# Patient Record
Sex: FEMALE | Race: WHITE | NOT HISPANIC OR LATINO | Employment: UNEMPLOYED | URBAN - NONMETROPOLITAN AREA
[De-identification: names, ages, dates, MRNs, and addresses within clinical notes are randomized per-mention and may not be internally consistent; named-entity substitution may affect disease eponyms.]

---

## 2017-09-02 ENCOUNTER — HOSPITAL ENCOUNTER (EMERGENCY)
Facility: HOSPITAL | Age: 22
Discharge: HOME | End: 2017-09-02
Attending: EMERGENCY MEDICINE | Admitting: EMERGENCY MEDICINE

## 2017-09-02 VITALS
HEART RATE: 102 BPM | TEMPERATURE: 98.1 F | RESPIRATION RATE: 20 BRPM | WEIGHT: 128 LBS | BODY MASS INDEX: 21.85 KG/M2 | DIASTOLIC BLOOD PRESSURE: 85 MMHG | OXYGEN SATURATION: 100 % | SYSTOLIC BLOOD PRESSURE: 130 MMHG | HEIGHT: 64 IN

## 2017-09-02 DIAGNOSIS — L03.114 CELLULITIS OF LEFT HAND: Primary | ICD-10-CM

## 2017-09-02 DIAGNOSIS — T80.818A INJECTION SITE EXTRAVASATION, INITIAL ENCOUNTER: ICD-10-CM

## 2017-09-02 PROCEDURE — 99284 EMERGENCY DEPT VISIT MOD MDM: CPT

## 2017-09-02 RX ORDER — CLINDAMYCIN HYDROCHLORIDE 150 MG/1
600 CAPSULE ORAL ONCE
Status: COMPLETED | OUTPATIENT
Start: 2017-09-02 | End: 2017-09-02

## 2017-09-02 RX ORDER — ACETAMINOPHEN 325 MG/1
1000 TABLET ORAL ONCE
Status: COMPLETED | OUTPATIENT
Start: 2017-09-02 | End: 2017-09-02

## 2017-09-02 RX ORDER — CLINDAMYCIN HYDROCHLORIDE 300 MG/1
300 CAPSULE ORAL 4 TIMES DAILY
Qty: 14 CAPSULE | Refills: 0 | OUTPATIENT
Start: 2017-09-02 | End: 2019-04-18

## 2017-09-02 RX ADMIN — CLINDAMYCIN HYDROCHLORIDE 600 MG: 150 CAPSULE ORAL at 04:24

## 2017-09-02 RX ADMIN — ACETAMINOPHEN 975 MG: 325 TABLET ORAL at 04:24

## 2017-09-02 NOTE — ED NOTES
Fetal Heart Tones found left suprapubic at 148bpm, provider made aware.      Mary De Dios RN  09/02/17 7408

## 2017-09-02 NOTE — ED PROVIDER NOTES
Subjective   Patient is a 21 y.o. female presenting with upper extremity pain.   History provided by:  Patient   used: No    Upper Extremity Issue   Location:  Hand  Hand location:  L hand  Injury: yes    Time since incident:  12 hours  Mechanism of injury comment:  Injected suboxone into left wrist   Pain details:     Quality:  Aching and throbbing    Radiates to:  L forearm and L arm    Severity:  Moderate    Onset quality:  Gradual    Timing:  Constant    Progression:  Worsening  Handedness:  Right-handed  Dislocation: no    Foreign body present:  No foreign bodies  Tetanus status:  Up to date  Prior injury to area:  No  Relieved by:  Nothing  Worsened by:  Nothing  Ineffective treatments:  None tried  Associated symptoms: numbness and swelling    Associated symptoms: no back pain, no fatigue, no fever, no muscle weakness and no neck pain    Risk factors: no concern for non-accidental trauma, no known bone disorder, no frequent fractures and no recent illness        Review of Systems   Constitutional: Negative.  Negative for fatigue and fever.   HENT: Negative.    Eyes: Negative.    Respiratory: Negative.    Cardiovascular: Negative.    Gastrointestinal: Negative.    Endocrine: Negative.    Genitourinary: Negative.    Musculoskeletal: Negative for back pain and neck pain.   Skin: Positive for color change.   Allergic/Immunologic: Negative.    Hematological: Negative.    Psychiatric/Behavioral: Negative.        History reviewed. No pertinent past medical history.    Allergies   Allergen Reactions   • Amoxicillin    • Penicillins    • Zofran [Ondansetron Hcl]        History reviewed. No pertinent surgical history.    Family History   Problem Relation Age of Onset   • No Known Problems Mother    • No Known Problems Father    • No Known Problems Sister    • No Known Problems Brother    • No Known Problems Son    • No Known Problems Daughter    • No Known Problems Maternal Grandmother    • No Known  Problems Maternal Grandfather    • No Known Problems Paternal Grandmother    • No Known Problems Paternal Grandfather    • No Known Problems Cousin    • Rheum arthritis Neg Hx    • Osteoarthritis Neg Hx    • Asthma Neg Hx    • Diabetes Neg Hx    • Heart failure Neg Hx    • Hyperlipidemia Neg Hx    • Hypertension Neg Hx    • Migraines Neg Hx    • Rashes / Skin problems Neg Hx    • Seizures Neg Hx    • Stroke Neg Hx    • Thyroid disease Neg Hx        Social History     Social History   • Marital status: Single     Spouse name: N/A   • Number of children: N/A   • Years of education: N/A     Social History Main Topics   • Smoking status: Current Every Day Smoker   • Smokeless tobacco: Never Used   • Alcohol use No   • Drug use: 3.00 per week     Special: IV   • Sexual activity: Yes     Other Topics Concern   • None     Social History Narrative   • None           Objective   Physical Exam   Constitutional: She is oriented to person, place, and time. She appears well-developed and well-nourished.   HENT:   Head: Normocephalic and atraumatic.   Nose: Nose normal.   Mouth/Throat: Oropharynx is clear and moist.   Eyes: EOM are normal. Pupils are equal, round, and reactive to light.   Neck: Normal range of motion. Neck supple.   Cardiovascular: Normal rate, regular rhythm, normal heart sounds and intact distal pulses.    Pulmonary/Chest: Effort normal and breath sounds normal.   Abdominal: Soft. Bowel sounds are normal.   Musculoskeletal: She exhibits edema.   Ulnar and radial pulses intact   Neurological: She is alert and oriented to person, place, and time.   Skin: Skin is warm and dry. There is erythema.   Erythema and ecchymosis left palm and left wrist   Psychiatric: She has a normal mood and affect. Her behavior is normal. Judgment and thought content normal.   Nursing note and vitals reviewed.      Procedures         ED Course  ED Course                  MDM  Number of Diagnoses or Management Options  Cellulitis of  left hand: new and does not require workup  Injection site extravasation, initial encounter: new and does not require workup  Risk of Complications, Morbidity, and/or Mortality  Presenting problems: moderate  Diagnostic procedures: moderate  Management options: moderate    Patient Progress  Patient progress: improved      Final diagnoses:   Cellulitis of left hand   Injection site extravasation, initial encounter            Benjamín eNal, APRN  09/02/17 0433

## 2017-12-30 ENCOUNTER — HOSPITAL ENCOUNTER (EMERGENCY)
Facility: HOSPITAL | Age: 22
Discharge: HOME | End: 2017-12-30
Attending: FAMILY MEDICINE | Admitting: FAMILY MEDICINE

## 2017-12-30 VITALS
BODY MASS INDEX: 24.8 KG/M2 | OXYGEN SATURATION: 100 % | HEART RATE: 80 BPM | WEIGHT: 140 LBS | DIASTOLIC BLOOD PRESSURE: 62 MMHG | TEMPERATURE: 98.2 F | SYSTOLIC BLOOD PRESSURE: 108 MMHG | HEIGHT: 63 IN | RESPIRATION RATE: 18 BRPM

## 2017-12-30 DIAGNOSIS — S31.41XA LACERATION OF VAGINA, INITIAL ENCOUNTER: Primary | ICD-10-CM

## 2017-12-30 PROCEDURE — 99283 EMERGENCY DEPT VISIT LOW MDM: CPT

## 2018-06-03 ENCOUNTER — HOSPITAL ENCOUNTER (EMERGENCY)
Facility: HOSPITAL | Age: 23
Discharge: LEFT BEFORE DISCHARGE | End: 2018-06-04

## 2018-06-04 VITALS
WEIGHT: 130 LBS | DIASTOLIC BLOOD PRESSURE: 75 MMHG | HEIGHT: 63 IN | BODY MASS INDEX: 23.04 KG/M2 | HEART RATE: 68 BPM | SYSTOLIC BLOOD PRESSURE: 114 MMHG | OXYGEN SATURATION: 98 % | RESPIRATION RATE: 18 BRPM | TEMPERATURE: 98 F

## 2018-06-04 NOTE — ED NOTES
"Patient reports she needs to leave because her  is ready to be discharged and she will go to [facility], encouraged patient to stay and be seen by a provider, patient states \" I need to go\". Patient signed AMA paper work and left ER.      Coretta Beckwith RN  06/04/18 0049    "

## 2018-06-04 NOTE — ED NOTES
"Patient reports she wants to leave and go to [facility] and be seen, inquired if there were any reasons that patient wanted to leave instead of staying to be seen, patient states \" I have two babies in the car with my mom waiting on me and I need to know how long this is gonna take? My  is on also in the ER he was bit by a spider as well, I need to talk to my  and then ill decided what to do\". Provider aware      Coretta Beckwith RN  06/04/18 0035    "

## 2018-10-29 ENCOUNTER — HOSPITAL ENCOUNTER (EMERGENCY)
Facility: HOSPITAL | Age: 23
Discharge: HOME | End: 2018-10-29
Attending: EMERGENCY MEDICINE | Admitting: EMERGENCY MEDICINE

## 2018-10-29 ENCOUNTER — APPOINTMENT (OUTPATIENT)
Dept: GENERAL RADIOLOGY | Facility: HOSPITAL | Age: 23
End: 2018-10-29

## 2018-10-29 VITALS
DIASTOLIC BLOOD PRESSURE: 60 MMHG | BODY MASS INDEX: 17.07 KG/M2 | RESPIRATION RATE: 28 BRPM | HEART RATE: 99 BPM | TEMPERATURE: 99.9 F | OXYGEN SATURATION: 95 % | HEIGHT: 64 IN | WEIGHT: 100 LBS | SYSTOLIC BLOOD PRESSURE: 111 MMHG

## 2018-10-29 DIAGNOSIS — J10.1 INFLUENZA A: Primary | ICD-10-CM

## 2018-10-29 LAB
ALBUMIN SERPL-MCNC: 4.6 G/DL (ref 3.5–5)
ALBUMIN/GLOB SERPL: 1.4 G/DL (ref 1.5–2.5)
ALP SERPL-CCNC: 71 U/L (ref 35–104)
ALT SERPL W P-5'-P-CCNC: 20 U/L (ref 10–36)
ANION GAP SERPL CALCULATED.3IONS-SCNC: 3.5 MMOL/L (ref 3.6–11.2)
AST SERPL-CCNC: 24 U/L (ref 10–30)
BACTERIA UR QL AUTO: ABNORMAL /HPF
BASOPHILS # BLD AUTO: 0.02 10*3/MM3 (ref 0–0.3)
BASOPHILS NFR BLD AUTO: 0.2 % (ref 0–2)
BILIRUB SERPL-MCNC: 1 MG/DL (ref 0.2–1.8)
BILIRUB UR QL STRIP: ABNORMAL
BUN BLD-MCNC: 8 MG/DL (ref 7–21)
BUN/CREAT SERPL: 10.8 (ref 7–25)
CALCIUM SPEC-SCNC: 9.4 MG/DL (ref 7.7–10)
CHLORIDE SERPL-SCNC: 107 MMOL/L (ref 99–112)
CLARITY UR: ABNORMAL
CO2 SERPL-SCNC: 26.5 MMOL/L (ref 24.3–31.9)
COLOR UR: ABNORMAL
CREAT BLD-MCNC: 0.74 MG/DL (ref 0.43–1.29)
D-LACTATE SERPL-SCNC: 1.2 MMOL/L (ref 0.5–2)
DEPRECATED RDW RBC AUTO: 43.4 FL (ref 37–54)
EOSINOPHIL # BLD AUTO: 0.02 10*3/MM3 (ref 0–0.7)
EOSINOPHIL NFR BLD AUTO: 0.2 % (ref 0–5)
ERYTHROCYTE [DISTWIDTH] IN BLOOD BY AUTOMATED COUNT: 13 % (ref 11.5–14.5)
FLUAV AG NPH QL: POSITIVE
FLUBV AG NPH QL IA: NEGATIVE
GFR SERPL CREATININE-BSD FRML MDRD: 96 ML/MIN/1.73
GLOBULIN UR ELPH-MCNC: 3.4 GM/DL
GLUCOSE BLD-MCNC: 122 MG/DL (ref 70–110)
GLUCOSE UR STRIP-MCNC: NEGATIVE MG/DL
HCG SERPL QL: NEGATIVE
HCT VFR BLD AUTO: 41.4 % (ref 37–47)
HGB BLD-MCNC: 13.8 G/DL (ref 12–16)
HGB UR QL STRIP.AUTO: ABNORMAL
HYALINE CASTS UR QL AUTO: ABNORMAL /LPF
IMM GRANULOCYTES # BLD: 0.01 10*3/MM3 (ref 0–0.03)
IMM GRANULOCYTES NFR BLD: 0.1 % (ref 0–0.5)
KETONES UR QL STRIP: ABNORMAL
LEUKOCYTE ESTERASE UR QL STRIP.AUTO: ABNORMAL
LYMPHOCYTES # BLD AUTO: 0.7 10*3/MM3 (ref 1–3)
LYMPHOCYTES NFR BLD AUTO: 8.1 % (ref 21–51)
MCH RBC QN AUTO: 30.8 PG (ref 27–33)
MCHC RBC AUTO-ENTMCNC: 33.3 G/DL (ref 33–37)
MCV RBC AUTO: 92.4 FL (ref 80–94)
MONOCYTES # BLD AUTO: 0.59 10*3/MM3 (ref 0.1–0.9)
MONOCYTES NFR BLD AUTO: 6.8 % (ref 0–10)
NEUTROPHILS # BLD AUTO: 7.33 10*3/MM3 (ref 1.4–6.5)
NEUTROPHILS NFR BLD AUTO: 84.6 % (ref 30–70)
NITRITE UR QL STRIP: NEGATIVE
OSMOLALITY SERPL CALC.SUM OF ELEC: 273.5 MOSM/KG (ref 273–305)
PH UR STRIP.AUTO: 6 [PH] (ref 5–8)
PLATELET # BLD AUTO: 136 10*3/MM3 (ref 130–400)
PMV BLD AUTO: 11 FL (ref 6–10)
POTASSIUM BLD-SCNC: 4.1 MMOL/L (ref 3.5–5.3)
PROT SERPL-MCNC: 8 G/DL (ref 6–8)
PROT UR QL STRIP: ABNORMAL
RBC # BLD AUTO: 4.48 10*6/MM3 (ref 4.2–5.4)
RBC # UR: ABNORMAL /HPF
REF LAB TEST METHOD: ABNORMAL
S PYO AG THROAT QL: NEGATIVE
SODIUM BLD-SCNC: 137 MMOL/L (ref 135–153)
SP GR UR STRIP: >1.03 (ref 1–1.03)
SQUAMOUS #/AREA URNS HPF: ABNORMAL /HPF
UROBILINOGEN UR QL STRIP: ABNORMAL
WBC NRBC COR # BLD: 8.67 10*3/MM3 (ref 4.5–12.5)
WBC UR QL AUTO: ABNORMAL /HPF

## 2018-10-29 PROCEDURE — 87880 STREP A ASSAY W/OPTIC: CPT | Performed by: PHYSICIAN ASSISTANT

## 2018-10-29 PROCEDURE — 94640 AIRWAY INHALATION TREATMENT: CPT

## 2018-10-29 PROCEDURE — 71045 X-RAY EXAM CHEST 1 VIEW: CPT | Performed by: RADIOLOGY

## 2018-10-29 PROCEDURE — 94799 UNLISTED PULMONARY SVC/PX: CPT

## 2018-10-29 PROCEDURE — 80053 COMPREHEN METABOLIC PANEL: CPT | Performed by: PHYSICIAN ASSISTANT

## 2018-10-29 PROCEDURE — 87804 INFLUENZA ASSAY W/OPTIC: CPT | Performed by: PHYSICIAN ASSISTANT

## 2018-10-29 PROCEDURE — 83605 ASSAY OF LACTIC ACID: CPT | Performed by: PHYSICIAN ASSISTANT

## 2018-10-29 PROCEDURE — 84703 CHORIONIC GONADOTROPIN ASSAY: CPT | Performed by: PHYSICIAN ASSISTANT

## 2018-10-29 PROCEDURE — 25010000002 DEXAMETHASONE PER 1 MG: Performed by: PHYSICIAN ASSISTANT

## 2018-10-29 PROCEDURE — 87081 CULTURE SCREEN ONLY: CPT | Performed by: PHYSICIAN ASSISTANT

## 2018-10-29 PROCEDURE — 96372 THER/PROPH/DIAG INJ SC/IM: CPT

## 2018-10-29 PROCEDURE — 99284 EMERGENCY DEPT VISIT MOD MDM: CPT

## 2018-10-29 PROCEDURE — 81001 URINALYSIS AUTO W/SCOPE: CPT | Performed by: PHYSICIAN ASSISTANT

## 2018-10-29 PROCEDURE — 36415 COLL VENOUS BLD VENIPUNCTURE: CPT

## 2018-10-29 PROCEDURE — 85025 COMPLETE CBC W/AUTO DIFF WBC: CPT | Performed by: PHYSICIAN ASSISTANT

## 2018-10-29 PROCEDURE — 71045 X-RAY EXAM CHEST 1 VIEW: CPT

## 2018-10-29 PROCEDURE — 87040 BLOOD CULTURE FOR BACTERIA: CPT | Performed by: PHYSICIAN ASSISTANT

## 2018-10-29 PROCEDURE — P9612 CATHETERIZE FOR URINE SPEC: HCPCS

## 2018-10-29 RX ORDER — METHYLPREDNISOLONE SODIUM SUCCINATE 125 MG/2ML
125 INJECTION, POWDER, LYOPHILIZED, FOR SOLUTION INTRAMUSCULAR; INTRAVENOUS ONCE
Status: DISCONTINUED | OUTPATIENT
Start: 2018-10-29 | End: 2018-10-29

## 2018-10-29 RX ORDER — OSELTAMIVIR PHOSPHATE 75 MG/1
75 CAPSULE ORAL 2 TIMES DAILY
Qty: 10 CAPSULE | Refills: 0 | OUTPATIENT
Start: 2018-10-29 | End: 2019-04-18

## 2018-10-29 RX ORDER — OSELTAMIVIR PHOSPHATE 75 MG/1
75 CAPSULE ORAL ONCE
Status: COMPLETED | OUTPATIENT
Start: 2018-10-29 | End: 2018-10-29

## 2018-10-29 RX ORDER — PREDNISONE 20 MG/1
60 TABLET ORAL ONCE
Status: DISCONTINUED | OUTPATIENT
Start: 2018-10-29 | End: 2018-10-29

## 2018-10-29 RX ORDER — ALBUTEROL SULFATE 2.5 MG/3ML
2.5 SOLUTION RESPIRATORY (INHALATION) ONCE
Status: COMPLETED | OUTPATIENT
Start: 2018-10-29 | End: 2018-10-29

## 2018-10-29 RX ORDER — SODIUM CHLORIDE 0.9 % (FLUSH) 0.9 %
10 SYRINGE (ML) INJECTION AS NEEDED
Status: DISCONTINUED | OUTPATIENT
Start: 2018-10-29 | End: 2018-10-29 | Stop reason: HOSPADM

## 2018-10-29 RX ORDER — DEXAMETHASONE SODIUM PHOSPHATE 4 MG/ML
8 INJECTION, SOLUTION INTRA-ARTICULAR; INTRALESIONAL; INTRAMUSCULAR; INTRAVENOUS; SOFT TISSUE ONCE
Status: COMPLETED | OUTPATIENT
Start: 2018-10-29 | End: 2018-10-29

## 2018-10-29 RX ADMIN — DEXAMETHASONE SODIUM PHOSPHATE 8 MG: 4 INJECTION, SOLUTION INTRAMUSCULAR; INTRAVENOUS at 19:49

## 2018-10-29 RX ADMIN — OSELTAMIVIR PHOSPHATE 75 MG: 75 CAPSULE ORAL at 19:49

## 2018-10-29 RX ADMIN — ALBUTEROL SULFATE 2.5 MG: 2.5 SOLUTION RESPIRATORY (INHALATION) at 16:54

## 2018-10-31 LAB — BACTERIA SPEC AEROBE CULT: NORMAL

## 2018-11-03 LAB — BACTERIA SPEC AEROBE CULT: NORMAL

## 2019-04-18 ENCOUNTER — HOSPITAL ENCOUNTER (EMERGENCY)
Facility: HOSPITAL | Age: 24
Discharge: HOME | End: 2019-04-18
Attending: EMERGENCY MEDICINE | Admitting: EMERGENCY MEDICINE

## 2019-04-18 VITALS
SYSTOLIC BLOOD PRESSURE: 125 MMHG | OXYGEN SATURATION: 98 % | BODY MASS INDEX: 21.97 KG/M2 | TEMPERATURE: 98.4 F | DIASTOLIC BLOOD PRESSURE: 52 MMHG | HEART RATE: 88 BPM | HEIGHT: 67 IN | WEIGHT: 140 LBS | RESPIRATION RATE: 20 BRPM

## 2019-04-18 DIAGNOSIS — Z34.90 PREGNANCY, UNSPECIFIED GESTATIONAL AGE: Primary | ICD-10-CM

## 2019-04-18 LAB — HCG SERPL QL: POSITIVE

## 2019-04-18 PROCEDURE — 36415 COLL VENOUS BLD VENIPUNCTURE: CPT

## 2019-04-18 PROCEDURE — 84703 CHORIONIC GONADOTROPIN ASSAY: CPT | Performed by: EMERGENCY MEDICINE

## 2019-04-18 PROCEDURE — 99283 EMERGENCY DEPT VISIT LOW MDM: CPT

## 2019-04-22 ENCOUNTER — HOSPITAL ENCOUNTER (EMERGENCY)
Facility: HOSPITAL | Age: 24
Discharge: HOME | End: 2019-04-23
Attending: FAMILY MEDICINE | Admitting: FAMILY MEDICINE

## 2019-04-22 ENCOUNTER — APPOINTMENT (OUTPATIENT)
Dept: ULTRASOUND IMAGING | Facility: HOSPITAL | Age: 24
End: 2019-04-22

## 2019-04-22 DIAGNOSIS — O46.8X2 SUBCHORIONIC HEMORRHAGE OF PLACENTA IN SECOND TRIMESTER, SINGLE OR UNSPECIFIED FETUS: Primary | ICD-10-CM

## 2019-04-22 DIAGNOSIS — O41.8X20 SUBCHORIONIC HEMORRHAGE OF PLACENTA IN SECOND TRIMESTER, SINGLE OR UNSPECIFIED FETUS: Primary | ICD-10-CM

## 2019-04-22 DIAGNOSIS — N93.9 VAGINAL BLEEDING: ICD-10-CM

## 2019-04-22 DIAGNOSIS — O23.42 URINARY TRACT INFECTION IN MOTHER DURING SECOND TRIMESTER OF PREGNANCY: ICD-10-CM

## 2019-04-22 LAB
6-ACETYL MORPHINE: NEGATIVE
ABO GROUP BLD: NORMAL
ALBUMIN SERPL-MCNC: 3.1 G/DL (ref 3.5–5.2)
ALBUMIN/GLOB SERPL: 1 G/DL
ALP SERPL-CCNC: 51 U/L (ref 39–117)
ALT SERPL W P-5'-P-CCNC: 8 U/L (ref 1–33)
AMPHET+METHAMPHET UR QL: NEGATIVE
ANION GAP SERPL CALCULATED.3IONS-SCNC: 10.2 MMOL/L
AST SERPL-CCNC: 11 U/L (ref 1–32)
BACTERIA UR QL AUTO: ABNORMAL /HPF
BARBITURATES UR QL SCN: NEGATIVE
BASOPHILS # BLD AUTO: 0.02 10*3/MM3 (ref 0–0.2)
BASOPHILS NFR BLD AUTO: 0.4 % (ref 0–1.5)
BENZODIAZ UR QL SCN: NEGATIVE
BILIRUB SERPL-MCNC: 0.4 MG/DL (ref 0.2–1.2)
BILIRUB UR QL STRIP: NEGATIVE
BLD GP AB SCN SERPL QL: NEGATIVE
BUN BLD-MCNC: 6 MG/DL (ref 6–20)
BUN/CREAT SERPL: 13.6 (ref 7–25)
BUPRENORPHINE SERPL-MCNC: POSITIVE NG/ML
CALCIUM SPEC-SCNC: 8.4 MG/DL (ref 8.6–10.5)
CANNABINOIDS SERPL QL: NEGATIVE
CHLORIDE SERPL-SCNC: 103 MMOL/L (ref 98–107)
CLARITY UR: ABNORMAL
CO2 SERPL-SCNC: 22.8 MMOL/L (ref 22–29)
COCAINE UR QL: NEGATIVE
COLOR UR: YELLOW
CREAT BLD-MCNC: 0.44 MG/DL (ref 0.57–1)
DEPRECATED RDW RBC AUTO: 43.4 FL (ref 37–54)
EOSINOPHIL # BLD AUTO: 0.07 10*3/MM3 (ref 0–0.4)
EOSINOPHIL NFR BLD AUTO: 1.3 % (ref 0.3–6.2)
ERYTHROCYTE [DISTWIDTH] IN BLOOD BY AUTOMATED COUNT: 13.5 % (ref 12.3–15.4)
GFR SERPL CREATININE-BSD FRML MDRD: >150 ML/MIN/1.73
GLOBULIN UR ELPH-MCNC: 3.1 GM/DL
GLUCOSE BLD-MCNC: 85 MG/DL (ref 65–99)
GLUCOSE UR STRIP-MCNC: NEGATIVE MG/DL
HCG INTACT+B SERPL-ACNC: NORMAL MIU/ML
HCT VFR BLD AUTO: 32.1 % (ref 34–46.6)
HGB BLD-MCNC: 10.9 G/DL (ref 12–15.9)
HGB UR QL STRIP.AUTO: ABNORMAL
HYALINE CASTS UR QL AUTO: ABNORMAL /LPF
IMM GRANULOCYTES # BLD AUTO: 0.03 10*3/MM3 (ref 0–0.05)
IMM GRANULOCYTES NFR BLD AUTO: 0.6 % (ref 0–0.5)
KETONES UR QL STRIP: NEGATIVE
LEUKOCYTE ESTERASE UR QL STRIP.AUTO: NEGATIVE
LYMPHOCYTES # BLD AUTO: 1.84 10*3/MM3 (ref 0.7–3.1)
LYMPHOCYTES NFR BLD AUTO: 34.3 % (ref 19.6–45.3)
MCH RBC QN AUTO: 31.1 PG (ref 26.6–33)
MCHC RBC AUTO-ENTMCNC: 34 G/DL (ref 31.5–35.7)
MCV RBC AUTO: 91.5 FL (ref 79–97)
METHADONE UR QL SCN: NEGATIVE
MONOCYTES # BLD AUTO: 0.4 10*3/MM3 (ref 0.1–0.9)
MONOCYTES NFR BLD AUTO: 7.4 % (ref 5–12)
NEUTROPHILS # BLD AUTO: 3.01 10*3/MM3 (ref 1.7–7)
NEUTROPHILS NFR BLD AUTO: 56 % (ref 42.7–76)
NITRITE UR QL STRIP: POSITIVE
NUMBER OF DOSES: NORMAL
OPIATES UR QL: NEGATIVE
OXYCODONE UR QL SCN: NEGATIVE
PCP UR QL SCN: NEGATIVE
PH UR STRIP.AUTO: 8.5 [PH] (ref 5–8)
PLATELET # BLD AUTO: 169 10*3/MM3 (ref 140–450)
PMV BLD AUTO: 10.6 FL (ref 6–12)
POTASSIUM BLD-SCNC: 3.9 MMOL/L (ref 3.5–5.2)
PROT SERPL-MCNC: 6.2 G/DL (ref 6–8.5)
PROT UR QL STRIP: NEGATIVE
RBC # BLD AUTO: 3.51 10*6/MM3 (ref 3.77–5.28)
RBC # UR: ABNORMAL /HPF
REF LAB TEST METHOD: ABNORMAL
RH BLD: NEGATIVE
SODIUM BLD-SCNC: 136 MMOL/L (ref 136–145)
SP GR UR STRIP: 1.01 (ref 1–1.03)
SQUAMOUS #/AREA URNS HPF: ABNORMAL /HPF
UROBILINOGEN UR QL STRIP: ABNORMAL
WBC NRBC COR # BLD: 5.37 10*3/MM3 (ref 3.4–10.8)
WBC UR QL AUTO: ABNORMAL /HPF

## 2019-04-22 PROCEDURE — 80307 DRUG TEST PRSMV CHEM ANLYZR: CPT | Performed by: PHYSICIAN ASSISTANT

## 2019-04-22 PROCEDURE — P9612 CATHETERIZE FOR URINE SPEC: HCPCS

## 2019-04-22 PROCEDURE — 84702 CHORIONIC GONADOTROPIN TEST: CPT | Performed by: PHYSICIAN ASSISTANT

## 2019-04-22 PROCEDURE — 85025 COMPLETE CBC W/AUTO DIFF WBC: CPT | Performed by: PHYSICIAN ASSISTANT

## 2019-04-22 PROCEDURE — 96372 THER/PROPH/DIAG INJ SC/IM: CPT

## 2019-04-22 PROCEDURE — 86901 BLOOD TYPING SEROLOGIC RH(D): CPT | Performed by: PHYSICIAN ASSISTANT

## 2019-04-22 PROCEDURE — 99283 EMERGENCY DEPT VISIT LOW MDM: CPT

## 2019-04-22 PROCEDURE — 76805 OB US >/= 14 WKS SNGL FETUS: CPT

## 2019-04-22 PROCEDURE — 81001 URINALYSIS AUTO W/SCOPE: CPT | Performed by: PHYSICIAN ASSISTANT

## 2019-04-22 PROCEDURE — 76817 TRANSVAGINAL US OBSTETRIC: CPT

## 2019-04-22 PROCEDURE — 76805 OB US >/= 14 WKS SNGL FETUS: CPT | Performed by: RADIOLOGY

## 2019-04-22 PROCEDURE — 86850 RBC ANTIBODY SCREEN: CPT | Performed by: PHYSICIAN ASSISTANT

## 2019-04-22 PROCEDURE — 86900 BLOOD TYPING SEROLOGIC ABO: CPT | Performed by: PHYSICIAN ASSISTANT

## 2019-04-22 PROCEDURE — 80053 COMPREHEN METABOLIC PANEL: CPT | Performed by: PHYSICIAN ASSISTANT

## 2019-04-22 RX ORDER — SODIUM CHLORIDE 0.9 % (FLUSH) 0.9 %
10 SYRINGE (ML) INJECTION AS NEEDED
Status: CANCELLED | OUTPATIENT
Start: 2019-04-22

## 2019-04-22 RX ORDER — SODIUM CHLORIDE 0.9 % (FLUSH) 0.9 %
10 SYRINGE (ML) INJECTION AS NEEDED
Status: DISCONTINUED | OUTPATIENT
Start: 2019-04-22 | End: 2019-04-23 | Stop reason: HOSPADM

## 2019-04-22 RX ADMIN — SODIUM CHLORIDE 1000 ML: 9 INJECTION, SOLUTION INTRAVENOUS at 21:05

## 2019-04-23 VITALS
BODY MASS INDEX: 22.88 KG/M2 | HEART RATE: 94 BPM | OXYGEN SATURATION: 100 % | DIASTOLIC BLOOD PRESSURE: 62 MMHG | HEIGHT: 64 IN | TEMPERATURE: 98 F | WEIGHT: 134 LBS | SYSTOLIC BLOOD PRESSURE: 92 MMHG | RESPIRATION RATE: 18 BRPM

## 2019-04-23 PROCEDURE — 96372 THER/PROPH/DIAG INJ SC/IM: CPT

## 2019-04-23 PROCEDURE — 25010000003 RHO D IMMUNE GLOBULIN 1500 UNITS SOLUTION PREFILLED SYRINGE: Performed by: PHYSICIAN ASSISTANT

## 2019-04-23 RX ORDER — NITROFURANTOIN 25; 75 MG/1; MG/1
100 CAPSULE ORAL ONCE
Status: COMPLETED | OUTPATIENT
Start: 2019-04-23 | End: 2019-04-23

## 2019-04-23 RX ORDER — NITROFURANTOIN 25; 75 MG/1; MG/1
100 CAPSULE ORAL EVERY 12 HOURS SCHEDULED
Qty: 14 CAPSULE | Refills: 0 | Status: SHIPPED | OUTPATIENT
Start: 2019-04-23 | End: 2019-04-30

## 2019-04-23 RX ADMIN — NITROFURANTOIN MONOHYDRATE/MACROCRYSTALLINE 100 MG: 25; 75 CAPSULE ORAL at 00:16

## 2019-04-23 RX ADMIN — HUMAN RHO(D) IMMUNE GLOBULIN 300 MCG: 300 INJECTION, SOLUTION INTRAMUSCULAR at 00:15

## 2019-10-13 ENCOUNTER — HOSPITAL ENCOUNTER (OUTPATIENT)
Facility: HOSPITAL | Age: 24
Discharge: HOME | End: 2019-10-14
Attending: OBSTETRICS & GYNECOLOGY | Admitting: OBSTETRICS & GYNECOLOGY

## 2019-10-13 VITALS
WEIGHT: 139 LBS | HEART RATE: 71 BPM | BODY MASS INDEX: 24.63 KG/M2 | DIASTOLIC BLOOD PRESSURE: 75 MMHG | TEMPERATURE: 98.2 F | SYSTOLIC BLOOD PRESSURE: 115 MMHG | RESPIRATION RATE: 18 BRPM | HEIGHT: 63 IN

## 2019-10-13 PROCEDURE — 59025 FETAL NON-STRESS TEST: CPT

## 2019-10-13 PROCEDURE — G0463 HOSPITAL OUTPT CLINIC VISIT: HCPCS

## 2019-10-13 RX ORDER — MULTIPLE VITAMINS W/ MINERALS TAB 9MG-400MCG
1 TAB ORAL DAILY
COMMUNITY
End: 2021-10-10

## 2019-10-14 PROBLEM — Z34.90 PREGNANT: Status: ACTIVE | Noted: 2019-10-14

## 2019-10-14 NOTE — DISCHARGE SUMMARY
Discharge Summary    Admit Date: 10/13/2019 10:42 PM    Admit Diagnosis: Pregnant [Z34.90]    Date of Discharge:  10/14/2019    Discharge Diagnosis: Same        Hospital Course  Patient is a 23 y.o. female, G 4 Patient was seen due to contractions.  Patient doing better. Symptoms have improved. Patient tolerating a regular diet and ambulating without difficulty. Will discharge to home today.         Vital Signs  Temp:  [98.2 °F (36.8 °C)] 98.2 °F (36.8 °C)  Heart Rate:  [71] 71  Resp:  [18] 18  BP: (115)/(75) 115/75    Review of Systems    The following systems were reviewed and negative;  ENT, respiratory, cardiovascular, gastrointestinal, genitourinary, breast, endocrine and allergies / immunologic.      Physical Exam:      General Appearance:    Alert, cooperative, in no acute distress   Head:    Normocephalic, without obvious abnormality, atraumatic   Eyes:            Lids and lashes normal, conjunctivae and sclerae normal, no   icterus, no pallor, corneas clear, PERRLA   Ears:    Ears appear intact with no abnormalities noted   Throat:   No oral lesions, no thrush, oral mucosa moist   Neck:   No adenopathy, supple, trachea midline, no thyromegaly, no     carotid bruit, no JVD   Back:     No kyphosis present, no scoliosis present, no skin lesions,       erythema or scars, no tenderness to percussion or                   palpation,   range of motion normal   Lungs:     Clear to auscultation,respirations regular, even and                   unlabored    Heart:    Regular rhythm and normal rate, normal S1 and S2, no            murmur, no gallop, no rub, no click   Breast Exam:    Deferred   Abdomen:     Normal bowel sounds, no masses, no organomegaly, soft        non-tender, gravid uterus, no guarding, no rebound                 tenderness   Genitalia:    Deferred   Extremities:   Moves all extremities well, no edema, no cyanosis, no              redness   Pulses:   Pulses palpable and equal bilaterally   Skin:   No  bleeding, bruising or rash   Lymph nodes:   No palpable adenopathy   Neurologic:   Cranial nerves 2 - 12 grossly intact, sensation intact, DTR        present and equal bilaterally             Condition on Discharge:  Stable    Urine Output Good    Discharge Diet: Regular    Follow-up Appointments  Patient will follow up in clinic this week.        Yaquelin Espinal DO  10/14/19  3:33 AM

## 2019-10-14 NOTE — H&P
W[facility] Jules  Obstetric History and Physical    Chief Complaint   Patient presents with   • Abdominal Pain     PT PRESENTS TO LABOR AND DELIVERY WITH C/O PRESSURE. PT DENIES VAG BLEEDING. BABY IS ACTIVE.        Subjective     Patient is a 23 y.o. female  currently at [id number], who presents with contractions.    Her prenatal care is benign.  Her previous obstetric/gynecological history is noted for is non-contributory.    The following portions of the patients history were reviewed and updated as appropriate: current medications, allergies, past medical history, past surgical history, past family history, past social history and problem list .       Prenatal Information:   Maternal Prenatal Labs  Blood Type No results found for: ABO   Rh Status No results found for: RH   Antibody Screen No results found for: ABSCRN   Rapid Urine Drug Screen No results found for: AMPMETHU, BARBITSCNUR, LABBENZSCN, LABMETHSCN, LABOPIASCN, THCURSCR, COCAINEUR, AMPHETSCREEN, PROPOXSCN, BUPRENORSCNU, METAMPSCNUR, OXYCODONESCN, TRICYCLICSCN   Group B Strep Culture No results found for: GBSANTIGEN           External Prenatal Results    The patient does not have a working CHU. Some results will not display without a working CHU.   Pregnancy Outside Results - Transcribed From Office Records - See Scanned Records For Details     Test Value Date Time    Hgb       Hct       ABO AB  19    Rh Negative  19    Antibody Screen       Glucose Fasting GTT       Glucose Tolerance Test 1 hour       Glucose Tolerance Test 3 hour       Gonorrhea (discrete)       Chlamydia (discrete)       RPR       VDRL       Syphilis Antibody       Rubella       HBsAg       Herpes Simplex Virus PCR       Herpes Simplex VIrus Culture       HIV       Hep C RNA Quant PCR       Hep C Antibody       AFP       Group B Strep       GBS Susceptibility to Clindamycin       GBS Susceptibility to Erythromycin       Fetal Fibronectin       Genetic Testing,  Maternal Blood             Drug Screening     Test Value Date Time    Urine Drug Screen       Amphetamine Screen       Barbiturate Screen       Benzodiazepine Screen       Methadone Screen       Phencyclidine Screen       Opiates Screen       THC Screen       Cocaine Screen       Propoxyphene Screen       Buprenorphine Screen       Methamphetamine Screen       Oxycodone Screen       Tricyclic Antidepressants Screen                     Past OB History:     Obstetric History       T1      L0     SAB0   TAB0   Ectopic0   Molar0   Multiple0   Live Births0       # Outcome Date GA Lbr Crow/2nd Weight Sex Delivery Anes PTL Lv   4 Current            3 Term 02/10/18      EPI     2  12/15/14      EPI     1 AB 2010 5w0d    SAB             Past Medical History: History reviewed. No pertinent past medical history.   Past Surgical History History reviewed. No pertinent surgical history.   Family History: Family History   Problem Relation Age of Onset   • Hypertension Mother    • Cancer Father    • No Known Problems Sister    • No Known Problems Brother    • No Known Problems Son    • No Known Problems Daughter    • Hypertension Maternal Grandmother    • Diabetes Maternal Grandfather    • No Known Problems Paternal Grandmother    • No Known Problems Paternal Grandfather    • No Known Problems Cousin    • Diabetes Maternal Aunt    • Rheum arthritis Neg Hx    • Osteoarthritis Neg Hx    • Asthma Neg Hx    • Heart failure Neg Hx    • Hyperlipidemia Neg Hx    • Migraines Neg Hx    • Rashes / Skin problems Neg Hx    • Seizures Neg Hx    • Stroke Neg Hx    • Thyroid disease Neg Hx       Social History:  reports that she has been smoking.  She has never used smokeless tobacco.   reports that she does not drink alcohol.   reports that she uses drugs. Drug: IV. Frequency: 3.00 times per week.        Review of Systems      Objective     Vital Signs Range for the last 24 hours  Temperature: Temp:  [98.2 °F (36.8 °C)] 98.2  °F (36.8 °C)   Temp Source: Temp src: Oral   BP: BP: (115)/(75) 115/75   Pulse: Heart Rate:  [71] 71   Respirations: Resp:  [18] 18   Weight: Weight:  [63 kg (139 lb)] 63 kg (139 lb)     Physical Examination: General appearance - alert, well appearing, and in no distress  Abdomen - soft, nontender, nondistended, no masses or organomegaly  Extremities - no pedal edema noted    Presentation: cephalic   Cervix: Exam by:     Dilation:  1cm       Assessment/Plan       Pregnant      Assessment & Plan    Assessment:  1.  Intrauterine pregnancy at Unknown weeks gestation with reactive fetal status.    2.  Latent labor- pt reassured and will f/u in office this week as already scheduled.  Labor precautions reviewed.   3Tobacco dependency- declines cessation counseling.       Yaquelin Espinal DO  10/14/2019  3:29 AM

## 2019-10-14 NOTE — NON STRESS TEST
Sailaja Alarcon, a  at Unknown with an CHU of Not found., was seen at [facility] LABOR DELIVERY for a nonstress test.    Chief Complaint   Patient presents with   • Abdominal Pain     PT PRESENTS TO LABOR AND DELIVERY WITH C/O PRESSURE. PT DENIES VAG BLEEDING. BABY IS ACTIVE.        There is no problem list on file for this patient.      Start Time: 2255  Stop Time: 2305

## 2019-10-18 ENCOUNTER — APPOINTMENT (OUTPATIENT)
Dept: ULTRASOUND IMAGING | Facility: HOSPITAL | Age: 24
End: 2019-10-18

## 2019-10-18 ENCOUNTER — HOSPITAL ENCOUNTER (INPATIENT)
Facility: HOSPITAL | Age: 24
LOS: 2 days | Discharge: HOME | End: 2019-10-21
Attending: OBSTETRICS & GYNECOLOGY | Admitting: OBSTETRICS & GYNECOLOGY

## 2019-10-18 LAB
6-ACETYL MORPHINE: NEGATIVE
ABO GROUP BLD: NORMAL
ALBUMIN SERPL-MCNC: 4.16 G/DL (ref 3.5–5.2)
ALBUMIN/GLOB SERPL: 1 G/DL
ALP SERPL-CCNC: 158 U/L (ref 39–117)
ALT SERPL W P-5'-P-CCNC: 8 U/L (ref 1–33)
AMPHET+METHAMPHET UR QL: NEGATIVE
ANION GAP SERPL CALCULATED.3IONS-SCNC: 14.7 MMOL/L (ref 5–15)
AST SERPL-CCNC: 12 U/L (ref 1–32)
BACTERIA UR QL AUTO: ABNORMAL /HPF
BARBITURATES UR QL SCN: NEGATIVE
BASOPHILS # BLD AUTO: 0.03 10*3/MM3 (ref 0–0.2)
BASOPHILS NFR BLD AUTO: 0.4 % (ref 0–1.5)
BENZODIAZ UR QL SCN: NEGATIVE
BILIRUB SERPL-MCNC: 0.4 MG/DL (ref 0.2–1.2)
BILIRUB UR QL STRIP: ABNORMAL
BLD GP AB SCN SERPL QL: NEGATIVE
BUN BLD-MCNC: 10 MG/DL (ref 6–20)
BUN/CREAT SERPL: 14.5 (ref 7–25)
BUPRENORPHINE SERPL-MCNC: POSITIVE NG/ML
CALCIUM SPEC-SCNC: 9.5 MG/DL (ref 8.6–10.5)
CANNABINOIDS SERPL QL: NEGATIVE
CHLORIDE SERPL-SCNC: 99 MMOL/L (ref 98–107)
CLARITY UR: ABNORMAL
CO2 SERPL-SCNC: 23.3 MMOL/L (ref 22–29)
COCAINE UR QL: NEGATIVE
COLOR UR: ABNORMAL
CREAT BLD-MCNC: 0.69 MG/DL (ref 0.57–1)
DEPRECATED RDW RBC AUTO: 41.3 FL (ref 37–54)
EOSINOPHIL # BLD AUTO: 0.06 10*3/MM3 (ref 0–0.4)
EOSINOPHIL NFR BLD AUTO: 0.8 % (ref 0.3–6.2)
ERYTHROCYTE [DISTWIDTH] IN BLOOD BY AUTOMATED COUNT: 12.6 % (ref 12.3–15.4)
GFR SERPL CREATININE-BSD FRML MDRD: 104 ML/MIN/1.73
GLOBULIN UR ELPH-MCNC: 4.2 GM/DL
GLUCOSE BLD-MCNC: 69 MG/DL (ref 65–99)
GLUCOSE UR STRIP-MCNC: NEGATIVE MG/DL
HBV SURFACE AG SERPL QL IA: NORMAL
HBV SURFACE AG SERPL QL IA: NORMAL
HCT VFR BLD AUTO: 39.8 % (ref 34–46.6)
HCV AB SER DONR QL: REACTIVE
HCV AB SER DONR QL: REACTIVE
HGB BLD-MCNC: 13.2 G/DL (ref 12–15.9)
HGB UR QL STRIP.AUTO: ABNORMAL
HIV1+2 AB SER QL: NORMAL
HYALINE CASTS UR QL AUTO: ABNORMAL /LPF
IMM GRANULOCYTES # BLD AUTO: 0.04 10*3/MM3 (ref 0–0.05)
IMM GRANULOCYTES NFR BLD AUTO: 0.5 % (ref 0–0.5)
KETONES UR QL STRIP: ABNORMAL
LEUKOCYTE ESTERASE UR QL STRIP.AUTO: ABNORMAL
LYMPHOCYTES # BLD AUTO: 2.84 10*3/MM3 (ref 0.7–3.1)
LYMPHOCYTES NFR BLD AUTO: 35.9 % (ref 19.6–45.3)
MCH RBC QN AUTO: 30.8 PG (ref 26.6–33)
MCHC RBC AUTO-ENTMCNC: 33.2 G/DL (ref 31.5–35.7)
MCV RBC AUTO: 92.8 FL (ref 79–97)
METHADONE UR QL SCN: NEGATIVE
MONOCYTES # BLD AUTO: 0.49 10*3/MM3 (ref 0.1–0.9)
MONOCYTES NFR BLD AUTO: 6.2 % (ref 5–12)
NEUTROPHILS # BLD AUTO: 4.46 10*3/MM3 (ref 1.7–7)
NEUTROPHILS NFR BLD AUTO: 56.2 % (ref 42.7–76)
NITRITE UR QL STRIP: NEGATIVE
OPIATES UR QL: NEGATIVE
OXYCODONE UR QL SCN: NEGATIVE
PCP UR QL SCN: NEGATIVE
PH UR STRIP.AUTO: 6.5 [PH] (ref 5–8)
PLATELET # BLD AUTO: 207 10*3/MM3 (ref 140–450)
PMV BLD AUTO: 10.9 FL (ref 6–12)
POTASSIUM BLD-SCNC: 3.9 MMOL/L (ref 3.5–5.2)
PROT SERPL-MCNC: 8.4 G/DL (ref 6–8.5)
PROT UR QL STRIP: ABNORMAL
RBC # BLD AUTO: 4.29 10*6/MM3 (ref 3.77–5.28)
RBC # UR: ABNORMAL /HPF
REF LAB TEST METHOD: ABNORMAL
RH BLD: NEGATIVE
SODIUM BLD-SCNC: 137 MMOL/L (ref 136–145)
SP GR UR STRIP: 1.03 (ref 1–1.03)
SQUAMOUS #/AREA URNS HPF: ABNORMAL /HPF
T&S EXPIRATION DATE: NORMAL
UROBILINOGEN UR QL STRIP: ABNORMAL
WBC NRBC COR # BLD: 7.92 10*3/MM3 (ref 3.4–10.8)
WBC UR QL AUTO: ABNORMAL /HPF

## 2019-10-18 PROCEDURE — 80307 DRUG TEST PRSMV CHEM ANLYZR: CPT | Performed by: OBSTETRICS & GYNECOLOGY

## 2019-10-18 PROCEDURE — 85025 COMPLETE CBC W/AUTO DIFF WBC: CPT | Performed by: OBSTETRICS & GYNECOLOGY

## 2019-10-18 PROCEDURE — 36415 COLL VENOUS BLD VENIPUNCTURE: CPT | Performed by: OBSTETRICS & GYNECOLOGY

## 2019-10-18 PROCEDURE — 86850 RBC ANTIBODY SCREEN: CPT | Performed by: OBSTETRICS & GYNECOLOGY

## 2019-10-18 PROCEDURE — 86803 HEPATITIS C AB TEST: CPT | Performed by: OBSTETRICS & GYNECOLOGY

## 2019-10-18 PROCEDURE — 86901 BLOOD TYPING SEROLOGIC RH(D): CPT | Performed by: OBSTETRICS & GYNECOLOGY

## 2019-10-18 PROCEDURE — 76819 FETAL BIOPHYS PROFIL W/O NST: CPT

## 2019-10-18 PROCEDURE — 86762 RUBELLA ANTIBODY: CPT | Performed by: OBSTETRICS & GYNECOLOGY

## 2019-10-18 PROCEDURE — 87081 CULTURE SCREEN ONLY: CPT | Performed by: OBSTETRICS & GYNECOLOGY

## 2019-10-18 PROCEDURE — 59025 FETAL NON-STRESS TEST: CPT

## 2019-10-18 PROCEDURE — 86735 MUMPS ANTIBODY: CPT | Performed by: OBSTETRICS & GYNECOLOGY

## 2019-10-18 PROCEDURE — G0432 EIA HIV-1/HIV-2 SCREEN: HCPCS | Performed by: OBSTETRICS & GYNECOLOGY

## 2019-10-18 PROCEDURE — 86592 SYPHILIS TEST NON-TREP QUAL: CPT | Performed by: OBSTETRICS & GYNECOLOGY

## 2019-10-18 PROCEDURE — 87340 HEPATITIS B SURFACE AG IA: CPT | Performed by: OBSTETRICS & GYNECOLOGY

## 2019-10-18 PROCEDURE — 81001 URINALYSIS AUTO W/SCOPE: CPT | Performed by: OBSTETRICS & GYNECOLOGY

## 2019-10-18 PROCEDURE — 87086 URINE CULTURE/COLONY COUNT: CPT | Performed by: OBSTETRICS & GYNECOLOGY

## 2019-10-18 PROCEDURE — 80053 COMPREHEN METABOLIC PANEL: CPT | Performed by: OBSTETRICS & GYNECOLOGY

## 2019-10-18 PROCEDURE — 86765 RUBEOLA ANTIBODY: CPT | Performed by: OBSTETRICS & GYNECOLOGY

## 2019-10-18 PROCEDURE — G0463 HOSPITAL OUTPT CLINIC VISIT: HCPCS

## 2019-10-18 PROCEDURE — 86900 BLOOD TYPING SEROLOGIC ABO: CPT | Performed by: OBSTETRICS & GYNECOLOGY

## 2019-10-18 PROCEDURE — 80081 OBSTETRIC PANEL INC HIV TSTG: CPT | Performed by: OBSTETRICS & GYNECOLOGY

## 2019-10-18 RX ORDER — MAGNESIUM HYDROXIDE 1200 MG/15ML
1000 LIQUID ORAL ONCE AS NEEDED
Status: DISCONTINUED | OUTPATIENT
Start: 2019-10-18 | End: 2019-10-19 | Stop reason: HOSPADM

## 2019-10-18 RX ORDER — NICOTINE 21 MG/24HR
1 PATCH, TRANSDERMAL 24 HOURS TRANSDERMAL
Status: DISCONTINUED | OUTPATIENT
Start: 2019-10-19 | End: 2019-10-21 | Stop reason: HOSPADM

## 2019-10-18 RX ORDER — MINERAL OIL
OIL (ML) MISCELLANEOUS AS NEEDED
Status: DISCONTINUED | OUTPATIENT
Start: 2019-10-18 | End: 2019-10-19 | Stop reason: HOSPADM

## 2019-10-18 RX ORDER — TERBUTALINE SULFATE 1 MG/ML
0.25 INJECTION, SOLUTION SUBCUTANEOUS AS NEEDED
Status: DISCONTINUED | OUTPATIENT
Start: 2019-10-18 | End: 2019-10-19 | Stop reason: HOSPADM

## 2019-10-18 RX ORDER — FAMOTIDINE 20 MG/1
20 TABLET, FILM COATED ORAL EVERY 12 HOURS SCHEDULED
Status: DISCONTINUED | OUTPATIENT
Start: 2019-10-19 | End: 2019-10-19 | Stop reason: HOSPADM

## 2019-10-18 RX ORDER — ACETAMINOPHEN 325 MG/1
650 TABLET ORAL EVERY 4 HOURS PRN
Status: DISCONTINUED | OUTPATIENT
Start: 2019-10-18 | End: 2019-10-19 | Stop reason: HOSPADM

## 2019-10-18 RX ORDER — FAMOTIDINE 10 MG/ML
20 INJECTION, SOLUTION INTRAVENOUS EVERY 12 HOURS SCHEDULED
Status: DISCONTINUED | OUTPATIENT
Start: 2019-10-19 | End: 2019-10-19 | Stop reason: HOSPADM

## 2019-10-18 RX ORDER — BUTORPHANOL TARTRATE 1 MG/ML
1 INJECTION, SOLUTION INTRAMUSCULAR; INTRAVENOUS
Status: DISCONTINUED | OUTPATIENT
Start: 2019-10-18 | End: 2019-10-19 | Stop reason: HOSPADM

## 2019-10-18 RX ORDER — SODIUM CHLORIDE, SODIUM LACTATE, POTASSIUM CHLORIDE, CALCIUM CHLORIDE 600; 310; 30; 20 MG/100ML; MG/100ML; MG/100ML; MG/100ML
125 INJECTION, SOLUTION INTRAVENOUS CONTINUOUS
Status: DISCONTINUED | OUTPATIENT
Start: 2019-10-19 | End: 2019-10-21 | Stop reason: HOSPADM

## 2019-10-18 RX ORDER — CLINDAMYCIN PHOSPHATE 900 MG/50ML
900 INJECTION INTRAVENOUS EVERY 8 HOURS
Status: DISCONTINUED | OUTPATIENT
Start: 2019-10-19 | End: 2019-10-19 | Stop reason: HOSPADM

## 2019-10-18 RX ORDER — HYDROXYZINE 50 MG/1
50 TABLET, FILM COATED ORAL NIGHTLY PRN
Status: DISCONTINUED | OUTPATIENT
Start: 2019-10-18 | End: 2019-10-19 | Stop reason: HOSPADM

## 2019-10-18 RX ORDER — OXYTOCIN-SODIUM CHLORIDE 0.9% IV SOLN 30 UNIT/500ML 30-0.9/5 UT/ML-%
2 SOLUTION INTRAVENOUS
Status: DISCONTINUED | OUTPATIENT
Start: 2019-10-19 | End: 2019-10-19 | Stop reason: HOSPADM

## 2019-10-18 RX ORDER — SODIUM CHLORIDE 0.9 % (FLUSH) 0.9 %
3-10 SYRINGE (ML) INJECTION AS NEEDED
Status: DISCONTINUED | OUTPATIENT
Start: 2019-10-18 | End: 2019-10-19 | Stop reason: HOSPADM

## 2019-10-18 RX ORDER — OXYTOCIN-SODIUM CHLORIDE 0.9% IV SOLN 30 UNIT/500ML 30-0.9/5 UT/ML-%
2 SOLUTION INTRAVENOUS
Status: DISCONTINUED | OUTPATIENT
Start: 2019-10-19 | End: 2019-10-18

## 2019-10-19 ENCOUNTER — ANESTHESIA EVENT (OUTPATIENT)
Dept: LABOR AND DELIVERY | Facility: HOSPITAL | Age: 24
End: 2019-10-19

## 2019-10-19 ENCOUNTER — ANESTHESIA (OUTPATIENT)
Dept: LABOR AND DELIVERY | Facility: HOSPITAL | Age: 24
End: 2019-10-19

## 2019-10-19 LAB — BACTERIA SPEC AEROBE CULT: NORMAL

## 2019-10-19 PROCEDURE — 10907ZC DRAINAGE OF AMNIOTIC FLUID, THERAPEUTIC FROM PRODUCTS OF CONCEPTION, VIA NATURAL OR ARTIFICIAL OPENING: ICD-10-PCS | Performed by: OBSTETRICS & GYNECOLOGY

## 2019-10-19 PROCEDURE — 25010000002 ROPIVACAINE PER 1 MG: Performed by: ANESTHESIOLOGY

## 2019-10-19 PROCEDURE — C1755 CATHETER, INTRASPINAL: HCPCS | Performed by: ANESTHESIOLOGY

## 2019-10-19 PROCEDURE — 3E033VJ INTRODUCTION OF OTHER HORMONE INTO PERIPHERAL VEIN, PERCUTANEOUS APPROACH: ICD-10-PCS | Performed by: OBSTETRICS & GYNECOLOGY

## 2019-10-19 RX ORDER — ROPIVACAINE HYDROCHLORIDE 2 MG/ML
14 INJECTION, SOLUTION EPIDURAL; INFILTRATION; PERINEURAL CONTINUOUS
Status: DISCONTINUED | OUTPATIENT
Start: 2019-10-19 | End: 2019-10-19

## 2019-10-19 RX ORDER — BISACODYL 10 MG
10 SUPPOSITORY, RECTAL RECTAL DAILY PRN
Status: DISCONTINUED | OUTPATIENT
Start: 2019-10-20 | End: 2019-10-21 | Stop reason: HOSPADM

## 2019-10-19 RX ORDER — SODIUM CHLORIDE 0.9 % (FLUSH) 0.9 %
1-10 SYRINGE (ML) INJECTION AS NEEDED
Status: DISCONTINUED | OUTPATIENT
Start: 2019-10-19 | End: 2019-10-21 | Stop reason: HOSPADM

## 2019-10-19 RX ORDER — OXYTOCIN-SODIUM CHLORIDE 0.9% IV SOLN 30 UNIT/500ML 30-0.9/5 UT/ML-%
250 SOLUTION INTRAVENOUS CONTINUOUS
Status: ACTIVE | OUTPATIENT
Start: 2019-10-19 | End: 2019-10-19

## 2019-10-19 RX ORDER — OXYTOCIN-SODIUM CHLORIDE 0.9% IV SOLN 30 UNIT/500ML 30-0.9/5 UT/ML-%
125 SOLUTION INTRAVENOUS CONTINUOUS PRN
Status: DISCONTINUED | OUTPATIENT
Start: 2019-10-19 | End: 2019-10-21 | Stop reason: HOSPADM

## 2019-10-19 RX ORDER — DIPHENOXYLATE HYDROCHLORIDE AND ATROPINE SULFATE 2.5; .025 MG/1; MG/1
1 TABLET ORAL DAILY
Status: DISCONTINUED | OUTPATIENT
Start: 2019-10-20 | End: 2019-10-21 | Stop reason: HOSPADM

## 2019-10-19 RX ORDER — CALCIUM CARBONATE 200(500)MG
1 TABLET,CHEWABLE ORAL 3 TIMES DAILY PRN
Status: DISCONTINUED | OUTPATIENT
Start: 2019-10-19 | End: 2019-10-21 | Stop reason: HOSPADM

## 2019-10-19 RX ORDER — BUPIVACAINE HYDROCHLORIDE 2.5 MG/ML
INJECTION, SOLUTION EPIDURAL; INFILTRATION; INTRACAUDAL AS NEEDED
Status: DISCONTINUED | OUTPATIENT
Start: 2019-10-19 | End: 2019-10-24 | Stop reason: SURG

## 2019-10-19 RX ORDER — ACETAMINOPHEN 325 MG/1
650 TABLET ORAL EVERY 4 HOURS PRN
Status: DISCONTINUED | OUTPATIENT
Start: 2019-10-19 | End: 2019-10-19 | Stop reason: HOSPADM

## 2019-10-19 RX ORDER — IBUPROFEN 800 MG/1
800 TABLET ORAL ONCE AS NEEDED
Status: DISCONTINUED | OUTPATIENT
Start: 2019-10-19 | End: 2019-10-19 | Stop reason: HOSPADM

## 2019-10-19 RX ORDER — EPHEDRINE SULFATE 50 MG/ML
10 INJECTION, SOLUTION INTRAVENOUS
Status: DISCONTINUED | OUTPATIENT
Start: 2019-10-19 | End: 2019-10-19 | Stop reason: HOSPADM

## 2019-10-19 RX ORDER — PRENATAL VIT/IRON FUM/FOLIC AC 27MG-0.8MG
1 TABLET ORAL DAILY
Status: DISCONTINUED | OUTPATIENT
Start: 2019-10-19 | End: 2019-10-21 | Stop reason: HOSPADM

## 2019-10-19 RX ORDER — MISOPROSTOL 100 UG/1
800 TABLET ORAL AS NEEDED
Status: DISCONTINUED | OUTPATIENT
Start: 2019-10-19 | End: 2019-10-19 | Stop reason: HOSPADM

## 2019-10-19 RX ORDER — METHYLERGONOVINE MALEATE 0.2 MG/ML
200 INJECTION INTRAVENOUS ONCE AS NEEDED
Status: DISCONTINUED | OUTPATIENT
Start: 2019-10-19 | End: 2019-10-19 | Stop reason: HOSPADM

## 2019-10-19 RX ORDER — CARBOPROST TROMETHAMINE 250 UG/ML
250 INJECTION, SOLUTION INTRAMUSCULAR AS NEEDED
Status: DISCONTINUED | OUTPATIENT
Start: 2019-10-19 | End: 2019-10-19 | Stop reason: HOSPADM

## 2019-10-19 RX ORDER — HYDROCODONE BITARTRATE AND ACETAMINOPHEN 5; 325 MG/1; MG/1
1 TABLET ORAL EVERY 4 HOURS PRN
Status: DISCONTINUED | OUTPATIENT
Start: 2019-10-19 | End: 2019-10-21 | Stop reason: HOSPADM

## 2019-10-19 RX ORDER — OXYTOCIN-SODIUM CHLORIDE 0.9% IV SOLN 30 UNIT/500ML 30-0.9/5 UT/ML-%
999 SOLUTION INTRAVENOUS ONCE
Status: DISCONTINUED | OUTPATIENT
Start: 2019-10-19 | End: 2019-10-21 | Stop reason: HOSPADM

## 2019-10-19 RX ORDER — SODIUM CHLORIDE, SODIUM LACTATE, POTASSIUM CHLORIDE, CALCIUM CHLORIDE 600; 310; 30; 20 MG/100ML; MG/100ML; MG/100ML; MG/100ML
999 INJECTION, SOLUTION INTRAVENOUS ONCE
Status: DISCONTINUED | OUTPATIENT
Start: 2019-10-19 | End: 2019-10-21 | Stop reason: HOSPADM

## 2019-10-19 RX ORDER — BUPIVACAINE HYDROCHLORIDE 2.5 MG/ML
INJECTION, SOLUTION EPIDURAL; INFILTRATION; INTRACAUDAL
Status: COMPLETED
Start: 2019-10-19 | End: 2019-10-19

## 2019-10-19 RX ORDER — HYDROCODONE BITARTRATE AND ACETAMINOPHEN 5; 325 MG/1; MG/1
1 TABLET ORAL EVERY 4 HOURS PRN
Status: DISCONTINUED | OUTPATIENT
Start: 2019-10-19 | End: 2019-10-19 | Stop reason: HOSPADM

## 2019-10-19 RX ORDER — CALCIUM CARBONATE 200(500)MG
1 TABLET,CHEWABLE ORAL AS NEEDED
COMMUNITY
End: 2019-10-21 | Stop reason: HOSPADM

## 2019-10-19 RX ORDER — DOCUSATE SODIUM 100 MG/1
100 CAPSULE, LIQUID FILLED ORAL DAILY
Status: DISCONTINUED | OUTPATIENT
Start: 2019-10-20 | End: 2019-10-21 | Stop reason: HOSPADM

## 2019-10-19 RX ORDER — ROPIVACAINE HYDROCHLORIDE 2 MG/ML
INJECTION, SOLUTION EPIDURAL; INFILTRATION; PERINEURAL
Status: COMPLETED
Start: 2019-10-19 | End: 2019-10-19

## 2019-10-19 RX ORDER — IBUPROFEN 800 MG/1
800 TABLET ORAL EVERY 8 HOURS SCHEDULED
Status: DISCONTINUED | OUTPATIENT
Start: 2019-10-19 | End: 2019-10-21 | Stop reason: HOSPADM

## 2019-10-19 RX ADMIN — CLINDAMYCIN PHOSPHATE 900 MG: 900 INJECTION, SOLUTION INTRAVENOUS at 10:17

## 2019-10-19 RX ADMIN — OXYTOCIN 2 MILLI-UNITS/MIN: 10 INJECTION INTRAVENOUS at 08:31

## 2019-10-19 RX ADMIN — MISOPROSTOL 600 MCG: 100 TABLET ORAL at 12:36

## 2019-10-19 RX ADMIN — CLINDAMYCIN PHOSPHATE 900 MG: 900 INJECTION, SOLUTION INTRAVENOUS at 02:49

## 2019-10-19 RX ADMIN — BUPIVACAINE HYDROCHLORIDE 5 ML: 2.5 INJECTION, SOLUTION EPIDURAL; INFILTRATION; INTRACAUDAL; PERINEURAL at 10:27

## 2019-10-19 RX ADMIN — BUPIVACAINE HYDROCHLORIDE 5 ML: 2.5 INJECTION, SOLUTION EPIDURAL; INFILTRATION; INTRACAUDAL; PERINEURAL at 10:06

## 2019-10-19 RX ADMIN — BUPIVACAINE HYDROCHLORIDE 5 ML: 2.5 INJECTION, SOLUTION EPIDURAL; INFILTRATION; INTRACAUDAL; PERINEURAL at 10:07

## 2019-10-19 RX ADMIN — HYDROCODONE BITARTRATE AND ACETAMINOPHEN 1 TABLET: 5; 325 TABLET ORAL at 19:44

## 2019-10-19 RX ADMIN — SODIUM CHLORIDE, POTASSIUM CHLORIDE, SODIUM LACTATE AND CALCIUM CHLORIDE 1000 ML: 600; 310; 30; 20 INJECTION, SOLUTION INTRAVENOUS at 00:35

## 2019-10-19 RX ADMIN — ROPIVACAINE HYDROCHLORIDE 14 ML/HR: 2 INJECTION, SOLUTION EPIDURAL; INFILTRATION at 10:07

## 2019-10-19 RX ADMIN — SODIUM CHLORIDE, POTASSIUM CHLORIDE, SODIUM LACTATE AND CALCIUM CHLORIDE 125 ML/HR: 600; 310; 30; 20 INJECTION, SOLUTION INTRAVENOUS at 02:49

## 2019-10-19 RX ADMIN — NICOTINE 1 PATCH: 14 PATCH TRANSDERMAL at 00:50

## 2019-10-19 NOTE — NURSING NOTE
Called Dr. Esparza at [phone]. Informed of BPP. Dr. Esparza requests to do continuous EFM and start Pitocin at 0700 10-.

## 2019-10-19 NOTE — NON STRESS TEST
Sailaja Alarcon, a  at 40w2d with an CHU of 10/17/2019, by Ultrasound, was seen at [facility] LABOR DELIVERY for a nonstress test.    Chief Complaint   Patient presents with   • Labs Only     PT PRESENTS TO LABOR FALK WITH ORDERS FOR PRENATAL LABS, BPP, NST, AND GBS CULTURE. PT DENIES VAG BLEEDING AND LOF. BABY IS ACTIVE.        Patient Active Problem List   Diagnosis   • Pregnant       Start Time:   Stop Time:

## 2019-10-19 NOTE — NURSING NOTE
PT REQUESTS TO GO OFF FLOOR TO SMOKE. PT EDUCATED ON RISKS OF LEAVING THE FLOOR. PT STILL REQUESTS TO LEAVE. PT STATES SHE WILL LARISA NUE EFM WHEN SHE RETURNS.

## 2019-10-19 NOTE — L&D DELIVERY NOTE
Vaginal Delivery Procedure Note    Sailaja Alarcon  40 2/7  G 4,       OBGYN: Lionel Esparza MD      Pre-op Diagnosis: Complete Dilation      Anesthesia: Epidual        Detailed Description of Procedure     The patient was prepped and draped in normal sterile fashion. The head was delivered over an intact perineum. The nares and mouth were bulb suctioned. There was no nuchal cord. Anterior and posterior shoulders delivered without any problems. The rest of the infant was delivered in controlled fashion. The placenta  manually evacuated. The patient tolerated the procedure well and went to the recovery room in stable condition.        Maternal Blood Type: AB   Fetal Gender: Female  Nuchal Cord: No  Tears: No  Amniotic Fluid Clear  Blood Cord: Yes  Estimated Blood Loss: 300cc  Placenta: Manually Evacuated  Uterus Explored: Yes  Membranes: AROM  APGARS: 8 & 9    Disposition: Transfer to Women's Health Floor  Condition: Stable    Lionel Esparza M.D     Date: 10/19/2019  Time: 12:18 PM

## 2019-10-19 NOTE — PLAN OF CARE
Problem: Patient Care Overview  Goal: Plan of Care Review  Outcome: Ongoing (interventions implemented as appropriate)   10/19/19 1511   Coping/Psychosocial   Plan of Care Reviewed With patient   Plan of Care Review   Progress improving

## 2019-10-19 NOTE — H&P
Chief complaint   Chief Complaint   Patient presents with   • Labs Only     PT PRESENTS TO LABOR FALK WITH ORDERS FOR PRENATAL LABS, BPP, NST, AND GBS CULTURE. PT DENIES VAG BLEEDING AND LOF. BABY IS ACTIVE.        History of Present Illness: Patient is a 23 y.o. female who presents with IUP at 40w2d weeks gestation.  G 4, P 1110. Drug Abuse. Insufficient PNC. Oligohydramnios. IUGR.        PAST MEDICAL HISTORY  Hepatis C positive  Drug Abuse  Insufficient PNC    Blood Type: AB -    History reviewed. No pertinent surgical history.  Family History   Problem Relation Age of Onset   • Hypertension Mother    • Cancer Father    • No Known Problems Sister    • No Known Problems Brother    • No Known Problems Son    • No Known Problems Daughter    • Hypertension Maternal Grandmother    • Diabetes Maternal Grandfather    • No Known Problems Paternal Grandmother    • No Known Problems Paternal Grandfather    • No Known Problems Cousin    • Diabetes Maternal Aunt    • Rheum arthritis Neg Hx    • Osteoarthritis Neg Hx    • Asthma Neg Hx    • Heart failure Neg Hx    • Hyperlipidemia Neg Hx    • Migraines Neg Hx    • Rashes / Skin problems Neg Hx    • Seizures Neg Hx    • Stroke Neg Hx    • Thyroid disease Neg Hx      Social History     Tobacco Use   • Smoking status: Current Every Day Smoker   • Smokeless tobacco: Never Used   Substance Use Topics   • Alcohol use: No     Alcohol/week: 0.0 oz   • Drug use: Yes     Frequency: 3.0 times per week     Types: IV     Comment: last use 3 days ago (10/26/18)     Medications Prior to Admission   Medication Sig Dispense Refill Last Dose   • Multiple Vitamins-Minerals (MULTIVITAMIN WITH MINERALS) tablet tablet Take 1 tablet by mouth Daily.   Past Week at Unknown time     Allergies:  Amoxicillin; Penicillins; and Zofran [ondansetron hcl]      Vital Signs       Radiology  Imaging Results (last 24 hours)     Procedure Component Value Units Date/Time    US Fetal Biophysical  Profile;Without Non-Stress Testing [[id number]] Collected:  10/18/19 2224     Updated:  10/18/19 2226    Narrative:       US Fetal Biophysical Profile    INDICATION:   Post 40 weeks gestational age.    TECHNIQUE:   Transabdominal ultrasound of the pelvis.    COMPARISON:   4/22/2019    FINDINGS:  FETAL BIOPHYSICAL PROFILE: 4/8    BIOPHYSICAL PROFILE:  Fetal Breathing Movements (FBM): 2  Gross Body Movements (GBM): 2  Fetal Tone (FT): 0  Amniotic Fluid Volume (AFV): 0  TOTAL SCORE: 4    Cardiac activity is noted a rate of 115 bpm. Gestational age is estimated with BPD, head circumference, abdominal circumference and femur length. Average gestational age by ultrasound criteria is 33 weeks 4 days. Estimated fetal weight 4 lbs. 12 oz.        Impression:       1.  Abnormal biophysical profile of 4 out of 8. Small for dates with estimated fetal weight 4 lbs. 12 oz. oligohydramnios.    Signer Name: Dylan Nolasco MD   Signed: 10/18/2019 10:24 PM   Workstation Name: [id number]    [facility]          Labs  Lab Results (last 24 hours)     Procedure Component Value Units Date/Time    Urinalysis With Culture If Indicated - Urine, Clean Catch [[id number]]  (Abnormal) Collected:  10/18/19 2108    Specimen:  Urine, Clean Catch Updated:  10/18/19 2216     Color, UA Dark Yellow     Appearance, UA Cloudy     pH, UA 6.5     Specific Gravity, UA 1.029     Glucose, UA Negative     Ketones, UA Trace     Bilirubin, UA Small (1+)     Blood, UA Moderate (2+)     Protein, UA 30 mg/dL (1+)     Leuk Esterase, UA Moderate (2+)     Nitrite, UA Negative     Urobilinogen, UA 1.0 E.U./dL    Urinalysis, Microscopic Only - Urine, Clean Catch [[id number]]  (Abnormal) Collected:  10/18/19 2108    Specimen:  Urine, Clean Catch Updated:  10/18/19 2216     RBC, UA 0-2 /HPF      WBC, UA 6-12 /HPF      Bacteria, UA 2+ /HPF      Squamous Epithelial Cells, UA 21-30 /HPF      Hyaline Casts, UA None Seen /LPF      Methodology Manual Light  Microscopy    Urine Culture - Urine, Urine, Clean Catch [[id number]] Collected:  10/18/19 2108    Specimen:  Urine, Clean Catch Updated:  10/18/19 2216    Hepatitis C Antibody [[id number]]  (Abnormal) Collected:  10/18/19 2109    Specimen:  Blood Updated:  10/18/19 2212     Hepatitis C Ab Reactive    OB Panel With HIV [[id number]] Collected:  10/18/19 2109    Specimen:  Blood Updated:  10/18/19 2210    Narrative:       The following orders were created for panel order OB Panel With HIV.  Procedure                               Abnormality         Status                     ---------                               -----------         ------                     RPR[[id number]]                                              In process                 CBC Auto Differential[[id number]]        Normal              Final result               Hepatitis B Surface Antigen[[id number]]  Normal              Final result               Rubella Antibody, IgG[[id number]]                            In process                 OB Panel Type & Screen[[id number]]                                                      HIV-1 / O / 2 Ag / Antib...[[id number]]  Normal              Final result               Hepatitis C Antibody[[id number]]         Abnormal            Final result                 Please view results for these tests on the individual orders.    HIV-1 / O / 2 Ag / Antibody 4th Generation [[id number]]  (Normal) Collected:  10/18/19 2109    Specimen:  Blood Updated:  10/18/19 2210     HIV-1/ HIV-2 Non-Reactive     Comment: A non-reactive test result does not preclude the possibility of exposure to HIV or infection with HIV. An antibody response to recent exposure may take several months to reach detectable levels.       Narrative:       The HIV antibody/antigen combo assay is a qualitative assay for HIV that includes the p24 antigen as well as antibodies to HIV types 1 and 2. This test is intended to be used as a screening assay in the diagnosis  of HIV infection in patients over the age of 2.    Hepatitis B Surface Antigen [[id number]]  (Normal) Collected:  10/18/19 2109    Specimen:  Blood Updated:  10/18/19 2210     Hepatitis B Surface Ag Non-Reactive    Hepatitis C Antibody [[id number]]  (Abnormal) Collected:  10/18/19 2109    Specimen:  Blood Updated:  10/18/19 2208     Hepatitis C Ab Reactive    Hepatitis B Surface Antigen [[id number]] Collected:  10/18/19 2109    Specimen:  Blood Updated:  10/18/19 2200    Narrative:       The following orders were created for panel order Hepatitis B Surface Antigen.  Procedure                               Abnormality         Status                     ---------                               -----------         ------                     Hepatitis B Surface Antigen[[id number]]  Normal              Final result                 Please view results for these tests on the individual orders.    Hepatitis B Surface Antigen [[id number]]  (Normal) Collected:  10/18/19 2109    Specimen:  Blood Updated:  10/18/19 2200     Hepatitis B Surface Ag Non-Reactive    Comprehensive Metabolic Panel [[id number]]  (Abnormal) Collected:  10/18/19 2109    Specimen:  Blood Updated:  10/18/19 2154     Glucose 69 mg/dL      BUN 10 mg/dL      Creatinine 0.69 mg/dL      Sodium 137 mmol/L      Potassium 3.9 mmol/L      Chloride 99 mmol/L      CO2 23.3 mmol/L      Calcium 9.5 mg/dL      Total Protein 8.4 g/dL      Albumin 4.16 g/dL      ALT (SGPT) 8 U/L      AST (SGOT) 12 U/L      Alkaline Phosphatase 158 U/L      Total Bilirubin 0.4 mg/dL      eGFR Non African Amer 104 mL/min/1.73      Globulin 4.2 gm/dL      A/G Ratio 1.0 g/dL      BUN/Creatinine Ratio 14.5     Anion Gap 14.7 mmol/L     Narrative:       GFR Normal >60  Chronic Kidney Disease <60  Kidney Failure <15    URINE DRUG SCREEN PLUS BUPRENORPHINE - [[id number]] Collected:  10/18/19 2108     Updated:  10/18/19 2143    Narrative:       The following orders were created for panel order URINE DRUG  SCREEN PLUS BUPRENORPHINE -.  Procedure                               Abnormality         Status                     ---------                               -----------         ------                     Urine Drug Screen - Urin...[[id number]]  Abnormal            Final result               Buprenorphine Screen Uri...[[id number]]                                                   Please view results for these tests on the individual orders.    Urine Drug Screen - Urine, Clean Catch [[id number]]  (Abnormal) Collected:  10/18/19 2108    Specimen:  Urine, Clean Catch Updated:  10/18/19 2143     Amphetamine Screen, Urine Negative     Barbiturates Screen, Urine Negative     Benzodiazepine Screen, Urine Negative     Cocaine Screen, Urine Negative     Methadone Screen, Urine Negative     Opiate Screen Negative     Phencyclidine (PCP), Urine Negative     THC, Screen, Urine Negative     6-ACETYL MORPHINE Negative     Buprenorphine, Screen, Urine Positive     Oxycodone Screen, Urine Negative    Narrative:       Negative Thresholds For Drugs Screened:                  Amphetamines              1000 ng/ml               Barbiturates               200 ng/ml               Benzodiazepines            200 ng/ml              Cocaine                    300 ng/ml              Methadone                  300 ng/ml              Opiates                    300 ng/ml               Phencyclidine               25 ng/ml               THC                         50 ng/ml              6-Acetyl Morphine           10 ng/ml              Buprenorphine                5 ng/ml              Oxycodone                  300 ng/ml    The reference range for all drugs tested is negative. This report includes final unconfirmed qualitative results to be used for medical treatment purposes only. Unconfirmed results must not be used for non-medical purposes such as employment or legal testing. Clinical consideration should be applied to any drug of abuse test,  especially when unconfirmed quantitative results are used.      Group B Streptococcus Culture - Swab, Vaginal/Rectum [[id number]] Collected:  10/18/19 2109    Specimen:  Swab from Vaginal/Rectum Updated:  10/18/19 2121    CBC Auto Differential [[id number]]  (Normal) Collected:  10/18/19 2109    Specimen:  Blood Updated:  10/18/19 2119     WBC 7.92 10*3/mm3      RBC 4.29 10*6/mm3      Hemoglobin 13.2 g/dL      Hematocrit 39.8 %      MCV 92.8 fL      MCH 30.8 pg      MCHC 33.2 g/dL      RDW 12.6 %      RDW-SD 41.3 fl      MPV 10.9 fL      Platelets 207 10*3/mm3      Neutrophil % 56.2 %      Lymphocyte % 35.9 %      Monocyte % 6.2 %      Eosinophil % 0.8 %      Basophil % 0.4 %      Immature Grans % 0.5 %      Neutrophils, Absolute 4.46 10*3/mm3      Lymphocytes, Absolute 2.84 10*3/mm3      Monocytes, Absolute 0.49 10*3/mm3      Eosinophils, Absolute 0.06 10*3/mm3      Basophils, Absolute 0.03 10*3/mm3      Immature Grans, Absolute 0.04 10*3/mm3     RPR [[id number]] Collected:  10/18/19 2109    Specimen:  Blood Updated:  10/18/19 2114    Rubella Antibody, IgG [[id number]] Collected:  10/18/19 2109    Specimen:  Blood Updated:  10/18/19 2114    Rubeola Antibody IgG [[id number]] Collected:  10/18/19 2109    Specimen:  Blood Updated:  10/18/19 2114    Mumps Antibody, IgG [[id number]] Collected:  10/18/19 2109    Specimen:  Blood Updated:  10/18/19 2114    Measles / Mumps / Rubella Immunity [[id number]] Collected:  10/18/19 2109    Specimen:  Blood Updated:  10/18/19 2114    Narrative:       The following orders were created for panel order Measles / Mumps / Rubella Immunity.  Procedure                               Abnormality         Status                     ---------                               -----------         ------                     Rubeola Antibody IgG[[id number]]                             In process                 Mumps Antibody, IgG[[id number]]                              In process                 Rubella  Antibody, IgG[[id number]]                            In process                   Please view results for these tests on the individual orders.    RPR [[id number]] Collected:  10/18/19 2109    Specimen:  Blood Updated:  10/18/19 2114    Rubella Antibody, IgG [[id number]] Collected:  10/18/19 2109    Specimen:  Blood Updated:  10/18/19 2114            Review of Systems    The following systems were reviewed and negative;  ENT, respiratory, cardiovascular, gastrointestinal, genitourinary, breast, endocrine and allergies / immunologic.      Physical Exam:      General Appearance:    Alert, cooperative, in no acute distress   Head:    Normocephalic, without obvious abnormality, atraumatic   Eyes:            Lids and lashes normal, conjunctivae and sclerae normal, no   icterus, no pallor, corneas clear, PERRLA   Ears:    Ears appear intact with no abnormalities noted   Throat:   No oral lesions, no thrush, oral mucosa moist   Neck:   No adenopathy, supple, trachea midline, no thyromegaly, no     carotid bruit, no JVD   Back:     No kyphosis present, no scoliosis present, no skin lesions,       erythema or scars, no tenderness to percussion or                   palpation,   range of motion normal   Lungs:     Clear to auscultation,respirations regular, even and                   unlabored    Heart:    Regular rhythm and normal rate, normal S1 and S2, no            murmur, no gallop, no rub, no click   Breast Exam:    Deferred   Abdomen:     Normal bowel sounds, no masses, no organomegaly, soft        non-tender, non-distended, no guarding, no rebound                 tenderness   Genitalia:    Cervix: Dilated 3 cm, 50%   Extremities:   Moves all extremities well, no edema, no cyanosis, no              redness   Pulses:   Pulses palpable and equal bilaterally   Skin:   No bleeding, bruising or rash   Lymph nodes:   No palpable adenopathy   Neurologic:   Cranial nerves 2 - 12 grossly intact, sensation intact, DTR        present  and equal bilaterally         Assessment: Patient is a 23 y.o. female who presents with IUP at 40w2d weeks gestation. G 4, P 1110. Drug Abuse. Insufficient PNC. Oligohydramnios. IUGR.   .   Chief Complaint   Patient presents with   • Labs Only     PT PRESENTS TO LABOR FALK WITH ORDERS FOR PRENATAL LABS, BPP, NST, AND GBS CULTURE. PT DENIES VAG BLEEDING AND LOF. BABY IS ACTIVE.        Plan of Care: Admit. Proceed with IOL.      Lionel Esparza III, MD  10/19/19  12:06 AM

## 2019-10-19 NOTE — NURSING NOTE
PT REQUESTS TO GO OFF THE FLOOR TO SMOKE. INFORMED PATIENT OF THE RISKS OF BEING OFF THE FLOOR. PT STATES THIS WILL BE HER LAST TIME LEAVING THE FLOOR AS LONG AS SHE CAN GET A NICOTINE PATCH. NICOTINE PATCH ORDERED. PT SEEN LEAVING FLOOR BEING PUSHED IN WHEELCHAIR BY .

## 2019-10-19 NOTE — PAYOR COMM NOTE
"[facility]  NPI:[id number]    Utilization Review  Contact: Tracey Chowdary RN  Phone: [phone]  Fax:[phone]    INITIATE INPATIENT AUTHORIZATION  ICD: O80        CHU 10/17/19 40/2    VAGINAL DELIVERY ON 10/19/19 @ 1223  BABY GIRL  2120 GRAMS  8/        Rochelle Robertson (23 y.o. Female)     Date of Birth Social Security Number Address Home Phone MRN    1995  [facility] [address]   [place] [facility] [address] [phone] [id number]    [employer] Marital Status          None        Admission Date Admission Type Admitting Provider Attending Provider Department, Room/Bed    10/18/19 Urgent Lionel Esparza III, MD Ascani, Enrico III, MD [facility] LABOR DELIVERY, L228/    Discharge Date Discharge Disposition Discharge Destination                       Attending Provider:  Lionel Esparza III, MD    Allergies:  Amoxicillin, Penicillins, Zofran [Ondansetron Hcl]    Isolation:  None   Infection:  None   Code Status:  CPR    Ht:  160 cm (63\")   Wt:  63 kg (139 lb)    Admission Cmt:  None   Principal Problem:  None                Active Insurance as of 10/18/2019     Primary Coverage     Payor Plan Insurance Group Employer/Plan Group    [id number]     Payor Plan Address Payor Plan Phone Number Payor Plan Fax Number Effective Dates    [address] [phone]  10/1/2015 - None Entered    [facility] [id number]       Subscriber Name Subscriber Birth Date Member ID       ROCHELLE ROBERTSON 1995 [id number]                 Emergency Contacts      (Rel.) Home Phone Work Phone Mobile Phone    UMER ROBERTSON (Spouse) [phone] -- --               History & Physical      Lionel Esparza III, MD at 10/19/19 0006                Chief complaint   Chief Complaint   Patient presents with   • Labs Only     PT PRESENTS TO LABOR FALK WITH ORDERS FOR PRENATAL LABS, BPP, NST, AND GBS CULTURE. PT DENIES VAG BLEEDING AND LOF. BABY IS " ACTIVE.        History of Present Illness: Patient is a 23 y.o. female who presents with IUP at 40w2d weeks gestation.  G 4, P 1110. Drug Abuse. Insufficient PNC. Oligohydramnios. IUGR.        PAST MEDICAL HISTORY  Hepatis C positive  Drug Abuse  Insufficient PNC    Blood Type: AB -    History reviewed. No pertinent surgical history.  Family History   Problem Relation Age of Onset   • Hypertension Mother    • Cancer Father    • No Known Problems Sister    • No Known Problems Brother    • No Known Problems Son    • No Known Problems Daughter    • Hypertension Maternal Grandmother    • Diabetes Maternal Grandfather    • No Known Problems Paternal Grandmother    • No Known Problems Paternal Grandfather    • No Known Problems Cousin    • Diabetes Maternal Aunt    • Rheum arthritis Neg Hx    • Osteoarthritis Neg Hx    • Asthma Neg Hx    • Heart failure Neg Hx    • Hyperlipidemia Neg Hx    • Migraines Neg Hx    • Rashes / Skin problems Neg Hx    • Seizures Neg Hx    • Stroke Neg Hx    • Thyroid disease Neg Hx      Social History     Tobacco Use   • Smoking status: Current Every Day Smoker   • Smokeless tobacco: Never Used   Substance Use Topics   • Alcohol use: No     Alcohol/week: 0.0 oz   • Drug use: Yes     Frequency: 3.0 times per week     Types: IV     Comment: last use 3 days ago (10/26/18)     Medications Prior to Admission   Medication Sig Dispense Refill Last Dose   • Multiple Vitamins-Minerals (MULTIVITAMIN WITH MINERALS) tablet tablet Take 1 tablet by mouth Daily.   Past Week at Unknown time     Allergies:  Amoxicillin; Penicillins; and Zofran [ondansetron hcl]      Vital Signs       Radiology  Imaging Results (last 24 hours)     Procedure Component Value Units Date/Time    US Fetal Biophysical Profile;Without Non-Stress Testing [[id number]] Collected:  10/18/19 2224     Updated:  10/18/19 2226    Narrative:       US Fetal Biophysical Profile    INDICATION:   Post 40 weeks gestational age.    TECHNIQUE:    Transabdominal ultrasound of the pelvis.    COMPARISON:   4/22/2019    FINDINGS:  FETAL BIOPHYSICAL PROFILE: 4/8    BIOPHYSICAL PROFILE:  Fetal Breathing Movements (FBM): 2  Gross Body Movements (GBM): 2  Fetal Tone (FT): 0  Amniotic Fluid Volume (AFV): 0  TOTAL SCORE: 4    Cardiac activity is noted a rate of 115 bpm. Gestational age is estimated with BPD, head circumference, abdominal circumference and femur length. Average gestational age by ultrasound criteria is 33 weeks 4 days. Estimated fetal weight 4 lbs. 12 oz.        Impression:       1.  Abnormal biophysical profile of 4 out of 8. Small for dates with estimated fetal weight 4 lbs. 12 oz. oligohydramnios.    Signer Name: Dylan Nolasco MD   Signed: 10/18/2019 10:24 PM   Workstation Name: [id number]    [facility]          Labs  Lab Results (last 24 hours)     Procedure Component Value Units Date/Time    Urinalysis With Culture If Indicated - Urine, Clean Catch [[id number]]  (Abnormal) Collected:  10/18/19 2108    Specimen:  Urine, Clean Catch Updated:  10/18/19 2216     Color, UA Dark Yellow     Appearance, UA Cloudy     pH, UA 6.5     Specific Gravity, UA 1.029     Glucose, UA Negative     Ketones, UA Trace     Bilirubin, UA Small (1+)     Blood, UA Moderate (2+)     Protein, UA 30 mg/dL (1+)     Leuk Esterase, UA Moderate (2+)     Nitrite, UA Negative     Urobilinogen, UA 1.0 E.U./dL    Urinalysis, Microscopic Only - Urine, Clean Catch [[id number]]  (Abnormal) Collected:  10/18/19 2108    Specimen:  Urine, Clean Catch Updated:  10/18/19 2216     RBC, UA 0-2 /HPF      WBC, UA 6-12 /HPF      Bacteria, UA 2+ /HPF      Squamous Epithelial Cells, UA 21-30 /HPF      Hyaline Casts, UA None Seen /LPF      Methodology Manual Light Microscopy    Urine Culture - Urine, Urine, Clean Catch [[id number]] Collected:  10/18/19 2108    Specimen:  Urine, Clean Catch Updated:  10/18/19 2216    Hepatitis C Antibody [[id number]]  (Abnormal) Collected:   10/18/19 2109    Specimen:  Blood Updated:  10/18/19 2212     Hepatitis C Ab Reactive    OB Panel With HIV [[id number]] Collected:  10/18/19 2109    Specimen:  Blood Updated:  10/18/19 2210    Narrative:       The following orders were created for panel order OB Panel With HIV.  Procedure                               Abnormality         Status                     ---------                               -----------         ------                     RPR[[id number]]                                              In process                 CBC Auto Differential[[id number]]        Normal              Final result               Hepatitis B Surface Antigen[[id number]]  Normal              Final result               Rubella Antibody, IgG[[id number]]                            In process                 OB Panel Type & Screen[[id number]]                                                      HIV-1 / O / 2 Ag / Antib...[[id number]]  Normal              Final result               Hepatitis C Antibody[[id number]]         Abnormal            Final result                 Please view results for these tests on the individual orders.    HIV-1 / O / 2 Ag / Antibody 4th Generation [[id number]]  (Normal) Collected:  10/18/19 2109    Specimen:  Blood Updated:  10/18/19 2210     HIV-1/ HIV-2 Non-Reactive     Comment: A non-reactive test result does not preclude the possibility of exposure to HIV or infection with HIV. An antibody response to recent exposure may take several months to reach detectable levels.       Narrative:       The HIV antibody/antigen combo assay is a qualitative assay for HIV that includes the p24 antigen as well as antibodies to HIV types 1 and 2. This test is intended to be used as a screening assay in the diagnosis of HIV infection in patients over the age of 2.    Hepatitis B Surface Antigen [[id number]]  (Normal) Collected:  10/18/19 2109    Specimen:  Blood Updated:  10/18/19 2210     Hepatitis B Surface Ag Non-Reactive     Hepatitis C Antibody [[id number]]  (Abnormal) Collected:  10/18/19 2109    Specimen:  Blood Updated:  10/18/19 2208     Hepatitis C Ab Reactive    Hepatitis B Surface Antigen [[id number]] Collected:  10/18/19 2109    Specimen:  Blood Updated:  10/18/19 2200    Narrative:       The following orders were created for panel order Hepatitis B Surface Antigen.  Procedure                               Abnormality         Status                     ---------                               -----------         ------                     Hepatitis B Surface Antigen[[id number]]  Normal              Final result                 Please view results for these tests on the individual orders.    Hepatitis B Surface Antigen [[id number]]  (Normal) Collected:  10/18/19 2109    Specimen:  Blood Updated:  10/18/19 2200     Hepatitis B Surface Ag Non-Reactive    Comprehensive Metabolic Panel [[id number]]  (Abnormal) Collected:  10/18/19 2109    Specimen:  Blood Updated:  10/18/19 2154     Glucose 69 mg/dL      BUN 10 mg/dL      Creatinine 0.69 mg/dL      Sodium 137 mmol/L      Potassium 3.9 mmol/L      Chloride 99 mmol/L      CO2 23.3 mmol/L      Calcium 9.5 mg/dL      Total Protein 8.4 g/dL      Albumin 4.16 g/dL      ALT (SGPT) 8 U/L      AST (SGOT) 12 U/L      Alkaline Phosphatase 158 U/L      Total Bilirubin 0.4 mg/dL      eGFR Non African Amer 104 mL/min/1.73      Globulin 4.2 gm/dL      A/G Ratio 1.0 g/dL      BUN/Creatinine Ratio 14.5     Anion Gap 14.7 mmol/L     Narrative:       GFR Normal >60  Chronic Kidney Disease <60  Kidney Failure <15    URINE DRUG SCREEN PLUS BUPRENORPHINE - [[id number]] Collected:  10/18/19 2108     Updated:  10/18/19 2143    Narrative:       The following orders were created for panel order URINE DRUG SCREEN PLUS BUPRENORPHINE -.  Procedure                               Abnormality         Status                     ---------                               -----------         ------                     Urine  Drug Screen - Urin...[[id number]]  Abnormal            Final result               Buprenorphine Screen Uri...[[id number]]                                                   Please view results for these tests on the individual orders.    Urine Drug Screen - Urine, Clean Catch [[id number]]  (Abnormal) Collected:  10/18/19 2108    Specimen:  Urine, Clean Catch Updated:  10/18/19 2143     Amphetamine Screen, Urine Negative     Barbiturates Screen, Urine Negative     Benzodiazepine Screen, Urine Negative     Cocaine Screen, Urine Negative     Methadone Screen, Urine Negative     Opiate Screen Negative     Phencyclidine (PCP), Urine Negative     THC, Screen, Urine Negative     6-ACETYL MORPHINE Negative     Buprenorphine, Screen, Urine Positive     Oxycodone Screen, Urine Negative    Narrative:       Negative Thresholds For Drugs Screened:                  Amphetamines              1000 ng/ml               Barbiturates               200 ng/ml               Benzodiazepines            200 ng/ml              Cocaine                    300 ng/ml              Methadone                  300 ng/ml              Opiates                    300 ng/ml               Phencyclidine               25 ng/ml               THC                         50 ng/ml              6-Acetyl Morphine           10 ng/ml              Buprenorphine                5 ng/ml              Oxycodone                  300 ng/ml    The reference range for all drugs tested is negative. This report includes final unconfirmed qualitative results to be used for medical treatment purposes only. Unconfirmed results must not be used for non-medical purposes such as employment or legal testing. Clinical consideration should be applied to any drug of abuse test, especially when unconfirmed quantitative results are used.      Group B Streptococcus Culture - Swab, Vaginal/Rectum [[id number]] Collected:  10/18/19 2109    Specimen:  Swab from Vaginal/Rectum Updated:  10/18/19 2121     CBC Auto Differential [[id number]]  (Normal) Collected:  10/18/19 2109    Specimen:  Blood Updated:  10/18/19 2119     WBC 7.92 10*3/mm3      RBC 4.29 10*6/mm3      Hemoglobin 13.2 g/dL      Hematocrit 39.8 %      MCV 92.8 fL      MCH 30.8 pg      MCHC 33.2 g/dL      RDW 12.6 %      RDW-SD 41.3 fl      MPV 10.9 fL      Platelets 207 10*3/mm3      Neutrophil % 56.2 %      Lymphocyte % 35.9 %      Monocyte % 6.2 %      Eosinophil % 0.8 %      Basophil % 0.4 %      Immature Grans % 0.5 %      Neutrophils, Absolute 4.46 10*3/mm3      Lymphocytes, Absolute 2.84 10*3/mm3      Monocytes, Absolute 0.49 10*3/mm3      Eosinophils, Absolute 0.06 10*3/mm3      Basophils, Absolute 0.03 10*3/mm3      Immature Grans, Absolute 0.04 10*3/mm3     RPR [[id number]] Collected:  10/18/19 2109    Specimen:  Blood Updated:  10/18/19 2114    Rubella Antibody, IgG [[id number]] Collected:  10/18/19 2109    Specimen:  Blood Updated:  10/18/19 2114    Rubeola Antibody IgG [[id number]] Collected:  10/18/19 2109    Specimen:  Blood Updated:  10/18/19 2114    Mumps Antibody, IgG [[id number]] Collected:  10/18/19 2109    Specimen:  Blood Updated:  10/18/19 2114    Measles / Mumps / Rubella Immunity [[id number]] Collected:  10/18/19 2109    Specimen:  Blood Updated:  10/18/19 2114    Narrative:       The following orders were created for panel order Measles / Mumps / Rubella Immunity.  Procedure                               Abnormality         Status                     ---------                               -----------         ------                     Rubeola Antibody IgG[[id number]]                             In process                 Mumps Antibody, IgG[[id number]]                              In process                 Rubella Antibody, IgG[[id number]]                            In process                   Please view results for these tests on the individual orders.    RPR [[id number]] Collected:  10/18/19 2109    Specimen:  Blood Updated:   10/18/19 2114    Rubella Antibody, IgG [[id number]] Collected:  10/18/19 2109    Specimen:  Blood Updated:  10/18/19 2114            Review of Systems    The following systems were reviewed and negative;  ENT, respiratory, cardiovascular, gastrointestinal, genitourinary, breast, endocrine and allergies / immunologic.      Physical Exam:      General Appearance:    Alert, cooperative, in no acute distress   Head:    Normocephalic, without obvious abnormality, atraumatic   Eyes:            Lids and lashes normal, conjunctivae and sclerae normal, no   icterus, no pallor, corneas clear, PERRLA   Ears:    Ears appear intact with no abnormalities noted   Throat:   No oral lesions, no thrush, oral mucosa moist   Neck:   No adenopathy, supple, trachea midline, no thyromegaly, no     carotid bruit, no JVD   Back:     No kyphosis present, no scoliosis present, no skin lesions,       erythema or scars, no tenderness to percussion or                   palpation,   range of motion normal   Lungs:     Clear to auscultation,respirations regular, even and                   unlabored    Heart:    Regular rhythm and normal rate, normal S1 and S2, no            murmur, no gallop, no rub, no click   Breast Exam:    Deferred   Abdomen:     Normal bowel sounds, no masses, no organomegaly, soft        non-tender, non-distended, no guarding, no rebound                 tenderness   Genitalia:    Cervix: Dilated 3 cm, 50%   Extremities:   Moves all extremities well, no edema, no cyanosis, no              redness   Pulses:   Pulses palpable and equal bilaterally   Skin:   No bleeding, bruising or rash   Lymph nodes:   No palpable adenopathy   Neurologic:   Cranial nerves 2 - 12 grossly intact, sensation intact, DTR        present and equal bilaterally         Assessment: Patient is a 23 y.o. female who presents with IUP at 40w2d weeks gestation. G 4, P 1110. Drug Abuse. Insufficient PNC. Oligohydramnios. IUGR.   .   Chief Complaint   Patient  presents with   • Labs Only     PT PRESENTS TO LABOR FALK WITH ORDERS FOR PRENATAL LABS, BPP, NST, AND GBS CULTURE. PT DENIES VAG BLEEDING AND LOF. BABY IS ACTIVE.        Plan of Care: Admit. Proceed with IOL.      Lionel Esparza III, MD  10/19/19  12:06 AM      Electronically signed by Lionel Esparza III, MD at 10/19/19 0009     Lionel Esparza III, MD at 10/18/19 2200                Chief complaint   Chief Complaint   Patient presents with   • Labs Only     PT PRESENTS TO LABOR FALK WITH ORDERS FOR PRENATAL LABS, BPP, NST, AND GBS CULTURE. PT DENIES VAG BLEEDING AND LOF. BABY IS ACTIVE.        History of Present Illness: Patient is a 23 y.o. female who presents with IUP at 40w2d weeks gestation.  G 4 P 1110. Insufficient PNC. Drug Abuse. Non Compliant. IUGR. Oligohydramnios.         PAST MEDICAL HISTORY  Hepatitis C  Drug Abuse  Insufficient PNC    Blood Type: AB -    History reviewed. No pertinent surgical history.  Family History   Problem Relation Age of Onset   • Hypertension Mother    • Cancer Father    • No Known Problems Sister    • No Known Problems Brother    • No Known Problems Son    • No Known Problems Daughter    • Hypertension Maternal Grandmother    • Diabetes Maternal Grandfather    • No Known Problems Paternal Grandmother    • No Known Problems Paternal Grandfather    • No Known Problems Cousin    • Diabetes Maternal Aunt    • Rheum arthritis Neg Hx    • Osteoarthritis Neg Hx    • Asthma Neg Hx    • Heart failure Neg Hx    • Hyperlipidemia Neg Hx    • Migraines Neg Hx    • Rashes / Skin problems Neg Hx    • Seizures Neg Hx    • Stroke Neg Hx    • Thyroid disease Neg Hx      Social History     Tobacco Use   • Smoking status: Current Every Day Smoker   • Smokeless tobacco: Never Used   Substance Use Topics   • Alcohol use: No     Alcohol/week: 0.0 oz   • Drug use: Yes     Frequency: 3.0 times per week     Types: IV     Comment: last use 3 days ago (10/26/18)     Medications Prior to Admission    Medication Sig Dispense Refill Last Dose   • calcium carbonate (TUMS) 500 MG chewable tablet Chew 1 tablet As Needed for Indigestion or Heartburn.   Past Week at Unknown time   • Multiple Vitamins-Minerals (MULTIVITAMIN WITH MINERALS) tablet tablet Take 1 tablet by mouth Daily.   Past Week at Unknown time     Allergies:  Amoxicillin; Penicillins; and Zofran [ondansetron hcl]      Vital Signs  Temp:  [97.5 °F (36.4 °C)-97.7 °F (36.5 °C)] 97.7 °F (36.5 °C)  Heart Rate:  [50-65] 50  Resp:  [18] 18  BP: (116-119)/(74-81) 117/77    Radiology  Imaging Results (last 24 hours)     Procedure Component Value Units Date/Time    US Fetal Biophysical Profile;Without Non-Stress Testing [[id number]] Collected:  10/18/19 2224     Updated:  10/18/19 2226    Narrative:       US Fetal Biophysical Profile    INDICATION:   Post 40 weeks gestational age.    TECHNIQUE:   Transabdominal ultrasound of the pelvis.    COMPARISON:   4/22/2019    FINDINGS:  FETAL BIOPHYSICAL PROFILE: 4/8    BIOPHYSICAL PROFILE:  Fetal Breathing Movements (FBM): 2  Gross Body Movements (GBM): 2  Fetal Tone (FT): 0  Amniotic Fluid Volume (AFV): 0  TOTAL SCORE: 4    Cardiac activity is noted a rate of 115 bpm. Gestational age is estimated with BPD, head circumference, abdominal circumference and femur length. Average gestational age by ultrasound criteria is 33 weeks 4 days. Estimated fetal weight 4 lbs. 12 oz.        Impression:       1.  Abnormal biophysical profile of 4 out of 8. Small for dates with estimated fetal weight 4 lbs. 12 oz. oligohydramnios.    Signer Name: Dylan Nolasco MD   Signed: 10/18/2019 10:24 PM   Workstation Name: [id number]    [facility]          Labs  Lab Results (last 24 hours)     Procedure Component Value Units Date/Time    Urinalysis With Culture If Indicated - Urine, Clean Catch [[id number]]  (Abnormal) Collected:  10/18/19 2108    Specimen:  Urine, Clean Catch Updated:  10/18/19 2216     Color, UA Dark  Yellow     Appearance, UA Cloudy     pH, UA 6.5     Specific Gravity, UA 1.029     Glucose, UA Negative     Ketones, UA Trace     Bilirubin, UA Small (1+)     Blood, UA Moderate (2+)     Protein, UA 30 mg/dL (1+)     Leuk Esterase, UA Moderate (2+)     Nitrite, UA Negative     Urobilinogen, UA 1.0 E.U./dL    Urinalysis, Microscopic Only - Urine, Clean Catch [[id number]]  (Abnormal) Collected:  10/18/19 2108    Specimen:  Urine, Clean Catch Updated:  10/18/19 2216     RBC, UA 0-2 /HPF      WBC, UA 6-12 /HPF      Bacteria, UA 2+ /HPF      Squamous Epithelial Cells, UA 21-30 /HPF      Hyaline Casts, UA None Seen /LPF      Methodology Manual Light Microscopy    Urine Culture - Urine, Urine, Clean Catch [[id number]] Collected:  10/18/19 2108    Specimen:  Urine, Clean Catch Updated:  10/18/19 2216    Hepatitis C Antibody [[id number]]  (Abnormal) Collected:  10/18/19 2109    Specimen:  Blood Updated:  10/18/19 2212     Hepatitis C Ab Reactive    OB Panel With HIV [[id number]] Collected:  10/18/19 2109    Specimen:  Blood Updated:  10/18/19 2210    Narrative:       The following orders were created for panel order OB Panel With HIV.  Procedure                               Abnormality         Status                     ---------                               -----------         ------                     RPR[[id number]]                                              In process                 CBC Auto Differential[[id number]]        Normal              Final result               Hepatitis B Surface Antigen[[id number]]  Normal              Final result               Rubella Antibody, IgG[[id number]]                            In process                 OB Panel Type & Screen[[id number]]                                                      HIV-1 / O / 2 Ag / Antib...[[id number]]  Normal              Final result               Hepatitis C Antibody[[id number]]         Abnormal            Final result                 Please view results for  these tests on the individual orders.    HIV-1 / O / 2 Ag / Antibody 4th Generation [[id number]]  (Normal) Collected:  10/18/19 2109    Specimen:  Blood Updated:  10/18/19 2210     HIV-1/ HIV-2 Non-Reactive     Comment: A non-reactive test result does not preclude the possibility of exposure to HIV or infection with HIV. An antibody response to recent exposure may take several months to reach detectable levels.       Narrative:       The HIV antibody/antigen combo assay is a qualitative assay for HIV that includes the p24 antigen as well as antibodies to HIV types 1 and 2. This test is intended to be used as a screening assay in the diagnosis of HIV infection in patients over the age of 2.    Hepatitis B Surface Antigen [[id number]]  (Normal) Collected:  10/18/19 2109    Specimen:  Blood Updated:  10/18/19 2210     Hepatitis B Surface Ag Non-Reactive    Hepatitis C Antibody [[id number]]  (Abnormal) Collected:  10/18/19 2109    Specimen:  Blood Updated:  10/18/19 2208     Hepatitis C Ab Reactive    Hepatitis B Surface Antigen [[id number]] Collected:  10/18/19 2109    Specimen:  Blood Updated:  10/18/19 2200    Narrative:       The following orders were created for panel order Hepatitis B Surface Antigen.  Procedure                               Abnormality         Status                     ---------                               -----------         ------                     Hepatitis B Surface Antigen[[id number]]  Normal              Final result                 Please view results for these tests on the individual orders.    Hepatitis B Surface Antigen [[id number]]  (Normal) Collected:  10/18/19 2109    Specimen:  Blood Updated:  10/18/19 2200     Hepatitis B Surface Ag Non-Reactive    Comprehensive Metabolic Panel [[id number]]  (Abnormal) Collected:  10/18/19 2109    Specimen:  Blood Updated:  10/18/19 2154     Glucose 69 mg/dL      BUN 10 mg/dL      Creatinine 0.69 mg/dL      Sodium 137 mmol/L      Potassium 3.9 mmol/L       Chloride 99 mmol/L      CO2 23.3 mmol/L      Calcium 9.5 mg/dL      Total Protein 8.4 g/dL      Albumin 4.16 g/dL      ALT (SGPT) 8 U/L      AST (SGOT) 12 U/L      Alkaline Phosphatase 158 U/L      Total Bilirubin 0.4 mg/dL      eGFR Non African Amer 104 mL/min/1.73      Globulin 4.2 gm/dL      A/G Ratio 1.0 g/dL      BUN/Creatinine Ratio 14.5     Anion Gap 14.7 mmol/L     Narrative:       GFR Normal >60  Chronic Kidney Disease <60  Kidney Failure <15    URINE DRUG SCREEN PLUS BUPRENORPHINE - [[id number]] Collected:  10/18/19 2108     Updated:  10/18/19 2143    Narrative:       The following orders were created for panel order URINE DRUG SCREEN PLUS BUPRENORPHINE -.  Procedure                               Abnormality         Status                     ---------                               -----------         ------                     Urine Drug Screen - Urin...[[id number]]  Abnormal            Final result               Buprenorphine Screen Uri...[[id number]]                                                   Please view results for these tests on the individual orders.    Urine Drug Screen - Urine, Clean Catch [[id number]]  (Abnormal) Collected:  10/18/19 2108    Specimen:  Urine, Clean Catch Updated:  10/18/19 2143     Amphetamine Screen, Urine Negative     Barbiturates Screen, Urine Negative     Benzodiazepine Screen, Urine Negative     Cocaine Screen, Urine Negative     Methadone Screen, Urine Negative     Opiate Screen Negative     Phencyclidine (PCP), Urine Negative     THC, Screen, Urine Negative     6-ACETYL MORPHINE Negative     Buprenorphine, Screen, Urine Positive     Oxycodone Screen, Urine Negative    Narrative:       Negative Thresholds For Drugs Screened:                  Amphetamines              1000 ng/ml               Barbiturates               200 ng/ml               Benzodiazepines            200 ng/ml              Cocaine                    300 ng/ml              Methadone                   300 ng/ml              Opiates                    300 ng/ml               Phencyclidine               25 ng/ml               THC                         50 ng/ml              6-Acetyl Morphine           10 ng/ml              Buprenorphine                5 ng/ml              Oxycodone                  300 ng/ml    The reference range for all drugs tested is negative. This report includes final unconfirmed qualitative results to be used for medical treatment purposes only. Unconfirmed results must not be used for non-medical purposes such as employment or legal testing. Clinical consideration should be applied to any drug of abuse test, especially when unconfirmed quantitative results are used.      Group B Streptococcus Culture - Swab, Vaginal/Rectum [[id number]] Collected:  10/18/19 2109    Specimen:  Swab from Vaginal/Rectum Updated:  10/18/19 2121    CBC Auto Differential [[id number]]  (Normal) Collected:  10/18/19 2109    Specimen:  Blood Updated:  10/18/19 2119     WBC 7.92 10*3/mm3      RBC 4.29 10*6/mm3      Hemoglobin 13.2 g/dL      Hematocrit 39.8 %      MCV 92.8 fL      MCH 30.8 pg      MCHC 33.2 g/dL      RDW 12.6 %      RDW-SD 41.3 fl      MPV 10.9 fL      Platelets 207 10*3/mm3      Neutrophil % 56.2 %      Lymphocyte % 35.9 %      Monocyte % 6.2 %      Eosinophil % 0.8 %      Basophil % 0.4 %      Immature Grans % 0.5 %      Neutrophils, Absolute 4.46 10*3/mm3      Lymphocytes, Absolute 2.84 10*3/mm3      Monocytes, Absolute 0.49 10*3/mm3      Eosinophils, Absolute 0.06 10*3/mm3      Basophils, Absolute 0.03 10*3/mm3      Immature Grans, Absolute 0.04 10*3/mm3     RPR [[id number]] Collected:  10/18/19 2109    Specimen:  Blood Updated:  10/18/19 2114    Rubella Antibody, IgG [[id number]] Collected:  10/18/19 2109    Specimen:  Blood Updated:  10/18/19 2114    Rubeola Antibody IgG [[id number]] Collected:  10/18/19 2109    Specimen:  Blood Updated:  10/18/19 2114    Mumps Antibody, IgG [[id number]] Collected:   10/18/19 2109    Specimen:  Blood Updated:  10/18/19 2114    Measles / Mumps / Rubella Immunity [[id number]] Collected:  10/18/19 2109    Specimen:  Blood Updated:  10/18/19 2114    Narrative:       The following orders were created for panel order Measles / Mumps / Rubella Immunity.  Procedure                               Abnormality         Status                     ---------                               -----------         ------                     Rubeola Antibody IgG[[id number]]                             In process                 Mumps Antibody, IgG[[id number]]                              In process                 Rubella Antibody, IgG[[id number]]                            In process                   Please view results for these tests on the individual orders.    RPR [[id number]] Collected:  10/18/19 2109    Specimen:  Blood Updated:  10/18/19 2114    Rubella Antibody, IgG [[id number]] Collected:  10/18/19 2109    Specimen:  Blood Updated:  10/18/19 2114            Review of Systems    The following systems were reviewed and negative;  ENT, respiratory, cardiovascular, gastrointestinal, genitourinary, breast, endocrine and allergies / immunologic.      Physical Exam:      General Appearance:    Alert, cooperative, in no acute distress   Head:    Normocephalic, without obvious abnormality, atraumatic   Eyes:            Lids and lashes normal, conjunctivae and sclerae normal, no   icterus, no pallor, corneas clear, PERRLA   Ears:    Ears appear intact with no abnormalities noted   Throat:   No oral lesions, no thrush, oral mucosa moist   Neck:   No adenopathy, supple, trachea midline, no thyromegaly, no     carotid bruit, no JVD   Back:     No kyphosis present, no scoliosis present, no skin lesions,       erythema or scars, no tenderness to percussion or                   palpation,   range of motion normal   Lungs:     Clear to auscultation,respirations regular, even and                   unlabored     Heart:    Regular rhythm and normal rate, normal S1 and S2, no            murmur, no gallop, no rub, no click   Breast Exam:    Deferred   Abdomen:     Normal bowel sounds, no masses, no organomegaly, soft        non-tender, non-distended, no guarding, no rebound                 tenderness   Genitalia:    Cervix: Dilated 3cm, 50%   Extremities:   Moves all extremities well, no edema, no cyanosis, no              redness   Pulses:   Pulses palpable and equal bilaterally   Skin:   No bleeding, bruising or rash   Lymph nodes:   No palpable adenopathy   Neurologic:   Cranial nerves 2 - 12 grossly intact, sensation intact, DTR        present and equal bilaterally         Assessment: Patient is a 23 y.o. female who presents with IUP at 40w2d weeks gestation. G 4 P 1110. Insufficient PNC. Drug Abuse. Non Compliant. IUGR. Oligohydramnios.     Chief Complaint   Patient presents with   • Labs Only     PT PRESENTS TO LABOR FALK WITH ORDERS FOR PRENATAL LABS, BPP, NST, AND GBS CULTURE. PT DENIES VAG BLEEDING AND LOF. BABY IS ACTIVE.        Plan of Care: Admit. Proceed with IOL.      Lionel Esparza III, MD  10/19/19  9:23 AM      Electronically signed by Lionel Esparza III, MD at 10/19/19 0926     H&P filed by [facility] at 10/19/19 0408     Scan on 10/18/2019: [facility] PRENATAL RECORDS -10/18/2019 (below)            Electronically signed by Interface, Scans Incoming at 10/19/19 0408       Hospital Medications (all)       Dose Frequency Start End    acetaminophen (TYLENOL) tablet 650 mg 650 mg Every 4 Hours PRN 10/18/2019     Sig - Route: Take 2 tablets by mouth Every 4 (Four) Hours As Needed for Mild Pain  or Headache. - Oral    acetaminophen (TYLENOL) tablet 650 mg 650 mg Every 4 Hours PRN 10/19/2019     Sig - Route: Take 2 tablets by mouth Every 4 (Four) Hours As Needed for Mild Pain  or Headache. - Oral    bupivacaine (PF) (MARCAINE) 0.25 % injection  - ADS Override Pull   10/19/2019 10/19/2019    Notes to  "Pharmacy: Created by cabinet override    butorphanol (STADOL) injection 1 mg 1 mg Every 3 Hours PRN 10/18/2019     Sig - Route: Infuse 1 mL into a venous catheter Every 3 (Three) Hours As Needed for Moderate Pain . - Intravenous    butorphanol (STADOL) injection 2 mg 2 mg Every 3 Hours PRN 10/18/2019     Sig - Route: Infuse 1 mL into a venous catheter Every 3 (Three) Hours As Needed for Severe Pain . - Intravenous    calcium carbonate (TUMS) chewable tablet 500 mg (200 mg elemental) 1 tablet 3 Times Daily PRN 10/19/2019     Sig - Route: Chew 500 mg 3 (Three) Times a Day As Needed for Indigestion or Heartburn. - Oral    carboprost (HEMABATE) injection 250 mcg 250 mcg As Needed 10/19/2019     Sig - Route: Inject 1 mL into the appropriate muscle as directed by prescriber As Needed (hemorrhage). - Intramuscular    clindamycin (CLEOCIN) 900 mg in dextrose 5% 50 mL IVPB (premix) 900 mg Every 8 Hours 10/19/2019 10/21/2019    Sig - Route: Infuse 50 mL into a venous catheter Every 8 (Eight) Hours. - Intravenous    ePHEDrine injection 10 mg 10 mg Every 5 Minutes PRN 10/19/2019     Sig - Route: Infuse 0.2 mL into a venous catheter Every 5 (Five) Minutes As Needed (hypotension). - Intravenous    famotidine (PEPCID) injection 20 mg 20 mg Every 12 Hours Scheduled 10/19/2019     Sig - Route: Infuse 2 mL into a venous catheter Every 12 (Twelve) Hours. - Intravenous    Linked Group 1:  \"Or\" Linked Group Details        famotidine (PEPCID) tablet 20 mg 20 mg Every 12 Hours Scheduled 10/19/2019     Sig - Route: Take 1 tablet by mouth Every 12 (Twelve) Hours. - Oral    Linked Group 1:  \"Or\" Linked Group Details        HYDROcodone-acetaminophen (NORCO) 5-325 MG per tablet 1 tablet 1 tablet Every 4 Hours PRN 10/19/2019 10/29/2019    Sig - Route: Take 1 tablet by mouth Every 4 (Four) Hours As Needed for Moderate Pain . - Oral    hydrOXYzine (ATARAX) tablet 50 mg 50 mg Nightly PRN 10/18/2019     Sig - Route: Take 1 tablet by mouth At Night " "As Needed for Sleep. - Oral    ibuprofen (ADVIL,MOTRIN) tablet 800 mg 800 mg Once As Needed 10/19/2019     Sig - Route: Take 1 tablet by mouth 1 (One) Time As Needed for Mild Pain . - Oral    lactated ringers bolus 1,000 mL 1,000 mL Once As Needed 10/18/2019 10/19/2019    Sig - Route: Infuse 1,000 mL into a venous catheter 1 (One) Time As Needed (epidural pre load). - Intravenous    lactated ringers infusion 125 mL/hr Continuous 10/19/2019     Sig - Route: Infuse 125 mL/hr into a venous catheter Continuous. - Intravenous    lactated ringers infusion 999 mL/hr Once 10/19/2019     Sig - Route: Infuse 999 mL/hr into a venous catheter 1 (One) Time. - Intravenous    methylergonovine (METHERGINE) injection 200 mcg 200 mcg Once As Needed 10/19/2019     Sig - Route: Inject 1 mL into the appropriate muscle as directed by prescriber 1 (One) Time As Needed (hemorrhage). - Intramuscular    mineral oil  As Needed 10/18/2019     Sig - Route: Apply  topically to the appropriate area as directed As Needed (perineal prep). - Topical    misoprostol (CYTOTEC) tablet 800 mcg 800 mcg As Needed 10/19/2019     Sig - Route: Insert 8 tablets into the rectum As Needed (Postpartum Hemorrhage). - Rectal    multivitamin (THERAGRAN) tablet 1 tablet 1 tablet Daily 10/20/2019     Sig - Route: Take 1 tablet by mouth Daily. - Oral    nicotine (NICODERM CQ) 14 MG/24HR patch 1 patch 1 patch Every 24 Hours Scheduled 10/19/2019     Sig - Route: Place 1 patch on the skin as directed by provider Daily. - Transdermal    oxytocin in sodium chloride (PITOCIN) 30 UNIT/500ML infusion solution 999 mL/hr Once 10/19/2019     Sig - Route: Infuse 59.94 Units/hr into a venous catheter 1 (One) Time. - Intravenous    Linked Group 2:  \"Followed by\" Linked Group Details        oxytocin in sodium chloride (PITOCIN) 30 UNIT/500ML infusion solution 250 mL/hr Continuous 10/19/2019 10/19/2019    Sig - Route: Infuse 15 Units/hr into a venous catheter Continuous. - Intravenous " "   Linked Group 2:  \"Followed by\" Linked Group Details        oxytocin in sodium chloride (PITOCIN) 30 UNIT/500ML infusion solution 125 mL/hr Continuous PRN 10/19/2019     Sig - Route: Infuse 7.5 Units/hr into a venous catheter Continuous As Needed (PRN bleeding for 1 bag). - Intravenous    Linked Group 2:  \"Followed by\" Linked Group Details        oxytocin in sodium chloride (PITOCIN) 30 UNIT/500ML infusion solution 2 eden-units/min Titrated 10/19/2019     Sig - Route: Infuse 0.002 Units/min into a venous catheter Dose Adjusted By Provider As Needed. - Intravenous    ropivacaine (NAROPIN) 0.2 % injection  - ADS Override Pull   10/19/2019 10/19/2019    Notes to Pharmacy: Created by cabinet override    ropivacaine (NAROPIN) 0.2 % injection 14 mL/hr Continuous 10/19/2019     Sig - Route: 28 mg/hr by Epidural route Continuous. - Epidural    sodium chloride (NS) irrigation solution 1,000 mL 1,000 mL Once As Needed 10/18/2019     Sig - Route: Irrigate with 1,000 mL to the affected area as directed by provider 1 (One) Time As Needed (irrigation). - Irrigation    sodium chloride 0.9 % flush 3-10 mL 3-10 mL As Needed 10/18/2019     Sig - Route: Infuse 3-10 mL into a venous catheter As Needed for Line Care. - Intravenous    terbutaline (BRETHINE) injection 0.25 mg 0.25 mg As Needed 10/18/2019     Sig - Route: Inject 0.25 mL under the skin into the appropriate area as directed As Needed (fetal hyperstimulation/distress). - Subcutaneous    oxytocin in sodium chloride (PITOCIN) 30 UNIT/500ML infusion solution (Discontinued) 2 eden-units/min Titrated 10/19/2019 10/18/2019    Sig - Route: Infuse 0.002 Units/min into a venous catheter Dose Adjusted By Provider As Needed. - Intravenous          Physician Progress Notes (all)     No notes of this type exist for this encounter.        Consult Notes (all)     No notes of this type exist for this encounter.        "

## 2019-10-19 NOTE — ANESTHESIA PROCEDURE NOTES
Labor Epidural    Pre-sedation assessment completed: 10/19/2019 9:56 AM    Patient reassessed immediately prior to procedure    Patient location during procedure: OB  Start Time: 10/19/2019 10:05 AM  Stop Time: 10/19/2019 10:06 AM  Indication:at surgeon's request  Performed By  Anesthesiologist: Oseas Arredondo MD  Preanesthetic Checklist  Completed: patient identified, site marked, surgical consent, pre-op evaluation, timeout performed, IV checked, risks and benefits discussed and monitors and equipment checked  Prep:  Pt Position:sitting  Sterile Tech:gloves, mask, sterile barrier and cap  Prep:povidone-iodine 7.5% surgical scrub  Monitoring:blood pressure monitoring  Epidural Block Procedure:  Approach:midline  Guidance:landmark technique and palpation technique  Location:L3-L4  Needle Type:Tuohy  Needle Gauge:17 G  Loss of Resistance Medium: saline  Loss of Resistance: 5cm  Cath Depth at skin:13 cm  Paresthesia: none  Aspiration:negative  Test Dose:negative  Number of Attempts: 1  Post Assessment:  Dressing:occlusive dressing applied and secured with tape  Pt Tolerance:patient tolerated the procedure well with no apparent complications  Complications:no

## 2019-10-19 NOTE — PROGRESS NOTES
Case Management/Social Work    Patient Name:  Sailaja Alarcon  YOB: 1995  MRN: [id number]  Admit Date:  10/18/2019        SS received consult on this date h/o drug abuse. SS spoke with the pt on this date. Pt delivered a baby girl Major Alarcon on this date. Infants weights 4lbs. Pt states that she has a 4-year-old Eileenrogerio Alarcon and 1-year-old Stefani Alarcon. Pt states that she does not have Custody of her children at this time. Pt states that she did not have a history with DCBS, that she choice to place her children in the care of family members due to being homeless and not having an income. Pt states that she does have a home at this time. Pt lives at [address] [place] [address]. Pt states that the FOB John Alarcon lives in the home. FOB also works at [employer] and does odd jobs for his boss.     Pt does not have WIC services or SNAP benefits at this time. Pt states that she does not have a car seat but family will be getting her one.     Pt did not have adequate prenatal care. Pt states that she did see provides at [facility], [facility] and [facility]. Pt states that she has been enrolled in the Subutex program at [facility]. Pt’s last prescription was filled on 6/26/19. Pt’s UDS positive for Buprenorphine. Pt states the last time she used was yesterday. Pt states that she has been using her same prescription that had filled. Infants UDS and Meconium is pending.     SS made report to [facility] on call per Kaley. Report has been accepted. [facility] to provide infants discharge plan.     Pt’s aunt Sridevi will provide transportation for the pt and infant at discharge.       Electronically signed by:  HELENA Wise  10/19/19 3:11 PM

## 2019-10-19 NOTE — H&P
Chief complaint   Chief Complaint   Patient presents with   • Labs Only     PT PRESENTS TO LAB[facility] WITH ORDERS FOR PRENATAL LABS, BPP, NST, AND GBS CULTURE. PT DENIES VAG BLEEDING AND LOF. BABY IS ACTIVE.        History of Present Illness: Patient is a 23 y.o. female who presents with IUP at 40w2d weeks gestation.  G 4 P 1110. Insufficient PNC. Drug Abuse. Non Compliant. IUGR. Oligohydramnios.         PAST MEDICAL HISTORY  Hepatitis C  Drug Abuse  Insufficient PNC    Blood Type: AB -    History reviewed. No pertinent surgical history.  Family History   Problem Relation Age of Onset   • Hypertension Mother    • Cancer Father    • No Known Problems Sister    • No Known Problems Brother    • No Known Problems Son    • No Known Problems Daughter    • Hypertension Maternal Grandmother    • Diabetes Maternal Grandfather    • No Known Problems Paternal Grandmother    • No Known Problems Paternal Grandfather    • No Known Problems Cousin    • Diabetes Maternal Aunt    • Rheum arthritis Neg Hx    • Osteoarthritis Neg Hx    • Asthma Neg Hx    • Heart failure Neg Hx    • Hyperlipidemia Neg Hx    • Migraines Neg Hx    • Rashes / Skin problems Neg Hx    • Seizures Neg Hx    • Stroke Neg Hx    • Thyroid disease Neg Hx      Social History     Tobacco Use   • Smoking status: Current Every Day Smoker   • Smokeless tobacco: Never Used   Substance Use Topics   • Alcohol use: No     Alcohol/week: 0.0 oz   • Drug use: Yes     Frequency: 3.0 times per week     Types: IV     Comment: last use 3 days ago (10/26/18)     Medications Prior to Admission   Medication Sig Dispense Refill Last Dose   • calcium carbonate (TUMS) 500 MG chewable tablet Chew 1 tablet As Needed for Indigestion or Heartburn.   Past Week at Unknown time   • Multiple Vitamins-Minerals (MULTIVITAMIN WITH MINERALS) tablet tablet Take 1 tablet by mouth Daily.   Past Week at Unknown time     Allergies:  Amoxicillin; Penicillins; and Zofran [ondansetron  hcl]      Vital Signs  Temp:  [97.5 °F (36.4 °C)-97.7 °F (36.5 °C)] 97.7 °F (36.5 °C)  Heart Rate:  [50-65] 50  Resp:  [18] 18  BP: (116-119)/(74-81) 117/77    Radiology  Imaging Results (last 24 hours)     Procedure Component Value Units Date/Time    US Fetal Biophysical Profile;Without Non-Stress Testing [[id number]] Collected:  10/18/19 2224     Updated:  10/18/19 2226    Narrative:       US Fetal Biophysical Profile    INDICATION:   Post 40 weeks gestational age.    TECHNIQUE:   Transabdominal ultrasound of the pelvis.    COMPARISON:   4/22/2019    FINDINGS:  FETAL BIOPHYSICAL PROFILE: 4/8    BIOPHYSICAL PROFILE:  Fetal Breathing Movements (FBM): 2  Gross Body Movements (GBM): 2  Fetal Tone (FT): 0  Amniotic Fluid Volume (AFV): 0  TOTAL SCORE: 4    Cardiac activity is noted a rate of 115 bpm. Gestational age is estimated with BPD, head circumference, abdominal circumference and femur length. Average gestational age by ultrasound criteria is 33 weeks 4 days. Estimated fetal weight 4 lbs. 12 oz.        Impression:       1.  Abnormal biophysical profile of 4 out of 8. Small for dates with estimated fetal weight 4 lbs. 12 oz. oligohydramnios.    Signer Name: Dylan Nolasco MD   Signed: 10/18/2019 10:24 PM   Workstation Name: [id number]    [facility]          Labs  Lab Results (last 24 hours)     Procedure Component Value Units Date/Time    Urinalysis With Culture If Indicated - Urine, Clean Catch [[id number]]  (Abnormal) Collected:  10/18/19 2108    Specimen:  Urine, Clean Catch Updated:  10/18/19 2216     Color, UA Dark Yellow     Appearance, UA Cloudy     pH, UA 6.5     Specific Gravity, UA 1.029     Glucose, UA Negative     Ketones, UA Trace     Bilirubin, UA Small (1+)     Blood, UA Moderate (2+)     Protein, UA 30 mg/dL (1+)     Leuk Esterase, UA Moderate (2+)     Nitrite, UA Negative     Urobilinogen, UA 1.0 E.U./dL    Urinalysis, Microscopic Only - Urine, Clean Catch [[id number]]   (Abnormal) Collected:  10/18/19 2108    Specimen:  Urine, Clean Catch Updated:  10/18/19 2216     RBC, UA 0-2 /HPF      WBC, UA 6-12 /HPF      Bacteria, UA 2+ /HPF      Squamous Epithelial Cells, UA 21-30 /HPF      Hyaline Casts, UA None Seen /LPF      Methodology Manual Light Microscopy    Urine Culture - Urine, Urine, Clean Catch [[id number]] Collected:  10/18/19 2108    Specimen:  Urine, Clean Catch Updated:  10/18/19 2216    Hepatitis C Antibody [[id number]]  (Abnormal) Collected:  10/18/19 2109    Specimen:  Blood Updated:  10/18/19 2212     Hepatitis C Ab Reactive    OB Panel With HIV [[id number]] Collected:  10/18/19 2109    Specimen:  Blood Updated:  10/18/19 2210    Narrative:       The following orders were created for panel order OB Panel With HIV.  Procedure                               Abnormality         Status                     ---------                               -----------         ------                     RPR[[id number]]                                              In process                 CBC Auto Differential[[id number]]        Normal              Final result               Hepatitis B Surface Antigen[[id number]]  Normal              Final result               Rubella Antibody, IgG[[id number]]                            In process                 OB Panel Type & Screen[[id number]]                                                      HIV-1 / O / 2 Ag / Antib...[[id number]]  Normal              Final result               Hepatitis C Antibody[[id number]]         Abnormal            Final result                 Please view results for these tests on the individual orders.    HIV-1 / O / 2 Ag / Antibody 4th Generation [[id number]]  (Normal) Collected:  10/18/19 2109    Specimen:  Blood Updated:  10/18/19 2210     HIV-1/ HIV-2 Non-Reactive     Comment: A non-reactive test result does not preclude the possibility of exposure to HIV or infection with HIV. An antibody response to recent exposure may take  several months to reach detectable levels.       Narrative:       The HIV antibody/antigen combo assay is a qualitative assay for HIV that includes the p24 antigen as well as antibodies to HIV types 1 and 2. This test is intended to be used as a screening assay in the diagnosis of HIV infection in patients over the age of 2.    Hepatitis B Surface Antigen [[id number]]  (Normal) Collected:  10/18/19 2109    Specimen:  Blood Updated:  10/18/19 2210     Hepatitis B Surface Ag Non-Reactive    Hepatitis C Antibody [[id number]]  (Abnormal) Collected:  10/18/19 2109    Specimen:  Blood Updated:  10/18/19 2208     Hepatitis C Ab Reactive    Hepatitis B Surface Antigen [[id number]] Collected:  10/18/19 2109    Specimen:  Blood Updated:  10/18/19 2200    Narrative:       The following orders were created for panel order Hepatitis B Surface Antigen.  Procedure                               Abnormality         Status                     ---------                               -----------         ------                     Hepatitis B Surface Antigen[[id number]]  Normal              Final result                 Please view results for these tests on the individual orders.    Hepatitis B Surface Antigen [[id number]]  (Normal) Collected:  10/18/19 2109    Specimen:  Blood Updated:  10/18/19 2200     Hepatitis B Surface Ag Non-Reactive    Comprehensive Metabolic Panel [[id number]]  (Abnormal) Collected:  10/18/19 2109    Specimen:  Blood Updated:  10/18/19 2154     Glucose 69 mg/dL      BUN 10 mg/dL      Creatinine 0.69 mg/dL      Sodium 137 mmol/L      Potassium 3.9 mmol/L      Chloride 99 mmol/L      CO2 23.3 mmol/L      Calcium 9.5 mg/dL      Total Protein 8.4 g/dL      Albumin 4.16 g/dL      ALT (SGPT) 8 U/L      AST (SGOT) 12 U/L      Alkaline Phosphatase 158 U/L      Total Bilirubin 0.4 mg/dL      eGFR Non African Amer 104 mL/min/1.73      Globulin 4.2 gm/dL      A/G Ratio 1.0 g/dL      BUN/Creatinine Ratio 14.5     Anion Gap 14.7  mmol/L     Narrative:       GFR Normal >60  Chronic Kidney Disease <60  Kidney Failure <15    URINE DRUG SCREEN PLUS BUPRENORPHINE - [[id number]] Collected:  10/18/19 2108     Updated:  10/18/19 2143    Narrative:       The following orders were created for panel order URINE DRUG SCREEN PLUS BUPRENORPHINE -.  Procedure                               Abnormality         Status                     ---------                               -----------         ------                     Urine Drug Screen - Urin...[[id number]]  Abnormal            Final result               Buprenorphine Screen Uri...[[id number]]                                                   Please view results for these tests on the individual orders.    Urine Drug Screen - Urine, Clean Catch [[id number]]  (Abnormal) Collected:  10/18/19 2108    Specimen:  Urine, Clean Catch Updated:  10/18/19 2143     Amphetamine Screen, Urine Negative     Barbiturates Screen, Urine Negative     Benzodiazepine Screen, Urine Negative     Cocaine Screen, Urine Negative     Methadone Screen, Urine Negative     Opiate Screen Negative     Phencyclidine (PCP), Urine Negative     THC, Screen, Urine Negative     6-ACETYL MORPHINE Negative     Buprenorphine, Screen, Urine Positive     Oxycodone Screen, Urine Negative    Narrative:       Negative Thresholds For Drugs Screened:                  Amphetamines              1000 ng/ml               Barbiturates               200 ng/ml               Benzodiazepines            200 ng/ml              Cocaine                    300 ng/ml              Methadone                  300 ng/ml              Opiates                    300 ng/ml               Phencyclidine               25 ng/ml               THC                         50 ng/ml              6-Acetyl Morphine           10 ng/ml              Buprenorphine                5 ng/ml              Oxycodone                  300 ng/ml    The reference range for all drugs tested is negative.  This report includes final unconfirmed qualitative results to be used for medical treatment purposes only. Unconfirmed results must not be used for non-medical purposes such as employment or legal testing. Clinical consideration should be applied to any drug of abuse test, especially when unconfirmed quantitative results are used.      Group B Streptococcus Culture - Swab, Vaginal/Rectum [[id number]] Collected:  10/18/19 2109    Specimen:  Swab from Vaginal/Rectum Updated:  10/18/19 2121    CBC Auto Differential [[id number]]  (Normal) Collected:  10/18/19 2109    Specimen:  Blood Updated:  10/18/19 2119     WBC 7.92 10*3/mm3      RBC 4.29 10*6/mm3      Hemoglobin 13.2 g/dL      Hematocrit 39.8 %      MCV 92.8 fL      MCH 30.8 pg      MCHC 33.2 g/dL      RDW 12.6 %      RDW-SD 41.3 fl      MPV 10.9 fL      Platelets 207 10*3/mm3      Neutrophil % 56.2 %      Lymphocyte % 35.9 %      Monocyte % 6.2 %      Eosinophil % 0.8 %      Basophil % 0.4 %      Immature Grans % 0.5 %      Neutrophils, Absolute 4.46 10*3/mm3      Lymphocytes, Absolute 2.84 10*3/mm3      Monocytes, Absolute 0.49 10*3/mm3      Eosinophils, Absolute 0.06 10*3/mm3      Basophils, Absolute 0.03 10*3/mm3      Immature Grans, Absolute 0.04 10*3/mm3     RPR [[id number]] Collected:  10/18/19 2109    Specimen:  Blood Updated:  10/18/19 2114    Rubella Antibody, IgG [[id number]] Collected:  10/18/19 2109    Specimen:  Blood Updated:  10/18/19 2114    Rubeola Antibody IgG [[id number]] Collected:  10/18/19 2109    Specimen:  Blood Updated:  10/18/19 2114    Mumps Antibody, IgG [[id number]] Collected:  10/18/19 2109    Specimen:  Blood Updated:  10/18/19 2114    Measles / Mumps / Rubella Immunity [[id number]] Collected:  10/18/19 2109    Specimen:  Blood Updated:  10/18/19 2114    Narrative:       The following orders were created for panel order Measles / Mumps / Rubella Immunity.  Procedure                               Abnormality         Status                      ---------                               -----------         ------                     Rubeola Antibody IgG[[id number]]                             In process                 Mumps Antibody, IgG[[id number]]                              In process                 Rubella Antibody, IgG[[id number]]                            In process                   Please view results for these tests on the individual orders.    RPR [[id number]] Collected:  10/18/19 2109    Specimen:  Blood Updated:  10/18/19 2114    Rubella Antibody, IgG [[id number]] Collected:  10/18/19 2109    Specimen:  Blood Updated:  10/18/19 2114            Review of Systems    The following systems were reviewed and negative;  ENT, respiratory, cardiovascular, gastrointestinal, genitourinary, breast, endocrine and allergies / immunologic.      Physical Exam:      General Appearance:    Alert, cooperative, in no acute distress   Head:    Normocephalic, without obvious abnormality, atraumatic   Eyes:            Lids and lashes normal, conjunctivae and sclerae normal, no   icterus, no pallor, corneas clear, PERRLA   Ears:    Ears appear intact with no abnormalities noted   Throat:   No oral lesions, no thrush, oral mucosa moist   Neck:   No adenopathy, supple, trachea midline, no thyromegaly, no     carotid bruit, no JVD   Back:     No kyphosis present, no scoliosis present, no skin lesions,       erythema or scars, no tenderness to percussion or                   palpation,   range of motion normal   Lungs:     Clear to auscultation,respirations regular, even and                   unlabored    Heart:    Regular rhythm and normal rate, normal S1 and S2, no            murmur, no gallop, no rub, no click   Breast Exam:    Deferred   Abdomen:     Normal bowel sounds, no masses, no organomegaly, soft        non-tender, non-distended, no guarding, no rebound                 tenderness   Genitalia:    Cervix: Dilated 3cm, 50%   Extremities:   Moves all extremities  well, no edema, no cyanosis, no              redness   Pulses:   Pulses palpable and equal bilaterally   Skin:   No bleeding, bruising or rash   Lymph nodes:   No palpable adenopathy   Neurologic:   Cranial nerves 2 - 12 grossly intact, sensation intact, DTR        present and equal bilaterally         Assessment: Patient is a 23 y.o. female who presents with IUP at 40w2d weeks gestation. G 4 P 1110. Insufficient PNC. Drug Abuse. Non Compliant. IUGR. Oligohydramnios.     Chief Complaint   Patient presents with   • Labs Only     PT PRESENTS TO LABOR FALK WITH ORDERS FOR PRENATAL LABS, BPP, NST, AND GBS CULTURE. PT DENIES VAG BLEEDING AND LOF. BABY IS ACTIVE.        Plan of Care: Admit. Proceed with IOL.      Lionel Esparza III, MD  10/19/19  9:23 AM

## 2019-10-20 LAB
BACTERIA SPEC AEROBE CULT: NORMAL
BASOPHILS # BLD AUTO: 0.03 10*3/MM3 (ref 0–0.2)
BASOPHILS NFR BLD AUTO: 0.4 % (ref 0–1.5)
DEPRECATED RDW RBC AUTO: 41.9 FL (ref 37–54)
EOSINOPHIL # BLD AUTO: 0.09 10*3/MM3 (ref 0–0.4)
EOSINOPHIL NFR BLD AUTO: 1.1 % (ref 0.3–6.2)
ERYTHROCYTE [DISTWIDTH] IN BLOOD BY AUTOMATED COUNT: 12.8 % (ref 12.3–15.4)
HCT VFR BLD AUTO: 42.2 % (ref 34–46.6)
HGB BLD-MCNC: 14 G/DL (ref 12–15.9)
IMM GRANULOCYTES # BLD AUTO: 0.07 10*3/MM3 (ref 0–0.05)
IMM GRANULOCYTES NFR BLD AUTO: 0.8 % (ref 0–0.5)
LYMPHOCYTES # BLD AUTO: 3.19 10*3/MM3 (ref 0.7–3.1)
LYMPHOCYTES NFR BLD AUTO: 37.5 % (ref 19.6–45.3)
MCH RBC QN AUTO: 30.8 PG (ref 26.6–33)
MCHC RBC AUTO-ENTMCNC: 33.2 G/DL (ref 31.5–35.7)
MCV RBC AUTO: 93 FL (ref 79–97)
MEV IGG SER IA-ACNC: NEGATIVE
MONOCYTES # BLD AUTO: 0.69 10*3/MM3 (ref 0.1–0.9)
MONOCYTES NFR BLD AUTO: 8.1 % (ref 5–12)
MUV IGG SER IA-ACNC: POSITIVE
NEUTROPHILS # BLD AUTO: 4.44 10*3/MM3 (ref 1.7–7)
NEUTROPHILS NFR BLD AUTO: 52.1 % (ref 42.7–76)
PLATELET # BLD AUTO: 153 10*3/MM3 (ref 140–450)
PMV BLD AUTO: 11.1 FL (ref 6–12)
RBC # BLD AUTO: 4.54 10*6/MM3 (ref 3.77–5.28)
RPR SER QL: NORMAL
RPR SER QL: NORMAL
RUBV IGG SERPL IA-ACNC: POSITIVE
RUBV IGG SERPL IA-ACNC: POSITIVE
WBC NRBC COR # BLD: 8.51 10*3/MM3 (ref 3.4–10.8)

## 2019-10-20 PROCEDURE — 85025 COMPLETE CBC W/AUTO DIFF WBC: CPT | Performed by: OBSTETRICS & GYNECOLOGY

## 2019-10-20 RX ADMIN — DOCUSATE SODIUM 100 MG: 100 CAPSULE ORAL at 08:30

## 2019-10-20 RX ADMIN — HYDROCODONE BITARTRATE AND ACETAMINOPHEN 1 TABLET: 5; 325 TABLET ORAL at 06:29

## 2019-10-20 RX ADMIN — NICOTINE 1 PATCH: 14 PATCH TRANSDERMAL at 15:26

## 2019-10-20 RX ADMIN — HYDROCODONE BITARTRATE AND ACETAMINOPHEN 1 TABLET: 5; 325 TABLET ORAL at 13:36

## 2019-10-20 RX ADMIN — PRENATAL VIT W/ FE FUMARATE-FA TAB 27-0.8 MG 1 TABLET: 27-0.8 TAB at 08:30

## 2019-10-20 RX ADMIN — HYDROCODONE BITARTRATE AND ACETAMINOPHEN 1 TABLET: 5; 325 TABLET ORAL at 19:01

## 2019-10-20 RX ADMIN — IBUPROFEN 800 MG: 800 TABLET, FILM COATED ORAL at 13:36

## 2019-10-20 RX ADMIN — IBUPROFEN 800 MG: 800 TABLET, FILM COATED ORAL at 06:29

## 2019-10-20 NOTE — PROGRESS NOTES
Spontaneous Vaginal Delivery Progress Note      Hospital Course: G 4, now . Patient was admitted on 10/18/2019  8:45 PM with IUP at 40w2d weeks gestation secondary to Pregnant [Z34.90]  Pregnant [Z34.90]. Patient underwent a normal spontaneous vaginal delivery.       Patient stable. No complaints.     signs in last 24 hours:    Vital Signs Range for the last 24 hours              Temp:  [97.8 °F (36.6 °C)-98.3 °F (36.8 °C)] 98.3 °F (36.8 °C)  Heart Rate:  [45-58] 57  Resp:  [18] 18  BP: (102-153)/(70-91) 134/91     Radiology     Imaging Results (last 24 hours)     ** No results found for the last 24 hours. **           Labs     Lab Results (last 24 hours)     Procedure Component Value Units Date/Time    CBC & Differential [[id number]] Collected:  10/20/19 0722    Specimen:  Blood Updated:  10/20/19 0741    Narrative:       The following orders were created for panel order CBC & Differential.  Procedure                               Abnormality         Status                     ---------                               -----------         ------                     CBC Auto Differential[[id number]]        Abnormal            Final result                 Please view results for these tests on the individual orders.    CBC Auto Differential [[id number]]  (Abnormal) Collected:  10/20/19 0722    Specimen:  Blood Updated:  10/20/19 0741     WBC 8.51 10*3/mm3      RBC 4.54 10*6/mm3      Hemoglobin 14.0 g/dL      Hematocrit 42.2 %      MCV 93.0 fL      MCH 30.8 pg      MCHC 33.2 g/dL      RDW 12.8 %      RDW-SD 41.9 fl      MPV 11.1 fL      Platelets 153 10*3/mm3      Neutrophil % 52.1 %      Lymphocyte % 37.5 %      Monocyte % 8.1 %      Eosinophil % 1.1 %      Basophil % 0.4 %      Immature Grans % 0.8 %      Neutrophils, Absolute 4.44 10*3/mm3      Lymphocytes, Absolute 3.19 10*3/mm3      Monocytes, Absolute 0.69 10*3/mm3      Eosinophils, Absolute 0.09 10*3/mm3      Basophils, Absolute 0.03 10*3/mm3      Immature  Grans, Absolute 0.07 10*3/mm3     Urine Culture - Urine, Urine, Clean Catch [[id number]] Collected:  10/18/19 2108    Specimen:  Urine, Clean Catch Updated:  10/19/19 1540     Urine Culture 50,000 CFU/mL Mixed Anuja Isolated    Narrative:       Specimen contains mixed organisms of questionable pathogenicity which indicates contamination with commensal anuja.  Further identification is unlikely to provide clinically useful information.  Suggest recollection.            Review of Systems    The following systems were reviewed and negative;  ENT, respiratory, cardiovascular, gastrointestinal, genitourinary, breast, endocrine and allergies / immunologic.      Physical Exam:      General Appearance:    Alert, cooperative, in no acute distress   Head:    Normocephalic, without obvious abnormality, atraumatic   Eyes:            Lids and lashes normal, conjunctivae and sclerae normal, no   icterus, no pallor, corneas clear, PERRLA   Ears:    Ears appear intact with no abnormalities noted   Throat:   No oral lesions, no thrush, oral mucosa moist   Neck:   No adenopathy, supple, trachea midline, no thyromegaly, no     carotid bruit, no JVD   Back:     No kyphosis present, no scoliosis present, no skin lesions,       erythema or scars, no tenderness to percussion or                   palpation,   range of motion normal   Lungs:     Clear to auscultation,respirations regular, even and                   unlabored    Heart:    Regular rhythm and normal rate, normal S1 and S2, no            murmur, no gallop, no rub, no click   Breast Exam:    Deferred   Abdomen:     Normal bowel sounds, no masses, no organomegaly, soft        non-tender, non-distended, no guarding, no rebound                 tenderness   Genitalia:    Deferred   Extremities:   Moves all extremities well, no edema, no cyanosis, no              redness   Pulses:   Pulses palpable and equal bilaterally   Skin:   No bleeding, bruising or rash   Lymph nodes:   No  palpable adenopathy   Neurologic:   Cranial nerves 2 - 12 grossly intact, sensation intact, DTR        present and equal bilaterally                 Assessment:  1.  . PPD#1. Patient doing well. No complaints.     Plan:  1. Will continue current plan of care and anticipate discharge to home in the AM.      Lionel Esparza III, MD  10/20/19  12:14 PM

## 2019-10-20 NOTE — PLAN OF CARE
Problem: Patient Care Overview  Goal: Plan of Care Review  Outcome: Ongoing (interventions implemented as appropriate)   10/20/19 0895   Coping/Psychosocial   Plan of Care Reviewed With patient   Plan of Care Review   Progress improving

## 2019-10-20 NOTE — PLAN OF CARE
Problem: Patient Care Overview  Goal: Plan of Care Review  Outcome: Ongoing (interventions implemented as appropriate)   10/19/19 1511 10/19/19 1944   Coping/Psychosocial   Plan of Care Reviewed With --  patient   Plan of Care Review   Progress improving --      Goal: Individualization and Mutuality  Outcome: Ongoing (interventions implemented as appropriate)      Problem: Postpartum (Vaginal Delivery) (Adult,Obstetrics,Pediatric)  Goal: Signs and Symptoms of Listed Potential Problems Will be Absent, Minimized or Managed (Postpartum)  Outcome: Ongoing (interventions implemented as appropriate)   10/19/19 2021   Goal/Outcome Evaluation   Problems Assessed (Postpartum Vaginal Delivery) all   Problems Present (Postpartum Vag Deliv) none

## 2019-10-21 VITALS
BODY MASS INDEX: 24.63 KG/M2 | WEIGHT: 139 LBS | RESPIRATION RATE: 16 BRPM | OXYGEN SATURATION: 98 % | DIASTOLIC BLOOD PRESSURE: 72 MMHG | SYSTOLIC BLOOD PRESSURE: 119 MMHG | TEMPERATURE: 97.6 F | HEIGHT: 63 IN | HEART RATE: 52 BPM

## 2019-10-21 RX ORDER — IBUPROFEN 800 MG/1
800 TABLET ORAL EVERY 8 HOURS SCHEDULED
Qty: 60 TABLET | Refills: 0 | Status: SHIPPED | OUTPATIENT
Start: 2019-10-21 | End: 2021-06-17 | Stop reason: HOSPADM

## 2019-10-21 RX ORDER — CITALOPRAM 20 MG/1
20 TABLET ORAL DAILY
Qty: 30 TABLET | Refills: 6 | Status: SHIPPED | OUTPATIENT
Start: 2019-10-21 | End: 2021-06-17 | Stop reason: HOSPADM

## 2019-10-21 RX ORDER — BUSPIRONE HYDROCHLORIDE 5 MG/1
10 TABLET ORAL 3 TIMES DAILY
Qty: 90 TABLET | Refills: 6 | Status: SHIPPED | OUTPATIENT
Start: 2019-10-21 | End: 2021-06-17 | Stop reason: HOSPADM

## 2019-10-21 RX ADMIN — HYDROCODONE BITARTRATE AND ACETAMINOPHEN 1 TABLET: 5; 325 TABLET ORAL at 08:40

## 2019-10-21 RX ADMIN — PRENATAL VIT W/ FE FUMARATE-FA TAB 27-0.8 MG 1 TABLET: 27-0.8 TAB at 08:33

## 2019-10-21 RX ADMIN — DOCUSATE SODIUM 100 MG: 100 CAPSULE ORAL at 08:33

## 2019-10-21 NOTE — PROGRESS NOTES
Case Management/Social Work    Patient Name:  Sailaja Alarcon  YOB: 1995  MRN: [id number]  Admit Date:  10/18/2019    SS received a consult for a high score on the post partum depression screening. SS spoke with pt who states she has dealt with anxiety and depression in the past and has been restarted on her medication since delivery. Pt states she plans to follow up with a counselor at [facility]. SS provided pt with a list of resources if needed.     No other needs identified.     Electronically signed by:  EDISON Mattson  10/21/19 12:09 PM

## 2019-10-21 NOTE — PLAN OF CARE
Problem: Patient Care Overview  Goal: Plan of Care Review  Outcome: Outcome(s) achieved Date Met: 10/21/19   10/21/19 1010   Coping/Psychosocial   Plan of Care Reviewed With patient   Plan of Care Review   Progress improving   OTHER   Outcome Summary patient is doing well, firm, small locia, being discharged home in stable condition     Goal: Individualization and Mutuality  Outcome: Outcome(s) achieved Date Met: 10/21/19    Goal: Discharge Needs Assessment  Outcome: Outcome(s) achieved Date Met: 10/21/19    Goal: Interprofessional Rounds/Family Conf  Outcome: Outcome(s) achieved Date Met: 10/21/19      Problem: Postpartum (Vaginal Delivery) (Adult,Obstetrics,Pediatric)  Goal: Signs and Symptoms of Listed Potential Problems Will be Absent, Minimized or Managed (Postpartum)  Outcome: Outcome(s) achieved Date Met: 10/21/19

## 2019-10-21 NOTE — DISCHARGE SUMMARY
WYATT Jules  Delivery Discharge Summary    Primary OB Clinician:     EDC: Estimated Date of Delivery: 10/17/19    Gestational Age:40w2d    Antepartum complications: none    Date of Delivery: 10/19/2019   Time of Delivery: 12:23 PM     Delivered By:  Lionel Esparza Iii     Delivery Type: Vaginal, Spontaneous      Tubal Ligation: n/a    Baby:@BABYNOHDR(SEX)@ infant;   Apgar:  8   @ 1 minute /   Apgar:  9   @ 5 minutes   Weight: @BABYNOHDR(BIRTHWEIGHT)@     Anesthesia: Epidural      Intrapartum complications: None    [unfilled]       Lab Results   Component Value Date    ABO AB 10/18/2019    RH Negative 10/18/2019        Lab Results   Component Value Date    HGB 14.0 10/20/2019    HCT 42.2 10/20/2019         Discharge Date: 10/21/2019; Discharge Time: 9:21 AM        Plan:    Follow-up appointment with [facility] in 3 weeks.      Shelley Steinberg DO  10/21/2019  9:21 AM

## 2019-10-21 NOTE — PLAN OF CARE
Problem: Patient Care Overview  Goal: Plan of Care Review  Outcome: Ongoing (interventions implemented as appropriate)   10/20/19 0850   Coping/Psychosocial   Plan of Care Reviewed With patient   Plan of Care Review   Progress improving     Goal: Individualization and Mutuality  Outcome: Ongoing (interventions implemented as appropriate)      Problem: Postpartum (Vaginal Delivery) (Adult,Obstetrics,Pediatric)  Goal: Signs and Symptoms of Listed Potential Problems Will be Absent, Minimized or Managed (Postpartum)  Outcome: Ongoing (interventions implemented as appropriate)   10/19/19 2021   Goal/Outcome Evaluation   Problems Assessed (Postpartum Vaginal Delivery) all   Problems Present (Postpartum Vag Deliv) none

## 2020-01-13 ENCOUNTER — TRANSCRIBE ORDERS (OUTPATIENT)
Dept: ADMINISTRATIVE | Facility: HOSPITAL | Age: 25
End: 2020-01-13

## 2020-01-13 DIAGNOSIS — I15.9 BENIGN SECONDARY HYPERTENSION: Primary | ICD-10-CM

## 2020-12-20 ENCOUNTER — HOSPITAL ENCOUNTER (EMERGENCY)
Facility: HOSPITAL | Age: 25
Discharge: LEFT BEFORE DISCHARGE | End: 2020-12-20

## 2020-12-20 VITALS
WEIGHT: 130 LBS | HEIGHT: 63 IN | OXYGEN SATURATION: 98 % | RESPIRATION RATE: 18 BRPM | SYSTOLIC BLOOD PRESSURE: 103 MMHG | BODY MASS INDEX: 23.04 KG/M2 | HEART RATE: 92 BPM | TEMPERATURE: 98.3 F | DIASTOLIC BLOOD PRESSURE: 59 MMHG

## 2021-03-08 LAB
EXTERNAL HEPATITIS B SURFACE ANTIGEN: NEGATIVE
EXTERNAL RUBELLA QUALITATIVE: NORMAL
EXTERNAL SYPHILIS RPR SCREEN: NORMAL
HIV1 P24 AG SERPL QL IA: NEGATIVE

## 2021-06-17 ENCOUNTER — HOSPITAL ENCOUNTER (OUTPATIENT)
Facility: HOSPITAL | Age: 26
Discharge: HOME | End: 2021-06-17
Attending: OBSTETRICS & GYNECOLOGY | Admitting: OBSTETRICS & GYNECOLOGY

## 2021-06-17 ENCOUNTER — HOSPITAL ENCOUNTER (OUTPATIENT)
Facility: HOSPITAL | Age: 26
End: 2021-06-17
Attending: OBSTETRICS & GYNECOLOGY | Admitting: OBSTETRICS & GYNECOLOGY

## 2021-06-17 VITALS
HEART RATE: 78 BPM | SYSTOLIC BLOOD PRESSURE: 119 MMHG | WEIGHT: 132.9 LBS | HEIGHT: 64 IN | DIASTOLIC BLOOD PRESSURE: 72 MMHG | TEMPERATURE: 97.2 F | BODY MASS INDEX: 22.69 KG/M2 | RESPIRATION RATE: 18 BRPM

## 2021-06-17 PROBLEM — Z34.90 PREGNANCY: Status: ACTIVE | Noted: 2021-06-17

## 2021-06-17 LAB
6-ACETYL MORPHINE: NEGATIVE
AMPHET+METHAMPHET UR QL: NEGATIVE
BARBITURATES UR QL SCN: NEGATIVE
BENZODIAZ UR QL SCN: NEGATIVE
BILIRUB UR QL STRIP: NEGATIVE
BUPRENORPHINE SERPL-MCNC: POSITIVE NG/ML
CANNABINOIDS SERPL QL: NEGATIVE
CLARITY UR: CLEAR
COCAINE UR QL: NEGATIVE
COLOR UR: YELLOW
DEPRECATED RDW RBC AUTO: 43.8 FL (ref 37–54)
ERYTHROCYTE [DISTWIDTH] IN BLOOD BY AUTOMATED COUNT: 12.4 % (ref 12.3–15.4)
GLUCOSE UR STRIP-MCNC: NEGATIVE MG/DL
HCT VFR BLD AUTO: 43 % (ref 34–46.6)
HGB BLD-MCNC: 14.3 G/DL (ref 12–15.9)
HGB UR QL STRIP.AUTO: NEGATIVE
KETONES UR QL STRIP: NEGATIVE
LEUKOCYTE ESTERASE UR QL STRIP.AUTO: NEGATIVE
MCH RBC QN AUTO: 31.9 PG (ref 26.6–33)
MCHC RBC AUTO-ENTMCNC: 33.3 G/DL (ref 31.5–35.7)
MCV RBC AUTO: 96 FL (ref 79–97)
METHADONE UR QL SCN: NEGATIVE
NITRITE UR QL STRIP: NEGATIVE
OPIATES UR QL: NEGATIVE
OXYCODONE UR QL SCN: NEGATIVE
PCP UR QL SCN: NEGATIVE
PH UR STRIP.AUTO: 7 [PH] (ref 5–8)
PLATELET # BLD AUTO: 147 10*3/MM3 (ref 140–450)
PMV BLD AUTO: 11.6 FL (ref 6–12)
PROT UR QL STRIP: ABNORMAL
RBC # BLD AUTO: 4.48 10*6/MM3 (ref 3.77–5.28)
SP GR UR STRIP: 1.03 (ref 1–1.03)
UROBILINOGEN UR QL STRIP: ABNORMAL
WBC # BLD AUTO: 6.84 10*3/MM3 (ref 3.4–10.8)

## 2021-06-17 PROCEDURE — 80307 DRUG TEST PRSMV CHEM ANLYZR: CPT | Performed by: OBSTETRICS & GYNECOLOGY

## 2021-06-17 PROCEDURE — 81003 URINALYSIS AUTO W/O SCOPE: CPT | Performed by: OBSTETRICS & GYNECOLOGY

## 2021-06-17 PROCEDURE — 87186 SC STD MICRODIL/AGAR DIL: CPT | Performed by: OBSTETRICS & GYNECOLOGY

## 2021-06-17 PROCEDURE — 87077 CULTURE AEROBIC IDENTIFY: CPT | Performed by: OBSTETRICS & GYNECOLOGY

## 2021-06-17 PROCEDURE — 36415 COLL VENOUS BLD VENIPUNCTURE: CPT | Performed by: OBSTETRICS & GYNECOLOGY

## 2021-06-17 PROCEDURE — 85027 COMPLETE CBC AUTOMATED: CPT | Performed by: OBSTETRICS & GYNECOLOGY

## 2021-06-17 PROCEDURE — 87086 URINE CULTURE/COLONY COUNT: CPT | Performed by: OBSTETRICS & GYNECOLOGY

## 2021-06-17 PROCEDURE — 59025 FETAL NON-STRESS TEST: CPT

## 2021-06-17 PROCEDURE — G0463 HOSPITAL OUTPT CLINIC VISIT: HCPCS

## 2021-06-17 RX ORDER — POLYETHYLENE GLYCOL 3350 17 G/17G
17 POWDER, FOR SOLUTION ORAL DAILY
Qty: 30 PACKET | Refills: 0 | Status: ON HOLD | OUTPATIENT
Start: 2021-06-17 | End: 2021-07-18

## 2021-06-17 RX ORDER — FAMOTIDINE 20 MG/1
20 TABLET, FILM COATED ORAL
Status: DISCONTINUED | OUTPATIENT
Start: 2021-06-17 | End: 2021-06-17 | Stop reason: HOSPADM

## 2021-06-17 RX ORDER — FAMOTIDINE 20 MG/1
20 TABLET, FILM COATED ORAL 2 TIMES DAILY
Qty: 60 TABLET | Refills: 0 | Status: SHIPPED | OUTPATIENT
Start: 2021-06-17 | End: 2021-10-10

## 2021-06-17 RX ADMIN — FAMOTIDINE 20 MG: 20 TABLET, FILM COATED ORAL at 03:18

## 2021-06-17 NOTE — NON STRESS TEST
Sailaja Alarcon, a  at 34w0d with an CHU of 2021, by Ultrasound, was seen at [facility] for a nonstress test.    Chief Complaint   Patient presents with   • Abdominal Cramping     PRESENTS VIA W/C TO L&D WITH C/O CRAMPING THAT RADIATES TO THIGHS, SINCE YESTERDAY. CERVIX CLOSED THICK AND HIGH. LG AMT OF HARD STOOL NOTED IN RECTUM UPON VAG EXAM. LATE LIMITED PNC IN MAT PROGRAM AT [facility]. HAS NOT BEEN SEEN REG. DENIES VB OR LOF. STATES BABY IS MOVING WELL.        Patient Active Problem List   Diagnosis   • Pregnant   • Pregnancy       Start Time: 235  Stop Time: 256    Interpretation A  Nonstress Test Interpretation A: Reactive (21 : Treasure Gomez, RN)

## 2021-06-19 LAB — BACTERIA SPEC AEROBE CULT: ABNORMAL

## 2021-07-18 ENCOUNTER — HOSPITAL ENCOUNTER (OUTPATIENT)
Facility: HOSPITAL | Age: 26
Discharge: HOME | End: 2021-07-18
Attending: OBSTETRICS & GYNECOLOGY | Admitting: OBSTETRICS & GYNECOLOGY

## 2021-07-18 ENCOUNTER — HOSPITAL ENCOUNTER (INPATIENT)
Facility: HOSPITAL | Age: 26
LOS: 2 days | Discharge: HOME | End: 2021-07-21
Attending: OBSTETRICS & GYNECOLOGY | Admitting: OBSTETRICS & GYNECOLOGY

## 2021-07-18 VITALS
HEART RATE: 88 BPM | DIASTOLIC BLOOD PRESSURE: 70 MMHG | HEIGHT: 63 IN | RESPIRATION RATE: 16 BRPM | SYSTOLIC BLOOD PRESSURE: 105 MMHG | TEMPERATURE: 98.4 F | WEIGHT: 129.4 LBS | BODY MASS INDEX: 22.93 KG/M2

## 2021-07-18 LAB
6-ACETYL MORPHINE: NEGATIVE
A1 MICROGLOB PLACENTAL VAG QL: NEGATIVE
AMPHET+METHAMPHET UR QL: NEGATIVE
BACTERIA UR QL AUTO: ABNORMAL /HPF
BARBITURATES UR QL SCN: NEGATIVE
BENZODIAZ UR QL SCN: NEGATIVE
BILIRUB UR QL STRIP: ABNORMAL
BUPRENORPHINE SERPL-MCNC: POSITIVE NG/ML
CANNABINOIDS SERPL QL: NEGATIVE
CLARITY UR: ABNORMAL
COCAINE UR QL: NEGATIVE
COLOR UR: ABNORMAL
GLUCOSE UR STRIP-MCNC: NEGATIVE MG/DL
HGB UR QL STRIP.AUTO: NEGATIVE
HYALINE CASTS UR QL AUTO: ABNORMAL /LPF
KETONES UR QL STRIP: ABNORMAL
LEUKOCYTE ESTERASE UR QL STRIP.AUTO: ABNORMAL
METHADONE UR QL SCN: NEGATIVE
NITRITE UR QL STRIP: NEGATIVE
OPIATES UR QL: NEGATIVE
OXYCODONE UR QL SCN: NEGATIVE
PCP UR QL SCN: NEGATIVE
PH UR STRIP.AUTO: 8 [PH] (ref 5–8)
PROT UR QL STRIP: ABNORMAL
RBC # UR: ABNORMAL /HPF
REF LAB TEST METHOD: ABNORMAL
SP GR UR STRIP: 1.03 (ref 1–1.03)
SQUAMOUS #/AREA URNS HPF: ABNORMAL /HPF
UROBILINOGEN UR QL STRIP: ABNORMAL
WBC UR QL AUTO: ABNORMAL /HPF

## 2021-07-18 PROCEDURE — 80307 DRUG TEST PRSMV CHEM ANLYZR: CPT | Performed by: OBSTETRICS & GYNECOLOGY

## 2021-07-18 PROCEDURE — G0463 HOSPITAL OUTPT CLINIC VISIT: HCPCS

## 2021-07-18 PROCEDURE — 84112 EVAL AMNIOTIC FLUID PROTEIN: CPT | Performed by: OBSTETRICS & GYNECOLOGY

## 2021-07-18 PROCEDURE — P9612 CATHETERIZE FOR URINE SPEC: HCPCS

## 2021-07-18 PROCEDURE — 59025 FETAL NON-STRESS TEST: CPT

## 2021-07-18 PROCEDURE — 81001 URINALYSIS AUTO W/SCOPE: CPT | Performed by: OBSTETRICS & GYNECOLOGY

## 2021-07-18 RX ORDER — BUPRENORPHINE HYDROCHLORIDE AND NALOXONE HYDROCHLORIDE DIHYDRATE 8; 2 MG/1; MG/1
0.5 TABLET SUBLINGUAL DAILY
COMMUNITY

## 2021-07-18 NOTE — NON STRESS TEST
Sailaja Alarcon, a  at 38w3d with an CHU of 2021, by Ultrasound, was seen at [facility] LABOR DELIVERY for a nonstress test.    Chief Complaint   Patient presents with   • Abdominal Pain     LEAKING FLUID       Patient Active Problem List   Diagnosis   • Pregnant   • Pregnancy       Start [id number]  Stop Time: 1647    Interpretation A  Nonstress Test Interpretation A: Reactive (21 1747 : Audra Blue, RN)

## 2021-07-18 NOTE — NURSING NOTE
Patient presented to L&D with contractions and possible ROM. Amnisure neg, naveen every 2-3 minutes, cervix remains unchanged. Patient asked to be discharged home. Advised patient to come back to L&D if contractions worsen or she has ROM. MD stated to discharge.

## 2021-07-19 ENCOUNTER — ANESTHESIA EVENT (OUTPATIENT)
Dept: LABOR AND DELIVERY | Facility: HOSPITAL | Age: 26
End: 2021-07-19

## 2021-07-19 ENCOUNTER — ANESTHESIA (OUTPATIENT)
Dept: LABOR AND DELIVERY | Facility: HOSPITAL | Age: 26
End: 2021-07-19

## 2021-07-19 PROBLEM — Z37.9 NORMAL LABOR: Status: ACTIVE | Noted: 2021-07-19

## 2021-07-19 LAB
6-ACETYL MORPHINE: NEGATIVE
ABO GROUP BLD: NORMAL
ALBUMIN SERPL-MCNC: 3.85 G/DL (ref 3.5–5.2)
ALBUMIN/GLOB SERPL: 0.9 G/DL
ALP SERPL-CCNC: 221 U/L (ref 39–117)
ALT SERPL W P-5'-P-CCNC: 6 U/L (ref 1–33)
AMPHET+METHAMPHET UR QL: NEGATIVE
ANION GAP SERPL CALCULATED.3IONS-SCNC: 11.5 MMOL/L (ref 5–15)
AST SERPL-CCNC: 12 U/L (ref 1–32)
BARBITURATES UR QL SCN: NEGATIVE
BASOPHILS # BLD AUTO: 0.03 10*3/MM3 (ref 0–0.2)
BASOPHILS NFR BLD AUTO: 0.4 % (ref 0–1.5)
BENZODIAZ UR QL SCN: NEGATIVE
BILIRUB SERPL-MCNC: 0.2 MG/DL (ref 0–1.2)
BLD GP AB SCN SERPL QL: NEGATIVE
BUN SERPL-MCNC: 13 MG/DL (ref 6–20)
BUN/CREAT SERPL: 15.7 (ref 7–25)
BUPRENORPHINE SERPL-MCNC: POSITIVE NG/ML
CALCIUM SPEC-SCNC: 10 MG/DL (ref 8.6–10.5)
CANNABINOIDS SERPL QL: NEGATIVE
CHLORIDE SERPL-SCNC: 101 MMOL/L (ref 98–107)
CO2 SERPL-SCNC: 22.5 MMOL/L (ref 22–29)
COCAINE UR QL: NEGATIVE
CREAT SERPL-MCNC: 0.83 MG/DL (ref 0.57–1)
DEPRECATED RDW RBC AUTO: 41.2 FL (ref 37–54)
EOSINOPHIL # BLD AUTO: 0.02 10*3/MM3 (ref 0–0.4)
EOSINOPHIL NFR BLD AUTO: 0.3 % (ref 0.3–6.2)
ERYTHROCYTE [DISTWIDTH] IN BLOOD BY AUTOMATED COUNT: 12.4 % (ref 12.3–15.4)
GFR SERPL CREATININE-BSD FRML MDRD: 82 ML/MIN/1.73
GLOBULIN UR ELPH-MCNC: 4.3 GM/DL
GLUCOSE SERPL-MCNC: 84 MG/DL (ref 65–99)
HCT VFR BLD AUTO: 40.6 % (ref 34–46.6)
HGB BLD-MCNC: 13.4 G/DL (ref 12–15.9)
IMM GRANULOCYTES # BLD AUTO: 0.05 10*3/MM3 (ref 0–0.05)
IMM GRANULOCYTES NFR BLD AUTO: 0.7 % (ref 0–0.5)
LYMPHOCYTES # BLD AUTO: 2.04 10*3/MM3 (ref 0.7–3.1)
LYMPHOCYTES NFR BLD AUTO: 28.4 % (ref 19.6–45.3)
MCH RBC QN AUTO: 30.5 PG (ref 26.6–33)
MCHC RBC AUTO-ENTMCNC: 33 G/DL (ref 31.5–35.7)
MCV RBC AUTO: 92.3 FL (ref 79–97)
METHADONE UR QL SCN: NEGATIVE
MONOCYTES # BLD AUTO: 0.34 10*3/MM3 (ref 0.1–0.9)
MONOCYTES NFR BLD AUTO: 4.7 % (ref 5–12)
NEUTROPHILS NFR BLD AUTO: 4.7 10*3/MM3 (ref 1.7–7)
NEUTROPHILS NFR BLD AUTO: 65.5 % (ref 42.7–76)
NRBC BLD AUTO-RTO: 0 /100 WBC (ref 0–0.2)
OPIATES UR QL: NEGATIVE
OXYCODONE UR QL SCN: NEGATIVE
PCP UR QL SCN: NEGATIVE
PLATELET # BLD AUTO: 177 10*3/MM3 (ref 140–450)
PMV BLD AUTO: 11.8 FL (ref 6–12)
POTASSIUM SERPL-SCNC: 4.6 MMOL/L (ref 3.5–5.2)
PROT SERPL-MCNC: 8.1 G/DL (ref 6–8.5)
RBC # BLD AUTO: 4.4 10*6/MM3 (ref 3.77–5.28)
RH BLD: NEGATIVE
SARS-COV-2 RNA RESP QL NAA+PROBE: NOT DETECTED
SODIUM SERPL-SCNC: 135 MMOL/L (ref 136–145)
T&S EXPIRATION DATE: NORMAL
WBC # BLD AUTO: 7.18 10*3/MM3 (ref 3.4–10.8)

## 2021-07-19 PROCEDURE — C1755 CATHETER, INTRASPINAL: HCPCS | Performed by: NURSE ANESTHETIST, CERTIFIED REGISTERED

## 2021-07-19 PROCEDURE — C1755 CATHETER, INTRASPINAL: HCPCS

## 2021-07-19 PROCEDURE — 80307 DRUG TEST PRSMV CHEM ANLYZR: CPT | Performed by: OBSTETRICS & GYNECOLOGY

## 2021-07-19 PROCEDURE — 36415 COLL VENOUS BLD VENIPUNCTURE: CPT | Performed by: OBSTETRICS & GYNECOLOGY

## 2021-07-19 PROCEDURE — 86850 RBC ANTIBODY SCREEN: CPT | Performed by: OBSTETRICS & GYNECOLOGY

## 2021-07-19 PROCEDURE — 80053 COMPREHEN METABOLIC PANEL: CPT | Performed by: OBSTETRICS & GYNECOLOGY

## 2021-07-19 PROCEDURE — 25010000003 LIDOCAINE 1 % SOLUTION: Performed by: NURSE ANESTHETIST, CERTIFIED REGISTERED

## 2021-07-19 PROCEDURE — 85025 COMPLETE CBC W/AUTO DIFF WBC: CPT | Performed by: OBSTETRICS & GYNECOLOGY

## 2021-07-19 PROCEDURE — 59025 FETAL NON-STRESS TEST: CPT

## 2021-07-19 PROCEDURE — 86900 BLOOD TYPING SEROLOGIC ABO: CPT | Performed by: OBSTETRICS & GYNECOLOGY

## 2021-07-19 PROCEDURE — 86901 BLOOD TYPING SEROLOGIC RH(D): CPT | Performed by: OBSTETRICS & GYNECOLOGY

## 2021-07-19 PROCEDURE — U0003 INFECTIOUS AGENT DETECTION BY NUCLEIC ACID (DNA OR RNA); SEVERE ACUTE RESPIRATORY SYNDROME CORONAVIRUS 2 (SARS-COV-2) (CORONAVIRUS DISEASE [COVID-19]), AMPLIFIED PROBE TECHNIQUE, MAKING USE OF HIGH THROUGHPUT TECHNOLOGIES AS DESCRIBED BY CMS-2020-01-R: HCPCS | Performed by: OBSTETRICS & GYNECOLOGY

## 2021-07-19 PROCEDURE — 25010000002 FENTANYL CITRATE (PF) 50 MCG/ML SOLUTION: Performed by: NURSE ANESTHETIST, CERTIFIED REGISTERED

## 2021-07-19 PROCEDURE — 25010000002 ROPIVACAINE PER 1 MG: Performed by: NURSE ANESTHETIST, CERTIFIED REGISTERED

## 2021-07-19 RX ORDER — LIDOCAINE HYDROCHLORIDE 10 MG/ML
INJECTION, SOLUTION INFILTRATION; PERINEURAL AS NEEDED
Status: DISCONTINUED | OUTPATIENT
Start: 2021-07-19 | End: 2021-09-21 | Stop reason: SURG

## 2021-07-19 RX ORDER — CARBOPROST TROMETHAMINE 250 UG/ML
250 INJECTION, SOLUTION INTRAMUSCULAR
Status: DISCONTINUED | OUTPATIENT
Start: 2021-07-19 | End: 2021-07-21 | Stop reason: HOSPADM

## 2021-07-19 RX ORDER — IBUPROFEN 800 MG/1
800 TABLET ORAL EVERY 8 HOURS SCHEDULED
Status: DISCONTINUED | OUTPATIENT
Start: 2021-07-19 | End: 2021-07-19 | Stop reason: HOSPADM

## 2021-07-19 RX ORDER — SODIUM CHLORIDE 0.9 % (FLUSH) 0.9 %
1-10 SYRINGE (ML) INJECTION AS NEEDED
Status: DISCONTINUED | OUTPATIENT
Start: 2021-07-19 | End: 2021-07-21 | Stop reason: HOSPADM

## 2021-07-19 RX ORDER — EPHEDRINE SULFATE 50 MG/ML
10 INJECTION, SOLUTION INTRAVENOUS
Status: DISCONTINUED | OUTPATIENT
Start: 2021-07-19 | End: 2021-07-19 | Stop reason: HOSPADM

## 2021-07-19 RX ORDER — OXYTOCIN-SODIUM CHLORIDE 0.9% IV SOLN 30 UNIT/500ML 30-0.9/5 UT/ML-%
125 SOLUTION INTRAVENOUS CONTINUOUS PRN
Status: DISCONTINUED | OUTPATIENT
Start: 2021-07-19 | End: 2021-07-21 | Stop reason: HOSPADM

## 2021-07-19 RX ORDER — FAMOTIDINE 10 MG/ML
20 INJECTION, SOLUTION INTRAVENOUS ONCE AS NEEDED
Status: DISCONTINUED | OUTPATIENT
Start: 2021-07-19 | End: 2021-07-19 | Stop reason: HOSPADM

## 2021-07-19 RX ORDER — LIDOCAINE HYDROCHLORIDE AND EPINEPHRINE 15; 5 MG/ML; UG/ML
INJECTION, SOLUTION EPIDURAL AS NEEDED
Status: DISCONTINUED | OUTPATIENT
Start: 2021-07-19 | End: 2021-09-21 | Stop reason: SURG

## 2021-07-19 RX ORDER — MULTIPLE VITAMINS W/ MINERALS TAB 9MG-400MCG
1 TAB ORAL DAILY
Status: DISCONTINUED | OUTPATIENT
Start: 2021-07-20 | End: 2021-07-21 | Stop reason: HOSPADM

## 2021-07-19 RX ORDER — CLINDAMYCIN HYDROCHLORIDE 150 MG/1
300 CAPSULE ORAL 3 TIMES DAILY
Status: CANCELLED | OUTPATIENT
Start: 2021-07-19 | End: 2021-07-21

## 2021-07-19 RX ORDER — TERBUTALINE SULFATE 1 MG/ML
0.25 INJECTION, SOLUTION SUBCUTANEOUS AS NEEDED
Status: DISCONTINUED | OUTPATIENT
Start: 2021-07-19 | End: 2021-07-19 | Stop reason: HOSPADM

## 2021-07-19 RX ORDER — MAGNESIUM HYDROXIDE 1200 MG/15ML
1000 LIQUID ORAL ONCE AS NEEDED
Status: DISCONTINUED | OUTPATIENT
Start: 2021-07-19 | End: 2021-07-19 | Stop reason: HOSPADM

## 2021-07-19 RX ORDER — FENTANYL CITRATE 50 UG/ML
100 INJECTION, SOLUTION INTRAMUSCULAR; INTRAVENOUS ONCE
Status: COMPLETED | OUTPATIENT
Start: 2021-07-19 | End: 2021-07-19

## 2021-07-19 RX ORDER — METHYLERGONOVINE MALEATE 0.2 MG/ML
200 INJECTION INTRAVENOUS ONCE AS NEEDED
Status: DISCONTINUED | OUTPATIENT
Start: 2021-07-19 | End: 2021-07-19 | Stop reason: HOSPADM

## 2021-07-19 RX ORDER — DOCUSATE SODIUM 100 MG/1
100 CAPSULE, LIQUID FILLED ORAL 2 TIMES DAILY
Status: DISCONTINUED | OUTPATIENT
Start: 2021-07-19 | End: 2021-07-21 | Stop reason: HOSPADM

## 2021-07-19 RX ORDER — BUTORPHANOL TARTRATE 1 MG/ML
1 INJECTION, SOLUTION INTRAMUSCULAR; INTRAVENOUS
Status: DISCONTINUED | OUTPATIENT
Start: 2021-07-19 | End: 2021-07-19 | Stop reason: HOSPADM

## 2021-07-19 RX ORDER — HYDROCORTISONE 25 MG/G
1 CREAM TOPICAL AS NEEDED
Status: DISCONTINUED | OUTPATIENT
Start: 2021-07-19 | End: 2021-07-21 | Stop reason: HOSPADM

## 2021-07-19 RX ORDER — SODIUM CHLORIDE 0.9 % (FLUSH) 0.9 %
3-10 SYRINGE (ML) INJECTION AS NEEDED
Status: DISCONTINUED | OUTPATIENT
Start: 2021-07-19 | End: 2021-07-19 | Stop reason: HOSPADM

## 2021-07-19 RX ORDER — LIDOCAINE HYDROCHLORIDE 20 MG/ML
INJECTION, SOLUTION EPIDURAL; INFILTRATION; INTRACAUDAL; PERINEURAL
Status: COMPLETED
Start: 2021-07-19 | End: 2021-07-19

## 2021-07-19 RX ORDER — BUPRENORPHINE HYDROCHLORIDE AND NALOXONE HYDROCHLORIDE DIHYDRATE 8; 2 MG/1; MG/1
1 TABLET SUBLINGUAL 2 TIMES DAILY
Status: DISCONTINUED | OUTPATIENT
Start: 2021-07-19 | End: 2021-07-21 | Stop reason: HOSPADM

## 2021-07-19 RX ORDER — HYDROCODONE BITARTRATE AND ACETAMINOPHEN 5; 325 MG/1; MG/1
1 TABLET ORAL EVERY 4 HOURS PRN
Status: DISCONTINUED | OUTPATIENT
Start: 2021-07-19 | End: 2021-07-19

## 2021-07-19 RX ORDER — MISOPROSTOL 100 UG/1
600 TABLET ORAL ONCE AS NEEDED
Status: DISCONTINUED | OUTPATIENT
Start: 2021-07-19 | End: 2021-07-21 | Stop reason: HOSPADM

## 2021-07-19 RX ORDER — HYDROCODONE BITARTRATE AND ACETAMINOPHEN 7.5; 325 MG/1; MG/1
1 TABLET ORAL EVERY 4 HOURS PRN
Status: DISCONTINUED | OUTPATIENT
Start: 2021-07-19 | End: 2021-07-19 | Stop reason: HOSPADM

## 2021-07-19 RX ORDER — ACETAMINOPHEN 325 MG/1
650 TABLET ORAL EVERY 4 HOURS PRN
Status: DISCONTINUED | OUTPATIENT
Start: 2021-07-19 | End: 2021-07-19 | Stop reason: HOSPADM

## 2021-07-19 RX ORDER — ONDANSETRON 4 MG/1
4 TABLET, FILM COATED ORAL EVERY 6 HOURS PRN
Status: DISCONTINUED | OUTPATIENT
Start: 2021-07-19 | End: 2021-07-19

## 2021-07-19 RX ORDER — OXYTOCIN-SODIUM CHLORIDE 0.9% IV SOLN 30 UNIT/500ML 30-0.9/5 UT/ML-%
250 SOLUTION INTRAVENOUS CONTINUOUS
Status: ACTIVE | OUTPATIENT
Start: 2021-07-19 | End: 2021-07-19

## 2021-07-19 RX ORDER — OXYTOCIN-SODIUM CHLORIDE 0.9% IV SOLN 30 UNIT/500ML 30-0.9/5 UT/ML-%
999 SOLUTION INTRAVENOUS ONCE
Status: DISCONTINUED | OUTPATIENT
Start: 2021-07-19 | End: 2021-07-21 | Stop reason: HOSPADM

## 2021-07-19 RX ORDER — BUPRENORPHINE HYDROCHLORIDE AND NALOXONE HYDROCHLORIDE DIHYDRATE 8; 2 MG/1; MG/1
2 TABLET SUBLINGUAL DAILY
Status: CANCELLED | OUTPATIENT
Start: 2021-07-19

## 2021-07-19 RX ORDER — CARBOPROST TROMETHAMINE 250 UG/ML
250 INJECTION, SOLUTION INTRAMUSCULAR AS NEEDED
Status: DISCONTINUED | OUTPATIENT
Start: 2021-07-19 | End: 2021-07-19 | Stop reason: HOSPADM

## 2021-07-19 RX ORDER — LIDOCAINE HYDROCHLORIDE 20 MG/ML
INJECTION, SOLUTION EPIDURAL; INFILTRATION; INTRACAUDAL; PERINEURAL AS NEEDED
Status: DISCONTINUED | OUTPATIENT
Start: 2021-07-19 | End: 2021-09-21 | Stop reason: SURG

## 2021-07-19 RX ORDER — HYDROXYZINE 50 MG/1
50 TABLET, FILM COATED ORAL NIGHTLY PRN
Status: DISCONTINUED | OUTPATIENT
Start: 2021-07-19 | End: 2021-07-21 | Stop reason: HOSPADM

## 2021-07-19 RX ORDER — OXYTOCIN-SODIUM CHLORIDE 0.9% IV SOLN 30 UNIT/500ML 30-0.9/5 UT/ML-%
2-20 SOLUTION INTRAVENOUS
Status: DISCONTINUED | OUTPATIENT
Start: 2021-07-19 | End: 2021-07-19 | Stop reason: HOSPADM

## 2021-07-19 RX ORDER — CLINDAMYCIN HYDROCHLORIDE 150 MG/1
300 CAPSULE ORAL EVERY 8 HOURS SCHEDULED
Status: DISCONTINUED | OUTPATIENT
Start: 2021-07-19 | End: 2021-07-21 | Stop reason: HOSPADM

## 2021-07-19 RX ORDER — METHYLERGONOVINE MALEATE 0.2 MG/ML
200 INJECTION INTRAVENOUS ONCE AS NEEDED
Status: DISCONTINUED | OUTPATIENT
Start: 2021-07-19 | End: 2021-07-21 | Stop reason: HOSPADM

## 2021-07-19 RX ORDER — ONDANSETRON 2 MG/ML
4 INJECTION INTRAMUSCULAR; INTRAVENOUS EVERY 6 HOURS PRN
Status: DISCONTINUED | OUTPATIENT
Start: 2021-07-19 | End: 2021-07-19

## 2021-07-19 RX ORDER — FAMOTIDINE 20 MG/1
20 TABLET, FILM COATED ORAL
Status: DISCONTINUED | OUTPATIENT
Start: 2021-07-19 | End: 2021-07-21 | Stop reason: HOSPADM

## 2021-07-19 RX ORDER — SODIUM CHLORIDE, SODIUM LACTATE, POTASSIUM CHLORIDE, CALCIUM CHLORIDE 600; 310; 30; 20 MG/100ML; MG/100ML; MG/100ML; MG/100ML
125 INJECTION, SOLUTION INTRAVENOUS CONTINUOUS
Status: DISCONTINUED | OUTPATIENT
Start: 2021-07-19 | End: 2021-07-19

## 2021-07-19 RX ORDER — MISOPROSTOL 100 UG/1
600 TABLET ORAL AS NEEDED
Status: DISCONTINUED | OUTPATIENT
Start: 2021-07-19 | End: 2021-07-19 | Stop reason: HOSPADM

## 2021-07-19 RX ORDER — ROPIVACAINE HYDROCHLORIDE 2 MG/ML
14 INJECTION, SOLUTION EPIDURAL; INFILTRATION; PERINEURAL CONTINUOUS
Status: DISCONTINUED | OUTPATIENT
Start: 2021-07-19 | End: 2021-07-19

## 2021-07-19 RX ORDER — ACETAMINOPHEN 325 MG/1
650 TABLET ORAL EVERY 6 HOURS PRN
Status: DISCONTINUED | OUTPATIENT
Start: 2021-07-19 | End: 2021-07-20

## 2021-07-19 RX ORDER — IBUPROFEN 800 MG/1
800 TABLET ORAL EVERY 8 HOURS SCHEDULED
Status: DISCONTINUED | OUTPATIENT
Start: 2021-07-19 | End: 2021-07-21 | Stop reason: HOSPADM

## 2021-07-19 RX ORDER — CLINDAMYCIN HYDROCHLORIDE 300 MG/1
300 CAPSULE ORAL EVERY 8 HOURS
COMMUNITY
Start: 2021-07-13 | End: 2021-07-22

## 2021-07-19 RX ORDER — BISACODYL 10 MG
10 SUPPOSITORY, RECTAL RECTAL DAILY PRN
Status: DISCONTINUED | OUTPATIENT
Start: 2021-07-20 | End: 2021-07-21 | Stop reason: HOSPADM

## 2021-07-19 RX ADMIN — LIDOCAINE HYDROCHLORIDE 3 ML: 10 INJECTION, SOLUTION INFILTRATION; PERINEURAL at 03:02

## 2021-07-19 RX ADMIN — SODIUM CHLORIDE, POTASSIUM CHLORIDE, SODIUM LACTATE AND CALCIUM CHLORIDE 1000 ML: 600; 310; 30; 20 INJECTION, SOLUTION INTRAVENOUS at 00:50

## 2021-07-19 RX ADMIN — ACETAMINOPHEN 650 MG: 325 TABLET ORAL at 19:33

## 2021-07-19 RX ADMIN — DOCUSATE SODIUM 100 MG: 100 CAPSULE, LIQUID FILLED ORAL at 21:58

## 2021-07-19 RX ADMIN — IBUPROFEN 800 MG: 800 TABLET, FILM COATED ORAL at 13:56

## 2021-07-19 RX ADMIN — ACETAMINOPHEN 650 MG: 325 TABLET ORAL at 01:33

## 2021-07-19 RX ADMIN — CLINDAMYCIN HYDROCHLORIDE 300 MG: 150 CAPSULE ORAL at 21:59

## 2021-07-19 RX ADMIN — IBUPROFEN 800 MG: 800 TABLET, FILM COATED ORAL at 21:58

## 2021-07-19 RX ADMIN — BUPRENORPHINE HYDROCHLORIDE AND NALOXONE HYDROCHLORIDE DIHYDRATE 1 TABLET: 8; 2 TABLET SUBLINGUAL at 14:42

## 2021-07-19 RX ADMIN — BENZOCAINE: 200 LIQUID DENTAL; ORAL; PERIODONTAL at 23:31

## 2021-07-19 RX ADMIN — VANCOMYCIN HYDROCHLORIDE 1 G: 1 INJECTION, POWDER, LYOPHILIZED, FOR SOLUTION INTRAVENOUS at 02:57

## 2021-07-19 RX ADMIN — LIDOCAINE HYDROCHLORIDE 5 ML: 20 INJECTION, SOLUTION EPIDURAL; INFILTRATION; INTRACAUDAL; PERINEURAL at 03:08

## 2021-07-19 RX ADMIN — FENTANYL CITRATE 100 MCG: 50 INJECTION, SOLUTION INTRAMUSCULAR; INTRAVENOUS at 03:08

## 2021-07-19 RX ADMIN — SODIUM CHLORIDE, POTASSIUM CHLORIDE, SODIUM LACTATE AND CALCIUM CHLORIDE 125 ML/HR: 600; 310; 30; 20 INJECTION, SOLUTION INTRAVENOUS at 01:57

## 2021-07-19 RX ADMIN — DOCUSATE SODIUM 100 MG: 100 CAPSULE, LIQUID FILLED ORAL at 13:56

## 2021-07-19 RX ADMIN — CLINDAMYCIN HYDROCHLORIDE 300 MG: 150 CAPSULE ORAL at 13:56

## 2021-07-19 RX ADMIN — BENZOCAINE: 200 LIQUID DENTAL; ORAL; PERIODONTAL at 17:56

## 2021-07-19 RX ADMIN — ACETAMINOPHEN 650 MG: 325 TABLET ORAL at 23:31

## 2021-07-19 RX ADMIN — OXYTOCIN 2 MILLI-UNITS/MIN: 10 INJECTION, SOLUTION INTRAMUSCULAR; INTRAVENOUS at 06:00

## 2021-07-19 RX ADMIN — SODIUM CHLORIDE, POTASSIUM CHLORIDE, SODIUM LACTATE AND CALCIUM CHLORIDE 125 ML/HR: 600; 310; 30; 20 INJECTION, SOLUTION INTRAVENOUS at 06:13

## 2021-07-19 RX ADMIN — BUPRENORPHINE HYDROCHLORIDE AND NALOXONE HYDROCHLORIDE DIHYDRATE 1 TABLET: 8; 2 TABLET SUBLINGUAL at 21:58

## 2021-07-19 RX ADMIN — FAMOTIDINE 20 MG: 20 TABLET, FILM COATED ORAL at 17:56

## 2021-07-19 RX ADMIN — LIDOCAINE HYDROCHLORIDE AND EPINEPHRINE 3 ML: 15; 5 INJECTION, SOLUTION EPIDURAL at 03:05

## 2021-07-19 RX ADMIN — ROPIVACAINE HYDROCHLORIDE 14 ML/HR: 2 INJECTION, SOLUTION EPIDURAL; INFILTRATION at 03:08

## 2021-07-19 RX ADMIN — BENZOCAINE: 200 LIQUID DENTAL; ORAL; PERIODONTAL at 06:13

## 2021-07-19 NOTE — CASE MANAGEMENT/SOCIAL WORK
Case Management/Social Work    Patient Name:  Sailaja Alarcon  YOB: 1995  MRN: [id number]  Admit Date:  7/18/2021        SS received late/limited prenatal care/ hx of amphetamines & Suboxone. Pt is 26 Y/O Sailaja Alarcon who delivered a viable baby girl weighing 4 pounds, 8 ounces, and 18.25 inches. Pt lives at [address] [place] [address] in [facility] with FOB and her other child, 18 month old Adelaida Alarcon. Pt has two other children, Stefani Alarcon and Isaac Alarcon in whom she does not have custody.      Pt and Infant's UDS results are positive for Buprenorphine. Pt admits to having prior history with Child Protective Services. Pt states she did not find out she was pregnant until she was 7 months pregnant. Pt states she attempted to get into a MAT program but was not successful. SS noted Pt's [facility] labs, Pt was positive for Buprenorphine and Amphetamines on 03/08/21 and 03/16/21. Pt reports she was kicked out of [facility] due to missing an appt. SS attempted to contact [facility] SW, Cherri Sanchez without success. SS left message for Cherri to return SS call. Pt states PCP has been prescribing her Buprenorphine. SS made a report to Central Intake [phone] (Intake ID# [id number]) SS to follow up on 07/20/21 to determine if report met for investigation.      Infant care supplies, including care seat are available. Thanx utilized during pregnancy. Mother plans on signing up for SNAP and WIC benefits.      SS to follow up with meconium drug screen results once available      Electronically signed by:  Parul Cat  07/19/21 16:41 EDT

## 2021-07-19 NOTE — L&D DELIVERY NOTE
Vaginal Delivery Procedure Note     Sailaja Alarcon  Gestational Age-3.4 weeks          OBGYN: Shelley Steinberg DO        Pre-op Diagnosis:   Pt 23 y/o  @ 38.4 weeks in active labor        Anesthesia: Epidural           Detailed Description of Procedure      The patient was prepped and draped in normal sterile fashion. The head was delivered without difficulty. There was no nuchal cord. Anterior and posterior shoulders delivered without any problems. The rest of the infant was delivered in controlled fashion.The infant was bulb suctioned at delivery. The placenta delivered intact. The patient tolerated the procedure well and went to the recovery room in stable condition.         Time of delivery: 756  Maternal Blood Type: AB Negative  Fetal Gender: Female  Nuchal Cord: No  Tears: 1st deg midline   Blood Cord: Yes  Estimated Blood Loss: 100cc  Placenta: Spontaneous, Delivered Intact   APGARS:  9  9          Disposition: Transfer to Women's Health Floor  Condition: Stable     Shelley Steinberg DO      Date: 2021  Time: 08:16 EDT

## 2021-07-19 NOTE — H&P
WYATT Jules  Obstetric History and Physical    Chief Complaint   Patient presents with   • Contractions     EVERY 2-3 MINUTES       Subjective     Patient is a 25 y.o. female  currently at 38w4d, who presents with labor.    Her prenatal care is benign.  Her previous obstetric/gynecological history is noted for is non-contributory.    The following portions of the patients history were reviewed and updated as appropriate: current medications, allergies, past medical history, past surgical history, past family history, past social history and problem list .   Social History     Socioeconomic History   • Marital status:      Spouse name: Not on file   • Number of children: Not on file   • Years of education: Not on file   • Highest education level: Not on file   Tobacco Use   • Smoking status: Current Every Day Smoker     Packs/day: 1.00     Years: 15.00     Pack years: 15.00     Types: Cigarettes   • Smokeless tobacco: Never Used   Vaping Use   • Vaping Use: Never used   Substance and Sexual Activity   • Alcohol use: No     Alcohol/week: 0.0 standard drinks   • Drug use: Not Currently     Frequency: 3.0 times per week     Types: IV     Comment: last use 3 days ago (10/26/18)   • Sexual activity: Yes     Past Medical History:   Diagnosis Date   • Hepatitis C antibody positive in blood        Current Facility-Administered Medications:   •  acetaminophen (TYLENOL) tablet 650 mg, 650 mg, Oral, Q4H PRN, Shailesh Lou MD, 650 mg at 21 0133  •  acetaminophen (TYLENOL) tablet 650 mg, 650 mg, Oral, Q4H PRN, Shailesh Lou MD  •  benzocaine (HURRICAINE) 20 % oral solution, , Mouth/Throat, 4x Daily PRN, Shailesh Lou MD, Given at 21 0613  •  butorphanol (STADOL) injection 1 mg, 1 mg, Intravenous, Q2H PRN, Shailesh Lou MD  •  butorphanol (STADOL) injection 2 mg, 2 mg, Intravenous, Q3H PRN, Shailesh Lou MD  •  carboprost (HEMABATE) injection 250 mcg, 250 mcg, Intramuscular, PRN, Shailesh Lou MD  •  ePHEDrine  injection 10 mg, 10 mg, Intravenous, Q5 Min PRN, Albert Rueda CRNA  •  famotidine (PEPCID) injection 20 mg, 20 mg, Intravenous, Once PRN, Albert Rueda CRNA  •  HYDROcodone-acetaminophen (NORCO) 7.5-325 MG per tablet 1 tablet, 1 tablet, Oral, Q4H PRN, Shailesh Lou MD  •  ibuprofen (ADVIL,MOTRIN) tablet 800 mg, 800 mg, Oral, Q8H, Shailesh Lou MD  •  lactated ringers bolus 1,000 mL, 1,000 mL, Intravenous, Once, Albert Rueda CRNA  •  lactated ringers infusion, 125 mL/hr, Intravenous, Continuous, Shailesh Lou MD, Last Rate: 125 mL/hr at 07/19/21 0613, 125 mL/hr at 07/19/21 0613  •  methylergonovine (METHERGINE) injection 200 mcg, 200 mcg, Intramuscular, Once PRN, Shailesh Lou MD  •  miSOPROStol (CYTOTEC) tablet 600 mcg, 600 mcg, Rectal, PRN, Shailesh Lou MD  •  oxytocin in sodium chloride (PITOCIN) 30 UNIT/500ML infusion solution, 2-20 eden-units/min, Intravenous, Titrated, Shailesh Lou MD, Last Rate: 4 mL/hr at 07/19/21 0723, 4 eden-units/min at 07/19/21 0723  •  oxytocin in sodium chloride (PITOCIN) 30 UNIT/500ML infusion solution, 999 mL/hr, Intravenous, Once **FOLLOWED BY** oxytocin in sodium chloride (PITOCIN) 30 UNIT/500ML infusion solution, 250 mL/hr, Intravenous, Continuous **FOLLOWED BY** oxytocin in sodium chloride (PITOCIN) 30 UNIT/500ML infusion solution, 125 mL/hr, Intravenous, Continuous PRN, Shailesh Lou MD  •  ropivacaine (NAROPIN) 0.2 % injection, 14 mL/hr, Epidural, Continuous, Albert Rueda CRNA, Last Rate: 14 mL/hr at 07/19/21 0308, 14 mL/hr at 07/19/21 0308  •  sodium chloride (NS) irrigation solution 1,000 mL, 1,000 mL, Irrigation, Once PRN, Shailesh Lou MD  •  sodium chloride 0.9 % flush 3-10 mL, 3-10 mL, Intravenous, PRN, Shailesh Lou MD  •  terbutaline (BRETHINE) injection 0.25 mg, 0.25 mg, Subcutaneous, PRN, Shailesh Lou MD  •  vancomycin 1 g/250 mL 0.9% NS (vial-mate), 1 g, Intravenous, Q12H, Shailesh Lou MD, 1 g at 07/19/21  0257    Facility-Administered Medications Ordered in Other Encounters:   •  lidocaine (XYLOCAINE) 1 % injection, , Infiltration, PRN, Albert Rueda CRNA, 3 mL at 07/19/21 0302  •  lidocaine PF 2% (XYLOCAINE) injection, , Epidural, PRN, Albert Rueda CRNA, 5 mL at 07/19/21 0308  •  lidocaine-EPINEPHrine (XYLOCAINE W/EPI) 1.5 %-1:119493 injection, , Epidural, PRN, Albert Rueda CRNA, 3 mL at 07/19/21 0305  Allergies   Allergen Reactions   • Amoxicillin Anaphylaxis   • Penicillins Anaphylaxis   • Zofran [Ondansetron Hcl] Rash     History reviewed. No pertinent surgical history.      Prenatal Information:   Maternal Prenatal Labs  Blood Type ABO Type   Date Value Ref Range Status   07/19/2021 AB  Final      Rh Status RH type   Date Value Ref Range Status   07/19/2021 Negative  Final      Antibody Screen Antibody Screen   Date Value Ref Range Status   07/19/2021 Negative  Final      Rapid Urine Drug Screen Barbiturates Screen, Urine   Date Value Ref Range Status   07/19/2021 Negative Negative Final     Benzodiazepine Screen, Urine   Date Value Ref Range Status   07/19/2021 Negative Negative Final     Methadone Screen, Urine   Date Value Ref Range Status   07/19/2021 Negative Negative Final     Opiate Screen   Date Value Ref Range Status   07/19/2021 Negative Negative Final     THC, Screen, Urine   Date Value Ref Range Status   07/19/2021 Negative Negative Final     Cocaine Screen, Urine   Date Value Ref Range Status   07/19/2021 Negative Negative Final     Amphetamine Screen, Urine   Date Value Ref Range Status   07/19/2021 Negative Negative Final     Buprenorphine, Screen, Urine   Date Value Ref Range Status   07/19/2021 Positive (A) Negative Final     Oxycodone Screen, Urine   Date Value Ref Range Status   07/19/2021 Negative Negative Final      Group B Strep Culture No results found for: GBSANTIGEN           External Prenatal Results     Pregnancy Outside Results -  Transcribed From Office Records - See Scanned Records For Details     Test Value Date Time    ABO  AB  07/19/21 0028    Rh  Negative  07/19/21 0028    Antibody Screen  Negative  07/19/21 0028    Varicella IgG       Rubella ^ Immune  03/08/21     Hgb  13.4 g/dL 07/19/21 0028       14.3 g/dL 06/17/21 0236    Hct  40.6 % 07/19/21 0028       43.0 % 06/17/21 0236    Glucose Fasting GTT       Glucose Tolerance Test 1 hour       Glucose Tolerance Test 3 hour       Gonorrhea (discrete)  Not Detected  12/21/16 2245    Chlamydia (discrete)  Not Detected  12/21/16 2245    RPR ^ Non-Reactive  03/08/21     VDRL       Syphilis Antibody       HBsAg ^ Negative  03/08/21     Herpes Simplex Virus PCR       Herpes Simplex VIrus Culture       HIV ^ Negative  03/08/21     Hep C RNA Quant PCR       Hep C Antibody  Reactive  10/18/19 2109       Reactive  10/18/19 2109    AFP       Group B Strep  No Group B Streptococcus isolated  10/18/19 2109    GBS Susceptibility to Clindamycin       GBS Susceptibility to Erythromycin       Fetal Fibronectin       Genetic Testing, Maternal Blood             Drug Screening     Test Value Date Time    Urine Drug Screen       Amphetamine Screen  Negative  07/19/21 0006       Negative  07/18/21 1717       Negative  06/17/21 0226    Barbiturate Screen  Negative  07/19/21 0006       Negative  07/18/21 1717       Negative  06/17/21 0226    Benzodiazepine Screen  Negative  07/19/21 0006       Negative  07/18/21 1717       Negative  06/17/21 0226    Methadone Screen  Negative  07/19/21 0006       Negative  07/18/21 1717       Negative  06/17/21 0226    Phencyclidine Screen  Negative  07/19/21 0006       Negative  07/18/21 1717       Negative  06/17/21 0226    Opiates Screen  Negative  07/19/21 0006       Negative  07/18/21 1717       Negative  06/17/21 0226    THC Screen  Negative  07/19/21 0006       Negative  07/18/21 1717       Negative  06/17/21 0226    Cocaine Screen       Propoxyphene Screen  Negative   12/15/14 1802    Buprenorphine Screen  Positive  21 0006       Positive  21 1717       Positive  21 0226    Methamphetamine Screen       Oxycodone Screen  Negative  21 0006       Negative  21 1717       Negative  21 0226    Tricyclic Antidepressants Screen             Legend    ^: Historical                          Past OB History:     OB History    Para Term  AB Living   5 3 2 1 1 3   SAB TAB Ectopic Molar Multiple Live Births   0 0 0 0 0 3      # Outcome Date GA Lbr Crow/2nd Weight Sex Delivery Anes PTL Lv   5 Current            4 Term 10/19/19 40w2d 02:45 / 00:08 2120 g (4 lb 10.8 oz) F Vag-Spont EPI N GHASSAN      Name: GOOD ROBERTSON      Apgar1: 8  Apgar5: 9   3 Term 02/10/18      EPI  GHASSAN   2  12/15/14      EPI  GHASSAN   1 AB 2010 5w0d    SAB          Past Medical History: Past Medical History:   Diagnosis Date   • Hepatitis C antibody positive in blood       Past Surgical History History reviewed. No pertinent surgical history.   Family History: Family History   Problem Relation Age of Onset   • Hypertension Mother    • Cancer Father    • No Known Problems Sister    • No Known Problems Brother    • No Known Problems Son    • No Known Problems Daughter    • Hypertension Maternal Grandmother    • Diabetes Maternal Grandfather    • No Known Problems Paternal Grandmother    • No Known Problems Paternal Grandfather    • No Known Problems Cousin    • Diabetes Maternal Aunt    • Rheum arthritis Neg Hx    • Osteoarthritis Neg Hx    • Asthma Neg Hx    • Heart failure Neg Hx    • Hyperlipidemia Neg Hx    • Migraines Neg Hx    • Rashes / Skin problems Neg Hx    • Seizures Neg Hx    • Stroke Neg Hx    • Thyroid disease Neg Hx       Social History:  reports that she has been smoking cigarettes. She has a 15.00 pack-year smoking history. She has never used smokeless tobacco.   reports no history of alcohol use.   reports previous drug use. Frequency: 3.00 times per  week. Drug: IV.        Review of Systems      Objective     Vital Signs Range for the last 24 hours  Temperature: Temp:  [97.9 °F (36.6 °C)-98.4 °F (36.9 °C)] 97.9 °F (36.6 °C)   Temp Source: Temp src: Temporal   BP: BP: ()/(58-89) 195/87   Pulse: Heart Rate:  [48-93] 62   Respirations: Resp:  [16-18] 18   Weight: Weight:  [58.5 kg (129 lb)-58.7 kg (129 lb 6.4 oz)] 58.5 kg (129 lb)     Physical Examination: General appearance - alert, well appearing, and in no distress, oriented to person, place, and time, normal appearing weight and well hydrated  Mental status - alert, oriented to person, place, and time, normal mood, behavior, speech, dress, motor activity, and thought processes, affect appropriate to mood  Neck - supple, no significant adenopathy  Chest - clear to auscultation, no wheezes, rales or rhonchi, symmetric air entry, no tachypnea, retractions or cyanosis  Heart - normal rate, regular rhythm, normal S1, S2, no murmurs, rubs, clicks or gallops  Abdomen - soft, nontender, gravid uterus, no masses or organomegaly  no rebound tenderness noted,   Pelvic - normal external genitalia, vulva, vagina, cervix, uterus and adnexa  Neurological - alert, oriented, normal speech, no focal findings or movement disorder noted  Musculoskeletal - no joint tenderness, deformity or swelling  Extremities - peripheral pulses normal, no pedal edema, no clubbing or cyanosis  Skin - normal coloration and turgor, no rashes, no suspicious skin lesions noted        Cervix: Exam by:     Dilation:     Effacement:     Station:       Laboratory Results:   Lab Results (last 24 hours)     Procedure Component Value Units Date/Time    HIV-1 Antibody, EIA [[id number]] Resulted: 03/08/21    Specimen: Blood Updated: 07/19/21 0738     External HIV Antibody Negative    Hepatitis B Surface Antigen [[id number]] Resulted: 03/08/21    Specimen: Blood Updated: 07/19/21 0638     External Hepatitis B Surface Ag Negative    RPR [[id number]]  Resulted: 03/08/21    Specimen: Blood Updated: 07/19/21 0638     External RPR Non-Reactive    Rubella Antibody, IgG [[id number]] Resulted: 03/08/21    Specimen: Blood Updated: 07/19/21 0638     External Rubella Qual Immune    COVID PRE-OP / PRE-PROCEDURE SCREENING ORDER (NO ISOLATION) - Swab, Nasopharynx [[id number]]  (Normal) Collected: 07/19/21 0238    Specimen: Swab from Nasopharynx Updated: 07/19/21 0445    Narrative:      The following orders were created for panel order COVID PRE-OP / PRE-PROCEDURE SCREENING ORDER (NO ISOLATION) - Swab, Nasopharynx.  Procedure                               Abnormality         Status                     ---------                               -----------         ------                     COVID-19,CEPHEID,COR/JERRI...[[id number]]  Normal              Final result                 Please view results for these tests on the individual orders.    COVID-19,CEPHEID,COR/JERRI/PAD/TANISHA IN-HOUSE(OR EMERGENT/ADD-ON),NP SWAB IN TRANSPORT MEDIA 3-4 HR TAT, RT-PCR - Swab, Nasopharynx [[id number]]  (Normal) Collected: 07/19/21 0238    Specimen: Swab from Nasopharynx Updated: 07/19/21 0445     COVID19 Not Detected    Narrative:      Fact sheet for providers: https://www.fda.gov/media/301497/download     Fact sheet for patients: https://www.fda.gov/media/247356/download  Fact sheet for providers: https://www.fda.gov/media/109302/download     Fact sheet for patients: https://www.fda.gov/media/475766/download    Comprehensive Metabolic Panel [[id number]]  (Abnormal) Collected: 07/19/21 0028    Specimen: Blood Updated: 07/19/21 0105     Glucose 84 mg/dL      BUN 13 mg/dL      Creatinine 0.83 mg/dL      Sodium 135 mmol/L      Potassium 4.6 mmol/L      Chloride 101 mmol/L      CO2 22.5 mmol/L      Calcium 10.0 mg/dL      Total Protein 8.1 g/dL      Albumin 3.85 g/dL      ALT (SGPT) 6 U/L      AST (SGOT) 12 U/L      Alkaline Phosphatase 221 U/L      Total Bilirubin 0.2 mg/dL      eGFR Non  Amer 82  mL/min/1.73      Globulin 4.3 gm/dL      A/G Ratio 0.9 g/dL      BUN/Creatinine Ratio 15.7     Anion Gap 11.5 mmol/L     Narrative:      GFR Normal >60  Chronic Kidney Disease <60  Kidney Failure <15      Urine Drug Screen - Urine, Clean Catch [[id number]]  (Abnormal) Collected: 07/19/21 0006    Specimen: Urine, Clean Catch Updated: 07/19/21 0045     Amphetamine Screen, Urine Negative     Barbiturates Screen, Urine Negative     Benzodiazepine Screen, Urine Negative     Cocaine Screen, Urine Negative     Methadone Screen, Urine Negative     Opiate Screen Negative     Phencyclidine (PCP), Urine Negative     THC, Screen, Urine Negative     6-ACETYL MORPHINE Negative     Buprenorphine, Screen, Urine Positive     Oxycodone Screen, Urine Negative    Narrative:      Negative Thresholds For Drugs Screened:                  Amphetamines              1000 ng/ml               Barbiturates               200 ng/ml               Benzodiazepines            200 ng/ml              Cocaine                    300 ng/ml              Methadone                  300 ng/ml              Opiates                    300 ng/ml               Phencyclidine               25 ng/ml               THC                         50 ng/ml              6-Acetyl Morphine           10 ng/ml              Buprenorphine                5 ng/ml              Oxycodone                  300 ng/ml    The reference range for all drugs tested is negative. This report includes final unconfirmed qualitative results to be used for medical treatment purposes only. Unconfirmed results must not be used for non-medical purposes such as employment or legal testing. Clinical consideration should be applied to any drug of abuse test, especially when unconfirmed quantitative results are used.        CBC & Differential [[id number]]  (Abnormal) Collected: 07/19/21 0028    Specimen: Blood Updated: 07/19/21 0036    Narrative:      The following orders were created for panel order CBC &  Differential.  Procedure                               Abnormality         Status                     ---------                               -----------         ------                     CBC Auto Differential[[id number]]        Abnormal            Final result                 Please view results for these tests on the individual orders.    CBC Auto Differential [[id number]]  (Abnormal) Collected: 07/19/21 0028    Specimen: Blood Updated: 07/19/21 0036     WBC 7.18 10*3/mm3      RBC 4.40 10*6/mm3      Hemoglobin 13.4 g/dL      Hematocrit 40.6 %      MCV 92.3 fL      MCH 30.5 pg      MCHC 33.0 g/dL      RDW 12.4 %      RDW-SD 41.2 fl      MPV 11.8 fL      Platelets 177 10*3/mm3      Neutrophil % 65.5 %      Lymphocyte % 28.4 %      Monocyte % 4.7 %      Eosinophil % 0.3 %      Basophil % 0.4 %      Immature Grans % 0.7 %      Neutrophils, Absolute 4.70 10*3/mm3      Lymphocytes, Absolute 2.04 10*3/mm3      Monocytes, Absolute 0.34 10*3/mm3      Eosinophils, Absolute 0.02 10*3/mm3      Basophils, Absolute 0.03 10*3/mm3      Immature Grans, Absolute 0.05 10*3/mm3      nRBC 0.0 /100 WBC         Radiology Review:   Imaging Results (Last 72 Hours)     ** No results found for the last 72 hours. **            Assessment/Plan       Normal labor      Assessment & Plan    Assessment:  1.  Intrauterine pregnancy at 38w4d weeks gestation with reassuring fetal status.    2.  labor  without ROM  3.  Obstetrical history significant for is non-contributory.  4.  GBS status: No results found for: GBSANTIGEN    Plan:  1. fetal and uterine monitoring  continuously, expectant management and analgesia with  epidural  2. Plan of care has been reviewed with patient   3.  Risks, benefits of treatment plan have been discussed.  4.  All questions have been answered.        Shelley Steinberg, DO  7/19/2021  08:44 EDT

## 2021-07-19 NOTE — NON STRESS TEST
Sailaja Alarcon, a  at 38w4d with an CHU of 2021, by Ultrasound, was seen at [facility] LABOR DELIVERY for a nonstress test.    Chief Complaint   Patient presents with   • Contractions     EVERY 2-3 MINUTES       Patient Active Problem List   Diagnosis   • Pregnant   • Pregnancy   • Normal labor       Start Time: 0530  Stop Time: 0600    Interpretation A  Nonstress Test Interpretation A: Reactive (21 0600 : Beata Blas, RN)  Comments A: verified by romy reynoso rn (21 06 : Beata Blas, RN)

## 2021-07-20 PROBLEM — Z34.90 PREGNANCY: Status: RESOLVED | Noted: 2021-06-17 | Resolved: 2021-07-20

## 2021-07-20 PROBLEM — Z34.90 PREGNANT: Status: RESOLVED | Noted: 2019-10-14 | Resolved: 2021-07-20

## 2021-07-20 LAB
BASOPHILS # BLD AUTO: 0.05 10*3/MM3 (ref 0–0.2)
BASOPHILS NFR BLD AUTO: 0.6 % (ref 0–1.5)
DEPRECATED RDW RBC AUTO: 40.7 FL (ref 37–54)
EOSINOPHIL # BLD AUTO: 0.08 10*3/MM3 (ref 0–0.4)
EOSINOPHIL NFR BLD AUTO: 0.9 % (ref 0.3–6.2)
ERYTHROCYTE [DISTWIDTH] IN BLOOD BY AUTOMATED COUNT: 12.5 % (ref 12.3–15.4)
HCT VFR BLD AUTO: 35.6 % (ref 34–46.6)
HGB BLD-MCNC: 12.1 G/DL (ref 12–15.9)
IMM GRANULOCYTES # BLD AUTO: 0.1 10*3/MM3 (ref 0–0.05)
IMM GRANULOCYTES NFR BLD AUTO: 1.1 % (ref 0–0.5)
LYMPHOCYTES # BLD AUTO: 3.18 10*3/MM3 (ref 0.7–3.1)
LYMPHOCYTES NFR BLD AUTO: 36.3 % (ref 19.6–45.3)
MCH RBC QN AUTO: 30.9 PG (ref 26.6–33)
MCHC RBC AUTO-ENTMCNC: 34 G/DL (ref 31.5–35.7)
MCV RBC AUTO: 91 FL (ref 79–97)
MONOCYTES # BLD AUTO: 0.5 10*3/MM3 (ref 0.1–0.9)
MONOCYTES NFR BLD AUTO: 5.7 % (ref 5–12)
NEUTROPHILS NFR BLD AUTO: 4.85 10*3/MM3 (ref 1.7–7)
NEUTROPHILS NFR BLD AUTO: 55.4 % (ref 42.7–76)
NRBC BLD AUTO-RTO: 0 /100 WBC (ref 0–0.2)
PLATELET # BLD AUTO: 149 10*3/MM3 (ref 140–450)
PMV BLD AUTO: 12.1 FL (ref 6–12)
RBC # BLD AUTO: 3.91 10*6/MM3 (ref 3.77–5.28)
WBC # BLD AUTO: 8.76 10*3/MM3 (ref 3.4–10.8)

## 2021-07-20 PROCEDURE — 85025 COMPLETE CBC W/AUTO DIFF WBC: CPT | Performed by: OBSTETRICS & GYNECOLOGY

## 2021-07-20 RX ORDER — ACETAMINOPHEN 325 MG/1
650 TABLET ORAL EVERY 4 HOURS PRN
Status: DISCONTINUED | OUTPATIENT
Start: 2021-07-20 | End: 2021-07-21 | Stop reason: HOSPADM

## 2021-07-20 RX ADMIN — FAMOTIDINE 20 MG: 20 TABLET, FILM COATED ORAL at 17:01

## 2021-07-20 RX ADMIN — IBUPROFEN 800 MG: 800 TABLET, FILM COATED ORAL at 05:37

## 2021-07-20 RX ADMIN — CLINDAMYCIN HYDROCHLORIDE 300 MG: 150 CAPSULE ORAL at 14:09

## 2021-07-20 RX ADMIN — BUPRENORPHINE HYDROCHLORIDE AND NALOXONE HYDROCHLORIDE DIHYDRATE 1 TABLET: 8; 2 TABLET SUBLINGUAL at 21:30

## 2021-07-20 RX ADMIN — CLINDAMYCIN HYDROCHLORIDE 300 MG: 150 CAPSULE ORAL at 05:37

## 2021-07-20 RX ADMIN — FAMOTIDINE 20 MG: 20 TABLET, FILM COATED ORAL at 09:31

## 2021-07-20 RX ADMIN — IBUPROFEN 800 MG: 800 TABLET, FILM COATED ORAL at 14:09

## 2021-07-20 RX ADMIN — DOCUSATE SODIUM 100 MG: 100 CAPSULE, LIQUID FILLED ORAL at 21:30

## 2021-07-20 RX ADMIN — IBUPROFEN 800 MG: 800 TABLET, FILM COATED ORAL at 21:30

## 2021-07-20 RX ADMIN — BUPRENORPHINE HYDROCHLORIDE AND NALOXONE HYDROCHLORIDE DIHYDRATE 1 TABLET: 8; 2 TABLET SUBLINGUAL at 09:30

## 2021-07-20 RX ADMIN — Medication 1 TABLET: at 09:31

## 2021-07-20 RX ADMIN — CLINDAMYCIN HYDROCHLORIDE 300 MG: 150 CAPSULE ORAL at 21:30

## 2021-07-20 RX ADMIN — HYDROXYZINE HYDROCHLORIDE 50 MG: 50 TABLET ORAL at 21:30

## 2021-07-20 RX ADMIN — DOCUSATE SODIUM 100 MG: 100 CAPSULE, LIQUID FILLED ORAL at 09:31

## 2021-07-20 RX ADMIN — ACETAMINOPHEN 650 MG: 325 TABLET ORAL at 09:30

## 2021-07-20 NOTE — PLAN OF CARE
Problem: Adult Inpatient Plan of Care  Goal: Plan of Care Review  Outcome: Ongoing, Progressing  Flowsheets  Taken 7/20/2021 1815 by Miriam Ha RN  Outcome Summary: patient is doing well, firm, small lochia, motrin and tylenol for discomfort.  Taken 7/20/2021 0217 by Rupal Boswell RN  Progress: improving  Plan of Care Reviewed With:   patient   significant other  Goal: Patient-Specific Goal (Individualized)  Outcome: Ongoing, Progressing  Goal: Absence of Hospital-Acquired Illness or Injury  Outcome: Ongoing, Progressing  Intervention: Identify and Manage Fall Risk  Recent Flowsheet Documentation  Taken 7/20/2021 0930 by Miriam Ha RN  Safety Promotion/Fall Prevention: safety round/check completed  Intervention: Prevent Infection  Recent Flowsheet Documentation  Taken 7/20/2021 0930 by Miriam Ha RN  Infection Prevention:   single patient room provided   rest/sleep promoted  Goal: Optimal Comfort and Wellbeing  Outcome: Ongoing, Progressing  Intervention: Provide Person-Centered Care  Recent Flowsheet Documentation  Taken 7/20/2021 0930 by Miriam Ha RN  Trust Relationship/Rapport: care explained  Goal: Readiness for Transition of Care  Outcome: Ongoing, Progressing   Goal Outcome Evaluation:              Outcome Summary: patient is doing well, firm, small lochia, motrin and tylenol for discomfort.

## 2021-07-20 NOTE — PLAN OF CARE
Goal Outcome Evaluation:  Plan of Care Reviewed With: patient, significant other        Progress: improving  Outcome Summary: Pt voiding and ambulating without difficulty.

## 2021-07-20 NOTE — PROGRESS NOTES
" Dawn  Vaginal Delivery Progress Note    Subjective   Subjective  Postpartum Day 1: Vaginal Delivery    The patient feels well.  Her pain is well controlled with nonsteroidal anti-inflammatory drugs.   She is ambulating well.  Patient describes her bleeding as thin lochia.    Breastfeeding: infant latching without difficulty.    Objective     Objective   Vital Signs Range for the last 24 hours  Temperature: Temp:  [97.6 °F (36.4 °C)-98.6 °F (37 °C)] 97.6 °F (36.4 °C)   Temp Source: Temp src: Oral   BP: BP: (108-151)/(63-91) 108/73   Pulse: Heart Rate:  [54-80] 56   Respirations: Resp:  [16-18] 16   Weight:       Admit Height:  Height: 160 cm (62.99\")    Physical Exam:  General:  no acute distresss.  Abdomen: Fundus: appropriate, firm, non tender  Extremities: normal, atraumatic, no cyanosis, and trace edema.       [unfilled]       Lab Results   Component Value Date    ABO AB 07/19/2021    RH Negative 07/19/2021        Lab Results   Component Value Date    HGB 12.1 07/20/2021    HCT 35.6 07/20/2021         Assessment/Plan   Assessment & Plan    Normal labor    Postpartum care following vaginal delivery      Sailaja Alarcon is Day 1  post-partum  Vaginal, Spontaneous   .      Plan:  Continue current care.      Patsy Valdovinos, APRN  7/20/2021  09:08 EDT    "

## 2021-07-21 VITALS
SYSTOLIC BLOOD PRESSURE: 107 MMHG | TEMPERATURE: 98.1 F | HEART RATE: 57 BPM | BODY MASS INDEX: 22.86 KG/M2 | RESPIRATION RATE: 16 BRPM | HEIGHT: 63 IN | OXYGEN SATURATION: 96 % | WEIGHT: 129 LBS | DIASTOLIC BLOOD PRESSURE: 66 MMHG

## 2021-07-21 RX ORDER — IBUPROFEN 600 MG/1
600 TABLET ORAL EVERY 8 HOURS SCHEDULED
Qty: 40 TABLET | Refills: 0 | Status: SHIPPED | OUTPATIENT
Start: 2021-07-21 | End: 2021-10-10

## 2021-07-21 RX ADMIN — DOCUSATE SODIUM 100 MG: 100 CAPSULE, LIQUID FILLED ORAL at 08:07

## 2021-07-21 RX ADMIN — CLINDAMYCIN HYDROCHLORIDE 300 MG: 150 CAPSULE ORAL at 05:35

## 2021-07-21 RX ADMIN — IBUPROFEN 800 MG: 800 TABLET, FILM COATED ORAL at 05:35

## 2021-07-21 RX ADMIN — FAMOTIDINE 20 MG: 20 TABLET, FILM COATED ORAL at 08:07

## 2021-07-21 RX ADMIN — BUPRENORPHINE HYDROCHLORIDE AND NALOXONE HYDROCHLORIDE DIHYDRATE 1 TABLET: 8; 2 TABLET SUBLINGUAL at 08:03

## 2021-07-21 RX ADMIN — Medication 1 TABLET: at 08:07

## 2021-07-21 NOTE — PLAN OF CARE
Goal Outcome Evaluation:  Plan of Care Reviewed With: patient        Progress: improving  Outcome Summary: Pt voiding and ambulating without difficulty.

## 2021-07-21 NOTE — DISCHARGE SUMMARY
WYATT Jules  Delivery Discharge Summary    Primary OB Clinician:     EDC: Estimated Date of Delivery: 21    Gestational Age:38w4d    Antepartum complications: none    Date of Delivery: 2021   Time of Delivery: 7:56 AM     Delivered By:  Shelley Steinberg     Delivery Type: Vaginal, Spontaneous      Tubal Ligation: n/a    Baby:female  infant;   Apgar:  9  @ 1 minute /   Apgar:  9  @ 5 minutes   Weight: 2040 g (4 lb 8 oz)      Anesthesia: Epidural      Intrapartum complications: None    Laceration: No    Episiotomy: No    Placenta: Spontaneous     Feeding method: Breastfeeding Status: Yes    [unfilled]       Lab Results   Component Value Date    ABO AB 2021    RH Negative 2021        Lab Results   Component Value Date    HGB 12.1 2021    HCT 35.6 2021       Rh Immune globulin given: not applicable      Discharge Date: 2021; Discharge Time: 08:32 EDT        Plan:    Address and phone number verified and same.  Follow-up appointment with [facility] in 3 weeks.      Patsy Valdovinos, APRN  2021  08:32 EDT

## 2021-10-09 ENCOUNTER — HOSPITAL ENCOUNTER (EMERGENCY)
Facility: HOSPITAL | Age: 26
Discharge: LEFT BEFORE DISCHARGE | End: 2021-10-10
Attending: EMERGENCY MEDICINE | Admitting: EMERGENCY MEDICINE

## 2021-10-09 ENCOUNTER — APPOINTMENT (OUTPATIENT)
Dept: CT IMAGING | Facility: HOSPITAL | Age: 26
End: 2021-10-09

## 2021-10-09 DIAGNOSIS — L02.11 CUTANEOUS ABSCESS OF NECK: Primary | ICD-10-CM

## 2021-10-09 DIAGNOSIS — L02.11 ABSCESS, NECK: ICD-10-CM

## 2021-10-09 LAB
ALBUMIN SERPL-MCNC: 3.52 G/DL (ref 3.5–5.2)
ALBUMIN/GLOB SERPL: 0.8 G/DL
ALP SERPL-CCNC: 67 U/L (ref 39–117)
ALT SERPL W P-5'-P-CCNC: <5 U/L (ref 1–33)
ANION GAP SERPL CALCULATED.3IONS-SCNC: 13.9 MMOL/L (ref 5–15)
AST SERPL-CCNC: 9 U/L (ref 1–32)
B-HCG UR QL: NEGATIVE
BASOPHILS # BLD AUTO: 0.03 10*3/MM3 (ref 0–0.2)
BASOPHILS NFR BLD AUTO: 0.4 % (ref 0–1.5)
BILIRUB SERPL-MCNC: 0.4 MG/DL (ref 0–1.2)
BUN SERPL-MCNC: 10 MG/DL (ref 6–20)
BUN/CREAT SERPL: 12 (ref 7–25)
CALCIUM SPEC-SCNC: 9.4 MG/DL (ref 8.6–10.5)
CHLORIDE SERPL-SCNC: 104 MMOL/L (ref 98–107)
CO2 SERPL-SCNC: 18.1 MMOL/L (ref 22–29)
CREAT SERPL-MCNC: 0.83 MG/DL (ref 0.57–1)
CRP SERPL-MCNC: 17.26 MG/DL (ref 0–0.5)
D-LACTATE SERPL-SCNC: 0.6 MMOL/L (ref 0.5–2)
DEPRECATED RDW RBC AUTO: 45.4 FL (ref 37–54)
EOSINOPHIL # BLD AUTO: 0.08 10*3/MM3 (ref 0–0.4)
EOSINOPHIL NFR BLD AUTO: 1.1 % (ref 0.3–6.2)
ERYTHROCYTE [DISTWIDTH] IN BLOOD BY AUTOMATED COUNT: 12.8 % (ref 12.3–15.4)
GFR SERPL CREATININE-BSD FRML MDRD: 82 ML/MIN/1.73
GLOBULIN UR ELPH-MCNC: 4.2 GM/DL
GLUCOSE SERPL-MCNC: 87 MG/DL (ref 65–99)
HCT VFR BLD AUTO: 45.8 % (ref 34–46.6)
HETEROPH AB SER QL LA: NEGATIVE
HGB BLD-MCNC: 13.8 G/DL (ref 12–15.9)
IMM GRANULOCYTES # BLD AUTO: 0.03 10*3/MM3 (ref 0–0.05)
IMM GRANULOCYTES NFR BLD AUTO: 0.4 % (ref 0–0.5)
LYMPHOCYTES # BLD AUTO: 1.43 10*3/MM3 (ref 0.7–3.1)
LYMPHOCYTES NFR BLD AUTO: 19.8 % (ref 19.6–45.3)
MCH RBC QN AUTO: 28.8 PG (ref 26.6–33)
MCHC RBC AUTO-ENTMCNC: 30.1 G/DL (ref 31.5–35.7)
MCV RBC AUTO: 95.4 FL (ref 79–97)
MONOCYTES # BLD AUTO: 0.4 10*3/MM3 (ref 0.1–0.9)
MONOCYTES NFR BLD AUTO: 5.5 % (ref 5–12)
NEUTROPHILS NFR BLD AUTO: 5.25 10*3/MM3 (ref 1.7–7)
NEUTROPHILS NFR BLD AUTO: 72.8 % (ref 42.7–76)
NRBC BLD AUTO-RTO: 0 /100 WBC (ref 0–0.2)
PLATELET # BLD AUTO: 217 10*3/MM3 (ref 140–450)
PMV BLD AUTO: 10.7 FL (ref 6–12)
POTASSIUM SERPL-SCNC: 4.5 MMOL/L (ref 3.5–5.2)
PROT SERPL-MCNC: 7.7 G/DL (ref 6–8.5)
RBC # BLD AUTO: 4.8 10*6/MM3 (ref 3.77–5.28)
S PYO AG THROAT QL: NEGATIVE
SODIUM SERPL-SCNC: 136 MMOL/L (ref 136–145)
WBC # BLD AUTO: 7.22 10*3/MM3 (ref 3.4–10.8)

## 2021-10-09 PROCEDURE — 83605 ASSAY OF LACTIC ACID: CPT | Performed by: PHYSICIAN ASSISTANT

## 2021-10-09 PROCEDURE — 87081 CULTURE SCREEN ONLY: CPT | Performed by: PHYSICIAN ASSISTANT

## 2021-10-09 PROCEDURE — 86308 HETEROPHILE ANTIBODY SCREEN: CPT | Performed by: PHYSICIAN ASSISTANT

## 2021-10-09 PROCEDURE — 85025 COMPLETE CBC W/AUTO DIFF WBC: CPT | Performed by: PHYSICIAN ASSISTANT

## 2021-10-09 PROCEDURE — 87880 STREP A ASSAY W/OPTIC: CPT | Performed by: PHYSICIAN ASSISTANT

## 2021-10-09 PROCEDURE — 70490 CT SOFT TISSUE NECK W/O DYE: CPT

## 2021-10-09 PROCEDURE — 99283 EMERGENCY DEPT VISIT LOW MDM: CPT

## 2021-10-09 PROCEDURE — 80053 COMPREHEN METABOLIC PANEL: CPT | Performed by: PHYSICIAN ASSISTANT

## 2021-10-09 PROCEDURE — 86140 C-REACTIVE PROTEIN: CPT | Performed by: PHYSICIAN ASSISTANT

## 2021-10-09 PROCEDURE — 81025 URINE PREGNANCY TEST: CPT | Performed by: NURSE PRACTITIONER

## 2021-10-09 NOTE — ED TRIAGE NOTES
MEDICAL SCREENING:    Reason for Visit: neck and throat pain    Patient initially seen in triage.  The patient was advised further evaluation and diagnostic testing will be needed, some of the treatment and testing will be initiated in the lobby in order to begin the process.  The patient will be returned to the waiting area for the time being and possibly be re-assessed by a subsequent ED provider.  The patient will be brought back to the treatment area in as timely manner as possible.

## 2021-10-10 ENCOUNTER — APPOINTMENT (OUTPATIENT)
Dept: CT IMAGING | Facility: HOSPITAL | Age: 26
End: 2021-10-10

## 2021-10-10 ENCOUNTER — ANESTHESIA (OUTPATIENT)
Dept: PERIOP | Facility: HOSPITAL | Age: 26
End: 2021-10-10

## 2021-10-10 ENCOUNTER — ANESTHESIA EVENT (OUTPATIENT)
Dept: PERIOP | Facility: HOSPITAL | Age: 26
End: 2021-10-10

## 2021-10-10 ENCOUNTER — HOSPITAL ENCOUNTER (INPATIENT)
Facility: HOSPITAL | Age: 26
LOS: 1 days | Discharge: LEFT BEFORE DISCHARGE | End: 2021-10-10
Attending: STUDENT IN AN ORGANIZED HEALTH CARE EDUCATION/TRAINING PROGRAM | Admitting: SURGERY

## 2021-10-10 ENCOUNTER — APPOINTMENT (OUTPATIENT)
Dept: GENERAL RADIOLOGY | Facility: HOSPITAL | Age: 26
End: 2021-10-10

## 2021-10-10 VITALS
RESPIRATION RATE: 17 BRPM | TEMPERATURE: 98.7 F | OXYGEN SATURATION: 100 % | HEART RATE: 96 BPM | HEIGHT: 63 IN | WEIGHT: 136 LBS | BODY MASS INDEX: 24.1 KG/M2 | SYSTOLIC BLOOD PRESSURE: 107 MMHG | DIASTOLIC BLOOD PRESSURE: 67 MMHG

## 2021-10-10 VITALS
WEIGHT: 136 LBS | HEART RATE: 70 BPM | OXYGEN SATURATION: 98 % | RESPIRATION RATE: 18 BRPM | DIASTOLIC BLOOD PRESSURE: 61 MMHG | SYSTOLIC BLOOD PRESSURE: 101 MMHG | BODY MASS INDEX: 24.1 KG/M2 | HEIGHT: 63 IN | TEMPERATURE: 98 F

## 2021-10-10 DIAGNOSIS — L02.11 ABSCESS, NECK: Primary | ICD-10-CM

## 2021-10-10 DIAGNOSIS — L02.11 CUTANEOUS ABSCESS OF NECK: ICD-10-CM

## 2021-10-10 PROBLEM — Z37.9 NORMAL LABOR: Status: RESOLVED | Noted: 2021-07-19 | Resolved: 2021-10-10

## 2021-10-10 LAB
ALBUMIN SERPL-MCNC: 3.94 G/DL (ref 3.5–5.2)
ALBUMIN/GLOB SERPL: 1 G/DL
ALP SERPL-CCNC: 67 U/L (ref 39–117)
ALT SERPL W P-5'-P-CCNC: <5 U/L (ref 1–33)
ANION GAP SERPL CALCULATED.3IONS-SCNC: 16.2 MMOL/L (ref 5–15)
AST SERPL-CCNC: 7 U/L (ref 1–32)
BASOPHILS # BLD AUTO: 0.04 10*3/MM3 (ref 0–0.2)
BASOPHILS NFR BLD AUTO: 0.4 % (ref 0–1.5)
BILIRUB SERPL-MCNC: 0.6 MG/DL (ref 0–1.2)
BUN SERPL-MCNC: 8 MG/DL (ref 6–20)
BUN/CREAT SERPL: 10.8 (ref 7–25)
CALCIUM SPEC-SCNC: 9.6 MG/DL (ref 8.6–10.5)
CHLORIDE SERPL-SCNC: 101 MMOL/L (ref 98–107)
CO2 SERPL-SCNC: 20.8 MMOL/L (ref 22–29)
CREAT SERPL-MCNC: 0.74 MG/DL (ref 0.57–1)
CRP SERPL-MCNC: 22.71 MG/DL (ref 0–0.5)
D-LACTATE SERPL-SCNC: 0.8 MMOL/L (ref 0.5–2)
DEPRECATED RDW RBC AUTO: 42.2 FL (ref 37–54)
EOSINOPHIL # BLD AUTO: 0.1 10*3/MM3 (ref 0–0.4)
EOSINOPHIL NFR BLD AUTO: 0.9 % (ref 0.3–6.2)
ERYTHROCYTE [DISTWIDTH] IN BLOOD BY AUTOMATED COUNT: 13 % (ref 12.3–15.4)
ERYTHROCYTE [SEDIMENTATION RATE] IN BLOOD: 81 MM/HR (ref 0–20)
FLUAV RNA RESP QL NAA+PROBE: NOT DETECTED
FLUBV RNA RESP QL NAA+PROBE: NOT DETECTED
GFR SERPL CREATININE-BSD FRML MDRD: 94 ML/MIN/1.73
GLOBULIN UR ELPH-MCNC: 4.1 GM/DL
GLUCOSE SERPL-MCNC: 94 MG/DL (ref 65–99)
HCT VFR BLD AUTO: 32.9 % (ref 34–46.6)
HGB BLD-MCNC: 10.5 G/DL (ref 12–15.9)
HOLD SPECIMEN: NORMAL
HOLD SPECIMEN: NORMAL
IMM GRANULOCYTES # BLD AUTO: 0.05 10*3/MM3 (ref 0–0.05)
IMM GRANULOCYTES NFR BLD AUTO: 0.4 % (ref 0–0.5)
LYMPHOCYTES # BLD AUTO: 2.24 10*3/MM3 (ref 0.7–3.1)
LYMPHOCYTES NFR BLD AUTO: 20 % (ref 19.6–45.3)
MAGNESIUM SERPL-MCNC: 2.1 MG/DL (ref 1.6–2.6)
MCH RBC QN AUTO: 28.2 PG (ref 26.6–33)
MCHC RBC AUTO-ENTMCNC: 31.9 G/DL (ref 31.5–35.7)
MCV RBC AUTO: 88.4 FL (ref 79–97)
MONOCYTES # BLD AUTO: 0.54 10*3/MM3 (ref 0.1–0.9)
MONOCYTES NFR BLD AUTO: 4.8 % (ref 5–12)
NEUTROPHILS NFR BLD AUTO: 73.5 % (ref 42.7–76)
NEUTROPHILS NFR BLD AUTO: 8.21 10*3/MM3 (ref 1.7–7)
NRBC BLD AUTO-RTO: 0 /100 WBC (ref 0–0.2)
PLATELET # BLD AUTO: 286 10*3/MM3 (ref 140–450)
PMV BLD AUTO: 10.8 FL (ref 6–12)
POTASSIUM SERPL-SCNC: 3.6 MMOL/L (ref 3.5–5.2)
PROT SERPL-MCNC: 8 G/DL (ref 6–8.5)
RBC # BLD AUTO: 3.72 10*6/MM3 (ref 3.77–5.28)
SARS-COV-2 RNA RESP QL NAA+PROBE: NOT DETECTED
SODIUM SERPL-SCNC: 138 MMOL/L (ref 136–145)
WBC # BLD AUTO: 11.18 10*3/MM3 (ref 3.4–10.8)
WHOLE BLOOD HOLD SPECIMEN: NORMAL

## 2021-10-10 PROCEDURE — 0J940ZZ DRAINAGE OF RIGHT NECK SUBCUTANEOUS TISSUE AND FASCIA, OPEN APPROACH: ICD-10-PCS | Performed by: SURGERY

## 2021-10-10 PROCEDURE — 02HV33Z INSERTION OF INFUSION DEVICE INTO SUPERIOR VENA CAVA, PERCUTANEOUS APPROACH: ICD-10-PCS | Performed by: SURGERY

## 2021-10-10 PROCEDURE — 25010000002 VANCOMYCIN PER 500 MG: Performed by: PHYSICIAN ASSISTANT

## 2021-10-10 PROCEDURE — 87070 CULTURE OTHR SPECIMN AEROBIC: CPT | Performed by: SURGERY

## 2021-10-10 PROCEDURE — 25010000002 FENTANYL CITRATE (PF) 50 MCG/ML SOLUTION: Performed by: NURSE ANESTHETIST, CERTIFIED REGISTERED

## 2021-10-10 PROCEDURE — 80053 COMPREHEN METABOLIC PANEL: CPT | Performed by: PHYSICIAN ASSISTANT

## 2021-10-10 PROCEDURE — 25010000002 KETOROLAC TROMETHAMINE PER 15 MG: Performed by: NURSE ANESTHETIST, CERTIFIED REGISTERED

## 2021-10-10 PROCEDURE — 87205 SMEAR GRAM STAIN: CPT | Performed by: SURGERY

## 2021-10-10 PROCEDURE — 10060 I&D ABSCESS SIMPLE/SINGLE: CPT | Performed by: SURGERY

## 2021-10-10 PROCEDURE — 25010000002 IOPAMIDOL 61 % SOLUTION: Performed by: STUDENT IN AN ORGANIZED HEALTH CARE EDUCATION/TRAINING PROGRAM

## 2021-10-10 PROCEDURE — 87186 SC STD MICRODIL/AGAR DIL: CPT | Performed by: SURGERY

## 2021-10-10 PROCEDURE — 87636 SARSCOV2 & INF A&B AMP PRB: CPT | Performed by: NURSE PRACTITIONER

## 2021-10-10 PROCEDURE — 25010000002 PROPOFOL 10 MG/ML EMULSION: Performed by: NURSE ANESTHETIST, CERTIFIED REGISTERED

## 2021-10-10 PROCEDURE — 71045 X-RAY EXAM CHEST 1 VIEW: CPT

## 2021-10-10 PROCEDURE — 85652 RBC SED RATE AUTOMATED: CPT | Performed by: PHYSICIAN ASSISTANT

## 2021-10-10 PROCEDURE — 25010000002 HYDROMORPHONE PER 4 MG: Performed by: NURSE ANESTHETIST, CERTIFIED REGISTERED

## 2021-10-10 PROCEDURE — C1751 CATH, INF, PER/CENT/MIDLINE: HCPCS | Performed by: SURGERY

## 2021-10-10 PROCEDURE — 86140 C-REACTIVE PROTEIN: CPT | Performed by: PHYSICIAN ASSISTANT

## 2021-10-10 PROCEDURE — 87040 BLOOD CULTURE FOR BACTERIA: CPT | Performed by: PHYSICIAN ASSISTANT

## 2021-10-10 PROCEDURE — 83735 ASSAY OF MAGNESIUM: CPT | Performed by: PHYSICIAN ASSISTANT

## 2021-10-10 PROCEDURE — 85025 COMPLETE CBC W/AUTO DIFF WBC: CPT | Performed by: PHYSICIAN ASSISTANT

## 2021-10-10 PROCEDURE — 83605 ASSAY OF LACTIC ACID: CPT | Performed by: PHYSICIAN ASSISTANT

## 2021-10-10 PROCEDURE — 99284 EMERGENCY DEPT VISIT MOD MDM: CPT

## 2021-10-10 PROCEDURE — 25010000002 MIDAZOLAM PER 1 MG: Performed by: NURSE ANESTHETIST, CERTIFIED REGISTERED

## 2021-10-10 PROCEDURE — 99222 1ST HOSP IP/OBS MODERATE 55: CPT | Performed by: SURGERY

## 2021-10-10 PROCEDURE — 70491 CT SOFT TISSUE NECK W/DYE: CPT

## 2021-10-10 RX ORDER — BUPRENORPHINE HYDROCHLORIDE AND NALOXONE HYDROCHLORIDE DIHYDRATE 8; 2 MG/1; MG/1
1 TABLET SUBLINGUAL 2 TIMES DAILY
Status: DISCONTINUED | OUTPATIENT
Start: 2021-10-10 | End: 2021-10-10 | Stop reason: HOSPADM

## 2021-10-10 RX ORDER — IBUPROFEN 600 MG/1
600 TABLET ORAL EVERY 8 HOURS SCHEDULED
Status: DISCONTINUED | OUTPATIENT
Start: 2021-10-10 | End: 2021-10-10 | Stop reason: HOSPADM

## 2021-10-10 RX ORDER — NICOTINE 21 MG/24HR
1 PATCH, TRANSDERMAL 24 HOURS TRANSDERMAL ONCE
Status: DISCONTINUED | OUTPATIENT
Start: 2021-10-10 | End: 2021-10-10 | Stop reason: HOSPADM

## 2021-10-10 RX ORDER — FAMOTIDINE 10 MG/ML
INJECTION, SOLUTION INTRAVENOUS AS NEEDED
Status: DISCONTINUED | OUTPATIENT
Start: 2021-10-10 | End: 2021-10-10 | Stop reason: SURG

## 2021-10-10 RX ORDER — SODIUM CHLORIDE 0.9 % (FLUSH) 0.9 %
10 SYRINGE (ML) INJECTION AS NEEDED
Status: DISCONTINUED | OUTPATIENT
Start: 2021-10-10 | End: 2021-10-10 | Stop reason: HOSPADM

## 2021-10-10 RX ORDER — KETAMINE HYDROCHLORIDE 100 MG/ML
INJECTION INTRAMUSCULAR; INTRAVENOUS AS NEEDED
Status: DISCONTINUED | OUTPATIENT
Start: 2021-10-10 | End: 2021-10-10 | Stop reason: SURG

## 2021-10-10 RX ORDER — SODIUM CHLORIDE 0.9 % (FLUSH) 0.9 %
3 SYRINGE (ML) INJECTION EVERY 12 HOURS SCHEDULED
Status: DISCONTINUED | OUTPATIENT
Start: 2021-10-10 | End: 2021-10-10 | Stop reason: HOSPADM

## 2021-10-10 RX ORDER — ACETAMINOPHEN 160 MG
TABLET,DISINTEGRATING ORAL AS NEEDED
Status: DISCONTINUED | OUTPATIENT
Start: 2021-10-10 | End: 2021-10-10 | Stop reason: HOSPADM

## 2021-10-10 RX ORDER — CLINDAMYCIN PHOSPHATE 900 MG/50ML
900 INJECTION INTRAVENOUS EVERY 8 HOURS
Status: DISCONTINUED | OUTPATIENT
Start: 2021-10-10 | End: 2021-10-10 | Stop reason: HOSPADM

## 2021-10-10 RX ORDER — FENTANYL CITRATE 50 UG/ML
50 INJECTION, SOLUTION INTRAMUSCULAR; INTRAVENOUS
Status: DISCONTINUED | OUTPATIENT
Start: 2021-10-10 | End: 2021-10-10 | Stop reason: HOSPADM

## 2021-10-10 RX ORDER — HYDROMORPHONE HCL 110MG/55ML
PATIENT CONTROLLED ANALGESIA SYRINGE INTRAVENOUS AS NEEDED
Status: DISCONTINUED | OUTPATIENT
Start: 2021-10-10 | End: 2021-10-10 | Stop reason: SURG

## 2021-10-10 RX ORDER — LIDOCAINE HYDROCHLORIDE 10 MG/ML
INJECTION, SOLUTION EPIDURAL; INFILTRATION; INTRACAUDAL; PERINEURAL
Status: COMPLETED
Start: 2021-10-10 | End: 2021-10-10

## 2021-10-10 RX ORDER — MAGNESIUM HYDROXIDE 1200 MG/15ML
LIQUID ORAL AS NEEDED
Status: DISCONTINUED | OUTPATIENT
Start: 2021-10-10 | End: 2021-10-10 | Stop reason: HOSPADM

## 2021-10-10 RX ORDER — FAMOTIDINE 20 MG/1
20 TABLET, FILM COATED ORAL 2 TIMES DAILY
Status: DISCONTINUED | OUTPATIENT
Start: 2021-10-10 | End: 2021-10-10 | Stop reason: HOSPADM

## 2021-10-10 RX ORDER — DROPERIDOL 2.5 MG/ML
0.62 INJECTION, SOLUTION INTRAMUSCULAR; INTRAVENOUS ONCE AS NEEDED
Status: DISCONTINUED | OUTPATIENT
Start: 2021-10-10 | End: 2021-10-10 | Stop reason: HOSPADM

## 2021-10-10 RX ORDER — KETOROLAC TROMETHAMINE 30 MG/ML
INJECTION, SOLUTION INTRAMUSCULAR; INTRAVENOUS AS NEEDED
Status: DISCONTINUED | OUTPATIENT
Start: 2021-10-10 | End: 2021-10-10

## 2021-10-10 RX ORDER — MEPERIDINE HYDROCHLORIDE 25 MG/ML
12.5 INJECTION INTRAMUSCULAR; INTRAVENOUS; SUBCUTANEOUS
Status: DISCONTINUED | OUTPATIENT
Start: 2021-10-10 | End: 2021-10-10 | Stop reason: HOSPADM

## 2021-10-10 RX ORDER — IPRATROPIUM BROMIDE AND ALBUTEROL SULFATE 2.5; .5 MG/3ML; MG/3ML
3 SOLUTION RESPIRATORY (INHALATION) ONCE AS NEEDED
Status: DISCONTINUED | OUTPATIENT
Start: 2021-10-10 | End: 2021-10-10 | Stop reason: HOSPADM

## 2021-10-10 RX ORDER — LIDOCAINE HYDROCHLORIDE 20 MG/ML
INJECTION, SOLUTION INFILTRATION; PERINEURAL AS NEEDED
Status: DISCONTINUED | OUTPATIENT
Start: 2021-10-10 | End: 2021-10-10 | Stop reason: SURG

## 2021-10-10 RX ORDER — LIDOCAINE HYDROCHLORIDE 10 MG/ML
10 INJECTION, SOLUTION EPIDURAL; INFILTRATION; INTRACAUDAL; PERINEURAL ONCE
Status: COMPLETED | OUTPATIENT
Start: 2021-10-10 | End: 2021-10-10

## 2021-10-10 RX ORDER — SODIUM CHLORIDE, SODIUM LACTATE, POTASSIUM CHLORIDE, CALCIUM CHLORIDE 600; 310; 30; 20 MG/100ML; MG/100ML; MG/100ML; MG/100ML
INJECTION, SOLUTION INTRAVENOUS CONTINUOUS PRN
Status: DISCONTINUED | OUTPATIENT
Start: 2021-10-10 | End: 2021-10-10 | Stop reason: SURG

## 2021-10-10 RX ORDER — MIDAZOLAM HYDROCHLORIDE 1 MG/ML
INJECTION INTRAMUSCULAR; INTRAVENOUS AS NEEDED
Status: DISCONTINUED | OUTPATIENT
Start: 2021-10-10 | End: 2021-10-10 | Stop reason: SURG

## 2021-10-10 RX ORDER — PROPOFOL 10 MG/ML
VIAL (ML) INTRAVENOUS AS NEEDED
Status: DISCONTINUED | OUTPATIENT
Start: 2021-10-10 | End: 2021-10-10 | Stop reason: SURG

## 2021-10-10 RX ORDER — ACETAMINOPHEN 500 MG
1000 TABLET ORAL EVERY 6 HOURS
Status: DISCONTINUED | OUTPATIENT
Start: 2021-10-10 | End: 2021-10-10 | Stop reason: HOSPADM

## 2021-10-10 RX ORDER — FENTANYL CITRATE 50 UG/ML
INJECTION, SOLUTION INTRAMUSCULAR; INTRAVENOUS AS NEEDED
Status: DISCONTINUED | OUTPATIENT
Start: 2021-10-10 | End: 2021-10-10 | Stop reason: SURG

## 2021-10-10 RX ORDER — MULTIPLE VITAMINS W/ MINERALS TAB 9MG-400MCG
1 TAB ORAL DAILY
Status: DISCONTINUED | OUTPATIENT
Start: 2021-10-11 | End: 2021-10-10 | Stop reason: HOSPADM

## 2021-10-10 RX ORDER — CLINDAMYCIN PHOSPHATE 900 MG/50ML
INJECTION INTRAVENOUS AS NEEDED
Status: DISCONTINUED | OUTPATIENT
Start: 2021-10-10 | End: 2021-10-10 | Stop reason: SURG

## 2021-10-10 RX ORDER — KETOROLAC TROMETHAMINE 30 MG/ML
INJECTION, SOLUTION INTRAMUSCULAR; INTRAVENOUS AS NEEDED
Status: DISCONTINUED | OUTPATIENT
Start: 2021-10-10 | End: 2021-10-10 | Stop reason: SURG

## 2021-10-10 RX ORDER — OXYCODONE HYDROCHLORIDE AND ACETAMINOPHEN 5; 325 MG/1; MG/1
1 TABLET ORAL ONCE AS NEEDED
Status: DISCONTINUED | OUTPATIENT
Start: 2021-10-10 | End: 2021-10-10 | Stop reason: HOSPADM

## 2021-10-10 RX ORDER — SODIUM CHLORIDE, SODIUM LACTATE, POTASSIUM CHLORIDE, CALCIUM CHLORIDE 600; 310; 30; 20 MG/100ML; MG/100ML; MG/100ML; MG/100ML
100 INJECTION, SOLUTION INTRAVENOUS ONCE AS NEEDED
Status: DISCONTINUED | OUTPATIENT
Start: 2021-10-10 | End: 2021-10-10 | Stop reason: HOSPADM

## 2021-10-10 RX ORDER — SODIUM CHLORIDE, SODIUM LACTATE, POTASSIUM CHLORIDE, CALCIUM CHLORIDE 600; 310; 30; 20 MG/100ML; MG/100ML; MG/100ML; MG/100ML
100 INJECTION, SOLUTION INTRAVENOUS CONTINUOUS
Status: DISCONTINUED | OUTPATIENT
Start: 2021-10-10 | End: 2021-10-10 | Stop reason: HOSPADM

## 2021-10-10 RX ADMIN — LIDOCAINE HYDROCHLORIDE 60 MG: 20 INJECTION, SOLUTION INFILTRATION; PERINEURAL at 17:03

## 2021-10-10 RX ADMIN — LIDOCAINE HYDROCHLORIDE 10 ML: 10 INJECTION, SOLUTION EPIDURAL; INFILTRATION; INTRACAUDAL; PERINEURAL at 00:50

## 2021-10-10 RX ADMIN — IOPAMIDOL 70 ML: 612 INJECTION, SOLUTION INTRAVENOUS at 16:07

## 2021-10-10 RX ADMIN — HYDROMORPHONE HYDROCHLORIDE 2 MG: 2 INJECTION, SOLUTION INTRAMUSCULAR; INTRAVENOUS; SUBCUTANEOUS at 17:09

## 2021-10-10 RX ADMIN — PROPOFOL 200 MG: 10 INJECTION, EMULSION INTRAVENOUS at 17:03

## 2021-10-10 RX ADMIN — VANCOMYCIN HYDROCHLORIDE 1 G: 1 INJECTION, POWDER, LYOPHILIZED, FOR SOLUTION INTRAVENOUS at 15:04

## 2021-10-10 RX ADMIN — CLINDAMYCIN PHOSPHATE 900 MG: 900 INJECTION, SOLUTION INTRAVENOUS at 17:14

## 2021-10-10 RX ADMIN — KETAMINE HYDROCHLORIDE 10 MG: 100 INJECTION INTRAMUSCULAR; INTRAVENOUS at 17:35

## 2021-10-10 RX ADMIN — SODIUM CHLORIDE, POTASSIUM CHLORIDE, SODIUM LACTATE AND CALCIUM CHLORIDE 100 ML/HR: 600; 310; 30; 20 INJECTION, SOLUTION INTRAVENOUS at 18:53

## 2021-10-10 RX ADMIN — KETAMINE HYDROCHLORIDE 10 MG: 100 INJECTION INTRAMUSCULAR; INTRAVENOUS at 17:25

## 2021-10-10 RX ADMIN — KETAMINE HYDROCHLORIDE 10 MG: 100 INJECTION INTRAMUSCULAR; INTRAVENOUS at 17:30

## 2021-10-10 RX ADMIN — AZTREONAM 2 G: 2 INJECTION, POWDER, LYOPHILIZED, FOR SOLUTION INTRAMUSCULAR; INTRAVENOUS at 17:00

## 2021-10-10 RX ADMIN — FAMOTIDINE 20 MG: 10 INJECTION INTRAVENOUS at 17:03

## 2021-10-10 RX ADMIN — SODIUM CHLORIDE, POTASSIUM CHLORIDE, SODIUM LACTATE AND CALCIUM CHLORIDE: 600; 310; 30; 20 INJECTION, SOLUTION INTRAVENOUS at 16:56

## 2021-10-10 RX ADMIN — Medication 1 PATCH: at 15:09

## 2021-10-10 RX ADMIN — HYDROMORPHONE HYDROCHLORIDE 2 MG: 2 INJECTION, SOLUTION INTRAMUSCULAR; INTRAVENOUS; SUBCUTANEOUS at 17:25

## 2021-10-10 RX ADMIN — FENTANYL CITRATE 100 MCG: 50 INJECTION INTRAMUSCULAR; INTRAVENOUS at 16:58

## 2021-10-10 RX ADMIN — SODIUM CHLORIDE 1000 ML: 9 INJECTION, SOLUTION INTRAVENOUS at 14:37

## 2021-10-10 RX ADMIN — MIDAZOLAM 2 MG: 1 INJECTION INTRAMUSCULAR; INTRAVENOUS at 16:58

## 2021-10-10 RX ADMIN — KETOROLAC TROMETHAMINE 30 MG: 30 INJECTION, SOLUTION INTRAMUSCULAR at 17:33

## 2021-10-10 NOTE — ANESTHESIA PREPROCEDURE EVALUATION
Anesthesia Evaluation     Patient summary reviewed and Nursing notes reviewed   no history of anesthetic complications:  NPO Solid Status: > 8 hours  NPO Liquid Status: > 6 hours           Airway   Mallampati: II  TM distance: >3 FB  Neck ROM: full  No difficulty expected  Dental    (+) poor dentition    Pulmonary - normal exam   (+) a smoker Current Smoked day of surgery,   Cardiovascular - negative cardio ROS and normal exam  Exercise tolerance: good (4-7 METS)    NYHA Classification: II  Rhythm: regular  Rate: normal        Neuro/Psych- negative ROS  GI/Hepatic/Renal/Endo    (+)  GERD,  hepatitis C,     Musculoskeletal (-) negative ROS    Abdominal    Substance History   (+) drug use (hx IVDA.  None for 2 years)     OB/GYN    (-)  Pregnant ( 2021)        Other - negative ROS       ROS/Med Hx Other: HCG negative 10/9/2021                    Anesthesia Plan    ASA 2 - emergent     general     intravenous induction     Anesthetic plan, all risks, benefits, and alternatives have been provided, discussed and informed consent has been obtained with: patient.    Plan discussed with CRNA.

## 2021-10-10 NOTE — ED NOTES
MEDICAL SCREENING:    Reason for Visit: neck and throat pain    Patient initially seen in triage.  The patient was advised further evaluation and diagnostic testing will be needed, some of the treatment and testing will be initiated in the lobby in order to begin the process.  The patient will be returned to the waiting area for the time being and possibly be re-assessed by a subsequent ED provider.  The patient will be brought back to the treatment area in as timely manner as possible.       Kinjal Chaves PA  10/09/21 [id number]

## 2021-10-10 NOTE — ED NOTES
MEDICAL SCREENING:    Reason for Visit: abscess to neck, supposed to be admitted last night for I&D today by surgeon but signed out AMA    Patient initially seen in triage.  The patient was advised further evaluation and diagnostic testing will be needed, some of the treatment and testing will be initiated in the lobby in order to begin the process.  The patient will be returned to the waiting area for the time being and possibly be re-assessed by a subsequent ED provider.  The patient will be brought back to the treatment area in as timely manner as possible.       Parul Neal PA  10/10/21 1146

## 2021-10-10 NOTE — ED NOTES
Called Respiratory for blood work. They will be down after they are done upstairs.       Audra Gomes  10/10/21 5488

## 2021-10-10 NOTE — ANESTHESIA PROCEDURE NOTES
Airway  Urgency: elective    Date/Time: 10/10/2021 5:04 PM    General Information and Staff    Patient location during procedure: OR  CRNA: Janice Romero CRNA    Indications and Patient Condition    Preoxygenated: yes      Final Airway Details  Final airway type: supraglottic airway      Successful airway: Supreme  Size 4    Number of attempts at approach: 1  Assessment: lips, teeth, and gum same as pre-op and atraumatic intubation

## 2021-10-10 NOTE — ED NOTES
Unable to get patients blood with respiratory. Talked to provider. Provider stated that it was ok not get blood work.       Audra Gomes  10/09/21 1706

## 2021-10-10 NOTE — OP NOTE
Incision and Drainage right neck abscess    Surgeon:  Naz Payne M.D., DEVANTE    Assistant;  none    Pre-op:  Abscess right neck    Post-op:  Same    Anesthesia: Gen.    Indications: Right neck abscess       Procedure Details   After obtaining informed consent and receiving preoperative antibiotics and with venous compression boots in place, the patient was taken to the operating room and placed under anesthesia.  Incision and drainage was performed and the wound was irrigated with peroxide.  Location: Right neck abscess deep to sternocleidomastoid  Findings: Abscess  Culture: Done  Drain/Packing: Quarter inch Penrose drain      Estimated Blood Loss:  minimal    Blood administered:  None    Drains: see above    Grafts and Implants: None    Specimens:   ID Type Source Tests Collected by Time   1 : C&S ABSCESS RIGHT SIDE NECK Body Fluid Neck BODY FLUID CULTURE Naz Payne MD 10/10/2021 0826              Complications:  none           Disposition: PACU - hemodynamically stable.           Condition: stable

## 2021-10-10 NOTE — NURSING NOTE
"Patient states that, \" if she cannot go off the floor to smoke and see her kids that she is signing her papers and leaving.\"  made aware. Patient educated on risks of leaving. Central line removed.   "

## 2021-10-10 NOTE — ED PROVIDER NOTES
Subjective   25-year-old female who presents to the emergency department chief complaint abscess located to right leg.  Patient was seen here approximately 12 hours ago.  Patient was to be admitted by general surgery and have an I&D of an abscess located to her right neck today.  However patient became frustrated at night and signed out AGAINST MEDICAL ADVICE.  Patient denies any fever, chills, nausea, vomiting.      History provided by:  Patient   used: No    Abscess  Location:  Head/neck  Head/neck abscess location:  R neck  Abscess quality: painful and redness    Red streaking: no    Duration:  2 days  Progression:  Worsening  Pain details:     Quality:  Pressure and throbbing    Severity:  Moderate    Duration:  2 days    Timing:  Intermittent    Progression:  Worsening  Chronicity:  New  Relieved by:  Nothing  Worsened by:  Nothing  Ineffective treatments:  None tried  Associated symptoms: nausea    Associated symptoms: no fatigue and no fever    Risk factors: no family hx of MRSA, no hx of MRSA and no prior abscess        Review of Systems   Constitutional: Negative.  Negative for appetite change, chills, fatigue and fever.   HENT: Negative.  Negative for dental problem, drooling, ear discharge, hearing loss and nosebleeds.    Eyes: Negative.  Negative for photophobia, pain, redness and itching.   Respiratory: Negative.  Negative for apnea, cough, choking, chest tightness and shortness of breath.    Cardiovascular: Negative.  Negative for chest pain, palpitations and leg swelling.   Gastrointestinal: Positive for nausea. Negative for abdominal distention, abdominal pain and constipation.   Genitourinary: Negative.  Negative for dyspareunia, dysuria, enuresis, frequency, genital sores and hematuria.   Musculoskeletal: Negative for arthralgias, back pain, gait problem and joint swelling.   Skin: Positive for wound. Negative for color change, pallor and rash.   Neurological: Negative.   Negative for seizures, facial asymmetry, speech difficulty, light-headedness and numbness.   Hematological: Negative.  Negative for adenopathy. Does not bruise/bleed easily.   Psychiatric/Behavioral: Positive for agitation.   All other systems reviewed and are negative.      Past Medical History:   Diagnosis Date   • Hepatitis C antibody positive in blood        Allergies   Allergen Reactions   • Amoxicillin Anaphylaxis   • Penicillins Anaphylaxis   • Zofran [Ondansetron Hcl] Rash       No past surgical history on file.    Family History   Problem Relation Age of Onset   • Hypertension Mother    • Cancer Father    • No Known Problems Sister    • No Known Problems Brother    • No Known Problems Son    • No Known Problems Daughter    • Hypertension Maternal Grandmother    • Diabetes Maternal Grandfather    • No Known Problems Paternal Grandmother    • No Known Problems Paternal Grandfather    • No Known Problems Cousin    • Diabetes Maternal Aunt    • Rheum arthritis Neg Hx    • Osteoarthritis Neg Hx    • Asthma Neg Hx    • Heart failure Neg Hx    • Hyperlipidemia Neg Hx    • Migraines Neg Hx    • Rashes / Skin problems Neg Hx    • Seizures Neg Hx    • Stroke Neg Hx    • Thyroid disease Neg Hx        Social History     Socioeconomic History   • Marital status:    Tobacco Use   • Smoking status: Current Every Day Smoker     Packs/day: 1.00     Years: 15.00     Pack years: 15.00     Types: Cigarettes   • Smokeless tobacco: Never Used   Vaping Use   • Vaping Use: Never used   Substance and Sexual Activity   • Alcohol use: No     Alcohol/week: 0.0 standard drinks   • Drug use: Not Currently     Frequency: 3.0 times per week     Types: IV     Comment: last use 3 days ago (10/26/18)   • Sexual activity: Yes           Objective   Physical Exam  Vitals and nursing note reviewed.   Constitutional:       General: She is not in acute distress.     Appearance: Normal appearance. She is normal weight. She is not  ill-appearing or toxic-appearing.   HENT:      Head: Normocephalic and atraumatic.      Right Ear: Tympanic membrane, ear canal and external ear normal. There is no impacted cerumen.      Left Ear: Tympanic membrane, ear canal and external ear normal. There is no impacted cerumen.      Nose: Nose normal. No congestion or rhinorrhea.      Mouth/Throat:      Mouth: Mucous membranes are moist.      Pharynx: No oropharyngeal exudate or posterior oropharyngeal erythema.   Eyes:      General:         Right eye: No discharge.         Left eye: No discharge.      Extraocular Movements: Extraocular movements intact.      Conjunctiva/sclera: Conjunctivae normal.      Pupils: Pupils are equal, round, and reactive to light.   Neck:      Vascular: No carotid bruit.   Cardiovascular:      Rate and Rhythm: Normal rate and regular rhythm.      Pulses: Normal pulses.      Heart sounds: Normal heart sounds. No murmur heard.  No friction rub. No gallop.    Pulmonary:      Effort: Pulmonary effort is normal. No respiratory distress.      Breath sounds: Normal breath sounds. No stridor. No wheezing, rhonchi or rales.   Abdominal:      General: Abdomen is flat. Bowel sounds are normal. There is no distension.      Palpations: There is no mass.      Tenderness: There is no abdominal tenderness. There is no left CVA tenderness, guarding or rebound.      Hernia: No hernia is present.   Musculoskeletal:         General: No swelling, tenderness, deformity or signs of injury. Normal range of motion.      Cervical back: Normal range of motion and neck supple. No rigidity or tenderness.      Right lower leg: No edema.      Left lower leg: No edema.   Lymphadenopathy:      Cervical: No cervical adenopathy.   Skin:     General: Skin is warm and dry.      Capillary Refill: Capillary refill takes less than 2 seconds.      Coloration: Skin is not jaundiced.      Findings: Abscess and erythema present. No rash. Rash is not nodular, papular, purpuric,  pustular or urticarial.   Neurological:      General: No focal deficit present.      Mental Status: She is alert and oriented to person, place, and time.      Cranial Nerves: No cranial nerve deficit.      Sensory: No sensory deficit.      Motor: No weakness.      Coordination: Coordination normal.      Gait: Gait normal.   Psychiatric:         Mood and Affect: Mood normal.         Behavior: Behavior normal.         Thought Content: Thought content normal.         Judgment: Judgment normal.         Procedures           ED Course  ED Course as of 10/10/21 1449   Sun Oct 10, 2021   [facility] Discussed care with Dr. Magnant.  Will take patient to the OR. []      ED Course User Index  [] Leobardo Ramirez PA-C                                           [facility]    Final diagnoses:   Abscess, neck       ED Disposition  ED Disposition     ED Disposition Condition Comment    Send to OR            No follow-up provider specified.       Medication List      No changes were made to your prescriptions during this visit.          Leobardo Ramirez PA-C  10/10/21 1444

## 2021-10-10 NOTE — ANESTHESIA POSTPROCEDURE EVALUATION
Patient: Sailaja Alarcon    Procedure Summary     Date: 10/10/21 Room / Location: [facility] OR  /  [facility] OR    Anesthesia Start: 1656 Anesthesia Stop: 1742    Procedure: INCISION AND DRAINAGE ABSCESS CENTRAL LINE PLACEMENT (Right Abdomen) Diagnosis:       Cutaneous abscess of neck      (Cutaneous abscess of neck [L02.11])    Surgeons: Naz Payne MD Provider: Deonte Camejo DO    Anesthesia Type: general ASA Status: 2 - Emergent          Anesthesia Type: general    Vitals  Vitals Value Taken Time   /67 10/10/21 1813   Temp 99.2 °F (37.3 °C) 10/10/21 1813   Pulse 71 10/10/21 1813   Resp 13 10/10/21 1813   SpO2 100 % 10/10/21 1813           Post Anesthesia Care and Evaluation    Patient location during evaluation: PACU  Patient participation: complete - patient participated  Level of consciousness: sleepy but conscious  Pain score: 1  Pain management: adequate  Airway patency: patent  Anesthetic complications: No anesthetic complications  PONV Status: controlled  Cardiovascular status: acceptable  Respiratory status: acceptable and room air  Hydration status: euvolemic  No anesthesia care post op

## 2021-10-10 NOTE — H&P
Chief complaint right neck abscess    Subjective     Patient is a 25 y.o. female who presented to the ED with a right neck abscess.  She actually presented the evening before.  Was called that the patient had a right neck abscess that apparently on CT questioned whether or not there was airway compromise.  On review of the CT I did not see that there was airway compromise and was advised by Mary De Dios nurse practitioner the patient had no respiratory distress.  Patient was admitted under my service for planned I&D today but I came in this morning was told that she had signed out AMA.  Patient returned to the ED this afternoon stating she was told to come back.  Patient states that she is no longer having any difficulty breathing or turning her neck.  Although that note is unavailable apparently an I&D was done in the ED last night and it was documented there was minimal drainage although the patient states she was under the impression that quite a bit was drained and her symptomatology is much improved.  Repeat CT was done which demonstrated a large persistent collection deep to the right sternocleidomastoid muscle.  Patient states that she has not used any IV drugs in many years.  She states that she felt good she had a bad tooth.  She actually contacted her primary care provider and by telehealth visit got a prescription for some antibiotics.  However the patient did not fill her antibiotics for 2 days at that point in time the swelling had moved down her neck      Review of Systems  Review of Systems - General ROS: negative for -fever or chills  Psychological ROS: negative for - behavioral disorder  Ophthalmic ROS: negative for - dry eyes  ENT ROS: negative for - vertigo or vocal changes  Hematological and Lymphatic ROS: negative for - jaundice or swollen lymph nodes  Respiratory ROS: negative for - sputum changes or stridor  Cardiovascular ROS: negative for - irregular heartbeat or  murmur  Gastrointestinal ROS: negative for - blood in stools or change in stools  Genito-Urinary ROS: negative for - hematuria or incontinence  Musculoskeletal ROS: negative for - gait disturbance      History  Past Medical History:   Diagnosis Date   • Hepatitis C antibody positive in blood      History reviewed. No pertinent surgical history.  Family History   Problem Relation Age of Onset   • Hypertension Mother    • Cancer Father    • No Known Problems Sister    • No Known Problems Brother    • No Known Problems Son    • No Known Problems Daughter    • Hypertension Maternal Grandmother    • Diabetes Maternal Grandfather    • No Known Problems Paternal Grandmother    • No Known Problems Paternal Grandfather    • No Known Problems Cousin    • Diabetes Maternal Aunt    • Rheum arthritis Neg Hx    • Osteoarthritis Neg Hx    • Asthma Neg Hx    • Heart failure Neg Hx    • Hyperlipidemia Neg Hx    • Migraines Neg Hx    • Rashes / Skin problems Neg Hx    • Seizures Neg Hx    • Stroke Neg Hx    • Thyroid disease Neg Hx      Social History     Tobacco Use   • Smoking status: Current Every Day Smoker     Packs/day: 1.00     Years: 15.00     Pack years: 15.00     Types: Cigarettes   • Smokeless tobacco: Never Used   Vaping Use   • Vaping Use: Never used   Substance Use Topics   • Alcohol use: No     Alcohol/week: 0.0 standard drinks   • Drug use: Not Currently     Frequency: 3.0 times per week     Types: IV     Comment: last use 3 days ago (10/26/18)     Medications Prior to Admission   Medication Sig Dispense Refill Last Dose   • buprenorphine-naloxone (SUBOXONE) 8-2 MG per SL tablet Place 1 tablet under the tongue 2 (Two) Times a Day.      • famotidine (Pepcid) 20 MG tablet Take 1 tablet by mouth 2 (Two) Times a Day. 60 tablet 0    • ibuprofen (ADVIL,MOTRIN) 600 MG tablet Take 1 tablet by mouth Every 8 (Eight) Hours. Indications: Mild to Moderate Pain 40 tablet 0    • Multiple Vitamins-Minerals (MULTIVITAMIN WITH  MINERALS) tablet tablet Take 1 tablet by mouth Daily.        Allergies:  Amoxicillin, Penicillins, and Zofran [ondansetron hcl]    Objective     Vital Signs  Temp:  [98.7 °F (37.1 °C)-99.4 °F (37.4 °C)] 99.1 °F (37.3 °C)  Heart Rate:  [] 83  Resp:  [10-18] 10  BP: ()/(55-76) 117/76    Physical Exam  General:  This is a WD WN thin female in no acute distress  Vital signs stable, afebrile  HEENT exam:  WNL. Sclerae are anicteric.  EOMI  Neck:  supple, FROM.  No JVD.  Trachea midline.  Right neck swelling without overlying cellulitis.  Prior right neck surgical scar overlying the sternocleidomastoid as well as 1 from the I&D yesterday  Lungs:  Respiratory effort normal. Auscultation: Clear, without wheezes, rhonchi, rales  Heart:  Regular rate and rhythm, without murmur, gallop, rub.  No pedal edema  Musculoskeletal:  muscle strength/tone is normal.    Psychiatric:  alert, oriented x 3.  Mood and affect are appropriate  skin:  Warm with good turgor.  Without rash or lesion  extremities:  Examination of the extremities revealed no cyanosis, clubbing or edema.    Results Review:       Results from last 7 days   Lab Units 10/10/21  1405 10/09/21  2200 10/09/21  1912 10/09/21  1832   CRP mg/dL 22.71*  --   --  17.26*   LACTATE mmol/L 0.8 0.6  --   --    WBC 10*3/mm3 11.18*  --  7.22  --    HEMOGLOBIN g/dL 10.5*  --  13.8  --    HEMATOCRIT % 32.9*  --  45.8  --    PLATELETS 10*3/mm3 286  --  217  --          Results from last 7 days   Lab Units 10/10/21  1405 10/09/21  1832   SODIUM mmol/L 138 136   POTASSIUM mmol/L 3.6 4.5   MAGNESIUM mg/dL 2.1  --    CHLORIDE mmol/L 101 104   CO2 mmol/L 20.8* 18.1*   BUN mg/dL 8 10   CREATININE mg/dL 0.74 0.83   EGFR IF NONAFRICN AM mL/min/1.73 94 82   CALCIUM mg/dL 9.6 9.4   GLUCOSE mg/dL 94 87   ALBUMIN g/dL 3.94 3.52   BILIRUBIN mg/dL 0.6 0.4   ALK PHOS U/L 67 67   AST (SGOT) U/L 7 9   ALT (SGPT) U/L <5 <5   Estimated Creatinine Clearance: 111.2 mL/min (by C-G formula based  on SCr of 0.74 mg/dL).  No results found for: AMMONIA      No results found for: BLOODCX  No results found for: URINECX  No results found for: WOUNDCX  No results found for: STOOLCX    Imaging:  Imaging Results (Last 24 Hours)     Procedure Component Value Units Date/Time    CT Soft Tissue Neck With Contrast [[id number]] Collected: 10/10/21 1623     Updated: 10/10/21 1625    Narrative:      CT Neck Soft Tissue W    HISTORY:   Right-sided neck abscess    TECHNIQUE:   CT of the neck, soft tissues with IV contrast. The patient was given 100 cc of contrast.    Coronal and sagittal reconstructions were obtained. Radiation dose reduction techniques included automated exposure control or exposure modulation based on body size. Count of known CT and cardiac nuc med studies performed in previous 12 months: 1.     COMPARISON:   Prior noncontrasted study dated 10/9/2021.    FINDINGS:   The contrasted exam demonstrates a rim-enhancing fluid collection in the right soft tissues of the lower neck just lateral to the right lobe of the thyroid and just subjacent to the right sternocleidomastoid. This fluid collection demonstrates an  irregular contour and measures approximately 3.2 cm superior to inferior and 3.3 cm transversely. There is marked soft tissue inflammatory change present. There is a small 5 mm locule of air inferiorly.    The more superior fluid collection measures approximately 5 cm superior to inferior and approximately 1.3 cm transversely. This fluid collection extends to approximately the level of the hyoid bone. There is marked inflammatory change surrounding this  rim-enhancing fluid collection. There is mass effect on the airway to the left. The right internal jugular vein is not demonstrated on this contrasted study. The left internal jugular vein is adequately demonstrated.      Impression:      Multiloculated abscess in the soft tissues of the right neck. The lower fluid collection measures 3.2 x 3.3 cm with  effacement of the lateral aspect of the right lobe of the thyroid gland. 5 mm locule of air demonstrated in the inferior aspect of the  surrounding soft tissues.    The upper fluid collection measures 5 cm x 3.1 cm with effacement of the airway to the left.    There is no contrast opacification of the right internal jugular vein.          Signer Name: Damion Dodge MD   Signed: 10/10/2021 4:23 PM   Workstation Name: [id number]    [facility]            Impression:  Patient Active Problem List   Diagnosis Code   • Cutaneous abscess of neck L02.11   • Abscess, neck L02.11       Plan:  Incision and drainage      Discussion:      Naz Payne MD  10/10/21  17:46 EDT      Please note that portions of this note were completed with a voice recognition program.

## 2021-10-10 NOTE — ED NOTES
"When attempting to move pt over to ER 1 she stated that she just wanted to go home and would come back in the morning. Pt was told that it would start her work up all over and that she would be leaving AMA. Pt states, \" I don't care I have been here since 2 and I want to go home.\"      Melissa Garcia, RN  10/10/21 0124    "

## 2021-10-10 NOTE — ED NOTES
Called Respiratory to stick patient. Stuck patient 2 times and was unsuccessful. Patients states that she was stuck 5 times while out in the lobby.      Audra Gomes  10/09/21 2031

## 2021-10-10 NOTE — ED NOTES
Dr. Payne at bedside for surgery consultation, patient told her Vancomycin was making her itch all over, Dr. Payne stopped Vancomycin.     Keturah Del Toro RN  10/10/21 6681

## 2021-10-10 NOTE — ED PROVIDER NOTES
Subjective   Patient is a 25 year old female presenting to the ER c/o neck pain and swelling in the right neck X2days. Patient states that she's had several dental abscesses on the right side for several months. Patient took a round of Zpack and that helped but then 2 days ago (2 days after completing the Zpack) the swelling the right neck started and worsening. Patient denies SOA or difficulty breathing at this time. Patient denies any IV drug use but does have a history. Patient is alert and oriented. Patient denies CP, palpations, fever, or any additional symptoms today.           Review of Systems   Constitutional: Negative.    HENT: Negative.    Eyes: Negative.    Respiratory: Negative.    Cardiovascular: Negative.    Gastrointestinal: Negative.    Endocrine: Negative.    Genitourinary: Negative.    Musculoskeletal: Positive for neck pain.   Skin: Negative.    Allergic/Immunologic: Negative.    Neurological: Negative.    Hematological: Negative.    Psychiatric/Behavioral: Negative.    All other systems reviewed and are negative.      Past Medical History:   Diagnosis Date   • Hepatitis C antibody positive in blood        Allergies   Allergen Reactions   • Amoxicillin Anaphylaxis   • Penicillins Anaphylaxis   • Zofran [Ondansetron Hcl] Rash       No past surgical history on file.    Family History   Problem Relation Age of Onset   • Hypertension Mother    • Cancer Father    • No Known Problems Sister    • No Known Problems Brother    • No Known Problems Son    • No Known Problems Daughter    • Hypertension Maternal Grandmother    • Diabetes Maternal Grandfather    • No Known Problems Paternal Grandmother    • No Known Problems Paternal Grandfather    • No Known Problems Cousin    • Diabetes Maternal Aunt    • Rheum arthritis Neg Hx    • Osteoarthritis Neg Hx    • Asthma Neg Hx    • Heart failure Neg Hx    • Hyperlipidemia Neg Hx    • Migraines Neg Hx    • Rashes / Skin problems Neg Hx    • Seizures Neg Hx    •  Stroke Neg Hx    • Thyroid disease Neg Hx        Social History     Socioeconomic History   • Marital status:    Tobacco Use   • Smoking status: Current Every Day Smoker     Packs/day: 1.00     Years: 15.00     Pack years: 15.00     Types: Cigarettes   • Smokeless tobacco: Never Used   Vaping Use   • Vaping Use: Never used   Substance and Sexual Activity   • Alcohol use: No     Alcohol/week: 0.0 standard drinks   • Drug use: Not Currently     Frequency: 3.0 times per week     Types: IV     Comment: last use 3 days ago (10/26/18)   • Sexual activity: Yes           Objective   Physical Exam  Vitals and nursing note reviewed.   Constitutional:       Appearance: She is well-developed and normal weight.   HENT:      Head: Normocephalic and atraumatic.      Right Ear: Tympanic membrane normal.      Left Ear: Tympanic membrane normal.   Eyes:      Conjunctiva/sclera: Conjunctivae normal.      Pupils: Pupils are equal, round, and reactive to light.   Neck:      Trachea: Trachea and phonation normal.   Cardiovascular:      Rate and Rhythm: Normal rate and regular rhythm.   Pulmonary:      Effort: Pulmonary effort is normal.      Breath sounds: Normal breath sounds.   Abdominal:      General: Bowel sounds are normal.      Palpations: Abdomen is soft.   Musculoskeletal:      Cervical back: Neck supple. Decreased range of motion.   Lymphadenopathy:      Cervical: Cervical adenopathy present.      Right cervical: Deep cervical adenopathy present.   Skin:     General: Skin is warm and dry.      Capillary Refill: Capillary refill takes less than 2 seconds.   Neurological:      General: No focal deficit present.      Mental Status: She is alert.   Psychiatric:         Mood and Affect: Mood normal.         Behavior: Behavior normal.         Procedures       Results for orders placed or performed during the hospital encounter of 10/09/21   Rapid Strep A Screen - Swab, Throat    Specimen: Throat; Swab   Result Value Ref Range     Strep A Ag Negative Negative   Comprehensive Metabolic Panel    Specimen: Arm, Right; Blood   Result Value Ref Range    Glucose 87 65 - 99 mg/dL    BUN 10 6 - 20 mg/dL    Creatinine 0.83 0.57 - 1.00 mg/dL    Sodium 136 136 - 145 mmol/L    Potassium 4.5 3.5 - 5.2 mmol/L    Chloride 104 98 - 107 mmol/L    CO2 18.1 (L) 22.0 - 29.0 mmol/L    Calcium 9.4 8.6 - 10.5 mg/dL    Total Protein 7.7 6.0 - 8.5 g/dL    Albumin 3.52 3.50 - 5.20 g/dL    ALT (SGPT) <5 1 - 33 U/L    AST (SGOT) 9 1 - 32 U/L    Alkaline Phosphatase 67 39 - 117 U/L    Total Bilirubin 0.4 0.0 - 1.2 mg/dL    eGFR Non African Amer 82 >60 mL/min/1.73    Globulin 4.2 gm/dL    A/G Ratio 0.8 g/dL    BUN/Creatinine Ratio 12.0 7.0 - 25.0    Anion Gap 13.9 5.0 - 15.0 mmol/L   Mononucleosis Screen    Specimen: Arm, Right; Blood   Result Value Ref Range    Monospot Negative Negative   CBC Auto Differential    Specimen: Blood   Result Value Ref Range    WBC 7.22 3.40 - 10.80 10*3/mm3    RBC 4.80 3.77 - 5.28 10*6/mm3    Hemoglobin 13.8 12.0 - 15.9 g/dL    Hematocrit 45.8 34.0 - 46.6 %    MCV 95.4 79.0 - 97.0 fL    MCH 28.8 26.6 - 33.0 pg    MCHC 30.1 (L) 31.5 - 35.7 g/dL    RDW 12.8 12.3 - 15.4 %    RDW-SD 45.4 37.0 - 54.0 fl    MPV 10.7 6.0 - 12.0 fL    Platelets 217 140 - 450 10*3/mm3    Neutrophil % 72.8 42.7 - 76.0 %    Lymphocyte % 19.8 19.6 - 45.3 %    Monocyte % 5.5 5.0 - 12.0 %    Eosinophil % 1.1 0.3 - 6.2 %    Basophil % 0.4 0.0 - 1.5 %    Immature Grans % 0.4 0.0 - 0.5 %    Neutrophils, Absolute 5.25 1.70 - 7.00 10*3/mm3    Lymphocytes, Absolute 1.43 0.70 - 3.10 10*3/mm3    Monocytes, Absolute 0.40 0.10 - 0.90 10*3/mm3    Eosinophils, Absolute 0.08 0.00 - 0.40 10*3/mm3    Basophils, Absolute 0.03 0.00 - 0.20 10*3/mm3    Immature Grans, Absolute 0.03 0.00 - 0.05 10*3/mm3    nRBC 0.0 0.0 - 0.2 /100 WBC   C-reactive Protein    Specimen: Arm, Right; Blood   Result Value Ref Range    C-Reactive Protein 17.26 (H) 0.00 - 0.50 mg/dL   Lactic Acid, Plasma     Specimen: Blood   Result Value Ref Range    Lactate 0.6 0.5 - 2.0 mmol/L   Pregnancy, Urine - Urine, Clean Catch    Specimen: Urine, Clean Catch   Result Value Ref Range    HCG, Urine QL Negative Negative     CT Soft Tissue Neck Without Contrast   Final Result   Noncontrasted CT scan of the neck demonstrating a large amorphus appearing mass subjacent to the right sternocleidomastoid muscle. There is marked inflammatory stranding in the soft tissues of the right neck. This measures approximately 8 cm x 4 cm and   likely represents a large soft tissue abscess. There is effacement of the airway to the left.      Consider emergency ENT consultation.      Findings discussed with referring medical personnel at the [facility] emergency Department at 11:20 PM, Saturday, October 9, 2021.               Signer Name: Damion Dodge MD    Signed: 10/9/2021 11:30 PM    Workstation Name: [email or web address]     [facility]          ED Course  ED Course as of 10/10/21 0115   Sat Oct 09, 2021   2238 Sign out to Mary De Dios  [ML]   2335 Spoke with Dr. Minor ENT on call at this time advised to reach out to general surgery at this time as the abscess is large enough for their services.  [SM]   2336 CT Soft Tissue Neck Without Contrast  IMPRESSION:  Noncontrasted CT scan of the neck demonstrating a large amorphus appearing mass subjacent to the right sternocleidomastoid muscle. There is marked inflammatory stranding in the soft tissues of the right neck. This measures approximately 8 cm x 4 cm and  likely represents a large soft tissue abscess. There is effacement of the airway to the left.     Consider emergency ENT consultation.     Findings discussed with referring medical personnel at the [facility] emergency Department at 11:20 PM, Saturday, October 9, 2021.              Signer Name: Damion Dodge MD   Signed: 10/9/2021 11:30 PM   Workstation Name: [email or web address]    [facility] []   [place] Oct  10, 2021   0014 Discussed case with Dr. Payne at this agrees to I&D tomorrow.  [SM]   0112 Discussed admission with patient. Patient aggravated and doesn't want to stay for admission and surgical I&D. Advised of the risk of leaving and the benefit of staying including airway compromise and death. Patient alert and oriented X4 and declines additional treatment at this time.   [SM]      ED Course User Index  [ML] Halina Martin PA  [SM] Mary De Dios, APRN                                           [facility]    Final diagnoses:   Abscess, neck       ED Disposition  ED Disposition     ED Disposition Condition Comment    AMA            No follow-up provider specified.       Medication List      No changes were made to your prescriptions during this visit.          Mary De Dios, APRN  10/10/21 0115

## 2021-10-10 NOTE — PLAN OF CARE
Goal Outcome Evaluation:              Outcome Summary: Patient arrived to room 332 from OR, alert and oriented, wants to go outside to smoke and see her kids.  Patient states she will leave AMA if she can't go outside.

## 2021-10-11 ENCOUNTER — HOSPITAL ENCOUNTER (EMERGENCY)
Facility: HOSPITAL | Age: 26
Discharge: HOME | End: 2021-10-11
Attending: EMERGENCY MEDICINE | Admitting: EMERGENCY MEDICINE

## 2021-10-11 VITALS
RESPIRATION RATE: 16 BRPM | SYSTOLIC BLOOD PRESSURE: 103 MMHG | DIASTOLIC BLOOD PRESSURE: 71 MMHG | TEMPERATURE: 98.3 F | OXYGEN SATURATION: 100 % | WEIGHT: 135 LBS | HEIGHT: 63 IN | HEART RATE: 87 BPM | BODY MASS INDEX: 23.92 KG/M2

## 2021-10-11 DIAGNOSIS — L02.11 NECK ABSCESS: Primary | ICD-10-CM

## 2021-10-11 LAB — BACTERIA SPEC AEROBE CULT: NORMAL

## 2021-10-11 PROCEDURE — 99283 EMERGENCY DEPT VISIT LOW MDM: CPT

## 2021-10-11 RX ORDER — DOXYCYCLINE 100 MG/1
100 CAPSULE ORAL ONCE
Status: COMPLETED | OUTPATIENT
Start: 2021-10-11 | End: 2021-10-11

## 2021-10-11 RX ORDER — HYDROCODONE BITARTRATE AND ACETAMINOPHEN 7.5; 325 MG/1; MG/1
1 TABLET ORAL ONCE
Status: COMPLETED | OUTPATIENT
Start: 2021-10-11 | End: 2021-10-11

## 2021-10-11 RX ORDER — HYDROCODONE BITARTRATE AND ACETAMINOPHEN 5; 325 MG/1; MG/1
1 TABLET ORAL EVERY 8 HOURS PRN
Qty: 7 TABLET | Refills: 0 | OUTPATIENT
Start: 2021-10-11 | End: 2022-07-07

## 2021-10-11 RX ADMIN — HYDROCODONE BITARTRATE AND ACETAMINOPHEN 1 TABLET: 7.5; 325 TABLET ORAL at 21:22

## 2021-10-11 RX ADMIN — DOXYCYCLINE 100 MG: 100 CAPSULE ORAL at 21:22

## 2021-10-11 NOTE — OP NOTE
Central line insertion subclavian    Surgeon:  Naz Payne M.D., DEVANTE    Sailaja Robles Dorian  10/10/2021    Pre op Diagnosis: poor venous access  Post op Diagnosis:  same    Anesthesia: 1% lidocaine    Procedure:  After obtaining informed consent patient was placed in the Trendelenburg position.  With use of the Seldinger technique the left subclavian vein was cannulated.  Guidewire was passed without resistance.  The tract was dilated and the triple-lumen catheter was passed to 20 cm and sutured in place. Good blood return was obtained from all 3 ports.  Dressing was placed.    CXR result: no ptx, good position      Blood administered:  none      Naz Payne MD     Date: 10/11/2021  Time: 08:30 EDT

## 2021-10-12 ENCOUNTER — TELEPHONE (OUTPATIENT)
Dept: SURGERY | Facility: CLINIC | Age: 26
End: 2021-10-12

## 2021-10-12 NOTE — ED NOTES
MEDICAL SCREENING:    Reason for Visit: dressing change    Patient initially seen in triage.  The patient was advised further evaluation and diagnostic testing will be needed, some of the treatment and testing will be initiated in the lobby in order to begin the process.  The patient will be returned to the waiting area for the time being and possibly be re-assessed by a subsequent ED provider.  The patient will be brought back to the treatment area in as timely manner as possible.       Frantz Andrade PA-C  10/11/21 2048

## 2021-10-12 NOTE — ED NOTES
Changed pt's dressing to right neck at this time. Placed non-stick dressing with 4x4s to right neck secured with medical tape. Pt tolerated well and no active bleeding or drainage noted.      Ivett Alicea, RN  10/12/21 0440

## 2021-10-12 NOTE — ED PROVIDER NOTES
Subjective   Patient had abscess drained right neck today, needs dressing changed and didn't get Rx.      Neck Pain  Pain location:  R side  Quality:  Aching  Pain severity:  Moderate  Onset quality:  Gradual  Timing:  Constant  Progression:  Worsening  Chronicity:  New  Context comment:  Abscess      Review of Systems   Constitutional: Negative.    HENT: Negative.    Eyes: Negative.    Respiratory: Negative.    Cardiovascular: Negative.    Gastrointestinal: Negative.    Endocrine: Negative.    Musculoskeletal: Positive for neck pain.   Skin:        Abscess wound right neck   Allergic/Immunologic: Negative.    Neurological: Negative.    Hematological: Negative.    Psychiatric/Behavioral: Negative.        Past Medical History:   Diagnosis Date   • Hepatitis C antibody positive in blood        Allergies   Allergen Reactions   • Amoxicillin Anaphylaxis   • Penicillins Anaphylaxis   • Vancomycin Itching     Pt states she had full body itch and burning sensation, and she turned red. Reaction potentially Red Man Syndrome.   • Zofran [Ondansetron Hcl] Hives and Rash       Past Surgical History:   Procedure Laterality Date   • INCISION AND DRAINAGE ABSCESS Right 10/10/2021    Procedure: INCISION AND DRAINAGE ABSCESS CENTRAL LINE PLACEMENT;  Surgeon: Naz Payne MD;  Location: [facility] OR;  Service: General;  Laterality: Right;  I&D ABSCESS= INFECTED.  CENTRAL LINE = CLEAN.       Family History   Problem Relation Age of Onset   • Hypertension Mother    • Cancer Father    • No Known Problems Sister    • No Known Problems Brother    • No Known Problems Son    • No Known Problems Daughter    • Hypertension Maternal Grandmother    • Diabetes Maternal Grandfather    • No Known Problems Paternal Grandmother    • No Known Problems Paternal Grandfather    • No Known Problems Cousin    • Diabetes Maternal Aunt    • Rheum arthritis Neg Hx    • Osteoarthritis Neg Hx    • Asthma Neg Hx    • Heart failure Neg Hx    • Hyperlipidemia  Neg Hx    • Migraines Neg Hx    • Rashes / Skin problems Neg Hx    • Seizures Neg Hx    • Stroke Neg Hx    • Thyroid disease Neg Hx        Social History     Socioeconomic History   • Marital status:    Tobacco Use   • Smoking status: Current Every Day Smoker     Packs/day: 1.00     Years: 15.00     Pack years: 15.00     Types: Cigarettes   • Smokeless tobacco: Never Used   Vaping Use   • Vaping Use: Never used   Substance and Sexual Activity   • Alcohol use: No     Alcohol/week: 0.0 standard drinks   • Drug use: Not Currently     Frequency: 3.0 times per week     Types: IV     Comment: last use 3 days ago (10/26/18)   • Sexual activity: Yes           Objective   Physical Exam  Vitals and nursing note reviewed.   HENT:      Head: Normocephalic.      Nose: Nose normal.      Mouth/Throat:      Mouth: Mucous membranes are moist.   Eyes:      Pupils: Pupils are equal, round, and reactive to light.   Neck:      Comments: Wound right neck where abscess was drained  Cardiovascular:      Rate and Rhythm: Normal rate.      Pulses: Normal pulses.   Pulmonary:      Effort: Pulmonary effort is normal.   Abdominal:      General: Abdomen is flat.   Musculoskeletal:         General: Normal range of motion.   Skin:     Capillary Refill: Capillary refill takes less than 2 seconds.   Neurological:      General: No focal deficit present.      Mental Status: She is alert.   Psychiatric:         Mood and Affect: Mood normal.         Procedures           ED Course                                           MDM    Final diagnoses:   Neck abscess       ED Disposition  ED Disposition     ED Disposition Condition Comment    Discharge Stable           Margaret Baxter, APRN  [address]  [facility] [address]  [phone]    Schedule an appointment as soon as possible for a visit   If symptoms worsen         Medication List      New Prescriptions    HYDROcodone-acetaminophen 5-325 MG per tablet  Commonly known as: NORCO  Take  1 tablet by mouth Every 8 (Eight) Hours As Needed for Moderate Pain .           Where to Get Your Medications      These medications were sent to [facility], [place] - [address] [place] [address] [phone] [facility] [phone] [address] [address]    Phone: [phone]   · HYDROcodone-acetaminophen 5-325 MG per tablet          Kenton Chaves MD  10/11/21 0858

## 2021-10-12 NOTE — TELEPHONE ENCOUNTER
Dr. Malik said to inform patient that she is tracking culture and will send in antibiotic when it is complete.

## 2021-10-12 NOTE — TELEPHONE ENCOUNTER
Miss Menard called and stated that she had not received any medication after her surgery. I checked in chart and Elmer had done an I and D so I text her. She said the patient left AMA right after the I and D. Dr. Payne said the patient had been back to the ER and they sent in pain meds for her. Dr. Payne is waiting for her cultures to results and asking risk management what her responsibility is for someone leaving AMA before she does anything.

## 2021-10-13 ENCOUNTER — TELEPHONE (OUTPATIENT)
Dept: SURGERY | Facility: CLINIC | Age: 26
End: 2021-10-13

## 2021-10-13 LAB
BACTERIA FLD CULT: ABNORMAL
BACTERIA FLD CULT: ABNORMAL
GRAM STN SPEC: ABNORMAL
GRAM STN SPEC: ABNORMAL

## 2021-10-13 RX ORDER — DOXYCYCLINE HYCLATE 100 MG/1
100 CAPSULE ORAL 2 TIMES DAILY
Qty: 20 CAPSULE | Refills: 0 | Status: SHIPPED | OUTPATIENT
Start: 2021-10-13 | End: 2021-10-23

## 2021-10-14 ENCOUNTER — TELEPHONE (OUTPATIENT)
Dept: SURGERY | Facility: CLINIC | Age: 26
End: 2021-10-14

## 2021-10-15 ENCOUNTER — TELEPHONE (OUTPATIENT)
Dept: SURGERY | Facility: CLINIC | Age: 26
End: 2021-10-15

## 2021-10-15 LAB
BACTERIA SPEC AEROBE CULT: NORMAL
BACTERIA SPEC AEROBE CULT: NORMAL

## 2021-10-15 RX ORDER — IBUPROFEN 800 MG/1
800 TABLET ORAL EVERY 8 HOURS PRN
Qty: 28 TABLET | Refills: 0 | OUTPATIENT
Start: 2021-10-15 | End: 2022-07-07

## 2021-10-15 NOTE — TELEPHONE ENCOUNTER
Patient called in several times wanting pain meds.  Per Dr. Payne she can only have Ibuprofen and it was called in to [facility].  I also told her the antibiotics had been called in there also.

## 2021-10-18 ENCOUNTER — OFFICE VISIT (OUTPATIENT)
Dept: SURGERY | Facility: CLINIC | Age: 26
End: 2021-10-18

## 2021-10-18 VITALS — WEIGHT: 134.2 LBS | HEIGHT: 63 IN | BODY MASS INDEX: 23.78 KG/M2

## 2021-10-18 DIAGNOSIS — Z51.89 VISIT FOR WOUND CHECK: ICD-10-CM

## 2021-10-18 DIAGNOSIS — L02.11 CUTANEOUS ABSCESS OF NECK: Primary | ICD-10-CM

## 2021-10-18 PROCEDURE — 99024 POSTOP FOLLOW-UP VISIT: CPT | Performed by: SURGERY

## 2021-10-18 NOTE — PROGRESS NOTES
"Subjective   Sailaja Alarcon is a 25 y.o. female here today for post op.    History of Present Illness  Ms. Godoy was seen in the office today for first postoperative visit following incision and drainage of a large neck abscess.  Patient signed out AMA when she was not allowed to leave the floor with her triple-lumen in place.  Her cultures were tracked in the postoperative period and returned MRSA and prescription was sent into her pharmacy.  Patient says she did pick it up and is taking her antibiotics.  Allergies   Allergen Reactions   • Amoxicillin Anaphylaxis   • Penicillins Anaphylaxis   • Vancomycin Itching     Pt states she had full body itch and burning sensation, and she turned red. Reaction potentially Red Man Syndrome.   • Zofran [Ondansetron Hcl] Hives and Rash         Current Outpatient Medications   Medication Sig Dispense Refill   • buprenorphine-naloxone (SUBOXONE) 8-2 MG per SL tablet Place 1 tablet under the tongue 2 (Two) Times a Day.     • doxycycline (VIBRAMYCIN) 100 MG capsule Take 1 capsule by mouth 2 (Two) Times a Day for 10 days. 20 capsule 0   • ibuprofen (ADVIL,MOTRIN) 800 MG tablet Take 1 tablet by mouth Every 8 (Eight) Hours As Needed (pain) for up to 28 doses. 28 tablet 0   • HYDROcodone-acetaminophen (NORCO) 5-325 MG per tablet Take 1 tablet by mouth Every 8 (Eight) Hours As Needed for Moderate Pain . 7 tablet 0     No current facility-administered medications for this visit.       Objective   Ht 160 cm (63\")   Wt 60.9 kg (134 lb 3.2 oz)   BMI 23.77 kg/m²    Physical Exam  On examination this is a well-developed well-nourished female in no acute distress  HEENT examination: Within normal limits.  Conjunctiva pink.  Nose and ears appear normal.  Neck: Supple, full range of motion.  No JVD.  Musculoskeletal: Full range of motion all extremities without focal weakness. Normal gait. No digital cyanosis.  Psych: Patient is alert, oriented x3. Mood and affect are " appropriate.  Skin: On examination of the right neck there is a Penrose drain in place.  There is some crusting around the exit site of the drain both superior and inferior.  Drain was removed without complication.  Results/Data      Procedures     Assessment/Plan   Stable course, status post incision and drainage of right neck abscess    Plan: Complete course of antibiotics  Follow-up as needed       Discussion/Summary  Patient's Body mass index is 23.77 kg/m². indicating that she is within normal range (BMI 18.5-24.9). No BMI management plan needed..       No future appointments.      Please note that portions of this note were completed with a voice recognition program.

## 2021-10-20 NOTE — DISCHARGE SUMMARY
[facility] GENERAL SURGERY DISCHARGE SUMMARY < 48 HOURS    Patient Identification:  Name:  Sailaja Alarcon  Age:  25 y.o.  Sex:  female  :  1995  MRN:  [id number]    Date of Admission: 10/10/2021  Left AMA 10/10/21    ADMISSION DIAGNOSIS: right neck abscess    DISCHARGE DIAGNOSIS:  Same    PROCEDURES PERFORMED:  Procedure(s):  INCISION AND DRAINAGE ABSCESS CENTRAL LINE PLACEMENT       HOSPITAL COURSE  Patient is a 25 y.o. female admitted to [facility] for antibiotics following drainage of a large right neck abscess..  Please see the admitting history and physical for further details.  Less than 2 hours after arrival to the floor patient demanded to leave the floor to go visit her .  Patient has a history of IV drug abuse and her right neck abscess was highly suspicious for being secondary to IV drug abuse.  The patient was informed that her  could come visit her but she could not with the central line in place.  Patient chose to sign out AMA    CONDITION AT DISCHARGE:  Not evaluated as patient signed out AMA    DISCHARGE DISPOSITION   Home    DISCHARGE MEDICATIONS:     Discharge Medications      ASK your doctor about these medications      Instructions Start Date   buprenorphine-naloxone 8-2 MG per SL tablet  Commonly known as: SUBOXONE   1 tablet, Sublingual, 2 Times Daily             FOLLOW-UP:  Patient was not given follow-up appointment as she signed out AMA

## 2022-03-10 ENCOUNTER — HOSPITAL ENCOUNTER (EMERGENCY)
Facility: HOSPITAL | Age: 27
Discharge: HOME | End: 2022-03-11
Attending: EMERGENCY MEDICINE | Admitting: EMERGENCY MEDICINE

## 2022-03-10 DIAGNOSIS — J02.0 STREP PHARYNGITIS: Primary | ICD-10-CM

## 2022-03-10 LAB
FLUAV RNA RESP QL NAA+PROBE: NOT DETECTED
FLUBV RNA RESP QL NAA+PROBE: NOT DETECTED
SARS-COV-2 RNA RESP QL NAA+PROBE: NOT DETECTED

## 2022-03-10 PROCEDURE — C9803 HOPD COVID-19 SPEC COLLECT: HCPCS

## 2022-03-10 PROCEDURE — 36415 COLL VENOUS BLD VENIPUNCTURE: CPT

## 2022-03-10 PROCEDURE — 81025 URINE PREGNANCY TEST: CPT | Performed by: EMERGENCY MEDICINE

## 2022-03-10 PROCEDURE — 87880 STREP A ASSAY W/OPTIC: CPT | Performed by: EMERGENCY MEDICINE

## 2022-03-10 PROCEDURE — 99283 EMERGENCY DEPT VISIT LOW MDM: CPT

## 2022-03-10 PROCEDURE — 87081 CULTURE SCREEN ONLY: CPT | Performed by: EMERGENCY MEDICINE

## 2022-03-10 PROCEDURE — 81001 URINALYSIS AUTO W/SCOPE: CPT | Performed by: EMERGENCY MEDICINE

## 2022-03-10 PROCEDURE — 87636 SARSCOV2 & INF A&B AMP PRB: CPT | Performed by: EMERGENCY MEDICINE

## 2022-03-10 RX ORDER — SODIUM CHLORIDE 0.9 % (FLUSH) 0.9 %
10 SYRINGE (ML) INJECTION AS NEEDED
Status: DISCONTINUED | OUTPATIENT
Start: 2022-03-10 | End: 2022-03-11 | Stop reason: HOSPADM

## 2022-03-10 RX ORDER — ACETAMINOPHEN 500 MG
1000 TABLET ORAL ONCE
Status: COMPLETED | OUTPATIENT
Start: 2022-03-10 | End: 2022-03-11

## 2022-03-11 ENCOUNTER — APPOINTMENT (OUTPATIENT)
Dept: ULTRASOUND IMAGING | Facility: HOSPITAL | Age: 27
End: 2022-03-11

## 2022-03-11 VITALS
HEART RATE: 69 BPM | HEIGHT: 63 IN | OXYGEN SATURATION: 97 % | DIASTOLIC BLOOD PRESSURE: 51 MMHG | TEMPERATURE: 99.7 F | WEIGHT: 143 LBS | BODY MASS INDEX: 25.34 KG/M2 | RESPIRATION RATE: 18 BRPM | SYSTOLIC BLOOD PRESSURE: 92 MMHG

## 2022-03-11 LAB
ABO GROUP BLD: NORMAL
ALBUMIN SERPL-MCNC: 3.29 G/DL (ref 3.5–5.2)
ALBUMIN/GLOB SERPL: 1 G/DL
ALP SERPL-CCNC: 58 U/L (ref 39–117)
ALT SERPL W P-5'-P-CCNC: 8 U/L (ref 1–33)
AMPHET+METHAMPHET UR QL: NEGATIVE
AMPHETAMINES UR QL: NEGATIVE
ANION GAP SERPL CALCULATED.3IONS-SCNC: 13.1 MMOL/L (ref 5–15)
AST SERPL-CCNC: 19 U/L (ref 1–32)
B-HCG UR QL: NEGATIVE
BACTERIA UR QL AUTO: ABNORMAL /HPF
BARBITURATES UR QL SCN: NEGATIVE
BASOPHILS # BLD AUTO: 0.02 10*3/MM3 (ref 0–0.2)
BASOPHILS NFR BLD AUTO: 0.4 % (ref 0–1.5)
BENZODIAZ UR QL SCN: NEGATIVE
BILIRUB SERPL-MCNC: 0.7 MG/DL (ref 0–1.2)
BILIRUB UR QL STRIP: NEGATIVE
BLD GP AB SCN SERPL QL: NEGATIVE
BUN SERPL-MCNC: 10 MG/DL (ref 6–20)
BUN/CREAT SERPL: 10.9 (ref 7–25)
BUPRENORPHINE SERPL-MCNC: POSITIVE NG/ML
CALCIUM SPEC-SCNC: 8.3 MG/DL (ref 8.6–10.5)
CANNABINOIDS SERPL QL: NEGATIVE
CHLORIDE SERPL-SCNC: 95 MMOL/L (ref 98–107)
CLARITY UR: ABNORMAL
CO2 SERPL-SCNC: 20.9 MMOL/L (ref 22–29)
COCAINE UR QL: NEGATIVE
COLOR UR: ABNORMAL
CREAT SERPL-MCNC: 0.92 MG/DL (ref 0.57–1)
DEPRECATED RDW RBC AUTO: 44.8 FL (ref 37–54)
EGFRCR SERPLBLD CKD-EPI 2021: 87.2 ML/MIN/1.73
EOSINOPHIL # BLD AUTO: 0 10*3/MM3 (ref 0–0.4)
EOSINOPHIL NFR BLD AUTO: 0 % (ref 0.3–6.2)
ERYTHROCYTE [DISTWIDTH] IN BLOOD BY AUTOMATED COUNT: 13.8 % (ref 12.3–15.4)
ETHANOL BLD-MCNC: <10 MG/DL (ref 0–10)
ETHANOL UR QL: <0.01 %
GLOBULIN UR ELPH-MCNC: 3.2 GM/DL
GLUCOSE SERPL-MCNC: 112 MG/DL (ref 65–99)
GLUCOSE UR STRIP-MCNC: NEGATIVE MG/DL
HCG INTACT+B SERPL-ACNC: <0.1 MIU/ML
HCT VFR BLD AUTO: 34.1 % (ref 34–46.6)
HGB BLD-MCNC: 11.3 G/DL (ref 12–15.9)
HGB UR QL STRIP.AUTO: ABNORMAL
HOLD SPECIMEN: NORMAL
HYALINE CASTS UR QL AUTO: ABNORMAL /LPF
IMM GRANULOCYTES # BLD AUTO: 0.04 10*3/MM3 (ref 0–0.05)
IMM GRANULOCYTES NFR BLD AUTO: 0.8 % (ref 0–0.5)
KETONES UR QL STRIP: ABNORMAL
LEUKOCYTE ESTERASE UR QL STRIP.AUTO: ABNORMAL
LYMPHOCYTES # BLD AUTO: 0.59 10*3/MM3 (ref 0.7–3.1)
LYMPHOCYTES NFR BLD AUTO: 12.5 % (ref 19.6–45.3)
MCH RBC QN AUTO: 29.5 PG (ref 26.6–33)
MCHC RBC AUTO-ENTMCNC: 33.1 G/DL (ref 31.5–35.7)
MCV RBC AUTO: 89 FL (ref 79–97)
METHADONE UR QL SCN: NEGATIVE
MONOCYTES # BLD AUTO: 0.24 10*3/MM3 (ref 0.1–0.9)
MONOCYTES NFR BLD AUTO: 5.1 % (ref 5–12)
NEUTROPHILS NFR BLD AUTO: 3.84 10*3/MM3 (ref 1.7–7)
NEUTROPHILS NFR BLD AUTO: 81.2 % (ref 42.7–76)
NITRITE UR QL STRIP: NEGATIVE
NRBC BLD AUTO-RTO: 0 /100 WBC (ref 0–0.2)
OPIATES UR QL: NEGATIVE
OXYCODONE UR QL SCN: NEGATIVE
PCP UR QL SCN: NEGATIVE
PH UR STRIP.AUTO: 6.5 [PH] (ref 5–8)
PLATELET # BLD AUTO: 107 10*3/MM3 (ref 140–450)
PMV BLD AUTO: 10.8 FL (ref 6–12)
POTASSIUM SERPL-SCNC: 4 MMOL/L (ref 3.5–5.2)
PROPOXYPH UR QL: NEGATIVE
PROT SERPL-MCNC: 6.5 G/DL (ref 6–8.5)
PROT UR QL STRIP: ABNORMAL
RBC # BLD AUTO: 3.83 10*6/MM3 (ref 3.77–5.28)
RBC # UR STRIP: ABNORMAL /HPF
RBC MORPH BLD: NORMAL
REF LAB TEST METHOD: ABNORMAL
RH BLD: NEGATIVE
S PYO AG THROAT QL: NEGATIVE
SMALL PLATELETS BLD QL SMEAR: NORMAL
SODIUM SERPL-SCNC: 129 MMOL/L (ref 136–145)
SP GR UR STRIP: 1.02 (ref 1–1.03)
SQUAMOUS #/AREA URNS HPF: ABNORMAL /HPF
TRICYCLICS UR QL SCN: NEGATIVE
UROBILINOGEN UR QL STRIP: ABNORMAL
WBC # UR STRIP: ABNORMAL /HPF
WBC NRBC COR # BLD: 4.73 10*3/MM3 (ref 3.4–10.8)
WHOLE BLOOD HOLD SPECIMEN: NORMAL

## 2022-03-11 PROCEDURE — 63710000001 PROMETHAZINE PER 25 MG: Performed by: EMERGENCY MEDICINE

## 2022-03-11 PROCEDURE — 84702 CHORIONIC GONADOTROPIN TEST: CPT | Performed by: EMERGENCY MEDICINE

## 2022-03-11 PROCEDURE — 82077 ASSAY SPEC XCP UR&BREATH IA: CPT | Performed by: EMERGENCY MEDICINE

## 2022-03-11 PROCEDURE — 85025 COMPLETE CBC W/AUTO DIFF WBC: CPT | Performed by: EMERGENCY MEDICINE

## 2022-03-11 PROCEDURE — 80053 COMPREHEN METABOLIC PANEL: CPT | Performed by: EMERGENCY MEDICINE

## 2022-03-11 PROCEDURE — 25010000002 KETOROLAC TROMETHAMINE PER 15 MG: Performed by: EMERGENCY MEDICINE

## 2022-03-11 PROCEDURE — 85007 BL SMEAR W/DIFF WBC COUNT: CPT | Performed by: EMERGENCY MEDICINE

## 2022-03-11 PROCEDURE — 96374 THER/PROPH/DIAG INJ IV PUSH: CPT

## 2022-03-11 PROCEDURE — 86900 BLOOD TYPING SEROLOGIC ABO: CPT | Performed by: EMERGENCY MEDICINE

## 2022-03-11 PROCEDURE — 86850 RBC ANTIBODY SCREEN: CPT | Performed by: EMERGENCY MEDICINE

## 2022-03-11 PROCEDURE — 76817 TRANSVAGINAL US OBSTETRIC: CPT

## 2022-03-11 PROCEDURE — 86901 BLOOD TYPING SEROLOGIC RH(D): CPT | Performed by: EMERGENCY MEDICINE

## 2022-03-11 PROCEDURE — 80306 DRUG TEST PRSMV INSTRMNT: CPT | Performed by: EMERGENCY MEDICINE

## 2022-03-11 RX ORDER — CLINDAMYCIN HYDROCHLORIDE 300 MG/1
300 CAPSULE ORAL EVERY 6 HOURS
Qty: 28 CAPSULE | Refills: 0 | Status: SHIPPED | OUTPATIENT
Start: 2022-03-11 | End: 2022-03-18

## 2022-03-11 RX ORDER — PROMETHAZINE HYDROCHLORIDE 25 MG/1
25 TABLET ORAL EVERY 6 HOURS PRN
Qty: 30 TABLET | Refills: 0 | OUTPATIENT
Start: 2022-03-11 | End: 2022-07-07

## 2022-03-11 RX ORDER — CLINDAMYCIN HYDROCHLORIDE 150 MG/1
300 CAPSULE ORAL ONCE
Status: COMPLETED | OUTPATIENT
Start: 2022-03-11 | End: 2022-03-11

## 2022-03-11 RX ORDER — PROMETHAZINE HYDROCHLORIDE 25 MG/1
25 TABLET ORAL ONCE
Status: COMPLETED | OUTPATIENT
Start: 2022-03-11 | End: 2022-03-11

## 2022-03-11 RX ORDER — KETOROLAC TROMETHAMINE 30 MG/ML
30 INJECTION, SOLUTION INTRAMUSCULAR; INTRAVENOUS ONCE
Status: COMPLETED | OUTPATIENT
Start: 2022-03-11 | End: 2022-03-11

## 2022-03-11 RX ADMIN — CLINDAMYCIN HYDROCHLORIDE 300 MG: 150 CAPSULE ORAL at 02:37

## 2022-03-11 RX ADMIN — PROMETHAZINE HYDROCHLORIDE 25 MG: 25 TABLET ORAL at 03:55

## 2022-03-11 RX ADMIN — KETOROLAC TROMETHAMINE 30 MG: 30 INJECTION, SOLUTION INTRAMUSCULAR; INTRAVENOUS at 02:37

## 2022-03-11 RX ADMIN — SODIUM CHLORIDE 1000 ML: 9 INJECTION, SOLUTION INTRAVENOUS at 02:02

## 2022-03-11 RX ADMIN — ACETAMINOPHEN 1000 MG: 500 TABLET ORAL at 00:47

## 2022-03-11 RX ADMIN — SODIUM CHLORIDE 1000 ML: 9 INJECTION, SOLUTION INTRAVENOUS at 00:49

## 2022-03-13 LAB — BACTERIA SPEC AEROBE CULT: NORMAL

## 2022-03-13 NOTE — ED PROVIDER NOTES
Subjective     History provided by:  Patient   used: No    Sore Throat  Location:  Generalized  Severity:  Mild  Onset quality:  Gradual  Timing:  Constant  Progression:  Worsening  Chronicity:  New  Relieved by:  Nothing  Worsened by:  Nothing  Ineffective treatments:  None tried  Associated symptoms: no abdominal pain, no adenopathy, no chest pain, no chills, no cough, no drooling, no ear discharge, no ear pain, no epistaxis, no eye discharge, no fever, no headaches, no neck stiffness, no night sweats, no plugged ear sensation, no postnasal drip, no rash, no rhinorrhea, no shortness of breath, no sinus congestion, no stridor, no trouble swallowing and no voice change    Risk factors: exposure to strep    Risk factors: no exposure to mono, no sick contacts, no recent dental procedure, no recent endoscopy and no recent ENT procedure        Review of Systems   Constitutional: Negative for activity change, appetite change, chills, diaphoresis, fatigue, fever and night sweats.   HENT: Positive for sore throat. Negative for congestion, drooling, ear discharge, ear pain, nosebleeds, postnasal drip, rhinorrhea, trouble swallowing and voice change.    Eyes: Negative for discharge and redness.   Respiratory: Negative for cough, chest tightness, shortness of breath, wheezing and stridor.    Cardiovascular: Negative for chest pain, palpitations and leg swelling.   Gastrointestinal: Negative for abdominal pain, diarrhea, nausea and vomiting.   Genitourinary: Negative for dysuria and urgency.   Musculoskeletal: Negative for arthralgias, back pain, myalgias, neck pain and neck stiffness.   Skin: Negative for pallor, rash and wound.   Neurological: Negative for dizziness, speech difficulty, weakness and headaches.   Hematological: Negative for adenopathy.   Psychiatric/Behavioral: Negative for agitation, behavioral problems, confusion and decreased concentration.   All other systems reviewed and are  negative.      Past Medical History:   Diagnosis Date   • Hepatitis C antibody positive in blood    • Migraines        Allergies   Allergen Reactions   • Amoxicillin Anaphylaxis   • Penicillins Anaphylaxis   • Vancomycin Itching     Pt states she had full body itch and burning sensation, and she turned red. Reaction potentially Red Man Syndrome.   • Zofran [Ondansetron Hcl] Hives and Rash       Past Surgical History:   Procedure Laterality Date   • INCISION AND DRAINAGE ABSCESS Right 10/10/2021    Procedure: INCISION AND DRAINAGE ABSCESS CENTRAL LINE PLACEMENT;  Surgeon: Naz Payne MD;  Location: [facility];  Service: General;  Laterality: Right;  I&D ABSCESS= INFECTED.  CENTRAL LINE = CLEAN.       Family History   Problem Relation Age of Onset   • Hypertension Mother    • Cancer Father    • No Known Problems Sister    • No Known Problems Brother    • No Known Problems Son    • No Known Problems Daughter    • Hypertension Maternal Grandmother    • Diabetes Maternal Grandfather    • No Known Problems Paternal Grandmother    • No Known Problems Paternal Grandfather    • No Known Problems Cousin    • Diabetes Maternal Aunt    • Rheum arthritis Neg Hx    • Osteoarthritis Neg Hx    • Asthma Neg Hx    • Heart failure Neg Hx    • Hyperlipidemia Neg Hx    • Migraines Neg Hx    • Rashes / Skin problems Neg Hx    • Seizures Neg Hx    • Stroke Neg Hx    • Thyroid disease Neg Hx        Social History     Socioeconomic History   • Marital status:    Tobacco Use   • Smoking status: Current Every Day Smoker     Packs/day: 1.00     Years: 15.00     Pack years: 15.00     Types: Cigarettes   • Smokeless tobacco: Never Used   Vaping Use   • Vaping Use: Never used   Substance and Sexual Activity   • Alcohol use: No     Alcohol/week: 0.0 standard drinks   • Drug use: Not Currently     Frequency: 3.0 times per week     Types: IV     Comment: last use 3 days ago (10/26/18)   • Sexual activity: Yes           Objective    Physical Exam  Vitals and nursing note reviewed.   Constitutional:       General: She is not in acute distress.     Appearance: Normal appearance. She is well-developed. She is not toxic-appearing or diaphoretic.   HENT:      Head: Normocephalic and atraumatic.      Right Ear: External ear normal.      Left Ear: External ear normal.      Nose: Nose normal.      Mouth/Throat:      Pharynx: Posterior oropharyngeal erythema present. No pharyngeal swelling, oropharyngeal exudate or uvula swelling.      Tonsils: Tonsillar exudate present. No tonsillar abscesses.   Eyes:      General: Lids are normal.      Conjunctiva/sclera: Conjunctivae normal.      Pupils: Pupils are equal, round, and reactive to light.   Neck:      Thyroid: No thyromegaly.   Cardiovascular:      Rate and Rhythm: Normal rate and regular rhythm.      Pulses: Normal pulses.      Heart sounds: Normal heart sounds, S1 normal and S2 normal.   Pulmonary:      Effort: Pulmonary effort is normal. No tachypnea or respiratory distress.      Breath sounds: Normal breath sounds. No decreased breath sounds, wheezing or rales.   Chest:      Chest wall: No tenderness.   Abdominal:      General: Bowel sounds are normal. There is no distension.      Palpations: Abdomen is soft.      Tenderness: There is no abdominal tenderness. There is no guarding or rebound.   Musculoskeletal:         General: No tenderness or deformity. Normal range of motion.      Cervical back: Full passive range of motion without pain, normal range of motion and neck supple.   Lymphadenopathy:      Cervical: No cervical adenopathy.   Skin:     General: Skin is warm and dry.      Coloration: Skin is not pale.      Findings: No erythema or rash.   Neurological:      Mental Status: She is alert and oriented to person, place, and time.      GCS: GCS eye subscore is 4. GCS verbal subscore is 5. GCS motor subscore is 6.      Cranial Nerves: No cranial nerve deficit.      Sensory: No sensory deficit.    Psychiatric:         Speech: Speech normal.         Behavior: Behavior normal.         Thought Content: Thought content normal.         Judgment: Judgment normal.         Procedures           ED Course  ED Course as of 03/13/22 0448   Fri Mar 11, 2022   0127  Ob Transvaginal  IMPRESSION:     1. No intrauterine pregnancy identified. Correlate with beta hCG value.  2. Normal right ovary. Nonvisualization of the left ovary.  3. Nonspecific free fluid in the pelvis. [ES]      ED Course User Index  [ES] Efren Frias MD                                                 MDM  Number of Diagnoses or Management Options  Strep pharyngitis: new and requires workup     Amount and/or Complexity of Data Reviewed  Clinical lab tests: reviewed and ordered  Tests in the radiology section of CPT®: reviewed and ordered  Tests in the medicine section of CPT®: ordered and reviewed  Review and summarize past medical records: yes  Independent visualization of images, tracings, or specimens: yes    Risk of Complications, Morbidity, and/or Mortality  Presenting problems: moderate  Diagnostic procedures: moderate  Management options: moderate    Patient Progress  Patient progress: stable      Final diagnoses:   Strep pharyngitis       ED Disposition  ED Disposition     ED Disposition   Discharge    Condition   Stable    Comment   --             Margaret Baxter, APRN  [address]  [facility] [address]  [phone]    Schedule an appointment as soon as possible for a visit in 1 day  EVALUATE         Medication List      New Prescriptions    clindamycin 300 MG capsule  Commonly known as: CLEOCIN  Take 1 capsule by mouth Every 6 (Six) Hours for 7 days.     promethazine 25 MG tablet  Commonly known as: PHENERGAN  Take 1 tablet by mouth Every 6 (Six) Hours As Needed for Nausea or Vomiting.           Where to Get Your Medications      These medications were sent to [facility] [address]  [phone] [facility] [phone] FX  [address] [facility] [address]    Phone: [phone]   · clindamycin 300 MG capsule  · promethazine 25 MG tablet          Efren Frias MD  03/13/22 1612

## 2022-07-07 ENCOUNTER — APPOINTMENT (OUTPATIENT)
Dept: CT IMAGING | Facility: HOSPITAL | Age: 27
End: 2022-07-07

## 2022-07-07 ENCOUNTER — HOSPITAL ENCOUNTER (EMERGENCY)
Facility: HOSPITAL | Age: 27
Discharge: LEFT BEFORE DISCHARGE | End: 2022-07-07
Attending: STUDENT IN AN ORGANIZED HEALTH CARE EDUCATION/TRAINING PROGRAM | Admitting: STUDENT IN AN ORGANIZED HEALTH CARE EDUCATION/TRAINING PROGRAM

## 2022-07-07 VITALS
SYSTOLIC BLOOD PRESSURE: 88 MMHG | DIASTOLIC BLOOD PRESSURE: 48 MMHG | HEART RATE: 72 BPM | TEMPERATURE: 98.1 F | OXYGEN SATURATION: 100 % | BODY MASS INDEX: 24.1 KG/M2 | WEIGHT: 136 LBS | HEIGHT: 63 IN | RESPIRATION RATE: 16 BRPM

## 2022-07-07 DIAGNOSIS — L02.11 NECK ABSCESS: Primary | ICD-10-CM

## 2022-07-07 LAB
AMPHET+METHAMPHET UR QL: POSITIVE
AMPHETAMINES UR QL: POSITIVE
B-HCG UR QL: NEGATIVE
BACTERIA UR QL AUTO: ABNORMAL /HPF
BARBITURATES UR QL SCN: NEGATIVE
BENZODIAZ UR QL SCN: NEGATIVE
BILIRUB UR QL STRIP: NEGATIVE
BUPRENORPHINE SERPL-MCNC: POSITIVE NG/ML
CANNABINOIDS SERPL QL: NEGATIVE
CLARITY UR: ABNORMAL
COCAINE UR QL: NEGATIVE
COLOR UR: ABNORMAL
GLUCOSE UR STRIP-MCNC: NEGATIVE MG/DL
HGB UR QL STRIP.AUTO: NEGATIVE
HYALINE CASTS UR QL AUTO: ABNORMAL /LPF
KETONES UR QL STRIP: NEGATIVE
LEUKOCYTE ESTERASE UR QL STRIP.AUTO: ABNORMAL
METHADONE UR QL SCN: NEGATIVE
NITRITE UR QL STRIP: POSITIVE
OPIATES UR QL: NEGATIVE
OXYCODONE UR QL SCN: NEGATIVE
PCP UR QL SCN: NEGATIVE
PH UR STRIP.AUTO: 6 [PH] (ref 5–8)
PROPOXYPH UR QL: NEGATIVE
PROT UR QL STRIP: ABNORMAL
RBC # UR STRIP: ABNORMAL /HPF
REF LAB TEST METHOD: ABNORMAL
SP GR UR STRIP: 1.02 (ref 1–1.03)
SPERM URNS QL MICRO: ABNORMAL /HPF
SQUAMOUS #/AREA URNS HPF: ABNORMAL /HPF
TRICYCLICS UR QL SCN: NEGATIVE
UROBILINOGEN UR QL STRIP: ABNORMAL
WBC # UR STRIP: ABNORMAL /HPF

## 2022-07-07 PROCEDURE — 70490 CT SOFT TISSUE NECK W/O DYE: CPT

## 2022-07-07 PROCEDURE — 80306 DRUG TEST PRSMV INSTRMNT: CPT | Performed by: PHYSICIAN ASSISTANT

## 2022-07-07 PROCEDURE — 99283 EMERGENCY DEPT VISIT LOW MDM: CPT

## 2022-07-07 PROCEDURE — 81001 URINALYSIS AUTO W/SCOPE: CPT | Performed by: PHYSICIAN ASSISTANT

## 2022-07-07 PROCEDURE — 70490 CT SOFT TISSUE NECK W/O DYE: CPT | Performed by: RADIOLOGY

## 2022-07-07 PROCEDURE — 81025 URINE PREGNANCY TEST: CPT | Performed by: PHYSICIAN ASSISTANT

## 2022-07-07 PROCEDURE — 87186 SC STD MICRODIL/AGAR DIL: CPT | Performed by: PHYSICIAN ASSISTANT

## 2022-07-07 PROCEDURE — 87088 URINE BACTERIA CULTURE: CPT | Performed by: PHYSICIAN ASSISTANT

## 2022-07-07 PROCEDURE — 87086 URINE CULTURE/COLONY COUNT: CPT | Performed by: PHYSICIAN ASSISTANT

## 2022-07-07 RX ORDER — SODIUM CHLORIDE 0.9 % (FLUSH) 0.9 %
10 SYRINGE (ML) INJECTION AS NEEDED
Status: DISCONTINUED | OUTPATIENT
Start: 2022-07-07 | End: 2022-07-07

## 2022-07-07 RX ORDER — DOXYCYCLINE 100 MG/1
100 CAPSULE ORAL 2 TIMES DAILY
Qty: 20 CAPSULE | Refills: 0 | Status: SHIPPED | OUTPATIENT
Start: 2022-07-07 | End: 2022-07-07

## 2022-07-07 RX ORDER — SULFAMETHOXAZOLE AND TRIMETHOPRIM 800; 160 MG/1; MG/1
1 TABLET ORAL 2 TIMES DAILY
Qty: 20 TABLET | Refills: 0 | Status: SHIPPED | OUTPATIENT
Start: 2022-07-07 | End: 2022-07-12 | Stop reason: SINTOL

## 2022-07-07 NOTE — ED NOTES
Provider at bedside. Patient states she is signing out against medical advise, she states is dehydrated and is just going to go  home and come back in the morning. No IV in place. Patient is alert and oriented and states she understands the risks of signing out against medical advise.

## 2022-07-07 NOTE — ED PROVIDER NOTES
Subjective   26-year-old female who presents to the ED today for neck swelling.  She states that she has had for abscessed teeth to the right lower dental area for the last 4 weeks.  She states she has had recurrent issues with her teeth over the last 2 years.  She states she has been on several different antibiotics with no relief of her symptoms.  She states she plans to have these teeth removed once she has the infection resolved.  She states she woke up this morning and noted swelling to her neck.  She states it hurts to swallow and the area is painful to touch.  She states she tried some ibuprofen last night with no relief.  She does report a history of IV drug abuse.  She states she recently relapsed and attempted to inject in her right arm.  She does smoke 1 and half packs of cigarettes per day as well.      History provided by:  Patient  Illness  Location:  Anterior neck  Severity:  Moderate  Onset quality:  Gradual  Duration:  4 weeks  Timing:  Constant  Progression:  Worsening  Chronicity:  Recurrent  Associated symptoms: sore throat    Associated symptoms: no abdominal pain, no chest pain, no congestion, no cough, no diarrhea, no ear pain, no fatigue, no fever, no headaches, no myalgias, no nausea, no rash, no rhinorrhea, no shortness of breath, no vomiting and no wheezing        Review of Systems   Constitutional: Negative for fatigue and fever.   HENT: Positive for dental problem and sore throat. Negative for congestion, ear pain, rhinorrhea, trouble swallowing and voice change.    Eyes: Negative.    Respiratory: Negative for cough, shortness of breath and wheezing.    Cardiovascular: Negative for chest pain.   Gastrointestinal: Negative for abdominal pain, diarrhea, nausea and vomiting.   Genitourinary: Negative.    Musculoskeletal: Positive for neck pain. Negative for myalgias.   Skin: Negative for rash.   Neurological: Negative for headaches.   Psychiatric/Behavioral: Negative.    All other systems  reviewed and are negative.      Past Medical History:   Diagnosis Date   • Hepatitis C antibody positive in blood    • Migraines        Allergies   Allergen Reactions   • Amoxicillin Anaphylaxis   • Penicillins Anaphylaxis   • Vancomycin Itching     Pt states she had full body itch and burning sensation, and she turned red. Reaction potentially Red Man Syndrome.   • Zofran [Ondansetron Hcl] Hives and Rash       Past Surgical History:   Procedure Laterality Date   • INCISION AND DRAINAGE ABSCESS Right 10/10/2021    Procedure: INCISION AND DRAINAGE ABSCESS CENTRAL LINE PLACEMENT;  Surgeon: Naz Payne MD;  Location: [facility];  Service: General;  Laterality: Right;  I&D ABSCESS= INFECTED.  CENTRAL LINE = CLEAN.       Family History   Problem Relation Age of Onset   • Hypertension Mother    • Cancer Father    • No Known Problems Sister    • No Known Problems Brother    • No Known Problems Son    • No Known Problems Daughter    • Hypertension Maternal Grandmother    • Diabetes Maternal Grandfather    • No Known Problems Paternal Grandmother    • No Known Problems Paternal Grandfather    • No Known Problems Cousin    • Diabetes Maternal Aunt    • Rheum arthritis Neg Hx    • Osteoarthritis Neg Hx    • Asthma Neg Hx    • Heart failure Neg Hx    • Hyperlipidemia Neg Hx    • Migraines Neg Hx    • Rashes / Skin problems Neg Hx    • Seizures Neg Hx    • Stroke Neg Hx    • Thyroid disease Neg Hx        Social History     Socioeconomic History   • Marital status:    Tobacco Use   • Smoking status: Current Every Day Smoker     Packs/day: 1.00     Years: 15.00     Pack years: 15.00     Types: Cigarettes   • Smokeless tobacco: Never Used   Vaping Use   • Vaping Use: Never used   Substance and Sexual Activity   • Alcohol use: No     Alcohol/week: 0.0 standard drinks   • Drug use: Not Currently     Frequency: 3.0 times per week     Types: IV     Comment: last use 3 days ago (10/26/18)   • Sexual activity: Yes            Objective   Physical Exam  Vitals and nursing note reviewed.   Constitutional:       General: She is not in acute distress.     Appearance: She is well-developed. She is not diaphoretic.   HENT:      Head: Normocephalic and atraumatic.      Right Ear: Tympanic membrane and ear canal normal.      Left Ear: Tympanic membrane and ear canal normal.      Nose: No congestion.      Mouth/Throat:      Mouth: Mucous membranes are moist.      Comments: Patient noted to have poor dentition, multiple dental caries. Tenderness and mild swelling noted to the right mandibular area. Also noted to have swelling to the anterior neck near the midline. Tender to palpation.  Eyes:      Conjunctiva/sclera: Conjunctivae normal.      Pupils: Pupils are equal, round, and reactive to light.   Cardiovascular:      Rate and Rhythm: Normal rate and regular rhythm.      Heart sounds: Normal heart sounds.   Pulmonary:      Effort: Pulmonary effort is normal.      Breath sounds: Normal breath sounds.   Abdominal:      General: Bowel sounds are normal.      Palpations: Abdomen is soft.   Musculoskeletal:      Cervical back: Normal range of motion and neck supple.   Lymphadenopathy:      Cervical: Cervical adenopathy present.   Skin:     General: Skin is warm and dry.      Capillary Refill: Capillary refill takes less than 2 seconds.   Neurological:      General: No focal deficit present.      Mental Status: She is alert and oriented to person, place, and time.   Psychiatric:         Mood and Affect: Mood normal.         Procedures           ED Course  ED Course as of 07/07/22 1701   Thu Jul 07, 2022   1229 Patient is refusing any further sticks and is declining any lab work. [AH]   4763 CT Soft Tissue Neck Without Contrast  IMPRESSION:  1.  Ill-defined low-density mass occupying process with features  suspicious for ill-defined abscess is situated just lateral to the left  lobe of thyroid and just dorsal to the left carotid vasculature.  This is  immediately subjacent to the sternocleidomastoid muscle and is  approximately 3.4 x 3.4 cm and is suspicious for infection. Given a  similar finding on prior imaging on the right side of the neck, this is  most consistent with abscess.  2.  Soft tissue edema noted throughout the left neck soft tissues  compatible with cellulitis.  3.  No airway compromise identified. Deviation of the midline structures  to the right.  4.  Chronic right maxillary sinusitis with hyperostosis.  5. Reactive appearing cervical lymphadenopathy. []   3654 I did discuss this case with Dr. Payne. She recommended placing a line and repeating the CT scan with contrast so that she could better visualize the abscess in relation to the carotid. I went to discuss this with the patient and she is adamant that no one is going to stick her again and she will not get a central line. I explained the seriousness of this abscess and the danger this could impose to her health but she is adamant she is leaving AMA. She states she is dehydrated and she will return tomorrow. I tried to explain that it would be the same process tomorrow but again she is adamant she is leaving. [AH]      ED Course User Index  [AH] Parul Neal PA                                           MDM  Number of Diagnoses or Management Options     Amount and/or Complexity of Data Reviewed  Clinical lab tests: reviewed  Tests in the radiology section of CPT®: reviewed  Discuss the patient with other providers: yes    Patient Progress  Patient progress: (Left AMA)      Final diagnoses:   Neck abscess       ED Disposition  ED Disposition     ED Disposition   AMA    Condition   --    Comment   --             Margaret Baxter, BIANCA  [address]  [facility] [address]  [phone]    Schedule an appointment as soon as possible for a visit in 1 day           Medication List      New Prescriptions    sulfamethoxazole-trimethoprim 800-160 MG per tablet  Commonly known as:  BACTRIM DS,SEPTRA DS  Take 1 tablet by mouth 2 (Two) Times a Day for 10 days.        Stop    HYDROcodone-acetaminophen 5-325 MG per tablet  Commonly known as: NORCO     ibuprofen 800 MG tablet  Commonly known as: ADVIL,MOTRIN     promethazine 25 MG tablet  Commonly known as: PHENERGAN           Where to Get Your Medications      You can get these medications from any pharmacy    Bring a paper prescription for each of these medications  · sulfamethoxazole-trimethoprim 800-160 MG per tablet          Parul Neal PA  07/07/22 7751

## 2022-07-07 NOTE — ED NOTES
Pt. Refused anymore attempts at blood draw. Has agreed to urine tests. RN and provider made aware.

## 2022-07-08 ENCOUNTER — APPOINTMENT (OUTPATIENT)
Dept: ULTRASOUND IMAGING | Facility: HOSPITAL | Age: 27
End: 2022-07-08

## 2022-07-08 ENCOUNTER — ANESTHESIA (OUTPATIENT)
Dept: PERIOP | Facility: HOSPITAL | Age: 27
End: 2022-07-08

## 2022-07-08 ENCOUNTER — HOSPITAL ENCOUNTER (INPATIENT)
Facility: HOSPITAL | Age: 27
LOS: 1 days | Discharge: LEFT BEFORE DISCHARGE | End: 2022-07-08
Attending: STUDENT IN AN ORGANIZED HEALTH CARE EDUCATION/TRAINING PROGRAM | Admitting: SURGERY

## 2022-07-08 ENCOUNTER — ANESTHESIA EVENT (OUTPATIENT)
Dept: PERIOP | Facility: HOSPITAL | Age: 27
End: 2022-07-08

## 2022-07-08 VITALS
RESPIRATION RATE: 20 BRPM | SYSTOLIC BLOOD PRESSURE: 105 MMHG | WEIGHT: 131.4 LBS | HEIGHT: 63 IN | TEMPERATURE: 97.6 F | DIASTOLIC BLOOD PRESSURE: 62 MMHG | BODY MASS INDEX: 23.28 KG/M2 | OXYGEN SATURATION: 100 % | HEART RATE: 68 BPM

## 2022-07-08 DIAGNOSIS — L02.11 ABSCESS, NECK: Primary | ICD-10-CM

## 2022-07-08 DIAGNOSIS — L02.11 CUTANEOUS ABSCESS OF NECK: ICD-10-CM

## 2022-07-08 DIAGNOSIS — L02.11 NECK ABSCESS: ICD-10-CM

## 2022-07-08 LAB
ABO GROUP BLD: NORMAL
ALBUMIN SERPL-MCNC: 3.56 G/DL (ref 3.5–5.2)
ALBUMIN/GLOB SERPL: 1 G/DL
ALP SERPL-CCNC: 65 U/L (ref 39–117)
ALT SERPL W P-5'-P-CCNC: 6 U/L (ref 1–33)
AMPHET+METHAMPHET UR QL: POSITIVE
AMPHETAMINES UR QL: POSITIVE
ANION GAP SERPL CALCULATED.3IONS-SCNC: 13.5 MMOL/L (ref 5–15)
AST SERPL-CCNC: 8 U/L (ref 1–32)
BACTERIA UR QL AUTO: ABNORMAL /HPF
BARBITURATES UR QL SCN: NEGATIVE
BASOPHILS # BLD AUTO: 0.04 10*3/MM3 (ref 0–0.2)
BASOPHILS NFR BLD AUTO: 0.5 % (ref 0–1.5)
BENZODIAZ UR QL SCN: POSITIVE
BILIRUB SERPL-MCNC: 0.3 MG/DL (ref 0–1.2)
BILIRUB UR QL STRIP: NEGATIVE
BLD GP AB SCN SERPL QL: NEGATIVE
BUN SERPL-MCNC: 6 MG/DL (ref 6–20)
BUN/CREAT SERPL: 9.2 (ref 7–25)
BUPRENORPHINE SERPL-MCNC: POSITIVE NG/ML
CALCIUM SPEC-SCNC: 9.3 MG/DL (ref 8.6–10.5)
CANNABINOIDS SERPL QL: NEGATIVE
CHLORIDE SERPL-SCNC: 106 MMOL/L (ref 98–107)
CLARITY UR: ABNORMAL
CO2 SERPL-SCNC: 18.5 MMOL/L (ref 22–29)
COCAINE UR QL: NEGATIVE
COLOR UR: YELLOW
CREAT SERPL-MCNC: 0.65 MG/DL (ref 0.57–1)
CRP SERPL-MCNC: 10.38 MG/DL (ref 0–0.5)
D-LACTATE SERPL-SCNC: 0.6 MMOL/L (ref 0.5–2)
DEPRECATED RDW RBC AUTO: 43.8 FL (ref 37–54)
EGFRCR SERPLBLD CKD-EPI 2021: 123.2 ML/MIN/1.73
EOSINOPHIL # BLD AUTO: 0.12 10*3/MM3 (ref 0–0.4)
EOSINOPHIL NFR BLD AUTO: 1.6 % (ref 0.3–6.2)
ERYTHROCYTE [DISTWIDTH] IN BLOOD BY AUTOMATED COUNT: 13 % (ref 12.3–15.4)
ERYTHROCYTE [SEDIMENTATION RATE] IN BLOOD: 64 MM/HR (ref 0–20)
FLUAV SUBTYP SPEC NAA+PROBE: NOT DETECTED
FLUBV RNA ISLT QL NAA+PROBE: NOT DETECTED
GLOBULIN UR ELPH-MCNC: 3.7 GM/DL
GLUCOSE SERPL-MCNC: 85 MG/DL (ref 65–99)
GLUCOSE UR STRIP-MCNC: NEGATIVE MG/DL
HCG INTACT+B SERPL-ACNC: 31.06 MIU/ML
HCG SERPL QL: POSITIVE
HCT VFR BLD AUTO: 35.3 % (ref 34–46.6)
HGB BLD-MCNC: 11.4 G/DL (ref 12–15.9)
HGB UR QL STRIP.AUTO: NEGATIVE
HOLD SPECIMEN: NORMAL
HYALINE CASTS UR QL AUTO: ABNORMAL /LPF
IMM GRANULOCYTES # BLD AUTO: 0.02 10*3/MM3 (ref 0–0.05)
IMM GRANULOCYTES NFR BLD AUTO: 0.3 % (ref 0–0.5)
KETONES UR QL STRIP: ABNORMAL
LEUKOCYTE ESTERASE UR QL STRIP.AUTO: ABNORMAL
LYMPHOCYTES # BLD AUTO: 1.89 10*3/MM3 (ref 0.7–3.1)
LYMPHOCYTES NFR BLD AUTO: 24.5 % (ref 19.6–45.3)
MCH RBC QN AUTO: 29.8 PG (ref 26.6–33)
MCHC RBC AUTO-ENTMCNC: 32.3 G/DL (ref 31.5–35.7)
MCV RBC AUTO: 92.2 FL (ref 79–97)
METHADONE UR QL SCN: NEGATIVE
MONOCYTES # BLD AUTO: 0.49 10*3/MM3 (ref 0.1–0.9)
MONOCYTES NFR BLD AUTO: 6.4 % (ref 5–12)
NEUTROPHILS NFR BLD AUTO: 5.15 10*3/MM3 (ref 1.7–7)
NEUTROPHILS NFR BLD AUTO: 66.7 % (ref 42.7–76)
NITRITE UR QL STRIP: NEGATIVE
NRBC BLD AUTO-RTO: 0 /100 WBC (ref 0–0.2)
OPIATES UR QL: NEGATIVE
OXYCODONE UR QL SCN: NEGATIVE
PCP UR QL SCN: NEGATIVE
PH UR STRIP.AUTO: 6.5 [PH] (ref 5–8)
PLATELET # BLD AUTO: 215 10*3/MM3 (ref 140–450)
PMV BLD AUTO: 10.8 FL (ref 6–12)
POTASSIUM SERPL-SCNC: 3.9 MMOL/L (ref 3.5–5.2)
PROPOXYPH UR QL: NEGATIVE
PROT SERPL-MCNC: 7.3 G/DL (ref 6–8.5)
PROT UR QL STRIP: NEGATIVE
QT INTERVAL: 356 MS
QTC INTERVAL: 415 MS
RBC # BLD AUTO: 3.83 10*6/MM3 (ref 3.77–5.28)
RBC # UR STRIP: ABNORMAL /HPF
REF LAB TEST METHOD: ABNORMAL
RH BLD: NEGATIVE
SARS-COV-2 RNA PNL SPEC NAA+PROBE: NOT DETECTED
SODIUM SERPL-SCNC: 138 MMOL/L (ref 136–145)
SP GR UR STRIP: 1.02 (ref 1–1.03)
SQUAMOUS #/AREA URNS HPF: ABNORMAL /HPF
T&S EXPIRATION DATE: NORMAL
TRICYCLICS UR QL SCN: NEGATIVE
UROBILINOGEN UR QL STRIP: ABNORMAL
WBC # UR STRIP: ABNORMAL /HPF
WBC NRBC COR # BLD: 7.71 10*3/MM3 (ref 3.4–10.8)
WHOLE BLOOD HOLD COAG: NORMAL
WHOLE BLOOD HOLD SPECIMEN: NORMAL

## 2022-07-08 PROCEDURE — 36415 COLL VENOUS BLD VENIPUNCTURE: CPT

## 2022-07-08 PROCEDURE — 76817 TRANSVAGINAL US OBSTETRIC: CPT

## 2022-07-08 PROCEDURE — 84702 CHORIONIC GONADOTROPIN TEST: CPT | Performed by: PHYSICIAN ASSISTANT

## 2022-07-08 PROCEDURE — 87147 CULTURE TYPE IMMUNOLOGIC: CPT | Performed by: SURGERY

## 2022-07-08 PROCEDURE — 86140 C-REACTIVE PROTEIN: CPT | Performed by: PHYSICIAN ASSISTANT

## 2022-07-08 PROCEDURE — 87070 CULTURE OTHR SPECIMN AEROBIC: CPT | Performed by: SURGERY

## 2022-07-08 PROCEDURE — 87205 SMEAR GRAM STAIN: CPT | Performed by: SURGERY

## 2022-07-08 PROCEDURE — 84703 CHORIONIC GONADOTROPIN ASSAY: CPT | Performed by: PHYSICIAN ASSISTANT

## 2022-07-08 PROCEDURE — 80306 DRUG TEST PRSMV INSTRMNT: CPT | Performed by: PHYSICIAN ASSISTANT

## 2022-07-08 PROCEDURE — 99236 HOSP IP/OBS SAME DATE HI 85: CPT | Performed by: SURGERY

## 2022-07-08 PROCEDURE — 83605 ASSAY OF LACTIC ACID: CPT | Performed by: PHYSICIAN ASSISTANT

## 2022-07-08 PROCEDURE — 80053 COMPREHEN METABOLIC PANEL: CPT | Performed by: PHYSICIAN ASSISTANT

## 2022-07-08 PROCEDURE — 87186 SC STD MICRODIL/AGAR DIL: CPT | Performed by: SURGERY

## 2022-07-08 PROCEDURE — 85025 COMPLETE CBC W/AUTO DIFF WBC: CPT | Performed by: PHYSICIAN ASSISTANT

## 2022-07-08 PROCEDURE — 0J950ZZ DRAINAGE OF LEFT NECK SUBCUTANEOUS TISSUE AND FASCIA, OPEN APPROACH: ICD-10-PCS | Performed by: SURGERY

## 2022-07-08 PROCEDURE — 25010000002 PROPOFOL 10 MG/ML EMULSION: Performed by: NURSE ANESTHETIST, CERTIFIED REGISTERED

## 2022-07-08 PROCEDURE — 86850 RBC ANTIBODY SCREEN: CPT | Performed by: PHYSICIAN ASSISTANT

## 2022-07-08 PROCEDURE — 99284 EMERGENCY DEPT VISIT MOD MDM: CPT

## 2022-07-08 PROCEDURE — 86901 BLOOD TYPING SEROLOGIC RH(D): CPT | Performed by: PHYSICIAN ASSISTANT

## 2022-07-08 PROCEDURE — 87040 BLOOD CULTURE FOR BACTERIA: CPT | Performed by: PHYSICIAN ASSISTANT

## 2022-07-08 PROCEDURE — 85652 RBC SED RATE AUTOMATED: CPT | Performed by: PHYSICIAN ASSISTANT

## 2022-07-08 PROCEDURE — 86900 BLOOD TYPING SEROLOGIC ABO: CPT | Performed by: PHYSICIAN ASSISTANT

## 2022-07-08 PROCEDURE — 93005 ELECTROCARDIOGRAM TRACING: CPT | Performed by: STUDENT IN AN ORGANIZED HEALTH CARE EDUCATION/TRAINING PROGRAM

## 2022-07-08 PROCEDURE — 21501 I&D DP ABSC/HMTMA SFT TS NCK: CPT | Performed by: SURGERY

## 2022-07-08 PROCEDURE — 76817 TRANSVAGINAL US OBSTETRIC: CPT | Performed by: RADIOLOGY

## 2022-07-08 PROCEDURE — 87636 SARSCOV2 & INF A&B AMP PRB: CPT | Performed by: PHYSICIAN ASSISTANT

## 2022-07-08 PROCEDURE — 81001 URINALYSIS AUTO W/SCOPE: CPT | Performed by: PHYSICIAN ASSISTANT

## 2022-07-08 DEVICE — ABSORBABLE HEMOSTAT (OXIDIZED REGENERATED CELLULOSE, U.S.P.)
Type: IMPLANTABLE DEVICE | Site: NECK | Status: FUNCTIONAL
Brand: SURGICEL

## 2022-07-08 RX ORDER — FENTANYL CITRATE 50 UG/ML
50 INJECTION, SOLUTION INTRAMUSCULAR; INTRAVENOUS
Status: DISCONTINUED | OUTPATIENT
Start: 2022-07-08 | End: 2022-07-08 | Stop reason: HOSPADM

## 2022-07-08 RX ORDER — MEPERIDINE HYDROCHLORIDE 25 MG/ML
12.5 INJECTION INTRAMUSCULAR; INTRAVENOUS; SUBCUTANEOUS
Status: DISCONTINUED | OUTPATIENT
Start: 2022-07-08 | End: 2022-07-08 | Stop reason: HOSPADM

## 2022-07-08 RX ORDER — BUPRENORPHINE HYDROCHLORIDE AND NALOXONE HYDROCHLORIDE DIHYDRATE 8; 2 MG/1; MG/1
1 TABLET SUBLINGUAL 2 TIMES DAILY
Status: DISCONTINUED | OUTPATIENT
Start: 2022-07-08 | End: 2022-07-08 | Stop reason: HOSPADM

## 2022-07-08 RX ORDER — SODIUM CHLORIDE, SODIUM LACTATE, POTASSIUM CHLORIDE, CALCIUM CHLORIDE 600; 310; 30; 20 MG/100ML; MG/100ML; MG/100ML; MG/100ML
100 INJECTION, SOLUTION INTRAVENOUS CONTINUOUS
Status: DISCONTINUED | OUTPATIENT
Start: 2022-07-08 | End: 2022-07-08 | Stop reason: HOSPADM

## 2022-07-08 RX ORDER — SODIUM CHLORIDE, SODIUM LACTATE, POTASSIUM CHLORIDE, CALCIUM CHLORIDE 600; 310; 30; 20 MG/100ML; MG/100ML; MG/100ML; MG/100ML
125 INJECTION, SOLUTION INTRAVENOUS ONCE
Status: DISCONTINUED | OUTPATIENT
Start: 2022-07-08 | End: 2022-07-08 | Stop reason: HOSPADM

## 2022-07-08 RX ORDER — SODIUM CHLORIDE 0.9 % (FLUSH) 0.9 %
10 SYRINGE (ML) INJECTION AS NEEDED
Status: DISCONTINUED | OUTPATIENT
Start: 2022-07-08 | End: 2022-07-08 | Stop reason: HOSPADM

## 2022-07-08 RX ORDER — KETOROLAC TROMETHAMINE 30 MG/ML
30 INJECTION, SOLUTION INTRAMUSCULAR; INTRAVENOUS EVERY 6 HOURS PRN
Status: DISCONTINUED | OUTPATIENT
Start: 2022-07-08 | End: 2022-07-08 | Stop reason: HOSPADM

## 2022-07-08 RX ORDER — SODIUM CHLORIDE, SODIUM LACTATE, POTASSIUM CHLORIDE, CALCIUM CHLORIDE 600; 310; 30; 20 MG/100ML; MG/100ML; MG/100ML; MG/100ML
100 INJECTION, SOLUTION INTRAVENOUS ONCE AS NEEDED
Status: DISCONTINUED | OUTPATIENT
Start: 2022-07-08 | End: 2022-07-08 | Stop reason: HOSPADM

## 2022-07-08 RX ORDER — SODIUM CHLORIDE 0.9 % (FLUSH) 0.9 %
10 SYRINGE (ML) INJECTION EVERY 12 HOURS SCHEDULED
Status: DISCONTINUED | OUTPATIENT
Start: 2022-07-08 | End: 2022-07-08 | Stop reason: HOSPADM

## 2022-07-08 RX ORDER — PROPOFOL 10 MG/ML
VIAL (ML) INTRAVENOUS AS NEEDED
Status: DISCONTINUED | OUTPATIENT
Start: 2022-07-08 | End: 2022-07-08 | Stop reason: SURG

## 2022-07-08 RX ORDER — ACETAMINOPHEN 325 MG/1
650 TABLET ORAL ONCE
Status: DISCONTINUED | OUTPATIENT
Start: 2022-07-08 | End: 2022-07-08 | Stop reason: HOSPADM

## 2022-07-08 RX ORDER — CLINDAMYCIN PHOSPHATE 600 MG/50ML
600 INJECTION INTRAVENOUS EVERY 8 HOURS
Status: DISCONTINUED | OUTPATIENT
Start: 2022-07-09 | End: 2022-07-08 | Stop reason: HOSPADM

## 2022-07-08 RX ORDER — MIDAZOLAM HYDROCHLORIDE 1 MG/ML
1 INJECTION INTRAMUSCULAR; INTRAVENOUS
Status: DISCONTINUED | OUTPATIENT
Start: 2022-07-08 | End: 2022-07-08 | Stop reason: HOSPADM

## 2022-07-08 RX ORDER — SODIUM CHLORIDE, SODIUM LACTATE, POTASSIUM CHLORIDE, CALCIUM CHLORIDE 600; 310; 30; 20 MG/100ML; MG/100ML; MG/100ML; MG/100ML
INJECTION, SOLUTION INTRAVENOUS CONTINUOUS PRN
Status: DISCONTINUED | OUTPATIENT
Start: 2022-07-08 | End: 2022-07-08 | Stop reason: SURG

## 2022-07-08 RX ORDER — LIDOCAINE HYDROCHLORIDE 20 MG/ML
INJECTION, SOLUTION INFILTRATION; PERINEURAL AS NEEDED
Status: DISCONTINUED | OUTPATIENT
Start: 2022-07-08 | End: 2022-07-08 | Stop reason: SURG

## 2022-07-08 RX ORDER — IPRATROPIUM BROMIDE AND ALBUTEROL SULFATE 2.5; .5 MG/3ML; MG/3ML
3 SOLUTION RESPIRATORY (INHALATION) ONCE AS NEEDED
Status: DISCONTINUED | OUTPATIENT
Start: 2022-07-08 | End: 2022-07-08 | Stop reason: HOSPADM

## 2022-07-08 RX ORDER — MAGNESIUM HYDROXIDE 1200 MG/15ML
LIQUID ORAL AS NEEDED
Status: DISCONTINUED | OUTPATIENT
Start: 2022-07-08 | End: 2022-07-08 | Stop reason: HOSPADM

## 2022-07-08 RX ORDER — CLINDAMYCIN PHOSPHATE 600 MG/50ML
600 INJECTION INTRAVENOUS ONCE
Status: COMPLETED | OUTPATIENT
Start: 2022-07-08 | End: 2022-07-08

## 2022-07-08 RX ORDER — OXYCODONE HYDROCHLORIDE AND ACETAMINOPHEN 5; 325 MG/1; MG/1
1 TABLET ORAL ONCE AS NEEDED
Status: DISCONTINUED | OUTPATIENT
Start: 2022-07-08 | End: 2022-07-08 | Stop reason: HOSPADM

## 2022-07-08 RX ORDER — LIDOCAINE HYDROCHLORIDE AND EPINEPHRINE 10; 10 MG/ML; UG/ML
INJECTION, SOLUTION INFILTRATION; PERINEURAL AS NEEDED
Status: DISCONTINUED | OUTPATIENT
Start: 2022-07-08 | End: 2022-07-08 | Stop reason: HOSPADM

## 2022-07-08 RX ADMIN — PROPOFOL 125 MCG/KG/MIN: 10 INJECTION, EMULSION INTRAVENOUS at 17:50

## 2022-07-08 RX ADMIN — PROPOFOL 150 MG: 10 INJECTION, EMULSION INTRAVENOUS at 17:50

## 2022-07-08 RX ADMIN — LIDOCAINE HYDROCHLORIDE 60 MG: 20 INJECTION, SOLUTION INFILTRATION; PERINEURAL at 17:50

## 2022-07-08 RX ADMIN — SODIUM CHLORIDE, POTASSIUM CHLORIDE, SODIUM LACTATE AND CALCIUM CHLORIDE: 600; 310; 30; 20 INJECTION, SOLUTION INTRAVENOUS at 17:47

## 2022-07-08 RX ADMIN — CLINDAMYCIN PHOSPHATE 600 MG: 600 INJECTION INTRAVENOUS at 17:07

## 2022-07-08 RX ADMIN — SODIUM CHLORIDE 1000 ML: 9 INJECTION, SOLUTION INTRAVENOUS at 16:23

## 2022-07-08 RX ADMIN — SODIUM CHLORIDE 1000 ML: 9 INJECTION, SOLUTION INTRAVENOUS at 14:23

## 2022-07-08 NOTE — OP NOTE
HEAD NECK ABSCESS INCISION AND DRAINAGE  Procedure Note    Sailaja Alarcon  7/8/2022    Pre-op Diagnosis:   Abscess, neck [L02.11]  Cutaneous abscess of neck [L02.11]    Post-op Diagnosis:     Post-Op Diagnosis Codes:     * Abscess, neck [L02.11]     * Cutaneous abscess of neck [L02.11]    Procedure(s):  HEAD NECK ABSCESS INCISION AND DRAINAGE    Surgeon(s):  Miquel Grayson MD    Anesthesia: General    Staff:   Circulator: Raymundo Jolley RN  Scrub Person: Radhika Perdomo  Other: Sakshi Abdul    Findings: 3 cm left neck abscess deep to the sternocleidomastoid    Operative Procedure: Patient was taken operating placed upon operating table.  After induction of LMA anesthesia the left neck was prepped draped in usual sterile fashion.  Timeout procedure performed.  After injection of local anesthetic following timeout procedure an obliquely oriented incision along the anterior border of the sternocleidomastoid on the left was made.  Dissection was carried through the subcutaneous tissue and the sternocleidomastoid fibers were identified.  The fibers were retracted laterally and underneath the sternocleidomastoid an abscess cavity was identified and opened.  Purulence was expressed and cultured.  Purulence was suctioned free.  Loculations were disrupted digitally and the seem to track along the left lobe of the thyroid and along the carotid sheath.  No significant bleeding was noted.  A piece of Surgicel was placed in the cavity after being irrigated with copious amounts normal saline and all irrigant being suctioned free.  Quarter-inch Penrose drain was then placed through a stab incision lateral to the incision and placed in the abscess cavity.  The drain was secured to the skin with silk suture and the skin incision was closed with deep dermal Vicryl and Monocryl subcuticular skin approximation.  Patient tolerated the procedure well.    Estimated Blood Loss: 5 mL    Specimens:   Abscess culture  left neck           Drains: Penrose drain left neck    Grafts/Implants: Surgicel left neck    Complications: None      Miquel Grayson MD     Date: 7/8/2022  Time: 18:28 EDT

## 2022-07-08 NOTE — ANESTHESIA PROCEDURE NOTES
Airway  Urgency: elective    Airway not difficult    General Information and Staff    Patient location during procedure: OR  Anesthesiologist: Damian Trotter DO  CRNA/CAA: Peyton Gonzalez CRNA    Indications and Patient Condition  Indications for airway management: airway protection    Preoxygenated: yes  MILS maintained throughout  Mask difficulty assessment: 1 - vent by mask    Final Airway Details  Final airway type: supraglottic airway      Successful airway: classic  Size 4    Number of attempts at approach: 1  Assessment: lips, teeth, and gum same as pre-op and atraumatic intubation    Additional Comments  AOI X 1 PASS +BBS, +ETCO2, +R//F/C MOUTH TEETH GUMS SAME AS PREOP

## 2022-07-08 NOTE — ED PROVIDER NOTES
Subjective   26-year-old female who presents to the ED today for neck swelling.  She was seen in the emergency room yesterday but left AMA.   She states she woke up this morning and noted continued worsening swelling to her neck.  She states it hurts to swallow and the area is painful to touch.  She states she tried some ibuprofen last night with no relief.  She does report a history of IV drug abuse.  She states she recently relapsed.  She denies chest pain, shortness of breath, fever, chills, nausea, vomiting, headache, dizziness, abdominal pain, dysuria, diarrhea, or constipation.      History provided by:  Patient   used: No        Review of Systems   Constitutional: Negative.  Negative for chills, diaphoresis, fatigue and fever.   HENT: Positive for trouble swallowing. Negative for congestion, drooling, ear discharge, ear pain, facial swelling, nosebleeds, postnasal drip, rhinorrhea, sinus pressure, sinus pain, sneezing, sore throat and tinnitus.    Eyes: Negative.  Negative for photophobia, pain, discharge, redness and itching.   Respiratory: Negative.  Negative for cough, shortness of breath and wheezing.    Cardiovascular: Negative.  Negative for chest pain.   Gastrointestinal: Negative.  Negative for abdominal distention, abdominal pain, diarrhea, nausea and vomiting.   Endocrine: Negative.    Genitourinary: Negative.  Negative for difficulty urinating, dysuria, flank pain and frequency.   Musculoskeletal: Positive for neck pain. Negative for arthralgias, back pain, gait problem, joint swelling, myalgias and neck stiffness.   Skin: Negative.    Neurological: Negative.  Negative for dizziness, tremors, seizures, syncope, facial asymmetry, speech difficulty, weakness, light-headedness, numbness and headaches.   Psychiatric/Behavioral: Negative.  Negative for confusion.   All other systems reviewed and are negative.      Past Medical History:   Diagnosis Date   • Hepatitis C antibody  positive in blood    • Migraines        Allergies   Allergen Reactions   • Amoxicillin Anaphylaxis   • Penicillins Anaphylaxis   • Vancomycin Itching     Pt states she had full body itch and burning sensation, and she turned red. Reaction potentially Red Man Syndrome.   • Zofran [Ondansetron Hcl] Hives and Rash       Past Surgical History:   Procedure Laterality Date   • INCISION AND DRAINAGE ABSCESS Right 10/10/2021    Procedure: INCISION AND DRAINAGE ABSCESS CENTRAL LINE PLACEMENT;  Surgeon: Naz Payne MD;  Location: [facility];  Service: General;  Laterality: Right;  I&D ABSCESS= INFECTED.  CENTRAL LINE = CLEAN.       Family History   Problem Relation Age of Onset   • Hypertension Mother    • Cancer Father    • No Known Problems Sister    • No Known Problems Brother    • No Known Problems Son    • No Known Problems Daughter    • Hypertension Maternal Grandmother    • Diabetes Maternal Grandfather    • No Known Problems Paternal Grandmother    • No Known Problems Paternal Grandfather    • No Known Problems Cousin    • Diabetes Maternal Aunt    • Rheum arthritis Neg Hx    • Osteoarthritis Neg Hx    • Asthma Neg Hx    • Heart failure Neg Hx    • Hyperlipidemia Neg Hx    • Migraines Neg Hx    • Rashes / Skin problems Neg Hx    • Seizures Neg Hx    • Stroke Neg Hx    • Thyroid disease Neg Hx        Social History     Socioeconomic History   • Marital status:    Tobacco Use   • Smoking status: Current Every Day Smoker     Packs/day: 1.00     Years: 15.00     Pack years: 15.00     Types: Cigarettes   • Smokeless tobacco: Never Used   Vaping Use   • Vaping Use: Never used   Substance and Sexual Activity   • Alcohol use: No     Alcohol/week: 0.0 standard drinks   • Drug use: Not Currently     Frequency: 3.0 times per week     Types: IV     Comment: last use 3 days ago (10/26/18)   • Sexual activity: Yes           Objective   Physical Exam  Vitals and nursing note reviewed.   Constitutional:       General:  She is not in acute distress.     Appearance: She is well-developed. She is not diaphoretic.   HENT:      Head: Normocephalic and atraumatic.      Right Ear: External ear normal.      Left Ear: External ear normal.      Nose: Nose normal.      Mouth/Throat:      Mouth: Mucous membranes are moist.      Pharynx: Oropharynx is clear.   Eyes:      Extraocular Movements: Extraocular movements intact.      Conjunctiva/sclera: Conjunctivae normal.      Pupils: Pupils are equal, round, and reactive to light.   Neck:      Vascular: No JVD.      Trachea: No tracheal tenderness or tracheal deviation.     Cardiovascular:      Rate and Rhythm: Normal rate and regular rhythm.      Heart sounds: Normal heart sounds. No murmur heard.  Pulmonary:      Effort: Pulmonary effort is normal. No respiratory distress.      Breath sounds: Normal breath sounds. No wheezing.   Abdominal:      General: Bowel sounds are normal. There is no distension.      Palpations: Abdomen is soft.      Tenderness: There is no abdominal tenderness. There is no guarding or rebound.   Musculoskeletal:         General: No deformity. Normal range of motion.      Cervical back: Normal range of motion and neck supple. Erythema present.   Skin:     General: Skin is warm and dry.      Coloration: Skin is not pale.      Findings: No erythema or rash.   Neurological:      General: No focal deficit present.      Mental Status: She is alert and oriented to person, place, and time.      Cranial Nerves: No cranial nerve deficit.   Psychiatric:         Mood and Affect: Mood normal.         Behavior: Behavior normal.         Thought Content: Thought content normal.         Procedures           ED Course  ED Course as of 07/08/22 1733   Fri Jul 08, 2022   1132 EKG noted sinus rhythm.  82 bpm.  .  QRS 84.  QTc 415.  No acute ST elevation [SF]   1713 US Ob Transvaginal  IMPRESSION:  1.  Pelvic free fluid which is nonspecific. No living intrauterine  gestation or  intrauterine gestation sac identified.  2. No complex cystic masses of the adnexa. No complex cystic ovarian  masses.  3. Continued follow-up with serial beta hCG measurements is warranted.     This report was finalized on 7/8/2022 4:35 PM by Dr. Eric Kirk MD.    []   1731 Patient was seen and evaluated by Dr. Grayson, general surgeon.  He is taking patient to the OR for surgical intervention [MH]      ED Course User Index  [] Miriam Ramirez PA-C  [] George Renee DO                                           [facility]    Final diagnoses:   Neck abscess       ED Disposition  ED Disposition     ED Disposition   Send to OR    Condition   --    Comment   --             No follow-up provider specified.       Medication List      No changes were made to your prescriptions during this visit.          Miriam Ramirez PA-C  07/08/22 1736

## 2022-07-08 NOTE — ED NOTES
Patient refusing more  IV attempts at this time. Patient states that she will allow one attempt with a ultrasound.

## 2022-07-08 NOTE — ANESTHESIA PREPROCEDURE EVALUATION
Anesthesia Evaluation     Patient summary reviewed and Nursing notes reviewed   no history of anesthetic complications:  NPO Solid Status: > 8 hours  NPO Liquid Status: > 6 hours           Airway   Mallampati: II  TM distance: >3 FB  Neck ROM: full  No difficulty expected  Dental    (+) poor dentition    Pulmonary - normal exam   (+) a smoker Current Smoked day of surgery,   Cardiovascular - negative cardio ROS and normal exam  Exercise tolerance: good (4-7 METS)    NYHA Classification: II  Rhythm: regular  Rate: normal        Neuro/Psych  (+) headaches,    GI/Hepatic/Renal/Endo    (+)  GERD,  hepatitis C,     Musculoskeletal (-) negative ROS    Abdominal    Substance History   (+) drug use (hx IVDA.  None for 2 years)     OB/GYN    (-)  Pregnant ( 2021)        Other - negative ROS                         Anesthesia Plan    ASA 2     general     intravenous induction     Anesthetic plan, risks, benefits, and alternatives have been provided, discussed and informed consent has been obtained with: patient.    Plan discussed with CRNA.

## 2022-07-08 NOTE — H&P
[facility]   HISTORY AND PHYSICAL    Patient Name: Sailaja Alarcon  : 1995  MRN: [id number]  Primary Care Physician:  Margaret Baxter APRN  Date of admission: 2022    Subjective   Subjective     Chief Complaint: Neck abscess    26-year-old IV drug abuse and female presenting with left neck abscess.  She was seen previously in the emergency department but left AGAINST MEDICAL ADVICE.  Imaging shows possible involvement deep to the sternocleidomastoid near the carotid and thyroid lobe but no evidence of sepsis at this time.  The patient has tested positive for early pregnancy with positive hCG but actual quantitative shows very low quantitative features of very early pregnancy.    Abscess  Associated symptoms include neck pain. Pertinent negatives include no abdominal pain, chest pain, chills, coughing, fever, headaches, joint swelling, nausea, rash, sore throat, vomiting or weakness.       Review of Systems   Constitutional: Negative for activity change, appetite change, chills and fever.   HENT: Negative for sore throat and trouble swallowing.    Eyes: Negative for visual disturbance.   Respiratory: Negative for cough and shortness of breath.    Cardiovascular: Negative for chest pain and palpitations.   Gastrointestinal: Negative for abdominal distention, abdominal pain, blood in stool, constipation, diarrhea, nausea and vomiting.   Endocrine: Negative for cold intolerance and heat intolerance.   Genitourinary: Negative for dysuria.   Musculoskeletal: Positive for neck pain. Negative for joint swelling.   Skin: Negative for color change, rash and wound.   Allergic/Immunologic: Negative for immunocompromised state.   Neurological: Negative for dizziness, seizures, weakness and headaches.   Hematological: Negative for adenopathy. Does not bruise/bleed easily.   Psychiatric/Behavioral: Negative for agitation and confusion.        Personal History     Past Medical History:   Diagnosis  Date   • Hepatitis C antibody positive in blood    • Migraines        Past Surgical History:   Procedure Laterality Date   • INCISION AND DRAINAGE ABSCESS Right 10/10/2021    Procedure: INCISION AND DRAINAGE ABSCESS CENTRAL LINE PLACEMENT;  Surgeon: Naz Payne MD;  Location: [facility];  Service: General;  Laterality: Right;  I&D ABSCESS= INFECTED.  CENTRAL LINE = CLEAN.       Family History: family history includes Cancer in her father; Diabetes in her maternal aunt and maternal grandfather; Hypertension in her maternal grandmother and mother; No Known Problems in her brother, cousin, daughter, paternal grandfather, paternal grandmother, sister, and son. Otherwise pertinent FHx was reviewed and not pertinent to current issue.    Social History:  reports that she has been smoking cigarettes. She has a 15.00 pack-year smoking history. She has never used smokeless tobacco. She reports previous drug use. Frequency: 3.00 times per week. Drug: IV. She reports that she does not drink alcohol.    Home Medications:  buprenorphine-naloxone and sulfamethoxazole-trimethoprim    Allergies:  Allergies   Allergen Reactions   • Amoxicillin Anaphylaxis   • Penicillins Anaphylaxis   • Vancomycin Itching     Pt states she had full body itch and burning sensation, and she turned red. Reaction potentially Red Man Syndrome.   • Zofran [Ondansetron Hcl] Hives and Rash       Objective    Objective     Vitals:   Temp:  [98.1 °F (36.7 °C)] 98.1 °F (36.7 °C)  Heart Rate:  [84] 84  Resp:  [18] 18  BP: ()/(47-59) 96/59    Physical Exam  Constitutional:       Appearance: She is well-developed.   HENT:      Head: Normocephalic and atraumatic.   Eyes:      Conjunctiva/sclera: Conjunctivae normal.      Pupils: Pupils are equal, round, and reactive to light.   Neck:      Thyroid: No thyromegaly.      Vascular: No JVD.      Trachea: No tracheal deviation.        Comments: Induration and pain of the left neck  Cardiovascular:      Rate  and Rhythm: Normal rate and regular rhythm.      Heart sounds: No murmur heard.    No friction rub. No gallop.   Pulmonary:      Effort: Pulmonary effort is normal.      Breath sounds: Normal breath sounds.   Abdominal:      General: There is no distension.      Palpations: Abdomen is soft. There is no hepatomegaly or splenomegaly.      Tenderness: There is no abdominal tenderness.      Hernia: No hernia is present.   Musculoskeletal:         General: No deformity. Normal range of motion.      Cervical back: Neck supple.   Skin:     General: Skin is warm and dry.   Neurological:      Mental Status: She is alert and oriented to person, place, and time.         Result Review    Result Review:  I have personally reviewed the results from the time of this admission to 7/8/2022 16:44 EDT and agree with these findings:  [x]  Laboratory list / accordion  []  Microbiology  [x]  Radiology  []  EKG/Telemetry   []  Cardiology/Vascular   []  Pathology  []  Old records  []  Other:      Assessment & Plan   Assessment / Plan     Brief Patient Summary:  Sailaja Alarcon is a 26 y.o. female who presents with left neck abscess likely from IV drug abuse but poor dentition cannot be excluded.  She has tested positive for early pregnancy.    Active Hospital Problems:  There are no active hospital problems to display for this patient.    Plan:   IV antibiotics  I discussed the risks and benefits of surgery including the need for extensive debridement, repeat surgeries, and the risk to early pregnancy and the patient has agreed to proceed.        Miquel Grayson MD

## 2022-07-08 NOTE — ED NOTES
MEDICAL SCREENING:    Reason for Visit: Worsening left neck abscess.    Patient initially seen in triage.  The patient was advised further evaluation and diagnostic testing will be needed, some of the treatment and testing will be initiated in the lobby in order to begin the process.  The patient will be returned to the waiting area for the time being and possibly be re-assessed by a subsequent ED provider.  The patient will be brought back to the treatment area in as timely manner as possible.         Kory Givens PA  07/08/22 1034

## 2022-07-09 LAB — BACTERIA SPEC AEROBE CULT: ABNORMAL

## 2022-07-09 NOTE — NURSING NOTE
"Patient informed nurse that, \"she was leaving and that she had to get home to her babies.\" Informed by Desiree RUIZ about risks of signing out AMA. Patient adamant about leaving. Patients Aunt present at bedside. IV removed. AMA papers signed.  made aware.   "

## 2022-07-09 NOTE — ANESTHESIA POSTPROCEDURE EVALUATION
Patient: Sailaja Alarcon    Procedure Summary     Date: 07/08/22 Room / Location: [facility] OR  /  [facility] OR    Anesthesia Start: 1747 Anesthesia Stop: 1827    Procedure: HEAD NECK ABSCESS INCISION AND DRAINAGE (Left ) Diagnosis:       Abscess, neck      Cutaneous abscess of neck      (Abscess, neck [L02.11])      (Cutaneous abscess of neck [L02.11])    Surgeons: Miquel Grayson MD Provider: Damian Trotter DO    Anesthesia Type: general ASA Status: 2          Anesthesia Type: general    Vitals  Vitals Value Taken Time   /55 07/08/22 1843   Temp 97.6 °F (36.4 °C) 07/08/22 1843   Pulse 70 07/08/22 1843   Resp 16 07/08/22 1843   SpO2 100 % 07/08/22 1843           Post Anesthesia Care and Evaluation    Patient location during evaluation: PHASE II  Patient participation: complete - patient participated  Level of consciousness: awake and alert  Pain score: 1  Pain management: adequate    Airway patency: patent  Anesthetic complications: No anesthetic complications  PONV Status: controlled  Cardiovascular status: acceptable  Respiratory status: acceptable  Hydration status: acceptable

## 2022-07-10 LAB
BACTERIA SPEC AEROBE CULT: ABNORMAL
GRAM STN SPEC: ABNORMAL

## 2022-07-11 ENCOUNTER — TELEPHONE (OUTPATIENT)
Dept: SURGERY | Facility: CLINIC | Age: 27
End: 2022-07-11

## 2022-07-11 NOTE — TELEPHONE ENCOUNTER
Called in Bactrim 800mg tablets #20 1 tablet by mouth twice a day for 10 days. Patient is aware that this has been called and she is to start taking this today.       DL 7/11/22 3:57pm

## 2022-07-12 ENCOUNTER — OFFICE VISIT (OUTPATIENT)
Dept: SURGERY | Facility: CLINIC | Age: 27
End: 2022-07-12

## 2022-07-12 VITALS — BODY MASS INDEX: 23.21 KG/M2 | WEIGHT: 131 LBS | HEIGHT: 63 IN

## 2022-07-12 DIAGNOSIS — L02.11 NECK ABSCESS: Primary | ICD-10-CM

## 2022-07-12 PROCEDURE — 99024 POSTOP FOLLOW-UP VISIT: CPT

## 2022-07-12 RX ORDER — CLINDAMYCIN HYDROCHLORIDE 300 MG/1
300 CAPSULE ORAL 3 TIMES DAILY
Qty: 30 CAPSULE | Refills: 0 | Status: SHIPPED | OUTPATIENT
Start: 2022-07-12 | End: 2023-02-23

## 2022-07-12 NOTE — PROGRESS NOTES
Subjective   Sailaja Alarcon is a 26 y.o. female who presents today for Initial Evaluation    Chief Complaint:    Chief Complaint   Patient presents with   • Post-op Follow-up        History of Present Illness:    History of Present Illness Sailaja is a 26-year-old female who presents status post incision and drainage to left neck.  Patient was admitted to the hospital and had incision and drainage by Dr. Grayson on 7/8/2022.  Patient was admitted to the hospital and signed out AGAINST MEDICAL ADVICE.  Patient reports that she has not changed bandage at home.  Reports that she began taking antibiotics yesterday however, she is unable to tolerate them.    The following portions of the patient's history were reviewed and updated as appropriate: allergies, current medications, past family history, past medical history, past social history, past surgical history and problem list.    Past Medical History:  Past Medical History:   Diagnosis Date   • Hepatitis C antibody positive in blood    • Migraines        Social History:  Social History     Socioeconomic History   • Marital status:    Tobacco Use   • Smoking status: Current Every Day Smoker     Packs/day: 1.00     Years: 15.00     Pack years: 15.00     Types: Cigarettes   • Smokeless tobacco: Never Used   Vaping Use   • Vaping Use: Never used   Substance and Sexual Activity   • Alcohol use: No     Alcohol/week: 0.0 standard drinks   • Drug use: Not Currently     Frequency: 3.0 times per week     Types: IV     Comment: last use 3 days ago (10/26/18)   • Sexual activity: Yes       Family History:  Family History   Problem Relation Age of Onset   • Hypertension Mother    • Cancer Father    • No Known Problems Sister    • No Known Problems Brother    • No Known Problems Son    • No Known Problems Daughter    • Hypertension Maternal Grandmother    • Diabetes Maternal Grandfather    • No Known Problems Paternal Grandmother    • No Known Problems Paternal  Grandfather    • No Known Problems Cousin    • Diabetes Maternal Aunt    • Rheum arthritis Neg Hx    • Osteoarthritis Neg Hx    • Asthma Neg Hx    • Heart failure Neg Hx    • Hyperlipidemia Neg Hx    • Migraines Neg Hx    • Rashes / Skin problems Neg Hx    • Seizures Neg Hx    • Stroke Neg Hx    • Thyroid disease Neg Hx        Past Surgical History:  Past Surgical History:   Procedure Laterality Date   • INCISION AND DRAINAGE ABSCESS Right 10/10/2021    Procedure: INCISION AND DRAINAGE ABSCESS CENTRAL LINE PLACEMENT;  Surgeon: Naz Payne MD;  Location: [facility];  Service: General;  Laterality: Right;  I&D ABSCESS= INFECTED.  CENTRAL LINE = CLEAN.       Problem List:  Patient Active Problem List   Diagnosis   • Cutaneous abscess of neck   • Abscess, neck   • Neck abscess       Allergy:   Allergies   Allergen Reactions   • Amoxicillin Anaphylaxis   • Penicillins Anaphylaxis   • Vancomycin Itching     Pt states she had full body itch and burning sensation, and she turned red. Reaction potentially Red Man Syndrome.   • Zofran [Ondansetron Hcl] Hives and Rash        Current Medications:   Current Outpatient Medications   Medication Sig Dispense Refill   • buprenorphine-naloxone (SUBOXONE) 8-2 MG per SL tablet Place 1 tablet under the tongue 2 (Two) Times a Day.     • clindamycin (Cleocin) 300 MG capsule Take 1 capsule by mouth 3 (Three) Times a Day. 30 capsule 0     No current facility-administered medications for this visit.       Review of Systems:    Review of Systems   Constitutional: Negative for activity change.   HENT: Negative for congestion.    Respiratory: Negative for shortness of breath.    Cardiovascular: Negative for chest pain.   Gastrointestinal: Negative for abdominal pain.   Endocrine: Negative for cold intolerance.   Genitourinary: Negative for flank pain.   Musculoskeletal: Positive for neck pain.   Skin: Positive for wound.   Allergic/Immunologic: Negative for environmental allergies.  "  Neurological: Negative for confusion.   Hematological: Negative for adenopathy.   Psychiatric/Behavioral: Negative for agitation.         Physical Exam:   Physical Exam  Constitutional:       Appearance: Normal appearance.       HENT:      Head: Normocephalic and atraumatic.      Right Ear: External ear normal.      Left Ear: External ear normal.      Mouth/Throat:      Mouth: Mucous membranes are moist.   Eyes:      Extraocular Movements: Extraocular movements intact.      Pupils: Pupils are equal, round, and reactive to light.   Pulmonary:      Effort: Pulmonary effort is normal.   Abdominal:      General: Abdomen is flat.   Musculoskeletal:         General: Normal range of motion.      Cervical back: Normal range of motion and neck supple. Tenderness present.   Skin:     General: Skin is warm and dry.      Capillary Refill: Capillary refill takes less than 2 seconds.      Findings: Erythema present.   Neurological:      General: No focal deficit present.      Mental Status: She is alert and oriented to person, place, and time.   Psychiatric:         Mood and Affect: Mood normal.         Behavior: Behavior normal.         Vitals:  Height 160 cm (63\"), weight 59.4 kg (131 lb), last menstrual period 05/09/2022, not currently breastfeeding.   Body mass index is 23.21 kg/m².     Lab Results:   Admission on 07/08/2022, Discharged on 07/08/2022   Component Date Value Ref Range Status   • Glucose 07/08/2022 85  65 - 99 mg/dL Final   • BUN 07/08/2022 6  6 - 20 mg/dL Final   • Creatinine 07/08/2022 0.65  0.57 - 1.00 mg/dL Final   • Sodium 07/08/2022 138  136 - 145 mmol/L Final   • Potassium 07/08/2022 3.9  3.5 - 5.2 mmol/L Final   • Chloride 07/08/2022 106  98 - 107 mmol/L Final   • CO2 07/08/2022 18.5 (A) 22.0 - 29.0 mmol/L Final   • Calcium 07/08/2022 9.3  8.6 - 10.5 mg/dL Final   • Total Protein 07/08/2022 7.3  6.0 - 8.5 g/dL Final   • Albumin 07/08/2022 3.56  3.50 - 5.20 g/dL Final   • ALT (SGPT) 07/08/2022 6  1 - 33 " U/L Final   • AST (SGOT) 07/08/2022 8  1 - 32 U/L Final   • Alkaline Phosphatase 07/08/2022 65  39 - 117 U/L Final   • Total Bilirubin 07/08/2022 0.3  0.0 - 1.2 mg/dL Final   • Globulin 07/08/2022 3.7  gm/dL Final   • A/G Ratio 07/08/2022 1.0  g/dL Final   • BUN/Creatinine Ratio 07/08/2022 9.2  7.0 - 25.0 Final   • Anion Gap 07/08/2022 13.5  5.0 - 15.0 mmol/L Final   • eGFR 07/08/2022 123.2  >60.0 mL/min/1.73 Final    National Kidney Foundation and American Society of Nephrology (ASN) Task Force recommended calculation based on the Chronic Kidney Disease Epidemiology Collaboration (CKD-EPI) equation refit without adjustment for race.   • Blood Culture 07/08/2022 No growth at 4 days   Preliminary   • Blood Culture 07/08/2022 No growth at 4 days   Preliminary   • Lactate 07/08/2022 0.6  0.5 - 2.0 mmol/L Final   • C-Reactive Protein 07/08/2022 10.38 (A) 0.00 - 0.50 mg/dL Final   • Sed Rate 07/08/2022 64 (A) 0 - 20 mm/hr Final   • WBC 07/08/2022 7.71  3.40 - 10.80 10*3/mm3 Final   • RBC 07/08/2022 3.83  3.77 - 5.28 10*6/mm3 Final   • Hemoglobin 07/08/2022 11.4 (A) 12.0 - 15.9 g/dL Final   • Hematocrit 07/08/2022 35.3  34.0 - 46.6 % Final   • MCV 07/08/2022 92.2  79.0 - 97.0 fL Final   • MCH 07/08/2022 29.8  26.6 - 33.0 pg Final   • MCHC 07/08/2022 32.3  31.5 - 35.7 g/dL Final   • RDW 07/08/2022 13.0  12.3 - 15.4 % Final   • RDW-SD 07/08/2022 43.8  37.0 - 54.0 fl Final   • MPV 07/08/2022 10.8  6.0 - 12.0 fL Final   • Platelets 07/08/2022 215  140 - 450 10*3/mm3 Final   • Neutrophil % 07/08/2022 66.7  42.7 - 76.0 % Final   • Lymphocyte % 07/08/2022 24.5  19.6 - 45.3 % Final   • Monocyte % 07/08/2022 6.4  5.0 - 12.0 % Final   • Eosinophil % 07/08/2022 1.6  0.3 - 6.2 % Final   • Basophil % 07/08/2022 0.5  0.0 - 1.5 % Final   • Immature Grans % 07/08/2022 0.3  0.0 - 0.5 % Final   • Neutrophils, Absolute 07/08/2022 5.15  1.70 - 7.00 10*3/mm3 Final   • Lymphocytes, Absolute 07/08/2022 1.89  0.70 - 3.10 10*3/mm3 Final   •  Monocytes, Absolute 07/08/2022 0.49  0.10 - 0.90 10*3/mm3 Final   • Eosinophils, Absolute 07/08/2022 0.12  0.00 - 0.40 10*3/mm3 Final   • Basophils, Absolute 07/08/2022 0.04  0.00 - 0.20 10*3/mm3 Final   • Immature Grans, Absolute 07/08/2022 0.02  0.00 - 0.05 10*3/mm3 Final   • nRBC 07/08/2022 0.0  0.0 - 0.2 /100 WBC Final   • Extra Tube 07/08/2022 Hold for add-ons.   Final    Auto resulted.   • Extra Tube 07/08/2022 hold for add-on   Final    Auto resulted   • Extra Tube 07/08/2022 Hold for add-ons.   Final    Auto resulted   • COVID19 07/08/2022 Not Detected  Not Detected - Ref. Range Final   • Influenza A PCR 07/08/2022 Not Detected  Not Detected Final   • Influenza B PCR 07/08/2022 Not Detected  Not Detected Final   • HCG Qualitative 07/08/2022 Positive (A) Negative Final   • ABO Type 07/08/2022 AB   Final   • RH type 07/08/2022 Negative   Final   • Antibody Screen 07/08/2022 Negative   Final   • T&S Expiration Date 07/08/2022 7/11/2022 11:59:59 PM   Final   • HCG Quantitative 07/08/2022 31.06  mIU/mL Final   • THC, Screen, Urine 07/08/2022 Negative  Negative Final   • Phencyclidine (PCP), Urine 07/08/2022 Negative  Negative Final   • Cocaine Screen, Urine 07/08/2022 Negative  Negative Final   • Methamphetamine, Ur 07/08/2022 Positive (A) Negative Final   • Opiate Screen 07/08/2022 Negative  Negative Final   • Amphetamine Screen, Urine 07/08/2022 Positive (A) Negative Final   • Benzodiazepine Screen, Urine 07/08/2022 Positive (A) Negative Final   • Tricyclic Antidepressants Screen 07/08/2022 Negative  Negative Final   • Methadone Screen, Urine 07/08/2022 Negative  Negative Final   • Barbiturates Screen, Urine 07/08/2022 Negative  Negative Final   • Oxycodone Screen, Urine 07/08/2022 Negative  Negative Final   • Propoxyphene Screen 07/08/2022 Negative  Negative Final   • Buprenorphine, Screen, Urine 07/08/2022 Positive (A) Negative Final   • Color, UA 07/08/2022 Yellow  Yellow, Straw Final   • Appearance, UA  07/08/2022 Cloudy (A) Clear Final   • pH, UA 07/08/2022 6.5  5.0 - 8.0 Final   • Specific Gravity, UA 07/08/2022 1.016  1.005 - 1.030 Final   • Glucose, UA 07/08/2022 Negative  Negative Final   • Ketones, UA 07/08/2022 15 mg/dL (1+) (A) Negative Final   • Bilirubin, UA 07/08/2022 Negative  Negative Final   • Blood, UA 07/08/2022 Negative  Negative Final   • Protein, UA 07/08/2022 Negative  Negative Final   • Leuk Esterase, UA 07/08/2022 Moderate (2+) (A) Negative Final   • Nitrite, UA 07/08/2022 Negative  Negative Final   • Urobilinogen, UA 07/08/2022 1.0 E.U./dL  0.2 - 1.0 E.U./dL Final   • RBC, UA 07/08/2022 0-2  None Seen, 0-2 /HPF Final   • WBC, UA 07/08/2022 13-20 (A) None Seen, 0-2 /HPF Final   • Bacteria, UA 07/08/2022 1+ (A) None Seen /HPF Final   • Squamous Epithelial Cells, UA 07/08/2022 21-30 (A) None Seen, 0-2 /HPF Final   • Hyaline Casts, UA 07/08/2022 None Seen  None Seen /LPF Final   • Methodology 07/08/2022 Automated Microscopy   Final   • QT Interval 07/08/2022 356  ms Final   • QTC Interval 07/08/2022 415  ms Final   • Wound Culture 07/08/2022 Moderate growth (3+) Staphylococcus aureus, MRSA (A)  Final   • Gram Stain 07/08/2022 Many (4+) WBCs seen   Final   • Gram Stain 07/08/2022 Many (4+) Gram positive cocci   Final   • Gram Stain 07/08/2022 Moderate (3+) Gram negative bacilli   Final   Admission on 07/07/2022, Discharged on 07/07/2022   Component Date Value Ref Range Status   • HCG, Urine QL 07/07/2022 Negative  Negative Final   • Color, UA 07/07/2022 Dark Yellow (A) Yellow, Straw Final   • Appearance, UA 07/07/2022 Turbid (A) Clear Final   • pH, UA 07/07/2022 6.0  5.0 - 8.0 Final   • Specific Gravity, UA 07/07/2022 1.024  1.005 - 1.030 Final   • Glucose, UA 07/07/2022 Negative  Negative Final   • Ketones, UA 07/07/2022 Negative  Negative Final   • Bilirubin, UA 07/07/2022 Negative  Negative Final   • Blood, UA 07/07/2022 Negative  Negative Final   • Protein, UA 07/07/2022 Trace (A) Negative  Final   • Leuk Esterase, UA 07/07/2022 Moderate (2+) (A) Negative Final   • Nitrite, UA 07/07/2022 Positive (A) Negative Final   • Urobilinogen, UA 07/07/2022 1.0 E.U./dL  0.2 - 1.0 E.U./dL Final   • THC, Screen, Urine 07/07/2022 Negative  Negative Final   • Phencyclidine (PCP), Urine 07/07/2022 Negative  Negative Final   • Cocaine Screen, Urine 07/07/2022 Negative  Negative Final   • Methamphetamine, Ur 07/07/2022 Positive (A) Negative Final   • Opiate Screen 07/07/2022 Negative  Negative Final   • Amphetamine Screen, Urine 07/07/2022 Positive (A) Negative Final   • Benzodiazepine Screen, Urine 07/07/2022 Negative  Negative Final   • Tricyclic Antidepressants Screen 07/07/2022 Negative  Negative Final   • Methadone Screen, Urine 07/07/2022 Negative  Negative Final   • Barbiturates Screen, Urine 07/07/2022 Negative  Negative Final   • Oxycodone Screen, Urine 07/07/2022 Negative  Negative Final   • Propoxyphene Screen 07/07/2022 Negative  Negative Final   • Buprenorphine, Screen, Urine 07/07/2022 Positive (A) Negative Final   • RBC, UA 07/07/2022 0-2  None Seen, 0-2 /HPF Final   • WBC, UA 07/07/2022 21-30 (A) None Seen, 0-2 /HPF Final   • Bacteria, UA 07/07/2022 4+ (A) None Seen /HPF Final   • Squamous Epithelial Cells, UA 07/07/2022 31-50 (A) None Seen, 0-2 /HPF Final   • Hyaline Casts, UA 07/07/2022 None Seen  None Seen /LPF Final   • Sperm, UA 07/07/2022 Occasional (A) None Seen /HPF Final   • Methodology 07/07/2022 Manual Light Microscopy   Final   • Urine Culture 07/07/2022 >100,000 CFU/mL Escherichia coli (A)  Final         Assessment & Plan   Diagnoses and all orders for this visit:    1. Neck abscess (Primary)    Other orders  -     clindamycin (Cleocin) 300 MG capsule; Take 1 capsule by mouth 3 (Three) Times a Day.  Dispense: 30 capsule; Refill: 0    Sailaja is a 26-year-old female who presents status post incision and drainage.  Patient signed out AMA from inpatient care several days ago.  Patient reports  that she did not tolerate first antibiotic.  She only took several doses.  I have encouraged her to stop antibiotic.  She will begin taking clindamycin today.  Patient reports that she has been taking Motrin and Tylenol for pain.  I encouraged her to stop taking Motrin and only take Tylenol as needed.  Patient verbalized understanding to stop Motrin and begin taking clindamycin.  She will follow-up with Dr. Grayson in 1 week.  She verbalized understanding return sooner if symptoms worsen.    Visit Diagnoses:    ICD-10-CM ICD-9-CM   1. Neck abscess  L02.11 682.1         MEDS ORDERED DURING VISIT:  New Medications Ordered This Visit   Medications   • clindamycin (Cleocin) 300 MG capsule     Sig: Take 1 capsule by mouth 3 (Three) Times a Day.     Dispense:  30 capsule     Refill:  0       No follow-ups on file.             This document has been electronically signed by BIANCA Monae  July 12, 2022 15:10 EDT    Please note that portions of this note were completed with a voice recognition program.

## 2022-07-12 NOTE — DISCHARGE SUMMARY
Date of Discharge:  7/12/2022    Discharge Diagnosis: Left neck abscess    Presenting Problem/History of Present Illness  Active Hospital Problems    Diagnosis  POA   • Neck abscess [L02.11]  Yes   • Abscess, neck [L02.11]  Unknown   • Cutaneous abscess of neck [L02.11]  Unknown      Resolved Hospital Problems   No resolved problems to display.          Hospital Course  26-year-old female presenting to the emergency department with pain and worsening acute findings consistent with abscess deep to the sternocleidomastoid.  Incidentally she was noted to have early pregnancy of unknown gestational age below quantitative hCG.  Risks and benefits were discussed with the patient and she was taken to the operating room for incision and drainage.  Unfortunately however postoperatively she left AGAINST MEDICAL ADVICE.  She has a Penrose drain in the left neck.    Procedures Performed    Procedure(s):  HEAD NECK ABSCESS INCISION AND DRAINAGE  -------------------       Consults:   Consults     No orders found from 6/9/2022 to 7/9/2022.          Discharge Disposition  Left Against Medical Advice    Discharge Medications     Discharge Medications      ASK your doctor about these medications      Instructions Start Date   buprenorphine-naloxone 8-2 MG per SL tablet  Commonly known as: SUBOXONE   1 tablet, Sublingual, 2 Times Daily      sulfamethoxazole-trimethoprim 800-160 MG per tablet  Commonly known as: BACTRIM DS,SEPTRA DS   1 tablet, Oral, 2 Times Daily             Discharge Diet:     Activity at Discharge:     Follow-up Appointments  Future Appointments   Date Time Provider Department Center   7/12/2022  2:45 PM Leonard Paulino, BIANCA [facility]         Test Results Pending at Discharge  Pending Labs     Order Current Status    Blood Culture - Blood, Arm, Left Preliminary result    Blood Culture - Blood, Arm, Right Preliminary result           Miquel Grayson MD  07/12/22  08:25 EDT    Time: Discharge  15 min

## 2022-07-13 LAB
BACTERIA SPEC AEROBE CULT: NORMAL
BACTERIA SPEC AEROBE CULT: NORMAL

## 2022-07-20 ENCOUNTER — OFFICE VISIT (OUTPATIENT)
Dept: SURGERY | Facility: CLINIC | Age: 27
End: 2022-07-20

## 2022-07-20 VITALS — HEIGHT: 63 IN | BODY MASS INDEX: 23.21 KG/M2 | WEIGHT: 131 LBS

## 2022-07-20 DIAGNOSIS — L02.11 ABSCESS, NECK: Primary | ICD-10-CM

## 2022-07-20 DIAGNOSIS — L02.11 CUTANEOUS ABSCESS OF NECK: ICD-10-CM

## 2022-07-20 PROCEDURE — 99024 POSTOP FOLLOW-UP VISIT: CPT | Performed by: SURGERY

## 2022-07-20 NOTE — PROGRESS NOTES
Subjective   Sailaja Alarcon is a 26 y.o. female  is here today for follow-up.         Sailaja Alarcon is a 26 y.o. female here for follow up after drainage of left neck abscess.  No evidence of persistent infection.  Incisions healing well.  Penrose drain removed in the office today.  Follow-up as needed.          Assessment     Diagnoses and all orders for this visit:    1. Abscess, neck (Primary)    2. Cutaneous abscess of neck      Sailaja Alarcon is a 26 y.o. female status post incision and drainage of left neck abscess secondary to IV drug abuse.  Penrose drain removed in the office today.  Follow-up as needed.

## 2022-07-25 ENCOUNTER — NURSE TRIAGE (OUTPATIENT)
Dept: CALL CENTER | Facility: HOSPITAL | Age: 27
End: 2022-07-25

## 2022-07-25 NOTE — TELEPHONE ENCOUNTER
"    Reason for Disposition  • [1] Pus or bad-smelling fluid draining from incision AND [2] no fever    Additional Information  • Negative: [1] Major abdominal surgical incision AND [2] wound gaping open AND [3] visible internal organs  • Negative: Sounds like a life-threatening emergency to the triager  • Negative: Patient has a Negative Pressure Wound Therapy device  • Negative: Patient is followed by a wound clinic or wound specialist for this wound  • Negative: [1] Bleeding from incision AND [2] won't stop after 10 minutes of direct pressure  • Negative: [1] Widespread rash AND [2] bright red, sunburn-like  • Negative: Severe pain in the incision  • Negative: [1] Incision gaping open AND [2] < 48 hours since wound re-opened  • Negative: [1] Incision gaping open AND [2] length of opening > 2 inches (5 cm)  • Negative: Patient sounds very sick or weak to the triager  • Negative: Sounds like a serious complication to the triager  • Negative: Fever > 100.4 F (38.0 C)  • Negative: [1] Incision looks infected (spreading redness, pain) AND [2] fever > 99.5 F (37.5 C)  • Negative: [1] Incision looks infected (spreading redness, pain) AND [2] large red area (> 2 in. or 5 cm)  • Negative: [1] Incision looks infected (spreading redness, pain) AND [2] face wound  • Negative: [1] Red streak runs from the incision AND [2] longer than 1 inch (2.5 cm)    Answer Assessment - Initial Assessment Questions  1. SYMPTOM: \"What's the main symptom you're concerned about?\" (e.g., redness, pain, drainage)      Drainage from where drain inneck was.  2. ONSET: \"When did d/c start?\"     tonight  3. SURGERY: \"What surgery was performed?\"      Neck surgery  4. DATE of SURGERY: \"When was surgery performed?\"       A couple weeks ago  5. INISION SITE: \"Where is the incision located?\"       On neck  6. REDNESS: \"Is there any redness at the incision site?\" If yes, ask: \"How wide across is the redness?\" (Inches, centimeters)       no  7. PAIN: \"Is " "there any pain?\" If Yes, ask: \"How bad is it?\"  (Scale 1-10; or mild, moderate, severe)      slightly  8. BLEEDING: \"Is there any bleeding?\" If Yes, ask: \"How much?\" and \"Where?\"      No bleeding  9. DRAINAGE: \"Is there any drainage from the incision site?\" If yes, ask: \"What color and how much?\" (e.g., red, cloudy, pus; drops, teaspoon)      Yes greenish  10. FEVER: \"Do you have a fever?\" If Yes, ask: \"What is your temperature, how was it measured, and when did it start?\"        afebrile  11. OTHER SYMPTOMS: \"Do you have any other symptoms?\" (e.g., shaking chills, weakness, rash elsewhere on body)        denies    Protocols used: POST-OP INCISION SYMPTOMS AND QUESTIONS-ADULT-      "

## 2022-07-26 ENCOUNTER — OFFICE VISIT (OUTPATIENT)
Dept: SURGERY | Facility: CLINIC | Age: 27
End: 2022-07-26

## 2022-07-26 VITALS — HEIGHT: 63 IN | WEIGHT: 131 LBS | BODY MASS INDEX: 23.21 KG/M2

## 2022-07-26 DIAGNOSIS — L02.11 ABSCESS, NECK: Primary | ICD-10-CM

## 2022-07-26 PROCEDURE — 99024 POSTOP FOLLOW-UP VISIT: CPT | Performed by: SURGERY

## 2022-07-26 NOTE — PROGRESS NOTES
Subjective   Sailaja Alarcon is a 26 y.o. female  is here today for follow-up.         Sailaja Alarcon is a 26 y.o. female here for follow up after incision and drainage of left neck abscess.  She is concerned for some drainage from her wound.  The drain was removed in the office last visit and she has no evidence of recurrent infection.  She has some mild drainage from the granulating tissue at the drain site.        Assessment     Diagnoses and all orders for this visit:    1. Abscess, neck (Primary)      Sailaja Alarcon is a 26 y.o. female status post incision and drainage of left neck with some granulation tissue that is causing occasional proteinaceous drainage from the drain site.  Follow-up as needed.

## 2022-10-24 ENCOUNTER — TRANSCRIBE ORDERS (OUTPATIENT)
Dept: OTHER | Facility: OTHER | Age: 27
End: 2022-10-24

## 2022-10-24 ENCOUNTER — LAB (OUTPATIENT)
Dept: LAB | Facility: HOSPITAL | Age: 27
End: 2022-10-24

## 2022-10-24 DIAGNOSIS — Z34.82 PRENATAL CARE, SUBSEQUENT PREGNANCY, SECOND TRIMESTER: ICD-10-CM

## 2022-10-24 DIAGNOSIS — Z34.82 PRENATAL CARE, SUBSEQUENT PREGNANCY, SECOND TRIMESTER: Primary | ICD-10-CM

## 2022-10-24 DIAGNOSIS — Z3A.16 16 WEEKS GESTATION OF PREGNANCY: ICD-10-CM

## 2022-10-24 LAB
ABO GROUP BLD: NORMAL
BLD GP AB SCN SERPL QL: NEGATIVE
C TRACH RRNA CVX QL NAA+PROBE: NOT DETECTED
N GONORRHOEA RRNA SPEC QL NAA+PROBE: NOT DETECTED
RH BLD: NEGATIVE
T&S EXPIRATION DATE: NORMAL

## 2022-10-24 PROCEDURE — 86765 RUBEOLA ANTIBODY: CPT

## 2022-10-24 PROCEDURE — 86803 HEPATITIS C AB TEST: CPT

## 2022-10-24 PROCEDURE — 86706 HEP B SURFACE ANTIBODY: CPT

## 2022-10-24 PROCEDURE — 80053 COMPREHEN METABOLIC PANEL: CPT

## 2022-10-24 PROCEDURE — 36415 COLL VENOUS BLD VENIPUNCTURE: CPT

## 2022-10-24 PROCEDURE — 87086 URINE CULTURE/COLONY COUNT: CPT

## 2022-10-24 PROCEDURE — 87491 CHLMYD TRACH DNA AMP PROBE: CPT

## 2022-10-24 PROCEDURE — 80081 OBSTETRIC PANEL INC HIV TSTG: CPT

## 2022-10-24 PROCEDURE — 86735 MUMPS ANTIBODY: CPT

## 2022-10-24 PROCEDURE — 87591 N.GONORRHOEAE DNA AMP PROB: CPT

## 2022-10-24 PROCEDURE — 86704 HEP B CORE ANTIBODY TOTAL: CPT

## 2022-10-25 LAB
ALBUMIN SERPL-MCNC: 4.1 G/DL (ref 3.5–5.2)
ALBUMIN/GLOB SERPL: 1.2 G/DL
ALP SERPL-CCNC: 70 U/L (ref 39–117)
ALT SERPL W P-5'-P-CCNC: 10 U/L (ref 1–33)
ANION GAP SERPL CALCULATED.3IONS-SCNC: 13.6 MMOL/L (ref 5–15)
AST SERPL-CCNC: 10 U/L (ref 1–32)
BACTERIA SPEC AEROBE CULT: NO GROWTH
BASOPHILS # BLD AUTO: 0.02 10*3/MM3 (ref 0–0.2)
BASOPHILS NFR BLD AUTO: 0.5 % (ref 0–1.5)
BILIRUB SERPL-MCNC: 0.2 MG/DL (ref 0–1.2)
BUN SERPL-MCNC: 9 MG/DL (ref 6–20)
BUN/CREAT SERPL: 16.4 (ref 7–25)
CALCIUM SPEC-SCNC: 10.2 MG/DL (ref 8.6–10.5)
CHLORIDE SERPL-SCNC: 102 MMOL/L (ref 98–107)
CO2 SERPL-SCNC: 20.4 MMOL/L (ref 22–29)
CREAT SERPL-MCNC: 0.55 MG/DL (ref 0.57–1)
DEPRECATED RDW RBC AUTO: 47.2 FL (ref 37–54)
EGFRCR SERPLBLD CKD-EPI 2021: 128.2 ML/MIN/1.73
EOSINOPHIL # BLD AUTO: 0.03 10*3/MM3 (ref 0–0.4)
EOSINOPHIL NFR BLD AUTO: 0.7 % (ref 0.3–6.2)
ERYTHROCYTE [DISTWIDTH] IN BLOOD BY AUTOMATED COUNT: 13.6 % (ref 12.3–15.4)
GLOBULIN UR ELPH-MCNC: 3.3 GM/DL
GLUCOSE SERPL-MCNC: 72 MG/DL (ref 65–99)
HBV CORE AB SERPL QL IA: NEGATIVE
HBV SURFACE AB SER QL: REACTIVE
HBV SURFACE AG SERPL QL IA: NEGATIVE
HCT VFR BLD AUTO: 36 % (ref 34–46.6)
HCV AB SER DONR QL: REACTIVE
HGB BLD-MCNC: 12 G/DL (ref 12–15.9)
HIV1+2 AB SER QL: NORMAL
IMM GRANULOCYTES # BLD AUTO: 0.02 10*3/MM3 (ref 0–0.05)
IMM GRANULOCYTES NFR BLD AUTO: 0.5 % (ref 0–0.5)
IMP & REVIEW OF LAB RESULTS: NORMAL
LABORATORY COMMENT REPORT: NORMAL
LYMPHOCYTES # BLD AUTO: 0.96 10*3/MM3 (ref 0.7–3.1)
LYMPHOCYTES NFR BLD AUTO: 22.7 % (ref 19.6–45.3)
MCH RBC QN AUTO: 31.6 PG (ref 26.6–33)
MCHC RBC AUTO-ENTMCNC: 33.3 G/DL (ref 31.5–35.7)
MCV RBC AUTO: 94.7 FL (ref 79–97)
MEV IGG SER IA-ACNC: <13.5 AU/ML
MONOCYTES # BLD AUTO: 0.25 10*3/MM3 (ref 0.1–0.9)
MONOCYTES NFR BLD AUTO: 5.9 % (ref 5–12)
MUV IGG SER IA-ACNC: 10.4 AU/ML
NEUTROPHILS NFR BLD AUTO: 2.95 10*3/MM3 (ref 1.7–7)
NEUTROPHILS NFR BLD AUTO: 69.7 % (ref 42.7–76)
NRBC BLD AUTO-RTO: 0 /100 WBC (ref 0–0.2)
PLATELET # BLD AUTO: 168 10*3/MM3 (ref 140–450)
PMV BLD AUTO: 11.8 FL (ref 6–12)
POTASSIUM SERPL-SCNC: 4.5 MMOL/L (ref 3.5–5.2)
PROT SERPL-MCNC: 7.4 G/DL (ref 6–8.5)
RBC # BLD AUTO: 3.8 10*6/MM3 (ref 3.77–5.28)
RPR SER QL: NON REACTIVE
RUBV IGG SERPL IA-ACNC: 2.58 INDEX
SODIUM SERPL-SCNC: 136 MMOL/L (ref 136–145)
WBC NRBC COR # BLD: 4.23 10*3/MM3 (ref 3.4–10.8)

## 2022-12-09 ENCOUNTER — HOSPITAL ENCOUNTER (EMERGENCY)
Facility: HOSPITAL | Age: 27
Discharge: HOME | End: 2022-12-09
Attending: EMERGENCY MEDICINE | Admitting: EMERGENCY MEDICINE

## 2022-12-09 VITALS
TEMPERATURE: 97.7 F | OXYGEN SATURATION: 100 % | HEIGHT: 63 IN | SYSTOLIC BLOOD PRESSURE: 112 MMHG | HEART RATE: 70 BPM | RESPIRATION RATE: 16 BRPM | BODY MASS INDEX: 24.98 KG/M2 | WEIGHT: 141 LBS | DIASTOLIC BLOOD PRESSURE: 61 MMHG

## 2022-12-09 DIAGNOSIS — L02.91 ABSCESS: Primary | ICD-10-CM

## 2022-12-09 PROCEDURE — 99282 EMERGENCY DEPT VISIT SF MDM: CPT

## 2022-12-09 RX ORDER — MUPIROCIN CALCIUM 20 MG/G
1 CREAM TOPICAL 3 TIMES DAILY
Qty: 15 G | Refills: 0 | Status: SHIPPED | OUTPATIENT
Start: 2022-12-09 | End: 2023-02-23

## 2022-12-09 NOTE — DISCHARGE INSTRUCTIONS
Continue taking Clindamycin. You should follow up with your OB and your primary care provider. Return immediately to the ER if you develop fever, worsening pain/swelling, streaking, new symptoms or worsening symptoms.

## 2022-12-10 NOTE — ED PROVIDER NOTES
Subjective   History of Present Illness  27 yo female patient presents to the ED with abscess on the left lower leg.  Patient states she noticed this area 2 days ago and now her leg is more swollen and tender. Patient is 28 weeks pregnant. Patient states she is currently taking Clindamycin. Patient denies any fevers. No injuries.     History provided by:  Patient   used: No        Review of Systems   Constitutional: Negative.    HENT: Negative.    Eyes: Negative.    Respiratory: Negative.    Cardiovascular: Negative.    Gastrointestinal: Negative.    Endocrine: Negative.    Genitourinary: Negative.    Skin: Positive for wound.   Allergic/Immunologic: Negative.    Neurological: Negative.    Hematological: Negative.    Psychiatric/Behavioral: Negative.    All other systems reviewed and are negative.      Past Medical History:   Diagnosis Date   • Hepatitis C antibody positive in blood    • Migraines        Allergies   Allergen Reactions   • Amoxicillin Anaphylaxis   • Penicillins Anaphylaxis   • Vancomycin Itching     Pt states she had full body itch and burning sensation, and she turned red. Reaction potentially Red Man Syndrome.   • Zofran [Ondansetron Hcl] Hives and Rash       Past Surgical History:   Procedure Laterality Date   • HEAD/NECK ABSCESS INCISION AND DRAINAGE Left 7/8/2022    Procedure: HEAD NECK ABSCESS INCISION AND DRAINAGE;  Surgeon: Miquel Grayson MD;  Location: [facility] OR;  Service: General;  Laterality: Left;   • INCISION AND DRAINAGE ABSCESS Right 10/10/2021    Procedure: INCISION AND DRAINAGE ABSCESS CENTRAL LINE PLACEMENT;  Surgeon: Naz Payne MD;  Location: [facility] OR;  Service: General;  Laterality: Right;  I&D ABSCESS= INFECTED.  CENTRAL LINE = CLEAN.       Family History   Problem Relation Age of Onset   • Hypertension Mother    • Cancer Father    • No Known Problems Sister    • No Known Problems Brother    • No Known Problems Son    • No Known Problems  Daughter    • Hypertension Maternal Grandmother    • Diabetes Maternal Grandfather    • No Known Problems Paternal Grandmother    • No Known Problems Paternal Grandfather    • No Known Problems Cousin    • Diabetes Maternal Aunt    • Rheum arthritis Neg Hx    • Osteoarthritis Neg Hx    • Asthma Neg Hx    • Heart failure Neg Hx    • Hyperlipidemia Neg Hx    • Migraines Neg Hx    • Rashes / Skin problems Neg Hx    • Seizures Neg Hx    • Stroke Neg Hx    • Thyroid disease Neg Hx        Social History     Socioeconomic History   • Marital status:    Tobacco Use   • Smoking status: Every Day     Packs/day: 1.00     Years: 15.00     Pack years: 15.00     Types: Cigarettes   • Smokeless tobacco: Never   Vaping Use   • Vaping Use: Never used   Substance and Sexual Activity   • Alcohol use: No     Alcohol/week: 0.0 standard drinks   • Drug use: Not Currently     Frequency: 3.0 times per week     Types: IV     Comment: last use 3 days ago (10/26/18)   • Sexual activity: Yes           Objective   Physical Exam  Vitals and nursing note reviewed.   Constitutional:       Appearance: Normal appearance. She is normal weight.   HENT:      Head: Normocephalic and atraumatic.      Right Ear: External ear normal.      Left Ear: External ear normal.      Nose: Nose normal.      Mouth/Throat:      Mouth: Mucous membranes are moist.      Pharynx: Oropharynx is clear.   Eyes:      Extraocular Movements: Extraocular movements intact.      Conjunctiva/sclera: Conjunctivae normal.      Pupils: Pupils are equal, round, and reactive to light.   Cardiovascular:      Rate and Rhythm: Normal rate and regular rhythm.      Pulses: Normal pulses.      Heart sounds: Normal heart sounds.   Pulmonary:      Effort: Pulmonary effort is normal.      Breath sounds: Normal breath sounds.   Abdominal:      General: Abdomen is flat. Bowel sounds are normal.      Palpations: Abdomen is soft.   Musculoskeletal:         General: Normal range of motion.       Cervical back: Normal range of motion and neck supple.   Skin:     General: Skin is warm and dry.      Capillary Refill: Capillary refill takes less than 2 seconds.          Neurological:      General: No focal deficit present.      Mental Status: She is alert and oriented to person, place, and time. Mental status is at baseline.   Psychiatric:         Mood and Affect: Mood normal.         Behavior: Behavior normal.         Thought Content: Thought content normal.         Judgment: Judgment normal.         Procedures           ED Course  ED Course as of 12/09/22 1928   Fri Dec 09, 2022   1813 Pt refused IV and blood work. PT states she is a really hard stick and she will come back if it worsens. Patient is currently taking Clindamycin for dental infection. Patient instructed to continue taking. Instructions to f/u with PCP and OB. Discussed sx and red flags that would warrant return to the ED.  She voices understanding.   [ML]      ED Course User Index  [ML] Halina Martin PA                                           MDM  Number of Diagnoses or Management Options  Risk of Complications, Morbidity, and/or Mortality  Presenting problems: low  Diagnostic procedures: low  Management options: low    Patient Progress  Patient progress: improved      Final diagnoses:   Abscess       ED Disposition  ED Disposition     ED Disposition   Discharge    Condition   Stable    Comment   --             Margaret Baxter, BIANCA  [address]  [facility] [address] [phone]    Schedule an appointment as soon as possible for a visit in 3 days      Sheyla Arenas MD  [address] [facility] [address]  [phone]    Schedule an appointment as soon as possible for a visit in 3 days           Medication List      New Prescriptions    mupirocin 2 % cream  Commonly known as: BACTROBAN  Apply 1 application topically to the appropriate area as directed 3 (Three) Times a Day.           Where to Get Your  Medications      These medications were sent to [place], [place] - [address] [place] [address] - [phone]  - [phone] [address] [address]    Phone: [phone]   · mupirocin 2 % cream          Halina Martin PA  12/09/22 3685

## 2023-01-05 ENCOUNTER — HOSPITAL ENCOUNTER (OUTPATIENT)
Facility: HOSPITAL | Age: 28
Discharge: HOME | End: 2023-01-06
Attending: OBSTETRICS & GYNECOLOGY | Admitting: OBSTETRICS & GYNECOLOGY
Payer: COMMERCIAL

## 2023-01-05 PROBLEM — Z34.90 PREGNANCY: Status: ACTIVE | Noted: 2023-01-05

## 2023-01-05 LAB
A1 MICROGLOB PLACENTAL VAG QL: NEGATIVE
ABO GROUP BLD: NORMAL
ALBUMIN SERPL-MCNC: 3.7 G/DL (ref 3.5–5.2)
ALBUMIN/GLOB SERPL: 1.3 G/DL
ALP SERPL-CCNC: 74 U/L (ref 39–117)
ALT SERPL W P-5'-P-CCNC: 6 U/L (ref 1–33)
AMPHET+METHAMPHET UR QL: NEGATIVE
AMPHETAMINES UR QL: NEGATIVE
ANION GAP SERPL CALCULATED.3IONS-SCNC: 12.7 MMOL/L (ref 5–15)
AST SERPL-CCNC: 12 U/L (ref 1–32)
BARBITURATES UR QL SCN: NEGATIVE
BENZODIAZ UR QL SCN: NEGATIVE
BILIRUB SERPL-MCNC: 0.4 MG/DL (ref 0–1.2)
BILIRUB UR QL STRIP: NEGATIVE
BLD GP AB SCN SERPL QL: NEGATIVE
BUN SERPL-MCNC: 10 MG/DL (ref 6–20)
BUN/CREAT SERPL: 18.2 (ref 7–25)
BUPRENORPHINE SERPL-MCNC: POSITIVE NG/ML
CALCIUM SPEC-SCNC: 9.1 MG/DL (ref 8.6–10.5)
CANNABINOIDS SERPL QL: NEGATIVE
CHLORIDE SERPL-SCNC: 103 MMOL/L (ref 98–107)
CLARITY UR: CLEAR
CO2 SERPL-SCNC: 17.3 MMOL/L (ref 22–29)
COCAINE UR QL: NEGATIVE
COLOR UR: ABNORMAL
CREAT SERPL-MCNC: 0.55 MG/DL (ref 0.57–1)
DEPRECATED RDW RBC AUTO: 41.3 FL (ref 37–54)
EGFRCR SERPLBLD CKD-EPI 2021: 127.4 ML/MIN/1.73
ERYTHROCYTE [DISTWIDTH] IN BLOOD BY AUTOMATED COUNT: 12.5 % (ref 12.3–15.4)
GLOBULIN UR ELPH-MCNC: 2.9 GM/DL
GLUCOSE SERPL-MCNC: 85 MG/DL (ref 65–99)
GLUCOSE UR STRIP-MCNC: NEGATIVE MG/DL
HCT VFR BLD AUTO: 32.8 % (ref 34–46.6)
HGB BLD-MCNC: 11.5 G/DL (ref 12–15.9)
HGB UR QL STRIP.AUTO: NEGATIVE
KETONES UR QL STRIP: ABNORMAL
LEUKOCYTE ESTERASE UR QL STRIP.AUTO: NEGATIVE
MCH RBC QN AUTO: 31.5 PG (ref 26.6–33)
MCHC RBC AUTO-ENTMCNC: 35.1 G/DL (ref 31.5–35.7)
MCV RBC AUTO: 89.9 FL (ref 79–97)
METHADONE UR QL SCN: NEGATIVE
NITRITE UR QL STRIP: NEGATIVE
OPIATES UR QL: NEGATIVE
OXYCODONE UR QL SCN: NEGATIVE
PCP UR QL SCN: NEGATIVE
PH UR STRIP.AUTO: 7 [PH] (ref 5–8)
PLATELET # BLD AUTO: 147 10*3/MM3 (ref 140–450)
PMV BLD AUTO: 10.7 FL (ref 6–12)
POTASSIUM SERPL-SCNC: 4.4 MMOL/L (ref 3.5–5.2)
PROPOXYPH UR QL: NEGATIVE
PROT SERPL-MCNC: 6.6 G/DL (ref 6–8.5)
PROT UR QL STRIP: ABNORMAL
RBC # BLD AUTO: 3.65 10*6/MM3 (ref 3.77–5.28)
RH BLD: NEGATIVE
SODIUM SERPL-SCNC: 133 MMOL/L (ref 136–145)
SP GR UR STRIP: 1.02 (ref 1–1.03)
T&S EXPIRATION DATE: NORMAL
TRICYCLICS UR QL SCN: NEGATIVE
UROBILINOGEN UR QL STRIP: ABNORMAL
WBC NRBC COR # BLD: 6.4 10*3/MM3 (ref 3.4–10.8)

## 2023-01-05 PROCEDURE — 86900 BLOOD TYPING SEROLOGIC ABO: CPT | Performed by: OBSTETRICS & GYNECOLOGY

## 2023-01-05 PROCEDURE — 85027 COMPLETE CBC AUTOMATED: CPT | Performed by: OBSTETRICS & GYNECOLOGY

## 2023-01-05 PROCEDURE — 86901 BLOOD TYPING SEROLOGIC RH(D): CPT | Performed by: OBSTETRICS & GYNECOLOGY

## 2023-01-05 PROCEDURE — 80053 COMPREHEN METABOLIC PANEL: CPT | Performed by: OBSTETRICS & GYNECOLOGY

## 2023-01-05 PROCEDURE — 86850 RBC ANTIBODY SCREEN: CPT | Performed by: OBSTETRICS & GYNECOLOGY

## 2023-01-05 PROCEDURE — 59025 FETAL NON-STRESS TEST: CPT

## 2023-01-05 PROCEDURE — 36415 COLL VENOUS BLD VENIPUNCTURE: CPT | Performed by: OBSTETRICS & GYNECOLOGY

## 2023-01-05 PROCEDURE — 80306 DRUG TEST PRSMV INSTRMNT: CPT | Performed by: OBSTETRICS & GYNECOLOGY

## 2023-01-05 PROCEDURE — 81003 URINALYSIS AUTO W/O SCOPE: CPT | Performed by: OBSTETRICS & GYNECOLOGY

## 2023-01-05 PROCEDURE — G0463 HOSPITAL OUTPT CLINIC VISIT: HCPCS

## 2023-01-05 PROCEDURE — 84112 EVAL AMNIOTIC FLUID PROTEIN: CPT | Performed by: OBSTETRICS & GYNECOLOGY

## 2023-01-05 PROCEDURE — 87086 URINE CULTURE/COLONY COUNT: CPT | Performed by: OBSTETRICS & GYNECOLOGY

## 2023-01-05 RX ORDER — SODIUM CHLORIDE 0.9 % (FLUSH) 0.9 %
10 SYRINGE (ML) INJECTION AS NEEDED
Status: DISCONTINUED | OUTPATIENT
Start: 2023-01-05 | End: 2023-01-06 | Stop reason: HOSPADM

## 2023-01-05 RX ORDER — SODIUM CHLORIDE 0.9 % (FLUSH) 0.9 %
3 SYRINGE (ML) INJECTION EVERY 12 HOURS SCHEDULED
Status: DISCONTINUED | OUTPATIENT
Start: 2023-01-05 | End: 2023-01-06 | Stop reason: HOSPADM

## 2023-01-05 RX ORDER — CALCIUM CARBONATE 200(500)MG
2 TABLET,CHEWABLE ORAL 2 TIMES DAILY PRN
Status: DISCONTINUED | OUTPATIENT
Start: 2023-01-05 | End: 2023-01-06 | Stop reason: HOSPADM

## 2023-01-05 RX ORDER — LIDOCAINE HYDROCHLORIDE 10 MG/ML
5 INJECTION, SOLUTION EPIDURAL; INFILTRATION; INTRACAUDAL; PERINEURAL AS NEEDED
Status: DISCONTINUED | OUTPATIENT
Start: 2023-01-05 | End: 2023-01-06 | Stop reason: HOSPADM

## 2023-01-05 RX ORDER — SODIUM CHLORIDE, SODIUM LACTATE, POTASSIUM CHLORIDE, CALCIUM CHLORIDE 600; 310; 30; 20 MG/100ML; MG/100ML; MG/100ML; MG/100ML
125 INJECTION, SOLUTION INTRAVENOUS CONTINUOUS
Status: DISCONTINUED | OUTPATIENT
Start: 2023-01-05 | End: 2023-01-06 | Stop reason: HOSPADM

## 2023-01-05 RX ADMIN — ANTACID TABLETS 2 TABLET: 500 TABLET, CHEWABLE ORAL at 23:20

## 2023-01-06 PROCEDURE — G0463 HOSPITAL OUTPT CLINIC VISIT: HCPCS

## 2023-01-06 PROCEDURE — 59025 FETAL NON-STRESS TEST: CPT

## 2023-01-06 NOTE — NON STRESS TEST
Sailaja Alarcon, a  at 28w4d with an CHU of 3/27/2023, Date entered prior to episode creation, was seen at [facility] LABOR DELIVERY for a nonstress test.    Chief Complaint   Patient presents with   • Abdominal Pain   • Back Pain       Patient Active Problem List   Diagnosis   • Cutaneous abscess of neck   • Abscess, neck   • Neck abscess   • Pregnancy       Start Time: 0  Stop Time: 2300    Interpretation A  Nonstress Test Interpretation A: Reactive          Audra Blue RN

## 2023-01-07 LAB — BACTERIA SPEC AEROBE CULT: NO GROWTH

## 2023-02-23 ENCOUNTER — APPOINTMENT (OUTPATIENT)
Dept: ULTRASOUND IMAGING | Facility: HOSPITAL | Age: 28
End: 2023-02-23
Payer: COMMERCIAL

## 2023-02-23 ENCOUNTER — HOSPITAL ENCOUNTER (OUTPATIENT)
Facility: HOSPITAL | Age: 28
Discharge: HOME | End: 2023-02-23
Attending: OBSTETRICS & GYNECOLOGY | Admitting: OBSTETRICS & GYNECOLOGY
Payer: COMMERCIAL

## 2023-02-23 VITALS
WEIGHT: 141.9 LBS | BODY MASS INDEX: 24.22 KG/M2 | HEART RATE: 59 BPM | SYSTOLIC BLOOD PRESSURE: 113 MMHG | RESPIRATION RATE: 18 BRPM | DIASTOLIC BLOOD PRESSURE: 65 MMHG | HEIGHT: 64 IN | TEMPERATURE: 98 F

## 2023-02-23 PROBLEM — O36.5990 IUGR (INTRAUTERINE GROWTH RESTRICTION) AFFECTING CARE OF MOTHER: Status: ACTIVE | Noted: 2023-02-23

## 2023-02-23 PROBLEM — Z34.90 PREGNANCY: Status: RESOLVED | Noted: 2023-01-05 | Resolved: 2023-02-23

## 2023-02-23 PROBLEM — Z34.90 PREGNANCY: Status: ACTIVE | Noted: 2023-02-23

## 2023-02-23 LAB
AMPHET+METHAMPHET UR QL: NEGATIVE
AMPHETAMINES UR QL: NEGATIVE
BARBITURATES UR QL SCN: NEGATIVE
BENZODIAZ UR QL SCN: NEGATIVE
BUPRENORPHINE SERPL-MCNC: POSITIVE NG/ML
CANNABINOIDS SERPL QL: NEGATIVE
COCAINE UR QL: NEGATIVE
METHADONE UR QL SCN: NEGATIVE
OPIATES UR QL: NEGATIVE
OXYCODONE UR QL SCN: NEGATIVE
PCP UR QL SCN: NEGATIVE
PROPOXYPH UR QL: NEGATIVE
TRICYCLICS UR QL SCN: NEGATIVE

## 2023-02-23 PROCEDURE — 76819 FETAL BIOPHYS PROFIL W/O NST: CPT | Performed by: RADIOLOGY

## 2023-02-23 PROCEDURE — 76805 OB US >/= 14 WKS SNGL FETUS: CPT | Performed by: RADIOLOGY

## 2023-02-23 PROCEDURE — G0463 HOSPITAL OUTPT CLINIC VISIT: HCPCS

## 2023-02-23 PROCEDURE — 76819 FETAL BIOPHYS PROFIL W/O NST: CPT

## 2023-02-23 PROCEDURE — 76805 OB US >/= 14 WKS SNGL FETUS: CPT

## 2023-02-23 PROCEDURE — 80306 DRUG TEST PRSMV INSTRMNT: CPT | Performed by: OBSTETRICS & GYNECOLOGY

## 2023-02-23 PROCEDURE — 59025 FETAL NON-STRESS TEST: CPT

## 2023-02-23 RX ORDER — NICOTINE 21 MG/24HR
1 PATCH, TRANSDERMAL 24 HOURS TRANSDERMAL
Status: DISCONTINUED | OUTPATIENT
Start: 2023-02-23 | End: 2023-02-23 | Stop reason: HOSPADM

## 2023-02-23 RX ADMIN — NICOTINE TRANSDERMAL SYSTEM 1 PATCH: 21 PATCH, EXTENDED RELEASE TRANSDERMAL at 13:20

## 2023-02-23 NOTE — DISCHARGE INSTRUCTIONS
Keep your regular scheduled follow up appt. Return to L&D for any signs or symptoms of labor, contractions, leaking of fluid, vaginal bleeding or if you are not feeling your baby move as usual.

## 2023-02-23 NOTE — NON STRESS TEST
Sailaja Alarcon, a  at 35w3d with an CHU of 3/27/2023, Date entered prior to episode creation, was seen at [facility] LABOR DELIVERY for a nonstress test.    Chief Complaint   Patient presents with   • Vaginal Bleeding     Vaginal pressure denies any lof and has +FM       Patient Active Problem List   Diagnosis   • Cutaneous abscess of neck   • Abscess, neck   • Neck abscess   • Pregnancy       Start Time: 1045  Stop Time: 1115    Interpretation A  Nonstress Test Interpretation A: Reactive  Comments A: Verified by Martha STYLES RN

## 2023-02-23 NOTE — NURSING NOTE
After discharge instructions completed and understanding verbalized patient left unit ambulatory without any questions or concerns voiced.

## 2023-02-23 NOTE — H&P
WYATT Jules  Obstetric History and Physical    Chief Complaint   Patient presents with   • Vaginal Bleeding     Vaginal pressure denies any lof and has +FM       Subjective     Patient is a 27 y.o. female  currently at 35w3d, who presents with contractions.  Cervix is 2 to 3 cm 50% effaced and weighs 2 posterior.  Patient's prenatal care has been complicated thus far by buprenorphine usage and IUGR.  She is scheduled for induction of labor at 37 weeks.    Her prenatal care is complicated by  abnormal fetal growth  IUGR opiate dependency and tobacco abuse.    The following portions of the patients history were reviewed and updated as appropriate: current medications, allergies, past medical history, past surgical history, past family history, past social history and problem list .       Prenatal Information:   Maternal Prenatal Labs  Blood Type No results found for: ABO   Rh Status No results found for: RH   Antibody Screen No results found for: ABSCRN   Rapid Urine Drug Screen Barbiturates Screen, Urine   Date Value Ref Range Status   2023 Negative Negative Final     Benzodiazepine Screen, Urine   Date Value Ref Range Status   2023 Negative Negative Final     Methadone Screen, Urine   Date Value Ref Range Status   2023 Negative Negative Final     Opiate Screen   Date Value Ref Range Status   2023 Negative Negative Final     THC, Screen, Urine   Date Value Ref Range Status   2023 Negative Negative Final     Cocaine Screen, Urine   Date Value Ref Range Status   2023 Negative Negative Final     Amphetamine Screen, Urine   Date Value Ref Range Status   2023 Negative Negative Final     Propoxyphene Screen   Date Value Ref Range Status   2023 Negative Negative Final     Buprenorphine, Screen, Urine   Date Value Ref Range Status   2023 Positive (A) Negative Final     Methamphetamine, Ur   Date Value Ref Range Status   2023 Negative Negative Final      Oxycodone Screen, Urine   Date Value Ref Range Status   02/23/2023 Negative Negative Final     Tricyclic Antidepressants Screen   Date Value Ref Range Status   02/23/2023 Negative Negative Final      Group B Strep Culture No results found for: GBSANTIGEN           External Prenatal Results     Pregnancy Outside Results - Transcribed From Office Records - See Scanned Records For Details     Test Value Date Time    ABO  AB  01/05/23 2252    Rh  Negative  01/05/23 2252    Antibody Screen  Negative  01/05/23 2252       Negative  10/24/22 1319       Negative  07/08/22 1411    Varicella IgG       Rubella  2.58 index 10/24/22 1319    Hgb  11.5 g/dL 01/05/23 2252       12.0 g/dL 10/24/22 1319       11.4 g/dL 07/08/22 1411    Hct  32.8 % 01/05/23 2252       36.0 % 10/24/22 1319       35.3 % 07/08/22 1411    Glucose Fasting GTT       Glucose Tolerance Test 1 hour       Glucose Tolerance Test 3 hour       Gonorrhea (discrete)  Not Detected  10/24/22 1333    Chlamydia (discrete)  Not Detected  10/24/22 1333    RPR  Non Reactive  10/24/22 1319    VDRL       Syphilis Antibody       HBsAg  Negative  10/24/22 1319    Herpes Simplex Virus PCR       Herpes Simplex VIrus Culture       HIV  Non-Reactive  10/24/22 1319    Hep C RNA Quant PCR       Hep C Antibody  Reactive  10/24/22 1319    AFP       Group B Strep  No Group B Streptococcus isolated  10/18/19 2109    GBS Susceptibility to Clindamycin       GBS Susceptibility to Erythromycin       Fetal Fibronectin       Genetic Testing, Maternal Blood             Drug Screening     Test Value Date Time    Urine Drug Screen       Amphetamine Screen  Negative  02/23/23 1036       Negative  01/05/23 2223       Positive  07/08/22 1623       Positive  07/07/22 1224    Barbiturate Screen  Negative  02/23/23 1036       Negative  01/05/23 2223       Negative  07/08/22 1623       Negative  07/07/22 1224    Benzodiazepine Screen  Negative  02/23/23 1036       Negative  01/05/23 2223        Positive  22 1623       Negative  22 1224    Methadone Screen  Negative  23 1036       Negative  23 2223       Negative  22 1623       Negative  22 1224    Phencyclidine Screen  Negative  23 1036       Negative  23 2223       Negative  22 1623       Negative  22 1224    Opiates Screen  Negative  23 1036       Negative  23 2223       Negative  22 1623       Negative  22 1224    THC Screen  Negative  23 1036       Negative  23 2223       Negative  22 1623       Negative  22 1224    Cocaine Screen       Propoxyphene Screen  Negative  23 1036       Negative  23 2223       Negative  22 1623       Negative  22 1224    Buprenorphine Screen  Positive  23 1036       Positive  23 2223       Positive  22 1623       Positive  22 1224    Methamphetamine Screen       Oxycodone Screen  Negative  23 1036       Negative  23 2223       Negative  22 1623       Negative  22 1224    Tricyclic Antidepressants Screen  Negative  23 1036       Negative  23 2223       Negative  22 1623       Negative  22 1224          Legend    ^: Historical                          Past OB History:     OB History    Para Term  AB Living   6 4 3 1 1 4   SAB IAB Ectopic Molar Multiple Live Births   0 0 0 0 0 4      # Outcome Date GA Lbr Crow/2nd Weight Sex Delivery Anes PTL Lv   6 Current            5 Term 21 38w4d / 00:06 2040 g (4 lb 8 oz) F Vag-Spont EPI N GHASSAN      Name: ROBERTSONLILLIANAGIRL      Apgar1: 9  Apgar5: 9   4 Term 10/19/19 40w2d 02:45 / 00:08 2120 g (4 lb 10.8 oz) F Vag-Spont EPI N GHASSAN      Name: FLORINA ROBERTSONNASGIRL      Apgar1: 8  Apgar5: 9   3 Term 02/10/18 37w0d  1729 g (3 lb 13 oz) F Vag-Spont EPI N GHASSAN   2  12/15/14 35w0d  1446 g (3 lb 3 oz) F Vag-Spont EPI Y GHASSAN   1 AB  5w0d    SAB          Past Medical  History: Past Medical History:   Diagnosis Date   • Hepatitis C    • Hepatitis C antibody positive in blood    • Migraines       Past Surgical History Past Surgical History:   Procedure Laterality Date   • HEAD/NECK ABSCESS INCISION AND DRAINAGE Left 7/8/2022    Procedure: HEAD NECK ABSCESS INCISION AND DRAINAGE;  Surgeon: Miquel Grayson MD;  Location: [facility] OR;  Service: General;  Laterality: Left;   • INCISION AND DRAINAGE ABSCESS Right 10/10/2021    Procedure: INCISION AND DRAINAGE ABSCESS CENTRAL LINE PLACEMENT;  Surgeon: Naz Payne MD;  Location: [facility] OR;  Service: General;  Laterality: Right;  I&D ABSCESS= INFECTED.  CENTRAL LINE = CLEAN.      Family History: Family History   Problem Relation Age of Onset   • Hypertension Mother    • Cancer Father    • No Known Problems Sister    • No Known Problems Brother    • No Known Problems Son    • No Known Problems Daughter    • Hypertension Maternal Grandmother    • Diabetes Maternal Grandfather    • No Known Problems Paternal Grandmother    • No Known Problems Paternal Grandfather    • No Known Problems Cousin    • Diabetes Maternal Aunt    • Rheum arthritis Neg Hx    • Osteoarthritis Neg Hx    • Asthma Neg Hx    • Heart failure Neg Hx    • Hyperlipidemia Neg Hx    • Migraines Neg Hx    • Rashes / Skin problems Neg Hx    • Seizures Neg Hx    • Stroke Neg Hx    • Thyroid disease Neg Hx       Social History:  reports that she has been smoking cigarettes. She has a 15.00 pack-year smoking history. She has never used smokeless tobacco.   reports no history of alcohol use.   reports that she does not currently use drugs after having used the following drugs: IV. Frequency: 3.00 times per week.        Review of Systems                  Review of Systems   Pertinent items are noted in HPI, all other systems reviewed and negative      Objective     Vital Signs Range for the last 24 hours  Temperature: Temp:  [98 °F (36.7 °C)] 98 °F (36.7 °C)   Temp Source:  Temp src: Oral   BP: BP: (113)/(65) 113/65   Pulse: Heart Rate:  [59] 59   Respirations: Resp:  [18] 18   Weight: Weight:  [64.4 kg (141 lb 14.4 oz)] 64.4 kg (141 lb 14.4 oz)     Physical Examination: General appearance - alert, well appearing, and in no distress  Chest - clear to auscultation, no wheezes, rales or rhonchi, symmetric air entry  Heart - normal rate, regular rhythm, normal S1, S2, no murmurs, rubs, clicks or gallops  Abdomen - soft, nontender, nondistended, no masses or organomegaly  Extremities - peripheral pulses normal, no pedal edema, no clubbing or cyanosis    Presentation:  Cephalic   Cervix: Exam by: Dr. Espinal   Dilation:  2 to 3 cm   Effacement: Cervical Effacement: 50%   Station:  -3 posterior         Assessment & Plan       Pregnancy      Assessment & Plan    Assessment/Plan:  1.  Intrauterine pregnancy at 35w3d weeks gestation with reactive fetal status.    2.   contractions will monitor and recheck.      Yaquelin Espinal DO  2023  14:38 EST

## 2023-02-23 NOTE — DISCHARGE SUMMARY
Discharge Summary    Admit Date: 2023 10:29 AM    Admit Diagnosis: Pregnancy [Z34.90]    Date of Discharge:  2023    Discharge Diagnosis: Same        Hospital Course  Patient is a 27 y.o. female, G 6, P 3114. Patient was observed for 4 hours and her cervix has been unchanged.  NST is reactive.  There was a small variable noted on tracing for less than 30 seconds and a BPP was performed with normal INDERJIT over 10 8 out of 8 score.  Fetal weight is 3 pounds 12 ounces less than the 3rd percentile.  Patient is scheduled for induction of labor secondary to IUGR next week.   labor and precautions are given and fetal kick count instructions are reviewed.  Patient is a visit in the office tomorrow.      Vital Signs  Temp:  [98 °F (36.7 °C)] 98 °F (36.7 °C)  Heart Rate:  [59] 59  Resp:  [18] 18  BP: (113)/(65) 113/65    Review of Systems    The following systems were reviewed and negative;  LOF, VB,  HA, scotomata, N/V     The following systems were reviewed and positive; Good fetal movement, contractions spaced out and feeling better.            Physical Exam:      General Appearance:   NAD   Head:   NC   Eyes:           PEARLA   Ears:  deferred   Throat: deferred   Neck: No JVD   Back:    No CVAT   Lungs:    CTAB    Heart:   RRR    Breast Exam:  deferred   Abdomen:    gravid uterus.  Non-tender   Genitalia:   2-3/50/-3/posterior   Extremities:  No c/c/e   Pulses:  Normal                  A/P:  1.   contractions and latent labor-labor precautions are reviewed.  Patient states she received steroids approximately 2 weeks ago.  She is scheduled for induction of labor at 37 weeks.  2.  IUGR continue to follow-up for  testing in the office.  3.  Opiate dependency on buprenorphine.  4.  Tobacco abuse patient was given nicotine patch here in the office but states she will continue to smoke her LD's at home.        Condition on Discharge:  Stable    Urine Output Good    Discharge Diet:  Regular    Follow-up Appointments  Patient will follow up in clinic sarah beth Espinal DO  02/23/23  14:45 EST

## 2023-02-24 ENCOUNTER — HOSPITAL ENCOUNTER (OUTPATIENT)
Facility: HOSPITAL | Age: 28
Discharge: HOME | End: 2023-02-24
Attending: OBSTETRICS & GYNECOLOGY | Admitting: OBSTETRICS & GYNECOLOGY
Payer: COMMERCIAL

## 2023-02-24 VITALS
BODY MASS INDEX: 24.31 KG/M2 | DIASTOLIC BLOOD PRESSURE: 71 MMHG | OXYGEN SATURATION: 99 % | HEIGHT: 64 IN | TEMPERATURE: 97.4 F | RESPIRATION RATE: 16 BRPM | SYSTOLIC BLOOD PRESSURE: 113 MMHG | WEIGHT: 142.4 LBS | HEART RATE: 73 BPM

## 2023-02-24 PROCEDURE — G0463 HOSPITAL OUTPT CLINIC VISIT: HCPCS

## 2023-02-24 PROCEDURE — 59025 FETAL NON-STRESS TEST: CPT

## 2023-02-24 RX ORDER — NICOTINE 21 MG/24HR
1 PATCH, TRANSDERMAL 24 HOURS TRANSDERMAL
Status: DISCONTINUED | OUTPATIENT
Start: 2023-02-24 | End: 2023-02-24 | Stop reason: HOSPADM

## 2023-02-24 NOTE — NURSING NOTE
After discharge instructions given and understanding verbalized patient left unit ambulatory without any questions or concerns voiced.

## 2023-02-24 NOTE — NON STRESS TEST
Sailaja Alarcon, a  at 35w4d with an CHU of 3/27/2023, Date entered prior to episode creation, was seen at [facility] LABOR DELIVERY for a nonstress test.    No chief complaint on file.      Patient Active Problem List   Diagnosis   • Cutaneous abscess of neck   • Abscess, neck   • Neck abscess   • Pregnancy   • IUGR (intrauterine growth restriction) affecting care of mother       Start Time: 1300  Stop Time: 1330    Interpretation A  Nonstress Test Interpretation A: Reactive  Comments A: Verfied by Ada STYLES RN                  94

## 2023-03-06 ENCOUNTER — ANESTHESIA EVENT (OUTPATIENT)
Dept: LABOR AND DELIVERY | Facility: HOSPITAL | Age: 28
End: 2023-03-06
Payer: COMMERCIAL

## 2023-03-06 ENCOUNTER — ANESTHESIA (OUTPATIENT)
Dept: LABOR AND DELIVERY | Facility: HOSPITAL | Age: 28
End: 2023-03-06
Payer: COMMERCIAL

## 2023-03-06 ENCOUNTER — HOSPITAL ENCOUNTER (OUTPATIENT)
Dept: LABOR AND DELIVERY | Facility: HOSPITAL | Age: 28
Discharge: HOME | End: 2023-03-06
Payer: COMMERCIAL

## 2023-03-06 ENCOUNTER — HOSPITAL ENCOUNTER (INPATIENT)
Facility: HOSPITAL | Age: 28
LOS: 2 days | Discharge: HOME | End: 2023-03-08
Attending: OBSTETRICS & GYNECOLOGY | Admitting: OBSTETRICS & GYNECOLOGY
Payer: COMMERCIAL

## 2023-03-06 DIAGNOSIS — O36.5990 POOR FETAL GROWTH AFFECTING MANAGEMENT OF MOTHER, ANTEPARTUM, SINGLE OR UNSPECIFIED FETUS: ICD-10-CM

## 2023-03-06 LAB
ABO GROUP BLD: NORMAL
AMPHET+METHAMPHET UR QL: NEGATIVE
AMPHETAMINES UR QL: NEGATIVE
BARBITURATES UR QL SCN: NEGATIVE
BASOPHILS # BLD AUTO: 0.04 10*3/MM3 (ref 0–0.2)
BASOPHILS NFR BLD AUTO: 0.4 % (ref 0–1.5)
BENZODIAZ UR QL SCN: NEGATIVE
BLD GP AB SCN SERPL QL: POSITIVE
BUPRENORPHINE SERPL-MCNC: POSITIVE NG/ML
CANNABINOIDS SERPL QL: NEGATIVE
COCAINE UR QL: NEGATIVE
DEPRECATED RDW RBC AUTO: 44.5 FL (ref 37–54)
EOSINOPHIL # BLD AUTO: 0.02 10*3/MM3 (ref 0–0.4)
EOSINOPHIL NFR BLD AUTO: 0.2 % (ref 0.3–6.2)
ERYTHROCYTE [DISTWIDTH] IN BLOOD BY AUTOMATED COUNT: 12.9 % (ref 12.3–15.4)
HCT VFR BLD AUTO: 37.7 % (ref 34–46.6)
HGB BLD-MCNC: 12.9 G/DL (ref 12–15.9)
IMM GRANULOCYTES # BLD AUTO: 0.04 10*3/MM3 (ref 0–0.05)
IMM GRANULOCYTES NFR BLD AUTO: 0.4 % (ref 0–0.5)
LYMPHOCYTES # BLD AUTO: 2.37 10*3/MM3 (ref 0.7–3.1)
LYMPHOCYTES NFR BLD AUTO: 26.6 % (ref 19.6–45.3)
MCH RBC QN AUTO: 32.1 PG (ref 26.6–33)
MCHC RBC AUTO-ENTMCNC: 34.2 G/DL (ref 31.5–35.7)
MCV RBC AUTO: 93.8 FL (ref 79–97)
METHADONE UR QL SCN: NEGATIVE
MONOCYTES # BLD AUTO: 0.5 10*3/MM3 (ref 0.1–0.9)
MONOCYTES NFR BLD AUTO: 5.6 % (ref 5–12)
NEUTROPHILS NFR BLD AUTO: 5.95 10*3/MM3 (ref 1.7–7)
NEUTROPHILS NFR BLD AUTO: 66.8 % (ref 42.7–76)
NRBC BLD AUTO-RTO: 0 /100 WBC (ref 0–0.2)
OPIATES UR QL: NEGATIVE
OXYCODONE UR QL SCN: NEGATIVE
PCP UR QL SCN: NEGATIVE
PLATELET # BLD AUTO: 148 10*3/MM3 (ref 140–450)
PMV BLD AUTO: 11.7 FL (ref 6–12)
PROPOXYPH UR QL: NEGATIVE
RBC # BLD AUTO: 4.02 10*6/MM3 (ref 3.77–5.28)
RESIDUAL RHIG DETECTED: NORMAL
RH BLD: NEGATIVE
T&S EXPIRATION DATE: NORMAL
TRICYCLICS UR QL SCN: NEGATIVE
WBC NRBC COR # BLD: 8.92 10*3/MM3 (ref 3.4–10.8)

## 2023-03-06 PROCEDURE — 86901 BLOOD TYPING SEROLOGIC RH(D): CPT | Performed by: OBSTETRICS & GYNECOLOGY

## 2023-03-06 PROCEDURE — 80306 DRUG TEST PRSMV INSTRMNT: CPT | Performed by: OBSTETRICS & GYNECOLOGY

## 2023-03-06 PROCEDURE — 86900 BLOOD TYPING SEROLOGIC ABO: CPT | Performed by: OBSTETRICS & GYNECOLOGY

## 2023-03-06 PROCEDURE — 25010000002 ROPIVACAINE PER 1 MG: Performed by: ANESTHESIOLOGY

## 2023-03-06 PROCEDURE — 86870 RBC ANTIBODY IDENTIFICATION: CPT | Performed by: OBSTETRICS & GYNECOLOGY

## 2023-03-06 PROCEDURE — C1755 CATHETER, INTRASPINAL: HCPCS

## 2023-03-06 PROCEDURE — 86850 RBC ANTIBODY SCREEN: CPT | Performed by: OBSTETRICS & GYNECOLOGY

## 2023-03-06 PROCEDURE — 3E033VJ INTRODUCTION OF OTHER HORMONE INTO PERIPHERAL VEIN, PERCUTANEOUS APPROACH: ICD-10-PCS | Performed by: OBSTETRICS & GYNECOLOGY

## 2023-03-06 PROCEDURE — C1755 CATHETER, INTRASPINAL: HCPCS | Performed by: ANESTHESIOLOGY

## 2023-03-06 PROCEDURE — 59025 FETAL NON-STRESS TEST: CPT

## 2023-03-06 PROCEDURE — 85025 COMPLETE CBC W/AUTO DIFF WBC: CPT | Performed by: OBSTETRICS & GYNECOLOGY

## 2023-03-06 PROCEDURE — 25010000002 OXYTOCIN PER 10 UNITS: Performed by: OBSTETRICS & GYNECOLOGY

## 2023-03-06 RX ORDER — SODIUM CHLORIDE 0.9 % (FLUSH) 0.9 %
1-10 SYRINGE (ML) INJECTION AS NEEDED
Status: DISCONTINUED | OUTPATIENT
Start: 2023-03-06 | End: 2023-03-08 | Stop reason: HOSPADM

## 2023-03-06 RX ORDER — ROPIVACAINE HYDROCHLORIDE 2 MG/ML
14 INJECTION, SOLUTION EPIDURAL; INFILTRATION; PERINEURAL CONTINUOUS
Status: DISCONTINUED | OUTPATIENT
Start: 2023-03-06 | End: 2023-03-06

## 2023-03-06 RX ORDER — HYDROCODONE BITARTRATE AND ACETAMINOPHEN 5; 325 MG/1; MG/1
1 TABLET ORAL EVERY 4 HOURS PRN
Status: DISCONTINUED | OUTPATIENT
Start: 2023-03-06 | End: 2023-03-07

## 2023-03-06 RX ORDER — IBUPROFEN 600 MG/1
600 TABLET ORAL EVERY 6 HOURS PRN
Status: DISCONTINUED | OUTPATIENT
Start: 2023-03-06 | End: 2023-03-08 | Stop reason: HOSPADM

## 2023-03-06 RX ORDER — BUPIVACAINE HYDROCHLORIDE 2.5 MG/ML
INJECTION, SOLUTION EPIDURAL; INFILTRATION; INTRACAUDAL AS NEEDED
Status: DISCONTINUED | OUTPATIENT
Start: 2023-03-06 | End: 2023-03-06 | Stop reason: SURG

## 2023-03-06 RX ORDER — CHOLECALCIFEROL (VITAMIN D3) 125 MCG
10 CAPSULE ORAL NIGHTLY PRN
COMMUNITY

## 2023-03-06 RX ORDER — HYDROXYZINE HYDROCHLORIDE 25 MG/1
50 TABLET, FILM COATED ORAL NIGHTLY PRN
Status: DISCONTINUED | OUTPATIENT
Start: 2023-03-06 | End: 2023-03-08 | Stop reason: HOSPADM

## 2023-03-06 RX ORDER — PRENATAL VIT/IRON FUM/FOLIC AC 27MG-0.8MG
1 TABLET ORAL DAILY
Status: DISCONTINUED | OUTPATIENT
Start: 2023-03-07 | End: 2023-03-08 | Stop reason: HOSPADM

## 2023-03-06 RX ORDER — CARBOPROST TROMETHAMINE 250 UG/ML
250 INJECTION, SOLUTION INTRAMUSCULAR
Status: DISCONTINUED | OUTPATIENT
Start: 2023-03-06 | End: 2023-03-08 | Stop reason: HOSPADM

## 2023-03-06 RX ORDER — LIDOCAINE HYDROCHLORIDE AND EPINEPHRINE 10; 10 MG/ML; UG/ML
INJECTION, SOLUTION INFILTRATION; PERINEURAL AS NEEDED
Status: DISCONTINUED | OUTPATIENT
Start: 2023-03-06 | End: 2023-03-06 | Stop reason: SURG

## 2023-03-06 RX ORDER — MISOPROSTOL 100 UG/1
600 TABLET ORAL ONCE AS NEEDED
Status: DISCONTINUED | OUTPATIENT
Start: 2023-03-06 | End: 2023-03-08 | Stop reason: HOSPADM

## 2023-03-06 RX ORDER — MAGNESIUM HYDROXIDE 1200 MG/15ML
1000 LIQUID ORAL ONCE AS NEEDED
Status: DISCONTINUED | OUTPATIENT
Start: 2023-03-06 | End: 2023-03-06 | Stop reason: HOSPADM

## 2023-03-06 RX ORDER — OXYTOCIN/0.9 % SODIUM CHLORIDE 30/500 ML
999 PLASTIC BAG, INJECTION (ML) INTRAVENOUS ONCE
Status: DISCONTINUED | OUTPATIENT
Start: 2023-03-06 | End: 2023-03-06 | Stop reason: HOSPADM

## 2023-03-06 RX ORDER — ACETAMINOPHEN 325 MG/1
650 TABLET ORAL EVERY 6 HOURS PRN
Status: DISCONTINUED | OUTPATIENT
Start: 2023-03-06 | End: 2023-03-08 | Stop reason: HOSPADM

## 2023-03-06 RX ORDER — IBUPROFEN 800 MG/1
800 TABLET ORAL 2 TIMES DAILY PRN
COMMUNITY
End: 2023-03-08 | Stop reason: HOSPADM

## 2023-03-06 RX ORDER — SODIUM CHLORIDE, SODIUM LACTATE, POTASSIUM CHLORIDE, CALCIUM CHLORIDE 600; 310; 30; 20 MG/100ML; MG/100ML; MG/100ML; MG/100ML
125 INJECTION, SOLUTION INTRAVENOUS CONTINUOUS
Status: DISCONTINUED | OUTPATIENT
Start: 2023-03-06 | End: 2023-03-06

## 2023-03-06 RX ORDER — MISOPROSTOL 100 UG/1
600 TABLET ORAL AS NEEDED
Status: DISCONTINUED | OUTPATIENT
Start: 2023-03-06 | End: 2023-03-06 | Stop reason: HOSPADM

## 2023-03-06 RX ORDER — SODIUM CHLORIDE 9 MG/ML
40 INJECTION, SOLUTION INTRAVENOUS AS NEEDED
Status: DISCONTINUED | OUTPATIENT
Start: 2023-03-06 | End: 2023-03-06

## 2023-03-06 RX ORDER — OXYTOCIN/0.9 % SODIUM CHLORIDE 30/500 ML
250 PLASTIC BAG, INJECTION (ML) INTRAVENOUS CONTINUOUS
Status: ACTIVE | OUTPATIENT
Start: 2023-03-06 | End: 2023-03-06

## 2023-03-06 RX ORDER — HYDROCODONE BITARTRATE AND ACETAMINOPHEN 10; 325 MG/1; MG/1
1 TABLET ORAL EVERY 4 HOURS PRN
Status: DISCONTINUED | OUTPATIENT
Start: 2023-03-06 | End: 2023-03-07

## 2023-03-06 RX ORDER — SODIUM CHLORIDE 0.9 % (FLUSH) 0.9 %
10 SYRINGE (ML) INJECTION EVERY 12 HOURS SCHEDULED
Status: DISCONTINUED | OUTPATIENT
Start: 2023-03-06 | End: 2023-03-06

## 2023-03-06 RX ORDER — IBUPROFEN 800 MG/1
800 TABLET ORAL EVERY 8 HOURS SCHEDULED
Status: DISCONTINUED | OUTPATIENT
Start: 2023-03-06 | End: 2023-03-06 | Stop reason: HOSPADM

## 2023-03-06 RX ORDER — DOCUSATE SODIUM 100 MG/1
100 CAPSULE, LIQUID FILLED ORAL 2 TIMES DAILY
Status: DISCONTINUED | OUTPATIENT
Start: 2023-03-07 | End: 2023-03-08 | Stop reason: HOSPADM

## 2023-03-06 RX ORDER — HYDROCORTISONE ACETATE PRAMOXINE HCL 1; 1 G/100G; G/100G
1 CREAM TOPICAL AS NEEDED
Status: DISCONTINUED | OUTPATIENT
Start: 2023-03-06 | End: 2023-03-08 | Stop reason: HOSPADM

## 2023-03-06 RX ORDER — SODIUM CHLORIDE 0.9 % (FLUSH) 0.9 %
10 SYRINGE (ML) INJECTION AS NEEDED
Status: DISCONTINUED | OUTPATIENT
Start: 2023-03-06 | End: 2023-03-06

## 2023-03-06 RX ORDER — FAMOTIDINE 10 MG/ML
20 INJECTION, SOLUTION INTRAVENOUS ONCE AS NEEDED
Status: DISCONTINUED | OUTPATIENT
Start: 2023-03-06 | End: 2023-03-06 | Stop reason: HOSPADM

## 2023-03-06 RX ORDER — NICOTINE 21 MG/24HR
1 PATCH, TRANSDERMAL 24 HOURS TRANSDERMAL
Status: DISCONTINUED | OUTPATIENT
Start: 2023-03-06 | End: 2023-03-06

## 2023-03-06 RX ORDER — METHYLERGONOVINE MALEATE 0.2 MG/ML
200 INJECTION INTRAVENOUS ONCE AS NEEDED
Status: DISCONTINUED | OUTPATIENT
Start: 2023-03-06 | End: 2023-03-08 | Stop reason: HOSPADM

## 2023-03-06 RX ORDER — ONDANSETRON 2 MG/ML
4 INJECTION INTRAMUSCULAR; INTRAVENOUS ONCE AS NEEDED
Status: DISCONTINUED | OUTPATIENT
Start: 2023-03-06 | End: 2023-03-06 | Stop reason: HOSPADM

## 2023-03-06 RX ORDER — CARBOPROST TROMETHAMINE 250 UG/ML
250 INJECTION, SOLUTION INTRAMUSCULAR AS NEEDED
Status: DISCONTINUED | OUTPATIENT
Start: 2023-03-06 | End: 2023-03-06 | Stop reason: HOSPADM

## 2023-03-06 RX ORDER — BUTORPHANOL TARTRATE 1 MG/ML
1 INJECTION, SOLUTION INTRAMUSCULAR; INTRAVENOUS
Status: DISCONTINUED | OUTPATIENT
Start: 2023-03-06 | End: 2023-03-06 | Stop reason: HOSPADM

## 2023-03-06 RX ORDER — HYDROCORTISONE 25 MG/G
1 CREAM TOPICAL AS NEEDED
Status: DISCONTINUED | OUTPATIENT
Start: 2023-03-06 | End: 2023-03-08 | Stop reason: HOSPADM

## 2023-03-06 RX ORDER — ACETAMINOPHEN 325 MG/1
650 TABLET ORAL EVERY 4 HOURS PRN
Status: DISCONTINUED | OUTPATIENT
Start: 2023-03-06 | End: 2023-03-06 | Stop reason: HOSPADM

## 2023-03-06 RX ORDER — TERBUTALINE SULFATE 1 MG/ML
0.2 INJECTION, SOLUTION SUBCUTANEOUS AS NEEDED
Status: DISCONTINUED | OUTPATIENT
Start: 2023-03-06 | End: 2023-03-06 | Stop reason: HOSPADM

## 2023-03-06 RX ORDER — HYDROCODONE BITARTRATE AND ACETAMINOPHEN 5; 325 MG/1; MG/1
1 TABLET ORAL EVERY 4 HOURS PRN
Status: DISCONTINUED | OUTPATIENT
Start: 2023-03-06 | End: 2023-03-06 | Stop reason: HOSPADM

## 2023-03-06 RX ORDER — SODIUM CHLORIDE 0.9 % (FLUSH) 0.9 %
3-10 SYRINGE (ML) INJECTION AS NEEDED
Status: DISCONTINUED | OUTPATIENT
Start: 2023-03-06 | End: 2023-03-06 | Stop reason: HOSPADM

## 2023-03-06 RX ORDER — BISACODYL 10 MG
10 SUPPOSITORY, RECTAL RECTAL DAILY PRN
Status: DISCONTINUED | OUTPATIENT
Start: 2023-03-07 | End: 2023-03-08 | Stop reason: HOSPADM

## 2023-03-06 RX ORDER — METHYLERGONOVINE MALEATE 0.2 MG/ML
200 INJECTION INTRAVENOUS ONCE AS NEEDED
Status: DISCONTINUED | OUTPATIENT
Start: 2023-03-06 | End: 2023-03-06 | Stop reason: HOSPADM

## 2023-03-06 RX ORDER — OXYTOCIN/0.9 % SODIUM CHLORIDE 30/500 ML
2-20 PLASTIC BAG, INJECTION (ML) INTRAVENOUS
Status: DISCONTINUED | OUTPATIENT
Start: 2023-03-06 | End: 2023-03-06 | Stop reason: HOSPADM

## 2023-03-06 RX ORDER — EPHEDRINE SULFATE 5 MG/ML
10 INJECTION INTRAVENOUS
Status: DISCONTINUED | OUTPATIENT
Start: 2023-03-06 | End: 2023-03-06 | Stop reason: HOSPADM

## 2023-03-06 RX ORDER — BUTORPHANOL TARTRATE 1 MG/ML
2 INJECTION, SOLUTION INTRAMUSCULAR; INTRAVENOUS
Status: DISCONTINUED | OUTPATIENT
Start: 2023-03-06 | End: 2023-03-06 | Stop reason: HOSPADM

## 2023-03-06 RX ADMIN — LIDOCAINE HYDROCHLORIDE,EPINEPHRINE BITARTRATE 3 ML: 10; .01 INJECTION, SOLUTION INFILTRATION; PERINEURAL at 10:43

## 2023-03-06 RX ADMIN — IBUPROFEN 600 MG: 600 TABLET, FILM COATED ORAL at 20:45

## 2023-03-06 RX ADMIN — NICOTINE 1 PATCH: 14 PATCH, EXTENDED RELEASE TRANSDERMAL at 07:56

## 2023-03-06 RX ADMIN — BUPIVACAINE HYDROCHLORIDE 5 MG: 2.5 INJECTION, SOLUTION EPIDURAL; INFILTRATION; INTRACAUDAL; PERINEURAL at 10:48

## 2023-03-06 RX ADMIN — ROPIVACAINE HYDROCHLORIDE 14 ML/HR: 2 INJECTION, SOLUTION EPIDURAL; INFILTRATION at 10:55

## 2023-03-06 RX ADMIN — SODIUM CHLORIDE, POTASSIUM CHLORIDE, SODIUM LACTATE AND CALCIUM CHLORIDE 125 ML/HR: 600; 310; 30; 20 INJECTION, SOLUTION INTRAVENOUS at 09:09

## 2023-03-06 RX ADMIN — OXYTOCIN 2 MILLI-UNITS/MIN: 10 INJECTION, SOLUTION INTRAMUSCULAR; INTRAVENOUS at 09:10

## 2023-03-06 NOTE — L&D DELIVERY NOTE
Vaginal Delivery Procedure Note    Sailaja Alarcon  Gestational Age-37.0 weeks        OBGYN: Shelley Steinberg DO      Pre-op Diagnosis:   Pt 28 y/o  @ 37.0 weeks for IOL due to severe IUGR      Anesthesia: Epidural        Detailed Description of Procedure     The patient was prepped and draped in normal sterile fashion. The head was delivered without difficulty. There was no nuchal cord. Anterior and posterior shoulders delivered without any problems. The rest of the infant was delivered in controlled fashion.The infant was bulb suctioned at delivery. The placenta delivered intact. The patient tolerated the procedure well and went to the recovery room in stable condition.        Time of delivery: 1352  Maternal Blood Type: AB Negative  Fetal Gender: Male  Nuchal Cord: No  Tears: None   Blood Cord: Yes  Estimated Blood Loss: 100cc  Placenta: Spontaneous, Delivered Intact   APGARS:  9   9          Disposition: Transfer to Women's Health Floor  Condition: Stable    Shelley Steinberg DO     Date: 3/6/2023  Time: 14:14 EST

## 2023-03-06 NOTE — ANESTHESIA PROCEDURE NOTES
Epidural Block    Pre-sedation assessment completed: 3/6/2023 10:29 AM    Patient reassessed immediately prior to procedure    Patient location during procedure: OB  Start Time: 3/6/2023 10:29 AM  Stop Time: 3/6/2023 10:48 AM  Indication:at surgeon's request  Performed By  Anesthesiologist: Aleksandr Chan MD  Preanesthetic Checklist  Completed: patient identified, IV checked, risks and benefits discussed, surgical consent, monitors and equipment checked, pre-op evaluation and timeout performed  Additional Notes  3 locations attempted, transient issues with back, upper thigh and lower extremity radiation pain that resolved  Replaced at 12PM, patient denied relief from previous epidural, replaced via Dural puncture technique, improved relief states having vaginal pressure and cramping, no longer having sharp pain, discussed expectations   Prep:  Pt Position:sitting  Sterile Tech:cap, gloves, mask and sterile barrier  Prep:chlorhexidine gluconate and isopropyl alcohol  Monitoring:blood pressure monitoring and continuous pulse oximetry  Epidural Block Procedure:  Approach:midline  Guidance:palpation technique  Location:lumbar  Level:4-5  Needle Type:Tuohy  Needle Gauge:17 G  Cath Size: 20 G  Loss of Resistance Medium: saline  Loss of Resistance: 6cm  Cath Depth at skin:12 cm  Paresthesia: transient  Aspiration:negative  Test Dose:negative  Post Assessment:  Dressing:occlusive dressing applied and secured with tape  Pt Tolerance:patient tolerated the procedure well with no apparent complications and no signs or symtoms of SAB or intravascular injection  Complications:no

## 2023-03-06 NOTE — NON STRESS TEST
Sailaja Alarcon, a  at 37w0d with an CHU of 3/27/2023, Date entered prior to episode creation, was seen at [facility] LABOR DELIVERY for a nonstress test.    Chief Complaint   Patient presents with   • Scheduled Induction     PT REPORTS TO L/D FOR SCHEDULED INDUCTION.       Patient Active Problem List   Diagnosis   • Cutaneous abscess of neck   • Abscess, neck   • Neck abscess   • Pregnancy   • IUGR (intrauterine growth restriction) affecting care of mother       Start Time:0830  Stop Time: 0900        Reactive: Verified by Sandrita Larsen

## 2023-03-06 NOTE — ANESTHESIA PREPROCEDURE EVALUATION
Anesthesia Evaluation     Patient summary reviewed and Nursing notes reviewed   no history of anesthetic complications:  NPO Solid Status: > 8 hours  NPO Liquid Status: > 6 hours           Airway   Mallampati: II  TM distance: >3 FB  Neck ROM: full  No difficulty expected  Dental    (+) poor dentition    Pulmonary - normal exam   (+) a smoker Current Smoked day of surgery,   Cardiovascular - negative cardio ROS and normal exam  Exercise tolerance: good (4-7 METS)    NYHA Classification: II  Rhythm: regular  Rate: normal        Neuro/Psych  (+) headaches,    GI/Hepatic/Renal/Endo    (+)  GERD,  hepatitis C, liver disease,     Musculoskeletal (-) negative ROS    Abdominal    Substance History   (+) drug use (hx IVDA.  None for 2 years, currently on suboxone)     OB/GYN    (-)  Pregnant ( 2021)        Other - negative ROS                         Anesthesia Plan    ASA 3     epidural       Anesthetic plan, risks, benefits, and alternatives have been provided, discussed and informed consent has been obtained with: patient.  Pre-procedure education provided  Use of blood products discussed with patient  Consented to blood products.       Lab Results   Component Value Date    WBC 8.92 2023    HGB 12.9 2023    HCT 37.7 2023    MCV 93.8 2023     2023       Lab Results   Component Value Date    GLUCOSE 85 2023    BUN 10 2023    CREATININE 0.55 (L) 2023    EGFR 127.4 2023    BCR 18.2 2023    K 4.4 2023    CO2 17.3 (L) 2023    CALCIUM 9.1 2023    ALBUMIN 3.7 2023    LABIL2 1.3 (L) 2014    AST 12 2023    ALT 6 2023

## 2023-03-06 NOTE — H&P
WYATT Jules  Obstetric History and Physical    Chief Complaint   Patient presents with   • Scheduled Induction     PT REPORTS TO L/D FOR SCHEDULED INDUCTION.       Subjective     Patient is a 27 y.o. female  currently at 37w0d, who presents with severe IUGR for IOL.    Her prenatal care is complicated by  abnormal fetal growth  IUGR.  Her previous obstetric/gynecological history is noted for is non-contributory.    The following portions of the patient's history were reviewed and updated as appropriate: current medications, allergies, past medical history, past surgical history, past family history, past social history and problem list .   Social History     Socioeconomic History   • Marital status:    Tobacco Use   • Smoking status: Every Day     Packs/day: 1.00     Years: 15.00     Pack years: 15.00     Types: Cigarettes   • Smokeless tobacco: Never   Vaping Use   • Vaping Use: Never used   Substance and Sexual Activity   • Alcohol use: No     Alcohol/week: 0.0 standard drinks   • Drug use: Not Currently     Frequency: 3.0 times per week     Types: IV     Comment: last use 5 YEARS AGO   • Sexual activity: Yes     Partners: Male     Past Medical History:   Diagnosis Date   • Hepatitis C    • Hepatitis C antibody positive in blood    • Migraines        Current Facility-Administered Medications:   •  acetaminophen (TYLENOL) tablet 650 mg, 650 mg, Oral, Q4H PRN, Shelley Steinberg, DO  •  butorphanol (STADOL) injection 1 mg, 1 mg, Intravenous, Q2H PRN, Shelley Steinberg, DO  •  butorphanol (STADOL) injection 2 mg, 2 mg, Intravenous, Q3H PRN, Shelley Steinberg, DO  •  lactated ringers bolus 1,000 mL, 1,000 mL, Intravenous, Once PRN, Shelley Steinberg, DO  •  lactated ringers infusion, 125 mL/hr, Intravenous, Continuous, Shelley Steinberg, , Last Rate: 125 mL/hr at 23, 125 mL/hr at 23  •  nicotine (NICODERM CQ) 14 MG/24HR patch 1 patch, 1 patch,  Transdermal, Q24H, Shelley Steinberg, , 1 patch at 03/06/23 0756  •  oxytocin (PITOCIN) 30 units in 0.9% sodium chloride 500 mL (premix), 2-20 eden-units/min, Intravenous, Titrated, Shelley Steinberg, DO, Last Rate: 2 mL/hr at 03/06/23 0910, 2 eden-units/min at 03/06/23 0910  •  sodium chloride (NS) irrigation solution 1,000 mL, 1,000 mL, Irrigation, Once PRN, Shelley Steinberg, DO  •  sodium chloride 0.9 % flush 10 mL, 10 mL, Intravenous, Q12H, Shelley Steinberg,   •  sodium chloride 0.9 % flush 10 mL, 10 mL, Intravenous, PRN, Shelley Steinberg, DO  •  sodium chloride 0.9 % flush 3-10 mL, 3-10 mL, Intravenous, PRN, Shelley Steinberg, DO  •  sodium chloride 0.9 % infusion 40 mL, 40 mL, Intravenous, PRN, Shelley Steinberg, DO  •  terbutaline (BRETHINE) injection 0.2 mg, 0.2 mg, Subcutaneous, PRN, Shelley Steinberg,   Allergies   Allergen Reactions   • Amoxicillin Anaphylaxis   • Penicillins Anaphylaxis   • Vancomycin Itching     Pt states she had full body itch and burning sensation, and she turned red. Reaction potentially Red Man Syndrome.   • Zofran [Ondansetron Hcl] Hives and Rash     Past Surgical History:   Procedure Laterality Date   • HEAD/NECK ABSCESS INCISION AND DRAINAGE Left 7/8/2022    Procedure: HEAD NECK ABSCESS INCISION AND DRAINAGE;  Surgeon: Miquel Grayson MD;  Location: [facility] OR;  Service: General;  Laterality: Left;   • INCISION AND DRAINAGE ABSCESS Right 10/10/2021    Procedure: INCISION AND DRAINAGE ABSCESS CENTRAL LINE PLACEMENT;  Surgeon: Naz Payne MD;  Location: [facility] OR;  Service: General;  Laterality: Right;  I&D ABSCESS= INFECTED.  CENTRAL LINE = CLEAN.         Prenatal Information:   Maternal Prenatal Labs  Blood Type ABO Type   Date Value Ref Range Status   03/06/2023 AB  Final      Rh Status RH type   Date Value Ref Range Status   03/06/2023 Negative  Final      Antibody Screen Antibody Screen   Date Value  Ref Range Status   03/06/2023 Positive  Final      Rapid Urine Drug Screen Barbiturates Screen, Urine   Date Value Ref Range Status   03/06/2023 Negative Negative Final     Benzodiazepine Screen, Urine   Date Value Ref Range Status   03/06/2023 Negative Negative Final     Methadone Screen, Urine   Date Value Ref Range Status   03/06/2023 Negative Negative Final     Opiate Screen   Date Value Ref Range Status   03/06/2023 Negative Negative Final     THC, Screen, Urine   Date Value Ref Range Status   03/06/2023 Negative Negative Final     Cocaine Screen, Urine   Date Value Ref Range Status   03/06/2023 Negative Negative Final     Amphetamine Screen, Urine   Date Value Ref Range Status   03/06/2023 Negative Negative Final     Propoxyphene Screen   Date Value Ref Range Status   03/06/2023 Negative Negative Final     Buprenorphine, Screen, Urine   Date Value Ref Range Status   03/06/2023 Positive (A) Negative Final     Methamphetamine, Ur   Date Value Ref Range Status   03/06/2023 Negative Negative Final     Oxycodone Screen, Urine   Date Value Ref Range Status   03/06/2023 Negative Negative Final     Tricyclic Antidepressants Screen   Date Value Ref Range Status   03/06/2023 Negative Negative Final      Group B Strep Culture No results found for: GBSANTIGEN           External Prenatal Results     Pregnancy Outside Results - Transcribed From Office Records - See Scanned Records For Details     Test Value Date Time    ABO  AB  03/06/23 0657    Rh  Negative  03/06/23 0657    Antibody Screen  Positive  03/06/23 0657       Negative  01/05/23 2252       Negative  10/24/22 1319       Negative  07/08/22 1411    Varicella IgG       Rubella  2.58 index 10/24/22 1319    Hgb  12.9 g/dL 03/06/23 0657       11.5 g/dL 01/05/23 2252       12.0 g/dL 10/24/22 1319       11.4 g/dL 07/08/22 1411    Hct  37.7 % 03/06/23 0657       32.8 % 01/05/23 2252       36.0 % 10/24/22 1319       35.3 % 07/08/22 1411    Glucose Fasting GTT       Glucose  Tolerance Test 1 hour       Glucose Tolerance Test 3 hour       Gonorrhea (discrete)  Not Detected  10/24/22 1333    Chlamydia (discrete)  Not Detected  10/24/22 1333    RPR  Non Reactive  10/24/22 1319    VDRL       Syphilis Antibody       HBsAg  Negative  10/24/22 1319    Herpes Simplex Virus PCR       Herpes Simplex VIrus Culture       HIV  Non-Reactive  10/24/22 1319    Hep C RNA Quant PCR       Hep C Antibody  Reactive  10/24/22 1319    AFP       Group B Strep  No Group B Streptococcus isolated  10/18/19 2109    GBS Susceptibility to Clindamycin       GBS Susceptibility to Erythromycin       Fetal Fibronectin       Genetic Testing, Maternal Blood             Drug Screening     Test Value Date Time    Urine Drug Screen       Amphetamine Screen  Negative  03/06/23 0621       Negative  02/23/23 1036       Negative  01/05/23 2223       Positive  07/08/22 1623       Positive  07/07/22 1224    Barbiturate Screen  Negative  03/06/23 0621       Negative  02/23/23 1036       Negative  01/05/23 2223       Negative  07/08/22 1623       Negative  07/07/22 1224    Benzodiazepine Screen  Negative  03/06/23 0621       Negative  02/23/23 1036       Negative  01/05/23 2223       Positive  07/08/22 1623       Negative  07/07/22 1224    Methadone Screen  Negative  03/06/23 0621       Negative  02/23/23 1036       Negative  01/05/23 2223       Negative  07/08/22 1623       Negative  07/07/22 1224    Phencyclidine Screen  Negative  03/06/23 0621       Negative  02/23/23 1036       Negative  01/05/23 2223       Negative  07/08/22 1623       Negative  07/07/22 1224    Opiates Screen  Negative  03/06/23 0621       Negative  02/23/23 1036       Negative  01/05/23 2223       Negative  07/08/22 1623       Negative  07/07/22 1224    THC Screen  Negative  03/06/23 0621       Negative  02/23/23 1036       Negative  01/05/23 2223       Negative  07/08/22 1623       Negative  07/07/22 1224    Cocaine Screen       Propoxyphene Screen  Negative   23 0621       Negative  23 1036       Negative  23 2223       Negative  22 1623       Negative  22 1224    Buprenorphine Screen  Positive  23 0621       Positive  23 1036       Positive  23 2223       Positive  22 1623       Positive  22 1224    Methamphetamine Screen       Oxycodone Screen  Negative  23 0621       Negative  23 1036       Negative  23 2223       Negative  22 1623       Negative  22 1224    Tricyclic Antidepressants Screen  Negative  23 0621       Negative  23 1036       Negative  23 2223       Negative  22 1623       Negative  22 1224          Legend    ^: Historical                          Past OB History:     OB History    Para Term  AB Living   6 4 3 1 1 4   SAB IAB Ectopic Molar Multiple Live Births   0 0 0 0 0 4      # Outcome Date GA Lbr Crow/2nd Weight Sex Delivery Anes PTL Lv   6 Current            5 Term 21 38w4d / 00:06 2040 g (4 lb 8 oz) F Vag-Spont EPI N GHASSAN      Name: AILYNCARLEANASGIRL      Apgar1: 9  Apgar5: 9   4 Term 10/19/19 40w2d 02:45 / 00:08 2120 g (4 lb 10.8 oz) F Vag-Spont EPI N GHASSAN      Name: AILYNCARLEANASGIRL      Apgar1: 8  Apgar5: 9   3 Term 02/10/18 37w0d  1729 g (3 lb 13 oz) F Vag-Spont EPI N GHASSAN   2  12/15/14 35w0d  1446 g (3 lb 3 oz) F Vag-Spont EPI Y GHASSAN   1 AB  5w0d    SAB          Past Medical History: Past Medical History:   Diagnosis Date   • Hepatitis C    • Hepatitis C antibody positive in blood    • Migraines       Past Surgical History Past Surgical History:   Procedure Laterality Date   • HEAD/NECK ABSCESS INCISION AND DRAINAGE Left 2022    Procedure: HEAD NECK ABSCESS INCISION AND DRAINAGE;  Surgeon: Miquel Grayson MD;  Location: [facility];  Service: General;  Laterality: Left;   • INCISION AND DRAINAGE ABSCESS Right 10/10/2021    Procedure: INCISION AND DRAINAGE ABSCESS CENTRAL LINE  PLACEMENT;  Surgeon: Naz Payne MD;  Location: [facility];  Service: General;  Laterality: Right;  I&D ABSCESS= INFECTED.  CENTRAL LINE = CLEAN.      Family History: Family History   Problem Relation Age of Onset   • Hypertension Mother    • Cancer Father    • No Known Problems Sister    • No Known Problems Brother    • No Known Problems Son    • No Known Problems Daughter    • Hypertension Maternal Grandmother    • Diabetes Maternal Grandfather    • No Known Problems Paternal Grandmother    • No Known Problems Paternal Grandfather    • No Known Problems Cousin    • Diabetes Maternal Aunt    • Rheum arthritis Neg Hx    • Osteoarthritis Neg Hx    • Asthma Neg Hx    • Heart failure Neg Hx    • Hyperlipidemia Neg Hx    • Migraines Neg Hx    • Rashes / Skin problems Neg Hx    • Seizures Neg Hx    • Stroke Neg Hx    • Thyroid disease Neg Hx       Social History:  reports that she has been smoking cigarettes. She has a 15.00 pack-year smoking history. She has never used smokeless tobacco.   reports no history of alcohol use.   reports that she does not currently use drugs after having used the following drugs: IV. Frequency: 3.00 times per week.        Review of Systems-negative except as noted in HPI      Objective     Vital Signs Range for the last 24 hours  Temperature: Temp:  [97 °F (36.1 °C)-97.7 °F (36.5 °C)] 97 °F (36.1 °C)   Temp Source: Temp src: Temporal   BP: BP: (105-128)/(63-74) 105/63   Pulse: Heart Rate:  [68-80] 68   Respirations: Resp:  [18] 18   Weight: Weight:  [64.6 kg (142 lb 8 oz)] 64.6 kg (142 lb 8 oz)     Physical Examination: General appearance - alert, well appearing, and in no distress, oriented to person, place, and time, normal appearing weight and well hydrated  Mental status - alert, oriented to person, place, and time, normal mood, behavior, speech, dress, motor activity, and thought processes, affect appropriate to mood  Neck - supple, no significant adenopathy  Chest - clear to  auscultation, no wheezes, rales or rhonchi, symmetric air entry, no tachypnea, retractions or cyanosis  Heart - normal rate, regular rhythm, normal S1, S2, no murmurs, rubs, clicks or gallops  Abdomen - soft, nontender, gravid uterus, no masses or organomegaly  no rebound tenderness noted,   Pelvic - normal external genitalia, vulva, vagina, cervix, uterus and adnexa  Neurological - alert, oriented, normal speech, no focal findings or movement disorder noted  Musculoskeletal - no joint tenderness, deformity or swelling  Extremities - peripheral pulses normal, no pedal edema, no clubbing or cyanosis  Skin - normal coloration and turgor, no rashes, no suspicious skin lesions noted        Cervix: Exam by:     Dilation:     Effacement:     Station:       Laboratory Results:   Lab Results (last 24 hours)     Procedure Component Value Units Date/Time    CBC & Differential [[id number]]  (Abnormal) Collected: 03/06/23 0657    Specimen: Blood Updated: 03/06/23 0757    Narrative:      The following orders were created for panel order CBC & Differential.  Procedure                               Abnormality         Status                     ---------                               -----------         ------                     CBC Auto Differential[[id number]]        Abnormal            Final result                 Please view results for these tests on the individual orders.    CBC Auto Differential [[id number]]  (Abnormal) Collected: 03/06/23 0657    Specimen: Blood Updated: 03/06/23 0757     WBC 8.92 10*3/mm3      RBC 4.02 10*6/mm3      Hemoglobin 12.9 g/dL      Hematocrit 37.7 %      MCV 93.8 fL      MCH 32.1 pg      MCHC 34.2 g/dL      RDW 12.9 %      RDW-SD 44.5 fl      MPV 11.7 fL      Platelets 148 10*3/mm3      Neutrophil % 66.8 %      Lymphocyte % 26.6 %      Monocyte % 5.6 %      Eosinophil % 0.2 %      Basophil % 0.4 %      Immature Grans % 0.4 %      Neutrophils, Absolute 5.95 10*3/mm3      Lymphocytes, Absolute 2.37  10*3/mm3      Monocytes, Absolute 0.50 10*3/mm3      Eosinophils, Absolute 0.02 10*3/mm3      Basophils, Absolute 0.04 10*3/mm3      Immature Grans, Absolute 0.04 10*3/mm3      nRBC 0.0 /100 WBC     Urine Drug Screen - Urine, Clean Catch [[id number]]  (Abnormal) Collected: 03/06/23 0621    Specimen: Urine, Clean Catch Updated: 03/06/23 0645     THC, Screen, Urine Negative     Phencyclidine (PCP), Urine Negative     Cocaine Screen, Urine Negative     Methamphetamine, Ur Negative     Opiate Screen Negative     Amphetamine Screen, Urine Negative     Benzodiazepine Screen, Urine Negative     Tricyclic Antidepressants Screen Negative     Methadone Screen, Urine Negative     Barbiturates Screen, Urine Negative     Oxycodone Screen, Urine Negative     Propoxyphene Screen Negative     Buprenorphine, Screen, Urine Positive    Narrative:      Cutoff For Drugs Screened:    Amphetamines               500 ng/ml  Barbiturates               200 ng/ml  Benzodiazepines            150 ng/ml  Cocaine                    150 ng/ml  Methadone                  200 ng/ml  Opiates                    100 ng/ml  Phencyclidine               25 ng/ml  THC                            50 ng/ml  Methamphetamine            500 ng/ml  Tricyclic Antidepressants  300 ng/ml  Oxycodone                  100 ng/ml  Propoxyphene               300 ng/ml  Buprenorphine               10 ng/ml    The normal value for all drugs tested is negative. This report includes unconfirmed screening results, with the cutoff values listed, to be used for medical treatment purposes only.  Unconfirmed results must not be used for non-medical purposes such as employment or legal testing.  Clinical consideration should be applied to any drug of abuse test, particularly when unconfirmed results are used.          Radiology Review:   Imaging Results (Last 72 Hours)     ** No results found for the last 72 hours. **            Assessment & Plan       Pregnancy      Assessment &  Plan    Assessment:  1.  Intrauterine pregnancy at 37w0d weeks gestation with reassuring fetal status.    2.  induction of labor  for severe IUGR  with favorable cervix  3.  Obstetrical history significant for is noncontributory.  4.  GBS status: No results found for: GBSANTIGEN    Plan:  1. fetal and uterine monitoring  continuously and cervical ripening with  low dose Pitocin  2. Plan of care has been reviewed with patient   3.  Risks, benefits of treatment plan have been discussed.  4.  All questions have been answered.        Shelley Steinberg,   3/6/2023  09:17 EST

## 2023-03-06 NOTE — PLAN OF CARE
Problem: Adjustment to Role Transition (Postpartum Vaginal Delivery)  Goal: Successful Maternal Role Transition  Outcome: Ongoing, Progressing     Problem: Bleeding (Postpartum Vaginal Delivery)  Goal: Hemostasis  Outcome: Ongoing, Progressing     Problem: Infection (Postpartum Vaginal Delivery)  Goal: Absence of Infection Signs/Symptoms  Outcome: Ongoing, Progressing  Intervention: Prevent or Manage Infection  Recent Flowsheet Documentation  Taken 3/6/2023 1100 by Roxana Birch RN  Perineal Care: absorbent brief/pad changed  Taken 3/6/2023 0953 by Roxana Birch, RN  Perineal Care: absorbent brief/pad changed     Problem: Pain (Postpartum Vaginal Delivery)  Goal: Acceptable Pain Control  Outcome: Ongoing, Progressing  Intervention: Prevent or Manage Pain  Recent Flowsheet Documentation  Taken 3/6/2023 0730 by Roxana Birch RN  Pain Management Interventions: pain management plan reviewed with patient/caregiver     Problem: Urinary Retention (Postpartum Vaginal Delivery)  Goal: Effective Urinary Elimination  Outcome: Ongoing, Progressing     Problem: Fall Injury Risk  Goal: Absence of Fall and Fall-Related Injury  Outcome: Ongoing, Progressing  Intervention: Promote Injury-Free Environment  Recent Flowsheet Documentation  Taken 3/6/2023 0730 by Roxana Birch RN  Safety Promotion/Fall Prevention: safety round/check completed   Goal Outcome Evaluation:

## 2023-03-06 NOTE — CASE MANAGEMENT/SOCIAL WORK
Case Management/Social Work    Patient Name:  Sailaja Alarcon  YOB: 1995  MRN: [id number]  Admit Date:  3/6/2023        SS received consult for maternal UDS positive. Pt is 28 Y/O Sailaja Alarcon who delivered viable baby boy weighing 4 pounds, 0.3 ounces and 17.91 inches. Infant was named Gianluca Alarcon. FOB is John Alarcon who is involved. Pt lives at [address] [place] [address] in [facility] with FOB. Pt's other children are Adelaida, Haley, Soledad and Stefani Alarcon.     Pt's UDS results were positive for Buprenorphine. Infant's UDS results to be collected. Pt states she is in MAT program and is prescribed Buprenorphine by Dr. Pandey in [place]. Pt attended prenatal visits at [facility].     Infant care supplies, including car seat are available. FOB to provide transportation home at discharge.     SS to follow up with Infant's UDS and meconium drug screen results when available.       Electronically signed by:  HELENA Nelson  03/06/23 16:38 EST

## 2023-03-06 NOTE — CONSULTS
Assessment:  Diagnosis: Pregnancy    Allergies   Allergen Reactions   • Amoxicillin Anaphylaxis   • Penicillins Anaphylaxis   • Vancomycin Itching     Pt states she had full body itch and burning sensation, and she turned red. Reaction potentially Red Man Syndrome.   • Zofran [Ondansetron Hcl] Hives and Rash       Order Date/Time: 3/6/2023 0628  Indications: Labor & Delivery; no access  LABS:  No results found for: INR, PROTIME  No results found for: PTT  Lab Results   Component Value Date    WBC 8.92 03/06/2023    HGB 12.9 03/06/2023    HCT 37.7 03/06/2023    MCV 93.8 03/06/2023     03/06/2023     Lab Results   Component Value Date    BUN 10 01/05/2023     Lab Results   Component Value Date    CREATININE 0.55 (L) 01/05/2023     Lab Results   Component Value Date    EGFRIFNONA 94 10/10/2021     Labs Reviewed: all labs reviewed  Contraindications for PICC/Midline:  No contraindications noted      Recommendations:  PowerGlide ST Midline Catheter; 18 g; 10 cm  Upper R Brachial    Procedure Time Out:  Time out Time: 0840  Correct Patient Identity: yes  Correct Surgical Side and Site Are Marked: yes  Agreement on Procedure to be done: yes  RN: SURAJ Chairez RN    PowerGlide ST Midline Catheter placed with ultrasound guidance and verified by blood return. Sterile field maintained. Minimal blood loss noted. Catheter flushes easily. Blood return noted.  Patient nurse, Roxana RUIZ, made aware that midline is in upper R brachial and ready for use.    Deirdre Chairez RN

## 2023-03-07 LAB
BASOPHILS # BLD AUTO: 0.03 10*3/MM3 (ref 0–0.2)
BASOPHILS NFR BLD AUTO: 0.4 % (ref 0–1.5)
DEPRECATED RDW RBC AUTO: 45.1 FL (ref 37–54)
EOSINOPHIL # BLD AUTO: 0.05 10*3/MM3 (ref 0–0.4)
EOSINOPHIL NFR BLD AUTO: 0.7 % (ref 0.3–6.2)
ERYTHROCYTE [DISTWIDTH] IN BLOOD BY AUTOMATED COUNT: 13 % (ref 12.3–15.4)
HCT VFR BLD AUTO: 37 % (ref 34–46.6)
HGB BLD-MCNC: 12.5 G/DL (ref 12–15.9)
IMM GRANULOCYTES # BLD AUTO: 0.05 10*3/MM3 (ref 0–0.05)
IMM GRANULOCYTES NFR BLD AUTO: 0.7 % (ref 0–0.5)
LYMPHOCYTES # BLD AUTO: 3.48 10*3/MM3 (ref 0.7–3.1)
LYMPHOCYTES NFR BLD AUTO: 45.5 % (ref 19.6–45.3)
MCH RBC QN AUTO: 32.6 PG (ref 26.6–33)
MCHC RBC AUTO-ENTMCNC: 33.8 G/DL (ref 31.5–35.7)
MCV RBC AUTO: 96.6 FL (ref 79–97)
MONOCYTES # BLD AUTO: 0.47 10*3/MM3 (ref 0.1–0.9)
MONOCYTES NFR BLD AUTO: 6.1 % (ref 5–12)
NEUTROPHILS NFR BLD AUTO: 3.57 10*3/MM3 (ref 1.7–7)
NEUTROPHILS NFR BLD AUTO: 46.6 % (ref 42.7–76)
NRBC BLD AUTO-RTO: 0 /100 WBC (ref 0–0.2)
PLATELET # BLD AUTO: 135 10*3/MM3 (ref 140–450)
PMV BLD AUTO: 11.6 FL (ref 6–12)
RBC # BLD AUTO: 3.83 10*6/MM3 (ref 3.77–5.28)
WBC NRBC COR # BLD: 7.65 10*3/MM3 (ref 3.4–10.8)

## 2023-03-07 PROCEDURE — 85025 COMPLETE CBC W/AUTO DIFF WBC: CPT | Performed by: OBSTETRICS & GYNECOLOGY

## 2023-03-07 RX ORDER — NICOTINE 21 MG/24HR
1 PATCH, TRANSDERMAL 24 HOURS TRANSDERMAL
Status: DISCONTINUED | OUTPATIENT
Start: 2023-03-07 | End: 2023-03-08 | Stop reason: HOSPADM

## 2023-03-07 RX ORDER — BUPRENORPHINE HYDROCHLORIDE AND NALOXONE HYDROCHLORIDE DIHYDRATE 8; 2 MG/1; MG/1
0.5 TABLET SUBLINGUAL DAILY
Status: DISCONTINUED | OUTPATIENT
Start: 2023-03-07 | End: 2023-03-08 | Stop reason: HOSPADM

## 2023-03-07 RX ADMIN — IBUPROFEN 600 MG: 600 TABLET, FILM COATED ORAL at 14:45

## 2023-03-07 RX ADMIN — DOCUSATE SODIUM 100 MG: 100 CAPSULE ORAL at 08:41

## 2023-03-07 RX ADMIN — PRENATAL VIT W/ FE FUMARATE-FA TAB 27-0.8 MG 1 TABLET: 27-0.8 TAB at 08:41

## 2023-03-07 RX ADMIN — IBUPROFEN 600 MG: 600 TABLET, FILM COATED ORAL at 21:43

## 2023-03-07 RX ADMIN — DOCUSATE SODIUM 100 MG: 100 CAPSULE ORAL at 21:43

## 2023-03-07 RX ADMIN — NICOTINE 1 PATCH: 14 PATCH, EXTENDED RELEASE TRANSDERMAL at 21:43

## 2023-03-07 RX ADMIN — BUPRENORPHINE HYDROCHLORIDE AND NALOXONE HYDROCHLORIDE DIHYDRATE 0.5 TABLET: 8; 2 TABLET SUBLINGUAL at 10:41

## 2023-03-07 NOTE — PROGRESS NOTES
" Jorge A  Vaginal Delivery Progress Note    Subjective   Subjective  Postpartum Day 1: Vaginal Delivery    The patient feels well.  Her pain is well controlled with nonsteroidal anti-inflammatory drugs.   She is ambulating well.  Patient describes her bleeding as thin lochia.    Breastfeeding: infant latching without difficulty.    Objective     Objective   Vital Signs Range for the last 24 hours  Temperature: Temp:  [97.9 °F (36.6 °C)-98 °F (36.7 °C)] 97.9 °F (36.6 °C)   Temp Source: Temp src: Oral   BP: BP: ()/(53-86) 127/83   Pulse: Heart Rate:  [49-96] 52   Respirations: Resp:  [17-18] 17   Weight:       Admit Height:  Height: 161.3 cm (63.5\")    Physical Exam:  General:  no acute distresss.  Abdomen: Fundus: appropriate, firm, non tender  Extremities: normal, atraumatic, no cyanosis, and trace edema.       [unfilled]       Lab Results   Component Value Date    ABO AB 03/06/2023    RH Negative 03/06/2023        Lab Results   Component Value Date    HGB 12.5 03/07/2023    HCT 37.0 03/07/2023         Assessment & Plan   Assessment & Plan    Pregnancy    Postpartum care following vaginal delivery      Sailaja Alarcon is Day 1  post-partum  Vaginal, Spontaneous   .      Plan:  Continue current care.      Patsy Valdovinos, APRN  3/7/2023  08:19 EST    "

## 2023-03-07 NOTE — PLAN OF CARE
Goal Outcome Evaluation:  Plan of Care Reviewed With: significant other        Progress: improving  Outcome Evaluation: pt fundus firm, midline. light rubra. c/o pain, PRN motrin given. effective per pt

## 2023-03-08 VITALS
BODY MASS INDEX: 24.33 KG/M2 | OXYGEN SATURATION: 99 % | RESPIRATION RATE: 16 BRPM | TEMPERATURE: 98.4 F | WEIGHT: 142.5 LBS | HEIGHT: 64 IN | HEART RATE: 67 BPM | DIASTOLIC BLOOD PRESSURE: 79 MMHG | SYSTOLIC BLOOD PRESSURE: 120 MMHG

## 2023-03-08 RX ORDER — IBUPROFEN 600 MG/1
600 TABLET ORAL EVERY 6 HOURS PRN
Qty: 40 TABLET | Refills: 0 | Status: SHIPPED | OUTPATIENT
Start: 2023-03-08

## 2023-03-08 RX ADMIN — DOCUSATE SODIUM 100 MG: 100 CAPSULE ORAL at 09:05

## 2023-03-08 RX ADMIN — BUPRENORPHINE HYDROCHLORIDE AND NALOXONE HYDROCHLORIDE DIHYDRATE 0.5 TABLET: 8; 2 TABLET SUBLINGUAL at 09:05

## 2023-03-08 RX ADMIN — PRENATAL VIT W/ FE FUMARATE-FA TAB 27-0.8 MG 1 TABLET: 27-0.8 TAB at 09:05

## 2023-03-08 NOTE — DISCHARGE SUMMARY
WYATT Jules  Delivery Discharge Summary    Primary OB Clinician:     EDC: Estimated Date of Delivery: 3/27/23    Gestational Age:37w0d    Antepartum complications: none    Date of Delivery: 3/6/2023   Time of Delivery: 1:52 PM     Delivered By:  Shelley Steinberg     Delivery Type: Vaginal, Spontaneous      Tubal Ligation: n/a    Baby:male  infant;   Apgar:  9  @ 1 minute /   Apgar:  9  @ 5 minutes   Weight: 1822 g (4 lb 0.3 oz)      Anesthesia: Epidural      Intrapartum complications: None    Laceration: No    Episiotomy: No    Placenta: Spontaneous     Feeding method: Breastfeeding Status: Yes    [unfilled]       Lab Results   Component Value Date    ABO AB 2023    RH Negative 2023        Lab Results   Component Value Date    HGB 12.5 2023    HCT 37.0 2023       Rh Immune globulin given: not applicable      Discharge Date: 3/8/2023; Discharge Time: 08:38 EST        Plan:    Address and phone number verified and same.  Follow-up appointment with [facility] in 3 weeks.      Patsy Valdovinos, APRN  3/8/2023  08:38 EST

## 2023-03-08 NOTE — CASE MANAGEMENT/SOCIAL WORK
Discharge Planning Assessment  WYATT Jules     Patient Name: Sailaja Alarcon  MRN: [id number]  Today's Date: 3/8/2023    Admit Date: 3/6/2023        Discharge Plan     Row Name 03/08/23 0859       Plan    Final Discharge Disposition Code 01 - home or self-care    Final Note Pt being discharged home on this date. No other needs identified                HELENA Nelson

## 2023-03-08 NOTE — CASE MANAGEMENT/SOCIAL WORK
Case Management/Social Work    Patient Name:  Sailaja Alarcon  YOB: 1995  MRN: [id number]  Admit Date:  3/6/2023        SS received consult for epds score of 12. SS spoke with Pt on this date and made her aware of epds score, and risk of post partum depression. Pt states she has a mental health counselor and plans to follow up with counselor as soon as possible.     Infant ok to be discharged home with Mother.       Electronically signed by:  HELENA Nelson  03/08/23 08:52 EST

## 2023-03-08 NOTE — PLAN OF CARE
Goal Outcome Evaluation:  Plan of Care Reviewed With: patient        Progress: improving  Outcome Evaluation: pt fundus firm, midline. light rubra. c/o pain, PRN motrin given. effective per pt

## 2023-03-08 NOTE — PLAN OF CARE
Goal Outcome Evaluation:               Discharge instructions given and explained. ER warning signs discussed. Questions answered. Patient v/u.

## 2023-12-04 ENCOUNTER — HOSPITAL ENCOUNTER (EMERGENCY)
Facility: HOSPITAL | Age: 28
Discharge: LEFT BEFORE DISCHARGE | End: 2023-12-04
Attending: STUDENT IN AN ORGANIZED HEALTH CARE EDUCATION/TRAINING PROGRAM | Admitting: STUDENT IN AN ORGANIZED HEALTH CARE EDUCATION/TRAINING PROGRAM
Payer: COMMERCIAL

## 2023-12-04 ENCOUNTER — APPOINTMENT (OUTPATIENT)
Dept: ULTRASOUND IMAGING | Facility: HOSPITAL | Age: 28
End: 2023-12-04
Payer: COMMERCIAL

## 2023-12-04 VITALS
HEIGHT: 63 IN | RESPIRATION RATE: 13 BRPM | SYSTOLIC BLOOD PRESSURE: 88 MMHG | WEIGHT: 126 LBS | BODY MASS INDEX: 22.32 KG/M2 | TEMPERATURE: 98.7 F | HEART RATE: 81 BPM | OXYGEN SATURATION: 94 % | DIASTOLIC BLOOD PRESSURE: 46 MMHG

## 2023-12-04 DIAGNOSIS — L03.90 CELLULITIS, UNSPECIFIED CELLULITIS SITE: Primary | ICD-10-CM

## 2023-12-04 LAB
ALBUMIN SERPL-MCNC: 3.5 G/DL (ref 3.5–5.2)
ALBUMIN/GLOB SERPL: 0.9 G/DL
ALP SERPL-CCNC: 77 U/L (ref 39–117)
ALT SERPL W P-5'-P-CCNC: 6 U/L (ref 1–33)
ANION GAP SERPL CALCULATED.3IONS-SCNC: 13.9 MMOL/L (ref 5–15)
AST SERPL-CCNC: 18 U/L (ref 1–32)
BACTERIA UR QL AUTO: ABNORMAL /HPF
BASOPHILS # BLD AUTO: 0.05 10*3/MM3 (ref 0–0.2)
BASOPHILS NFR BLD AUTO: 0.7 % (ref 0–1.5)
BILIRUB SERPL-MCNC: 0.6 MG/DL (ref 0–1.2)
BILIRUB UR QL STRIP: NEGATIVE
BUN SERPL-MCNC: 9 MG/DL (ref 6–20)
BUN/CREAT SERPL: 7.9 (ref 7–25)
CALCIUM SPEC-SCNC: 8.7 MG/DL (ref 8.6–10.5)
CHLORIDE SERPL-SCNC: 98 MMOL/L (ref 98–107)
CLARITY UR: CLEAR
CO2 SERPL-SCNC: 16.1 MMOL/L (ref 22–29)
COLOR UR: YELLOW
CREAT SERPL-MCNC: 1.14 MG/DL (ref 0.57–1)
CRP SERPL-MCNC: 20.62 MG/DL (ref 0–0.5)
D-LACTATE SERPL-SCNC: 1.4 MMOL/L (ref 0.5–2)
DEPRECATED RDW RBC AUTO: 44.6 FL (ref 37–54)
EGFRCR SERPLBLD CKD-EPI 2021: 67 ML/MIN/1.73
EOSINOPHIL # BLD AUTO: 0 10*3/MM3 (ref 0–0.4)
EOSINOPHIL NFR BLD AUTO: 0 % (ref 0.3–6.2)
ERYTHROCYTE [DISTWIDTH] IN BLOOD BY AUTOMATED COUNT: 13.1 % (ref 12.3–15.4)
ERYTHROCYTE [SEDIMENTATION RATE] IN BLOOD: 32 MM/HR (ref 0–20)
GLOBULIN UR ELPH-MCNC: 3.8 GM/DL
GLUCOSE SERPL-MCNC: 101 MG/DL (ref 65–99)
GLUCOSE UR STRIP-MCNC: NEGATIVE MG/DL
HCG SERPL QL: NEGATIVE
HCT VFR BLD AUTO: 42.4 % (ref 34–46.6)
HGB BLD-MCNC: 13.5 G/DL (ref 12–15.9)
HGB UR QL STRIP.AUTO: ABNORMAL
HOLD SPECIMEN: NORMAL
HYALINE CASTS UR QL AUTO: ABNORMAL /LPF
IMM GRANULOCYTES # BLD AUTO: 0.04 10*3/MM3 (ref 0–0.05)
IMM GRANULOCYTES NFR BLD AUTO: 0.5 % (ref 0–0.5)
KETONES UR QL STRIP: NEGATIVE
LEUKOCYTE ESTERASE UR QL STRIP.AUTO: NEGATIVE
LYMPHOCYTES # BLD AUTO: 0.6 10*3/MM3 (ref 0.7–3.1)
LYMPHOCYTES NFR BLD AUTO: 8.2 % (ref 19.6–45.3)
MCH RBC QN AUTO: 29.4 PG (ref 26.6–33)
MCHC RBC AUTO-ENTMCNC: 31.8 G/DL (ref 31.5–35.7)
MCV RBC AUTO: 92.4 FL (ref 79–97)
MONOCYTES # BLD AUTO: 0.31 10*3/MM3 (ref 0.1–0.9)
MONOCYTES NFR BLD AUTO: 4.2 % (ref 5–12)
NEUTROPHILS NFR BLD AUTO: 6.3 10*3/MM3 (ref 1.7–7)
NEUTROPHILS NFR BLD AUTO: 86.4 % (ref 42.7–76)
NITRITE UR QL STRIP: NEGATIVE
NRBC BLD AUTO-RTO: 0 /100 WBC (ref 0–0.2)
PH UR STRIP.AUTO: 5.5 [PH] (ref 5–8)
PLATELET # BLD AUTO: 141 10*3/MM3 (ref 140–450)
PMV BLD AUTO: 11.5 FL (ref 6–12)
POTASSIUM SERPL-SCNC: 3.7 MMOL/L (ref 3.5–5.2)
PROT SERPL-MCNC: 7.3 G/DL (ref 6–8.5)
PROT UR QL STRIP: ABNORMAL
RBC # BLD AUTO: 4.59 10*6/MM3 (ref 3.77–5.28)
RBC # UR STRIP: ABNORMAL /HPF
REF LAB TEST METHOD: ABNORMAL
SODIUM SERPL-SCNC: 128 MMOL/L (ref 136–145)
SP GR UR STRIP: 1.01 (ref 1–1.03)
SQUAMOUS #/AREA URNS HPF: ABNORMAL /HPF
UROBILINOGEN UR QL STRIP: ABNORMAL
WBC # UR STRIP: ABNORMAL /HPF
WBC NRBC COR # BLD AUTO: 7.3 10*3/MM3 (ref 3.4–10.8)
WHOLE BLOOD HOLD COAG: NORMAL
WHOLE BLOOD HOLD SPECIMEN: NORMAL

## 2023-12-04 PROCEDURE — 96365 THER/PROPH/DIAG IV INF INIT: CPT

## 2023-12-04 PROCEDURE — 85025 COMPLETE CBC W/AUTO DIFF WBC: CPT | Performed by: NURSE PRACTITIONER

## 2023-12-04 PROCEDURE — 96361 HYDRATE IV INFUSION ADD-ON: CPT

## 2023-12-04 PROCEDURE — 81001 URINALYSIS AUTO W/SCOPE: CPT | Performed by: NURSE PRACTITIONER

## 2023-12-04 PROCEDURE — 93971 EXTREMITY STUDY: CPT

## 2023-12-04 PROCEDURE — 25810000003 SODIUM CHLORIDE 0.9 % SOLUTION: Performed by: NURSE PRACTITIONER

## 2023-12-04 PROCEDURE — 87040 BLOOD CULTURE FOR BACTERIA: CPT | Performed by: NURSE PRACTITIONER

## 2023-12-04 PROCEDURE — 86140 C-REACTIVE PROTEIN: CPT | Performed by: NURSE PRACTITIONER

## 2023-12-04 PROCEDURE — 36415 COLL VENOUS BLD VENIPUNCTURE: CPT

## 2023-12-04 PROCEDURE — 93971 EXTREMITY STUDY: CPT | Performed by: RADIOLOGY

## 2023-12-04 PROCEDURE — 80053 COMPREHEN METABOLIC PANEL: CPT | Performed by: NURSE PRACTITIONER

## 2023-12-04 PROCEDURE — 84703 CHORIONIC GONADOTROPIN ASSAY: CPT | Performed by: NURSE PRACTITIONER

## 2023-12-04 PROCEDURE — 25810000003 SODIUM CHLORIDE 0.9 % SOLUTION: Performed by: STUDENT IN AN ORGANIZED HEALTH CARE EDUCATION/TRAINING PROGRAM

## 2023-12-04 PROCEDURE — 25010000002 ORPHENADRINE CITRATE PER 60 MG: Performed by: NURSE PRACTITIONER

## 2023-12-04 PROCEDURE — 85652 RBC SED RATE AUTOMATED: CPT | Performed by: NURSE PRACTITIONER

## 2023-12-04 PROCEDURE — 0 DEXTROSE 5 % SOLUTION 500 ML FLEX CONT: Performed by: NURSE PRACTITIONER

## 2023-12-04 PROCEDURE — 96375 TX/PRO/DX INJ NEW DRUG ADDON: CPT

## 2023-12-04 PROCEDURE — 99284 EMERGENCY DEPT VISIT MOD MDM: CPT

## 2023-12-04 PROCEDURE — 83605 ASSAY OF LACTIC ACID: CPT | Performed by: NURSE PRACTITIONER

## 2023-12-04 PROCEDURE — 25010000002 DALBAVANCIN 500 MG RECONSTITUTED SOLUTION 1 EACH VIAL: Performed by: NURSE PRACTITIONER

## 2023-12-04 RX ORDER — ORPHENADRINE CITRATE 30 MG/ML
60 INJECTION INTRAMUSCULAR; INTRAVENOUS ONCE
Status: COMPLETED | OUTPATIENT
Start: 2023-12-04 | End: 2023-12-04

## 2023-12-04 RX ORDER — DEXTROSE MONOHYDRATE 50 MG/ML
50 INJECTION, SOLUTION INTRAVENOUS ONCE
Status: COMPLETED | OUTPATIENT
Start: 2023-12-04 | End: 2023-12-04

## 2023-12-04 RX ORDER — ACETAMINOPHEN 500 MG
1000 TABLET ORAL ONCE
Status: COMPLETED | OUTPATIENT
Start: 2023-12-04 | End: 2023-12-04

## 2023-12-04 RX ORDER — SODIUM CHLORIDE 0.9 % (FLUSH) 0.9 %
10 SYRINGE (ML) INJECTION AS NEEDED
Status: DISCONTINUED | OUTPATIENT
Start: 2023-12-04 | End: 2023-12-04 | Stop reason: HOSPADM

## 2023-12-04 RX ADMIN — ACETAMINOPHEN 1000 MG: 500 TABLET ORAL at 14:34

## 2023-12-04 RX ADMIN — SODIUM CHLORIDE 1000 ML: 9 INJECTION, SOLUTION INTRAVENOUS at 14:34

## 2023-12-04 RX ADMIN — ORPHENADRINE CITRATE 60 MG: 60 INJECTION INTRAMUSCULAR; INTRAVENOUS at 15:15

## 2023-12-04 RX ADMIN — SODIUM CHLORIDE 1000 ML: 9 INJECTION, SOLUTION INTRAVENOUS at 13:12

## 2023-12-04 RX ADMIN — DALBAVANCIN 1500 MG: 500 INJECTION, POWDER, FOR SOLUTION INTRAVENOUS at 16:27

## 2023-12-04 RX ADMIN — DEXTROSE MONOHYDRATE 50 ML: 50 INJECTION, SOLUTION INTRAVENOUS at 16:27

## 2023-12-04 RX ADMIN — DEXTROSE 50 ML: 50 INJECTION, SOLUTION INTRAVENOUS at 18:09

## 2023-12-04 NOTE — ED NOTES
Pt declines admission, requesting IVPB abx and to leave AMA. Discussed with pt risks of refusing care, up to and including death. Pt verb understanding, AAOx4, provider aware.

## 2023-12-04 NOTE — ED PROVIDER NOTES
Subjective   History of Present Illness  Patient is a 27-year-old female with no significant past medical history presenting to the ER complaints of left lower leg swelling and redness.  Patient reports a 3-day history of swelling and redness.  Patient reports that she does have a history of MRSA.  Patient reports history of substance abuse on Suboxone.  Patient reports no IV drug use at this time.  Patient denies any additional symptoms.  Patient also reports fever and chills at home.  Patient reports significant pain and tightness in the left lower calf and foot.  Patient denies any additional symptoms at this time.    History provided by:  Patient   used: No        Review of Systems   Constitutional: Negative.  Negative for fever.   HENT: Negative.     Respiratory: Negative.     Cardiovascular: Negative.  Negative for chest pain.   Gastrointestinal: Negative.  Negative for abdominal pain.   Endocrine: Negative.    Genitourinary: Negative.  Negative for dysuria.   Skin:  Positive for color change and wound.   Neurological: Negative.    Psychiatric/Behavioral: Negative.     All other systems reviewed and are negative.      Past Medical History:   Diagnosis Date    Hepatitis C     Hepatitis C antibody positive in blood     Migraines        Allergies   Allergen Reactions    Amoxicillin Anaphylaxis    Penicillins Anaphylaxis    Vancomycin Itching     Pt states she had full body itch and burning sensation, and she turned red. Reaction potentially Red Man Syndrome.    Zofran [Ondansetron Hcl] Hives and Rash       Past Surgical History:   Procedure Laterality Date    HEAD/NECK ABSCESS INCISION AND DRAINAGE Left 7/8/2022    Procedure: HEAD NECK ABSCESS INCISION AND DRAINAGE;  Surgeon: Miquel Grayson MD;  Location: [facility];  Service: General;  Laterality: Left;    INCISION AND DRAINAGE ABSCESS Right 10/10/2021    Procedure: INCISION AND DRAINAGE ABSCESS CENTRAL LINE PLACEMENT;  Surgeon: Elmer  Naz VILLASENOR MD;  Location: [facility];  Service: General;  Laterality: Right;  I&D ABSCESS= INFECTED.  CENTRAL LINE = CLEAN.       Family History   Problem Relation Age of Onset    Hypertension Mother     Cancer Father     No Known Problems Sister     No Known Problems Brother     No Known Problems Son     No Known Problems Daughter     Hypertension Maternal Grandmother     Diabetes Maternal Grandfather     No Known Problems Paternal Grandmother     No Known Problems Paternal Grandfather     No Known Problems Cousin     Diabetes Maternal Aunt     Rheum arthritis Neg Hx     Osteoarthritis Neg Hx     Asthma Neg Hx     Heart failure Neg Hx     Hyperlipidemia Neg Hx     Migraines Neg Hx     Rashes / Skin problems Neg Hx     Seizures Neg Hx     Stroke Neg Hx     Thyroid disease Neg Hx        Social History     Socioeconomic History    Marital status:    Tobacco Use    Smoking status: Every Day     Packs/day: 1.00     Years: 15.00     Additional pack years: 0.00     Total pack years: 15.00     Types: Cigarettes    Smokeless tobacco: Never   Vaping Use    Vaping Use: Never used   Substance and Sexual Activity    Alcohol use: No     Alcohol/week: 0.0 standard drinks of alcohol    Drug use: Not Currently     Frequency: 3.0 times per week     Types: IV     Comment: last use 5 YEARS AGO    Sexual activity: Yes     Partners: Male           Objective   Physical Exam  Vitals and nursing note reviewed.   Constitutional:       General: She is not in acute distress.     Appearance: She is well-developed. She is not diaphoretic.   HENT:      Head: Normocephalic and atraumatic.      Right Ear: External ear normal.      Left Ear: External ear normal.      Nose: Nose normal.   Eyes:      Conjunctiva/sclera: Conjunctivae normal.      Pupils: Pupils are equal, round, and reactive to light.   Neck:      Vascular: No JVD.      Trachea: No tracheal deviation.   Cardiovascular:      Rate and Rhythm: Normal rate and regular rhythm.       Heart sounds: Normal heart sounds. No murmur heard.  Pulmonary:      Effort: Pulmonary effort is normal. No respiratory distress.      Breath sounds: Normal breath sounds. No wheezing.   Abdominal:      General: Bowel sounds are normal.      Palpations: Abdomen is soft.      Tenderness: There is no abdominal tenderness.   Musculoskeletal:         General: Swelling and tenderness present. No deformity. Normal range of motion.      Cervical back: Normal range of motion and neck supple.   Skin:     General: Skin is warm and dry.      Coloration: Skin is not pale.      Findings: No erythema or rash.   Neurological:      Mental Status: She is alert and oriented to person, place, and time.      Cranial Nerves: No cranial nerve deficit.   Psychiatric:         Behavior: Behavior normal.         Thought Content: Thought content normal.         Procedures       Results for orders placed or performed during the hospital encounter of 12/04/23   Comprehensive Metabolic Panel    Specimen: Arm, Right; Blood   Result Value Ref Range    Glucose 101 (H) 65 - 99 mg/dL    BUN 9 6 - 20 mg/dL    Creatinine 1.14 (H) 0.57 - 1.00 mg/dL    Sodium 128 (L) 136 - 145 mmol/L    Potassium 3.7 3.5 - 5.2 mmol/L    Chloride 98 98 - 107 mmol/L    CO2 16.1 (L) 22.0 - 29.0 mmol/L    Calcium 8.7 8.6 - 10.5 mg/dL    Total Protein 7.3 6.0 - 8.5 g/dL    Albumin 3.5 3.5 - 5.2 g/dL    ALT (SGPT) 6 1 - 33 U/L    AST (SGOT) 18 1 - 32 U/L    Alkaline Phosphatase 77 39 - 117 U/L    Total Bilirubin 0.6 0.0 - 1.2 mg/dL    Globulin 3.8 gm/dL    A/G Ratio 0.9 g/dL    BUN/Creatinine Ratio 7.9 7.0 - 25.0    Anion Gap 13.9 5.0 - 15.0 mmol/L    eGFR 67.0 >60.0 mL/min/1.73   hCG, Serum, Qualitative    Specimen: Arm, Right; Blood   Result Value Ref Range    HCG Qualitative Negative Negative   Urinalysis With Microscopic If Indicated (No Culture) - Urine, Clean Catch    Specimen: Urine, Clean Catch   Result Value Ref Range    Color, UA Yellow Yellow, Straw    Appearance,  UA Clear Clear    pH, UA 5.5 5.0 - 8.0    Specific Gravity, UA 1.015 1.005 - 1.030    Glucose, UA Negative Negative    Ketones, UA Negative Negative    Bilirubin, UA Negative Negative    Blood, UA Small (1+) (A) Negative    Protein,  mg/dL (2+) (A) Negative    Leuk Esterase, UA Negative Negative    Nitrite, UA Negative Negative    Urobilinogen, UA 0.2 E.U./dL 0.2 - 1.0 E.U./dL   C-reactive Protein    Specimen: Arm, Right; Blood   Result Value Ref Range    C-Reactive Protein 20.62 (H) 0.00 - 0.50 mg/dL   Sedimentation Rate    Specimen: Arm, Right; Blood   Result Value Ref Range    Sed Rate 32 (H) 0 - 20 mm/hr   Lactic Acid, Plasma    Specimen: Arm, Right; Blood   Result Value Ref Range    Lactate 1.4 0.5 - 2.0 mmol/L   CBC Auto Differential    Specimen: Arm, Right; Blood   Result Value Ref Range    WBC 7.30 3.40 - 10.80 10*3/mm3    RBC 4.59 3.77 - 5.28 10*6/mm3    Hemoglobin 13.5 12.0 - 15.9 g/dL    Hematocrit 42.4 34.0 - 46.6 %    MCV 92.4 79.0 - 97.0 fL    MCH 29.4 26.6 - 33.0 pg    MCHC 31.8 31.5 - 35.7 g/dL    RDW 13.1 12.3 - 15.4 %    RDW-SD 44.6 37.0 - 54.0 fl    MPV 11.5 6.0 - 12.0 fL    Platelets 141 140 - 450 10*3/mm3    Neutrophil % 86.4 (H) 42.7 - 76.0 %    Lymphocyte % 8.2 (L) 19.6 - 45.3 %    Monocyte % 4.2 (L) 5.0 - 12.0 %    Eosinophil % 0.0 (L) 0.3 - 6.2 %    Basophil % 0.7 0.0 - 1.5 %    Immature Grans % 0.5 0.0 - 0.5 %    Neutrophils, Absolute 6.30 1.70 - 7.00 10*3/mm3    Lymphocytes, Absolute 0.60 (L) 0.70 - 3.10 10*3/mm3    Monocytes, Absolute 0.31 0.10 - 0.90 10*3/mm3    Eosinophils, Absolute 0.00 0.00 - 0.40 10*3/mm3    Basophils, Absolute 0.05 0.00 - 0.20 10*3/mm3    Immature Grans, Absolute 0.04 0.00 - 0.05 10*3/mm3    nRBC 0.0 0.0 - 0.2 /100 WBC   Urinalysis, Microscopic Only - Urine, Clean Catch    Specimen: Urine, Clean Catch   Result Value Ref Range    RBC, UA 6-10 (A) None Seen, 0-2 /HPF    WBC, UA 3-5 (A) None Seen, 0-2 /HPF    Bacteria, UA None Seen None Seen /HPF    Squamous  Epithelial Cells, UA 0-2 None Seen, 0-2 /HPF    Hyaline Casts, UA None Seen None Seen /LPF    Methodology Automated Microscopy    Green Top (Gel)   Result Value Ref Range    Extra Tube Hold for add-ons.    Lavender Top   Result Value Ref Range    Extra Tube hold for add-on    Light Blue Top   Result Value Ref Range    Extra Tube Hold for add-ons.       US Venous Doppler Lower Extremity Left (duplex)   Final Result   No DVT in the left lower extremity on today's exam.        This report was finalized on 12/4/2023 2:15 PM by Dr. Asa Evans MD.               ED Course  ED Course as of 12/04/23 1847   Mon Dec 04, 2023   1256 Sed Rate(!): 32 [SM]   1338 C-Reactive Protein(!): 20.62 [SM]   1451 US Venous Doppler Lower Extremity Left (duplex) [SM]   1625 Lengthy discussion with patient at bedside regarding admission. Patient refuses admission at this time. Advises that she has a 3rd old at home with no assistance and can't be admitted. Advised it was in her best interest due BP, fever, and infection. Advised to immediately return to the ER for new or worsening symptoms. Patient understood the risk vs benefit and she understood and still wishes to sign out AMA after antibiotics  [SM]      ED Course User Index  [SM] Mary De Dios APRN                                             Medical Decision Making  Patient is a 27-year-old female with no significant past medical history presenting to the ER complaints of left lower leg swelling and redness.  Patient reports a 3-day history of swelling and redness.  Patient reports that she does have a history of MRSA.  Patient reports history of substance abuse on Suboxone.  Patient reports no IV drug use at this time.  Patient denies any additional symptoms.  Patient also reports fever and chills at home.  Patient reports significant pain and tightness in the left lower calf and foot.  Patient denies any additional symptoms at this time.    Lengthy discussion with patient at  bedside regarding admission. Patient refuses admission at this time. Advises that she has a 3rd old at home with no assistance and can't be admitted. Advised it was in her best interest due BP, fever, and infection. Advised to immediately return to the ER for new or worsening symptoms. Patient understood the risk vs benefit and she understood and still wishes to sign out AMA after antibiotics     Problems Addressed:  Cellulitis, unspecified cellulitis site: complicated acute illness or injury    Amount and/or Complexity of Data Reviewed  Labs: ordered. Decision-making details documented in ED Course.  Radiology:  Decision-making details documented in ED Course.    Risk  OTC drugs.  Prescription drug management.        Final diagnoses:   Cellulitis, unspecified cellulitis site       ED Disposition  ED Disposition       ED Disposition   AMA    Condition   --    Comment   --               Margaret Baxter, APRN  [address]  [facility] [address]  [phone]    Schedule an appointment as soon as possible for a visit            Medication List      No changes were made to your prescriptions during this visit.            Mary De Dios, APRN  12/04/23 184

## 2023-12-04 NOTE — ED NOTES
Pt continues to request AMA. Reeducated pt on risks, up to and including death, verb understanding, AAOx4. Pt reports waiting  arrival to ED. Charge RN and provider aware.

## 2023-12-09 LAB
BACTERIA SPEC AEROBE CULT: NORMAL
BACTERIA SPEC AEROBE CULT: NORMAL

## 2023-12-12 ENCOUNTER — LAB (OUTPATIENT)
Dept: LAB | Facility: HOSPITAL | Age: 28
End: 2023-12-12
Payer: COMMERCIAL

## 2023-12-12 ENCOUNTER — HOSPITAL ENCOUNTER (OUTPATIENT)
Dept: ULTRASOUND IMAGING | Facility: HOSPITAL | Age: 28
Discharge: HOME | End: 2023-12-12
Payer: COMMERCIAL

## 2023-12-12 ENCOUNTER — TRANSCRIBE ORDERS (OUTPATIENT)
Dept: ADMINISTRATIVE | Facility: HOSPITAL | Age: 28
End: 2023-12-12
Payer: COMMERCIAL

## 2023-12-12 DIAGNOSIS — R60.9 EDEMA, UNSPECIFIED TYPE: ICD-10-CM

## 2023-12-12 DIAGNOSIS — R22.42 MASS OF LOWER LEG, LEFT: ICD-10-CM

## 2023-12-12 DIAGNOSIS — R22.43 LOCALIZED SWELLING, MASS AND LUMP, LOWER LIMB, BILATERAL: ICD-10-CM

## 2023-12-12 DIAGNOSIS — R22.43 LOCALIZED SWELLING, MASS AND LUMP, LOWER LIMB, BILATERAL: Primary | ICD-10-CM

## 2023-12-12 PROCEDURE — 36415 COLL VENOUS BLD VENIPUNCTURE: CPT

## 2023-12-12 PROCEDURE — 76882 US LMTD JT/FCL EVL NVASC XTR: CPT

## 2023-12-12 PROCEDURE — 83880 ASSAY OF NATRIURETIC PEPTIDE: CPT

## 2023-12-12 PROCEDURE — 80053 COMPREHEN METABOLIC PANEL: CPT

## 2023-12-13 LAB
ALBUMIN SERPL-MCNC: 3.5 G/DL (ref 3.5–5.2)
ALBUMIN/GLOB SERPL: 0.9 G/DL
ALP SERPL-CCNC: 104 U/L (ref 39–117)
ALT SERPL W P-5'-P-CCNC: 10 U/L (ref 1–33)
ANION GAP SERPL CALCULATED.3IONS-SCNC: 16 MMOL/L (ref 5–15)
AST SERPL-CCNC: 17 U/L (ref 1–32)
BILIRUB SERPL-MCNC: 0.2 MG/DL (ref 0–1.2)
BUN SERPL-MCNC: 12 MG/DL (ref 6–20)
BUN/CREAT SERPL: 12.4 (ref 7–25)
CALCIUM SPEC-SCNC: 9.8 MG/DL (ref 8.6–10.5)
CHLORIDE SERPL-SCNC: 107 MMOL/L (ref 98–107)
CO2 SERPL-SCNC: 14 MMOL/L (ref 22–29)
CREAT SERPL-MCNC: 0.97 MG/DL (ref 0.57–1)
EGFRCR SERPLBLD CKD-EPI 2021: 81.3 ML/MIN/1.73
GLOBULIN UR ELPH-MCNC: 3.7 GM/DL
GLUCOSE SERPL-MCNC: 102 MG/DL (ref 65–99)
NT-PROBNP SERPL-MCNC: 300 PG/ML (ref 0–450)
POTASSIUM SERPL-SCNC: 5.2 MMOL/L (ref 3.5–5.2)
PROT SERPL-MCNC: 7.2 G/DL (ref 6–8.5)
SODIUM SERPL-SCNC: 137 MMOL/L (ref 136–145)

## 2024-01-12 ENCOUNTER — HOSPITAL ENCOUNTER (EMERGENCY)
Facility: HOSPITAL | Age: 29
Discharge: HOME | End: 2024-01-13
Attending: STUDENT IN AN ORGANIZED HEALTH CARE EDUCATION/TRAINING PROGRAM
Payer: COMMERCIAL

## 2024-01-12 ENCOUNTER — APPOINTMENT (OUTPATIENT)
Dept: CT IMAGING | Facility: HOSPITAL | Age: 29
End: 2024-01-12
Payer: COMMERCIAL

## 2024-01-12 VITALS
HEART RATE: 66 BPM | HEIGHT: 63 IN | SYSTOLIC BLOOD PRESSURE: 104 MMHG | OXYGEN SATURATION: 100 % | TEMPERATURE: 98.5 F | WEIGHT: 145 LBS | RESPIRATION RATE: 14 BRPM | DIASTOLIC BLOOD PRESSURE: 57 MMHG | BODY MASS INDEX: 25.69 KG/M2

## 2024-01-12 DIAGNOSIS — S81.802A OPEN WOUND OF LEFT LOWER EXTREMITY, INITIAL ENCOUNTER: Primary | ICD-10-CM

## 2024-01-12 DIAGNOSIS — L03.116 CELLULITIS OF LEFT LOWER EXTREMITY: ICD-10-CM

## 2024-01-12 LAB
ALBUMIN SERPL-MCNC: 4.4 G/DL (ref 3.5–5.2)
ALBUMIN/GLOB SERPL: 1.1 G/DL
ALP SERPL-CCNC: 90 U/L (ref 39–117)
ALT SERPL W P-5'-P-CCNC: <5 U/L (ref 1–33)
ANION GAP SERPL CALCULATED.3IONS-SCNC: 11 MMOL/L (ref 5–15)
AST SERPL-CCNC: 11 U/L (ref 1–32)
BASOPHILS # BLD AUTO: 0.06 10*3/MM3 (ref 0–0.2)
BASOPHILS NFR BLD AUTO: 1.1 % (ref 0–1.5)
BILIRUB SERPL-MCNC: 0.3 MG/DL (ref 0–1.2)
BUN SERPL-MCNC: 10 MG/DL (ref 6–20)
BUN/CREAT SERPL: 12.3 (ref 7–25)
CALCIUM SPEC-SCNC: 9.5 MG/DL (ref 8.6–10.5)
CHLORIDE SERPL-SCNC: 102 MMOL/L (ref 98–107)
CO2 SERPL-SCNC: 22 MMOL/L (ref 22–29)
CREAT SERPL-MCNC: 0.81 MG/DL (ref 0.57–1)
CRP SERPL-MCNC: 1.31 MG/DL (ref 0–0.5)
D-LACTATE SERPL-SCNC: 1.1 MMOL/L (ref 0.5–2)
DEPRECATED RDW RBC AUTO: 45.2 FL (ref 37–54)
EGFRCR SERPLBLD CKD-EPI 2021: 100.3 ML/MIN/1.73
EOSINOPHIL # BLD AUTO: 0.13 10*3/MM3 (ref 0–0.4)
EOSINOPHIL NFR BLD AUTO: 2.3 % (ref 0.3–6.2)
ERYTHROCYTE [DISTWIDTH] IN BLOOD BY AUTOMATED COUNT: 13.5 % (ref 12.3–15.4)
ERYTHROCYTE [SEDIMENTATION RATE] IN BLOOD: 46 MM/HR (ref 0–20)
GLOBULIN UR ELPH-MCNC: 4 GM/DL
GLUCOSE SERPL-MCNC: 89 MG/DL (ref 65–99)
HCG SERPL QL: NEGATIVE
HCT VFR BLD AUTO: 40.9 % (ref 34–46.6)
HGB BLD-MCNC: 12.9 G/DL (ref 12–15.9)
HOLD SPECIMEN: NORMAL
IMM GRANULOCYTES # BLD AUTO: 0.03 10*3/MM3 (ref 0–0.05)
IMM GRANULOCYTES NFR BLD AUTO: 0.5 % (ref 0–0.5)
LYMPHOCYTES # BLD AUTO: 2.01 10*3/MM3 (ref 0.7–3.1)
LYMPHOCYTES NFR BLD AUTO: 36.2 % (ref 19.6–45.3)
MCH RBC QN AUTO: 29 PG (ref 26.6–33)
MCHC RBC AUTO-ENTMCNC: 31.5 G/DL (ref 31.5–35.7)
MCV RBC AUTO: 91.9 FL (ref 79–97)
MONOCYTES # BLD AUTO: 0.34 10*3/MM3 (ref 0.1–0.9)
MONOCYTES NFR BLD AUTO: 6.1 % (ref 5–12)
NEUTROPHILS NFR BLD AUTO: 2.99 10*3/MM3 (ref 1.7–7)
NEUTROPHILS NFR BLD AUTO: 53.8 % (ref 42.7–76)
NRBC BLD AUTO-RTO: 0 /100 WBC (ref 0–0.2)
PLATELET # BLD AUTO: 168 10*3/MM3 (ref 140–450)
PMV BLD AUTO: 10.8 FL (ref 6–12)
POTASSIUM SERPL-SCNC: 4.5 MMOL/L (ref 3.5–5.2)
PROT SERPL-MCNC: 8.4 G/DL (ref 6–8.5)
RBC # BLD AUTO: 4.45 10*6/MM3 (ref 3.77–5.28)
SODIUM SERPL-SCNC: 135 MMOL/L (ref 136–145)
WBC NRBC COR # BLD AUTO: 5.56 10*3/MM3 (ref 3.4–10.8)
WHOLE BLOOD HOLD COAG: NORMAL
WHOLE BLOOD HOLD SPECIMEN: NORMAL

## 2024-01-12 PROCEDURE — 73700 CT LOWER EXTREMITY W/O DYE: CPT | Performed by: RADIOLOGY

## 2024-01-12 PROCEDURE — 87150 DNA/RNA AMPLIFIED PROBE: CPT | Performed by: STUDENT IN AN ORGANIZED HEALTH CARE EDUCATION/TRAINING PROGRAM

## 2024-01-12 PROCEDURE — 99284 EMERGENCY DEPT VISIT MOD MDM: CPT

## 2024-01-12 PROCEDURE — 80053 COMPREHEN METABOLIC PANEL: CPT | Performed by: PHYSICIAN ASSISTANT

## 2024-01-12 PROCEDURE — 36415 COLL VENOUS BLD VENIPUNCTURE: CPT

## 2024-01-12 PROCEDURE — 84703 CHORIONIC GONADOTROPIN ASSAY: CPT | Performed by: PHYSICIAN ASSISTANT

## 2024-01-12 PROCEDURE — 87147 CULTURE TYPE IMMUNOLOGIC: CPT | Performed by: STUDENT IN AN ORGANIZED HEALTH CARE EDUCATION/TRAINING PROGRAM

## 2024-01-12 PROCEDURE — 85025 COMPLETE CBC W/AUTO DIFF WBC: CPT | Performed by: PHYSICIAN ASSISTANT

## 2024-01-12 PROCEDURE — 86140 C-REACTIVE PROTEIN: CPT | Performed by: PHYSICIAN ASSISTANT

## 2024-01-12 PROCEDURE — 87040 BLOOD CULTURE FOR BACTERIA: CPT | Performed by: STUDENT IN AN ORGANIZED HEALTH CARE EDUCATION/TRAINING PROGRAM

## 2024-01-12 PROCEDURE — 85652 RBC SED RATE AUTOMATED: CPT | Performed by: PHYSICIAN ASSISTANT

## 2024-01-12 PROCEDURE — 83605 ASSAY OF LACTIC ACID: CPT | Performed by: STUDENT IN AN ORGANIZED HEALTH CARE EDUCATION/TRAINING PROGRAM

## 2024-01-12 PROCEDURE — 73700 CT LOWER EXTREMITY W/O DYE: CPT

## 2024-01-12 RX ORDER — NICOTINE 21 MG/24HR
1 PATCH, TRANSDERMAL 24 HOURS TRANSDERMAL ONCE
Status: DISCONTINUED | OUTPATIENT
Start: 2024-01-12 | End: 2024-01-13 | Stop reason: HOSPADM

## 2024-01-12 RX ADMIN — Medication 1 PATCH: at 22:33

## 2024-01-13 LAB
BACTERIA BLD CULT: ABNORMAL
BOTTLE TYPE: ABNORMAL

## 2024-01-13 RX ORDER — CLINDAMYCIN HYDROCHLORIDE 300 MG/1
600 CAPSULE ORAL 3 TIMES DAILY
Qty: 84 CAPSULE | Refills: 0 | Status: SHIPPED | OUTPATIENT
Start: 2024-01-13 | End: 2024-01-27

## 2024-01-13 RX ORDER — SULFAMETHOXAZOLE AND TRIMETHOPRIM 800; 160 MG/1; MG/1
1 TABLET ORAL 2 TIMES DAILY
Qty: 28 TABLET | Refills: 0 | Status: SHIPPED | OUTPATIENT
Start: 2024-01-13 | End: 2024-01-27

## 2024-01-13 NOTE — ED PROVIDER NOTES
"Subjective   History of Present Illness  28-year-old female presents with complaints of lower extremity wounds that been present for 2 to 3 months.  She reports seeing \"multiple doctors\" but wounds do not seem to be clinically improving.  Patient reports that she does have a history of IV drug use and has been clean since 2017.  Patient was last seen at [facility] 12/4 where she was diagnosed with lower extremity cellulitis.  At that time she was hypotensive and recommendations were made to the patient to be admitted to the hospital for medical management.  Patient ultimately left A as she had a 3-year-old at home with no additional caregiver help.  Patient received IV antibiotics and ultimately left.      Review of Systems    -Patient has a history of IV drug use and has not used since 2017.    Past Medical History:   Diagnosis Date    Hepatitis C     Hepatitis C antibody positive in blood     Migraines        Allergies   Allergen Reactions    Amoxicillin Anaphylaxis    Penicillins Anaphylaxis    Vancomycin Itching     Pt states she had full body itch and burning sensation, and she turned red. Reaction potentially Red Man Syndrome.    Zofran [Ondansetron Hcl] Hives and Rash       Past Surgical History:   Procedure Laterality Date    HEAD/NECK ABSCESS INCISION AND DRAINAGE Left 7/8/2022    Procedure: HEAD NECK ABSCESS INCISION AND DRAINAGE;  Surgeon: Miquel Grayson MD;  Location: [facility];  Service: General;  Laterality: Left;    INCISION AND DRAINAGE ABSCESS Right 10/10/2021    Procedure: INCISION AND DRAINAGE ABSCESS CENTRAL LINE PLACEMENT;  Surgeon: Naz Payne MD;  Location: [facility];  Service: General;  Laterality: Right;  I&D ABSCESS= INFECTED.  CENTRAL LINE = CLEAN.       Family History   Problem Relation Age of Onset    Hypertension Mother     Cancer Father     No Known Problems Sister     No Known Problems Brother     No Known Problems Son     No Known Problems Daughter     " Hypertension Maternal Grandmother     Diabetes Maternal Grandfather     No Known Problems Paternal Grandmother     No Known Problems Paternal Grandfather     No Known Problems Cousin     Diabetes Maternal Aunt     Rheum arthritis Neg Hx     Osteoarthritis Neg Hx     Asthma Neg Hx     Heart failure Neg Hx     Hyperlipidemia Neg Hx     Migraines Neg Hx     Rashes / Skin problems Neg Hx     Seizures Neg Hx     Stroke Neg Hx     Thyroid disease Neg Hx        Social History     Socioeconomic History    Marital status:    Tobacco Use    Smoking status: Every Day     Packs/day: 1.00     Years: 15.00     Additional pack years: 0.00     Total pack years: 15.00     Types: Cigarettes    Smokeless tobacco: Never   Vaping Use    Vaping Use: Never used   Substance and Sexual Activity    Alcohol use: No     Alcohol/week: 0.0 standard drinks of alcohol    Drug use: Not Currently     Frequency: 3.0 times per week     Types: IV     Comment: last use 5 YEARS AGO    Sexual activity: Yes     Partners: Male           Objective   Physical Exam  Vitals and nursing note reviewed.   Constitutional:       General: She is not in acute distress.     Appearance: She is well-developed. She is not diaphoretic.   HENT:      Head: Normocephalic and atraumatic.      Right Ear: External ear normal.      Left Ear: External ear normal.      Nose: Nose normal.   Eyes:      Conjunctiva/sclera: Conjunctivae normal.      Pupils: Pupils are equal, round, and reactive to light.   Neck:      Vascular: No JVD.      Trachea: No tracheal deviation.   Cardiovascular:      Rate and Rhythm: Normal rate and regular rhythm.      Heart sounds: Normal heart sounds. No murmur heard.  Pulmonary:      Effort: Pulmonary effort is normal. No respiratory distress.      Breath sounds: Normal breath sounds. No wheezing.   Abdominal:      General: Bowel sounds are normal.      Palpations: Abdomen is soft.      Tenderness: There is no abdominal tenderness.    Musculoskeletal:         General: No deformity. Normal range of motion.      Cervical back: Normal range of motion and neck supple.   Skin:     General: Skin is warm and dry.      Coloration: Skin is not pale.      Findings: Lesion present. No erythema or rash.      Comments: She has +2 edema in the distal left lower extremity, and was warmth that is circumferential that extends out radially from roughly a 4-1/2 in diameter wound lesion with a center eschar/scabbing   Neurological:      Mental Status: She is alert and oriented to person, place, and time.      Cranial Nerves: No cranial nerve deficit.   Psychiatric:         Behavior: Behavior normal.         Thought Content: Thought content normal.         Procedures  Results for orders placed or performed during the hospital encounter of 01/12/24   Comprehensive Metabolic Panel    Specimen: Arm, Right; Blood   Result Value Ref Range    Glucose 89 65 - 99 mg/dL    BUN 10 6 - 20 mg/dL    Creatinine 0.81 0.57 - 1.00 mg/dL    Sodium 135 (L) 136 - 145 mmol/L    Potassium 4.5 3.5 - 5.2 mmol/L    Chloride 102 98 - 107 mmol/L    CO2 22.0 22.0 - 29.0 mmol/L    Calcium 9.5 8.6 - 10.5 mg/dL    Total Protein 8.4 6.0 - 8.5 g/dL    Albumin 4.4 3.5 - 5.2 g/dL    ALT (SGPT) <5 1 - 33 U/L    AST (SGOT) 11 1 - 32 U/L    Alkaline Phosphatase 90 39 - 117 U/L    Total Bilirubin 0.3 0.0 - 1.2 mg/dL    Globulin 4.0 gm/dL    A/G Ratio 1.1 g/dL    BUN/Creatinine Ratio 12.3 7.0 - 25.0    Anion Gap 11.0 5.0 - 15.0 mmol/L    eGFR 100.3 >60.0 mL/min/1.73   hCG, Serum, Qualitative    Specimen: Arm, Right; Blood   Result Value Ref Range    HCG Qualitative Negative Negative   C-reactive Protein    Specimen: Arm, Right; Blood   Result Value Ref Range    C-Reactive Protein 1.31 (H) 0.00 - 0.50 mg/dL   Sedimentation Rate    Specimen: Arm, Right; Blood   Result Value Ref Range    Sed Rate 46 (H) 0 - 20 mm/hr   CBC Auto Differential    Specimen: Arm, Right; Blood   Result Value Ref Range    WBC 5.56  3.40 - 10.80 10*3/mm3    RBC 4.45 3.77 - 5.28 10*6/mm3    Hemoglobin 12.9 12.0 - 15.9 g/dL    Hematocrit 40.9 34.0 - 46.6 %    MCV 91.9 79.0 - 97.0 fL    MCH 29.0 26.6 - 33.0 pg    MCHC 31.5 31.5 - 35.7 g/dL    RDW 13.5 12.3 - 15.4 %    RDW-SD 45.2 37.0 - 54.0 fl    MPV 10.8 6.0 - 12.0 fL    Platelets 168 140 - 450 10*3/mm3    Neutrophil % 53.8 42.7 - 76.0 %    Lymphocyte % 36.2 19.6 - 45.3 %    Monocyte % 6.1 5.0 - 12.0 %    Eosinophil % 2.3 0.3 - 6.2 %    Basophil % 1.1 0.0 - 1.5 %    Immature Grans % 0.5 0.0 - 0.5 %    Neutrophils, Absolute 2.99 1.70 - 7.00 10*3/mm3    Lymphocytes, Absolute 2.01 0.70 - 3.10 10*3/mm3    Monocytes, Absolute 0.34 0.10 - 0.90 10*3/mm3    Eosinophils, Absolute 0.13 0.00 - 0.40 10*3/mm3    Basophils, Absolute 0.06 0.00 - 0.20 10*3/mm3    Immature Grans, Absolute 0.03 0.00 - 0.05 10*3/mm3    nRBC 0.0 0.0 - 0.2 /100 WBC   Lactic Acid, Plasma    Specimen: Arm, Right; Blood   Result Value Ref Range    Lactate 1.1 0.5 - 2.0 mmol/L   Green Top (Gel)   Result Value Ref Range    Extra Tube Hold for add-ons.    Lavender Top   Result Value Ref Range    Extra Tube hold for add-on    Light Blue Top   Result Value Ref Range    Extra Tube Hold for add-ons.           CT Lower Extremity Left Without Contrast   Final Result       1.  Soft tissue ulceration identified along the lateral aspect of the   distal left fibula at the level of the distal left fibular   metadiaphysis.   2.  Soft tissue ulceration extends from image 658 through image 704,   series 6 and is limited to the subcutaneous fat.   3.  No foreign body.    4.  No air in the soft tissues.   5.  Soft tissue swelling and stranding extends from the mid to distal   diaphysis of the left tibia and fibula and extends to the left ankle   consistent with edema and/or cellulitis related changes.   6.  No abscess or hematoma.   7.  No acute fracture or dislocation.   8.  No features of osteomyelitis.   9.  Small tibiotalar joint effusion.       This  report was finalized on 1/13/2024 12:30 AM by Dylan Spence MD.                  ED Course  ED Course as of 01/13/24 0149   Sat Jan 13, 2024   0138 Call Monday to schedule of direct follow-up for evaluation of wound on the lower extremity. If no continued clinical improvement after 2 weeks of additional antibiotic therapy you should return to the ER for reevaluation [LK]   0148 Patient was prescribed additional courses of antibiotic therapy including Bactrim twice a day for an additional 14 days and clindamycin 600 mg 3 times a day for 14 days.   [LK]      ED Course User Index  [LK] Marcela Peter DO                                             Medical Decision Making  Problems Addressed:  Cellulitis of left lower extremity: complicated acute illness or injury  Open wound of left lower extremity, initial encounter: complicated acute illness or injury    Amount and/or Complexity of Data Reviewed  Radiology: ordered.    Risk  OTC drugs.  Prescription drug management.        Final diagnoses:   Open wound of left lower extremity, initial encounter   Cellulitis of left lower extremity       ED Disposition  ED Disposition       ED Disposition   Discharge    Condition   Stable    Comment   --               [facility]  [address] [place] [id number]  [phone]  Call   Call Monday to schedule of direct follow-up for evaluation of wound on the lower extremity.  If no continued clinical improvement after 2 weeks of additional antibiotic therapy you should return to the ER for reevaluation         Medication List        New Prescriptions      clindamycin 300 MG capsule  Commonly known as: CLEOCIN  Take 2 capsules by mouth 3 (Three) Times a Day for 14 days.     sulfamethoxazole-trimethoprim 800-160 MG per tablet  Commonly known as: BACTRIM DS,SEPTRA DS  Take 1 tablet by mouth 2 (Two) Times a Day for 14 days.               Where to Get Your Medications        These medications were  sent to [facility] [place], [place] - [address] [facility] - [phone]  - [phone]   [address] [facility] [address]      Phone: [phone]   clindamycin 300 MG capsule  sulfamethoxazole-trimethoprim 800-160 MG per tablet            Marcela Peter DO  01/13/24 0149

## 2024-01-13 NOTE — ED NOTES
Spoke with TERESA Salinas at this time and she stated pts IV infiltrated with saline flush. Warm compress applied.

## 2024-01-13 NOTE — DISCHARGE INSTRUCTIONS
Bactrim has been prescribed twice daily for an additional 14 days and clindamycin has been prescribed as well to take 3 times a day for an additional 14 days.    The specifically follow-up with wound care

## 2024-01-15 LAB
BACTERIA SPEC AEROBE CULT: ABNORMAL
GRAM STN SPEC: ABNORMAL
ISOLATED FROM: ABNORMAL

## 2024-01-17 LAB — BACTERIA SPEC AEROBE CULT: NORMAL

## 2024-01-22 ENCOUNTER — HOSPITAL ENCOUNTER (OUTPATIENT)
Dept: ULTRASOUND IMAGING | Facility: HOSPITAL | Age: 29
Discharge: HOME | End: 2024-01-22
Payer: COMMERCIAL

## 2024-01-22 ENCOUNTER — HOSPITAL ENCOUNTER (OUTPATIENT)
Dept: WOUND CARE | Facility: HOSPITAL | Age: 29
Discharge: HOME | End: 2024-01-22
Payer: COMMERCIAL

## 2024-01-22 VITALS
RESPIRATION RATE: 20 BRPM | SYSTOLIC BLOOD PRESSURE: 119 MMHG | TEMPERATURE: 98.7 F | HEART RATE: 71 BPM | DIASTOLIC BLOOD PRESSURE: 56 MMHG | WEIGHT: 145 LBS | BODY MASS INDEX: 25.69 KG/M2 | HEIGHT: 63 IN

## 2024-01-22 DIAGNOSIS — L97.922 NON-PRESSURE CHRONIC ULCER OF UNSPECIFIED PART OF LEFT LOWER LEG WITH FAT LAYER EXPOSED: ICD-10-CM

## 2024-01-22 DIAGNOSIS — T14.8XXA OPEN WOUND: Primary | ICD-10-CM

## 2024-01-22 LAB
ALBUMIN SERPL-MCNC: 4.1 G/DL (ref 3.5–5.2)
ALBUMIN/GLOB SERPL: 0.9 G/DL
ALP SERPL-CCNC: 82 U/L (ref 39–117)
ALT SERPL W P-5'-P-CCNC: 5 U/L (ref 1–33)
ANION GAP SERPL CALCULATED.3IONS-SCNC: 13.9 MMOL/L (ref 5–15)
AST SERPL-CCNC: 14 U/L (ref 1–32)
BILIRUB SERPL-MCNC: 0.3 MG/DL (ref 0–1.2)
BUN SERPL-MCNC: 10 MG/DL (ref 6–20)
BUN/CREAT SERPL: 9.6 (ref 7–25)
CALCIUM SPEC-SCNC: 9.9 MG/DL (ref 8.6–10.5)
CHLORIDE SERPL-SCNC: 105 MMOL/L (ref 98–107)
CO2 SERPL-SCNC: 13.1 MMOL/L (ref 22–29)
CREAT SERPL-MCNC: 1.04 MG/DL (ref 0.57–1)
CRP SERPL-MCNC: 5.38 MG/DL (ref 0–0.5)
EGFRCR SERPLBLD CKD-EPI 2021: 74.3 ML/MIN/1.73
GLOBULIN UR ELPH-MCNC: 4.5 GM/DL
GLUCOSE SERPL-MCNC: 77 MG/DL (ref 65–99)
POTASSIUM SERPL-SCNC: 4.7 MMOL/L (ref 3.5–5.2)
PROT SERPL-MCNC: 8.6 G/DL (ref 6–8.5)
SODIUM SERPL-SCNC: 132 MMOL/L (ref 136–145)

## 2024-01-22 PROCEDURE — 36415 COLL VENOUS BLD VENIPUNCTURE: CPT

## 2024-01-22 PROCEDURE — 87186 SC STD MICRODIL/AGAR DIL: CPT | Performed by: NURSE PRACTITIONER

## 2024-01-22 PROCEDURE — G0463 HOSPITAL OUTPT CLINIC VISIT: HCPCS

## 2024-01-22 PROCEDURE — 87205 SMEAR GRAM STAIN: CPT | Performed by: NURSE PRACTITIONER

## 2024-01-22 PROCEDURE — 87070 CULTURE OTHR SPECIMN AEROBIC: CPT | Performed by: NURSE PRACTITIONER

## 2024-01-22 PROCEDURE — 86140 C-REACTIVE PROTEIN: CPT | Performed by: NURSE PRACTITIONER

## 2024-01-22 PROCEDURE — 93922 UPR/L XTREMITY ART 2 LEVELS: CPT | Performed by: RADIOLOGY

## 2024-01-22 PROCEDURE — 80053 COMPREHEN METABOLIC PANEL: CPT | Performed by: NURSE PRACTITIONER

## 2024-01-22 PROCEDURE — 93922 UPR/L XTREMITY ART 2 LEVELS: CPT

## 2024-01-22 RX ORDER — SODIUM HYPOCHLORITE 2.5 MG/ML
1 SOLUTION TOPICAL AS NEEDED
OUTPATIENT
Start: 2024-01-22

## 2024-01-22 RX ORDER — LIDOCAINE HYDROCHLORIDE 20 MG/ML
10 INJECTION, SOLUTION INFILTRATION; PERINEURAL ONCE
OUTPATIENT
Start: 2024-01-22 | End: 2024-01-22

## 2024-01-22 RX ORDER — SODIUM HYPOCHLORITE 1.25 MG/ML
1 SOLUTION TOPICAL AS NEEDED
OUTPATIENT
Start: 2024-01-22

## 2024-01-22 RX ORDER — LIDOCAINE HYDROCHLORIDE 20 MG/ML
6 JELLY TOPICAL ONCE
OUTPATIENT
Start: 2024-01-22 | End: 2024-01-22

## 2024-01-22 RX ORDER — DIAPER,BRIEF,INFANT-TODD,DISP
1 EACH MISCELLANEOUS ONCE
OUTPATIENT
Start: 2024-01-22 | End: 2024-01-22

## 2024-01-22 RX ORDER — LIDOCAINE HYDROCHLORIDE AND EPINEPHRINE BITARTRATE 20; .01 MG/ML; MG/ML
10 INJECTION, SOLUTION SUBCUTANEOUS ONCE
OUTPATIENT
Start: 2024-01-22 | End: 2024-01-22

## 2024-01-22 RX ORDER — CASTOR OIL AND BALSAM, PERU 788; 87 MG/G; MG/G
1 OINTMENT TOPICAL AS NEEDED
OUTPATIENT
Start: 2024-01-22

## 2024-01-22 RX ORDER — LIDOCAINE HYDROCHLORIDE 20 MG/ML
JELLY TOPICAL AS NEEDED
OUTPATIENT
Start: 2024-01-22

## 2024-01-22 RX ORDER — SODIUM HYPOCHLORITE 2.5 MG/ML
1 SOLUTION TOPICAL AS NEEDED
Status: DISCONTINUED | OUTPATIENT
Start: 2024-01-22 | End: 2024-01-23 | Stop reason: HOSPADM

## 2024-01-22 RX ORDER — LIDOCAINE HYDROCHLORIDE 20 MG/ML
6 JELLY TOPICAL ONCE
Status: COMPLETED | OUTPATIENT
Start: 2024-01-22 | End: 2024-01-22

## 2024-01-22 RX ADMIN — COLLAGENASE SANTYL 1 APPLICATION: 250 OINTMENT TOPICAL at 09:48

## 2024-01-22 RX ADMIN — LIDOCAINE HYDROCHLORIDE 6 ML: 20 JELLY TOPICAL at 09:22

## 2024-01-22 RX ADMIN — SODIUM HYPOCHLORITE 473 ML: 2.5 SOLUTION TOPICAL at 10:25

## 2024-01-22 NOTE — LETTER
January 22, 2024      [facility]  [address] [place] [id number]  [phone]          Patient: Sailaja Alarcon   YOB: 1995   Date of Visit: 1/22/2024       To Whom It May Concern:    Sailaja Alarcon was seen at [facility] [place] on 1/22/2024.    Please excuse Mrs. Alarcon from work for today 01/22/2024.        Sincerely,       Tamra Cardoza, APRN

## 2024-01-25 LAB
BACTERIA SPEC AEROBE CULT: ABNORMAL
BACTERIA SPEC AEROBE CULT: ABNORMAL
GRAM STN SPEC: ABNORMAL

## 2024-01-28 PROBLEM — L97.822 NON-PRESSURE CHRONIC ULCER OF OTHER PART OF LEFT LOWER LEG WITH FAT LAYER EXPOSED: Status: ACTIVE | Noted: 2024-01-28

## 2024-01-28 PROBLEM — F17.200 CURRENT EVERY DAY SMOKER: Status: ACTIVE | Noted: 2024-01-28

## 2024-01-29 NOTE — PROGRESS NOTES
Wound Clinic Note  Patient Identification:  Name:  Sailaja Alarcon  Age:  28 y.o.  Sex:  female  :  1995  MRN:  [id number]   Visit Number:  [id number]  Primary Care Physician:  Margaret Baxter APRN     Subjective     Chief complaint:     Left ankle wound     History of presenting illness:     Patient is a 28 y.o. female with past medical history significant for hepatitis c and current everyday smoker. Presented today for evaluation of left ankle wound. Reports area has been present for approximately 2 months. Reports area developed as blister that continued to worsen. She has been soaking in epsom salt and alcohol. She has completed course of clindamycin and Bactrim. She was seen in ED on 2024. CT completed at that time: Soft tissue ulceration identified along the lateral aspect of the  distal left fibula at the level of the distal left fibular  metadiaphysis. Soft tissue ulceration extends from image 658 through image 704, series 6 and is limited to the subcutaneous fat. No foreign body. No air in the soft tissues. Soft tissue swelling and stranding extends from the mid to distal diaphysis of the left tibia and fibula and extends to the left ankle consistent with edema and/or cellulitis related changes. No abscess or hematoma. No acute fracture or dislocation. No features of osteomyelitis. Small tibiotalar joint effusion. Denies any fever or chills. No new issues or concerns reported. Moderate pain reported to area.   ---------------------------------------------------------------------------------------------------------------------   Review of Systems   Constitutional:  Negative for chills and fever.   HENT:  Negative for congestion and rhinorrhea.    Eyes:  Negative for pain and redness.   Respiratory:  Negative for cough and shortness of breath.    Cardiovascular:  Negative for chest pain and leg swelling.   Gastrointestinal:  Negative for nausea and vomiting.    Genitourinary:  Negative for difficulty urinating and frequency.   Musculoskeletal:  Negative for back pain and gait problem.   Skin:  Positive for wound.   Neurological:  Negative for dizziness and weakness.   Hematological:  Does not bruise/bleed easily.   Psychiatric/Behavioral:  Negative for confusion. The patient is not nervous/anxious.       ---------------------------------------------------------------------------------------------------------------------   Past Medical History:   Diagnosis Date    Hepatitis C     Hepatitis C antibody positive in blood     Migraines      Past Surgical History:   Procedure Laterality Date    HEAD/NECK ABSCESS INCISION AND DRAINAGE Left 7/8/2022    Procedure: HEAD NECK ABSCESS INCISION AND DRAINAGE;  Surgeon: Miquel Grayson MD;  Location: [facility];  Service: General;  Laterality: Left;    INCISION AND DRAINAGE ABSCESS Right 10/10/2021    Procedure: INCISION AND DRAINAGE ABSCESS CENTRAL LINE PLACEMENT;  Surgeon: Naz Payne MD;  Location: [facility];  Service: General;  Laterality: Right;  I&D ABSCESS= INFECTED.  CENTRAL LINE = CLEAN.     Family History   Problem Relation Age of Onset    Hypertension Mother     Cancer Father     No Known Problems Sister     No Known Problems Brother     No Known Problems Son     No Known Problems Daughter     Hypertension Maternal Grandmother     Diabetes Maternal Grandfather     No Known Problems Paternal Grandmother     No Known Problems Paternal Grandfather     No Known Problems Cousin     Diabetes Maternal Aunt     Rheum arthritis Neg Hx     Osteoarthritis Neg Hx     Asthma Neg Hx     Heart failure Neg Hx     Hyperlipidemia Neg Hx     Migraines Neg Hx     Rashes / Skin problems Neg Hx     Seizures Neg Hx     Stroke Neg Hx     Thyroid disease Neg Hx      Social History     Socioeconomic History    Marital status:    Tobacco Use    Smoking status: Every Day     Packs/day: 1.00     Years: 15.00     Additional pack years:  "0.00     Total pack years: 15.00     Types: Cigarettes    Smokeless tobacco: Never   Vaping Use    Vaping Use: Never used   Substance and Sexual Activity    Alcohol use: No     Alcohol/week: 0.0 standard drinks of alcohol    Drug use: Not Currently     Frequency: 3.0 times per week     Types: IV     Comment: last use 5 YEARS AGO    Sexual activity: Yes     Partners: Male     ---------------------------------------------------------------------------------------------------------------------   Allergies:  Amoxicillin, Penicillins, Vancomycin, and Zofran [ondansetron hcl]  ---------------------------------------------------------------------------------------------------------------------  Objective     ---------------------------------------------------------------------------------------------------------------------   Vital Signs:  /56   Pulse 71   Temp 98.7 °F (37.1 °C) (Infrared)   Resp 20   Ht 160 cm (63\")   Wt 65.8 kg (145 lb)   BMI 25.69 kg/m²   Estimated body mass index is 25.69 kg/m² as calculated from the following:    Height as of this encounter: 160 cm (63\").    Weight as of this encounter: 65.8 kg (145 lb).  Body mass index is 25.69 kg/m².  Wt Readings from Last 3 Encounters:   01/22/24 65.8 kg (145 lb)   01/12/24 65.8 kg (145 lb)   12/04/23 57.2 kg (126 lb)       ---------------------------------------------------------------------------------------------------------------------   Physical Exam  Wound Assessment:  Location: Left, lower, leg  Wound Measurements: 6 X 4 X 0.5cm  Tunneling: Yes Undermining: No  Dressing Appearance: dressingappearance: clean  Closure: NA  Changes since last exam: this is initial exam  Etiology and classification: non-pressure chronic ulcer   Wound bed structures/characteristics: non-blanchable, black eschar, moist  Edges dry  Periwound characteristics: edematous, redness   Periwound Temperature: normal turgor and temperature  Drainage characteristics: moderate, " malodorous  Perfusion characteristics: suggest some degree of PAD    Wound Goal (s):Free of infection and No further symptoms  Assessment & Plan      Patient Active Problem List    Diagnosis     Non-pressure chronic ulcer of other part of left lower leg with fat layer exposed [L97.822]  -Debridement completed, see below for procedure details  -Clean with Dakin's, apply santyl to area and secure with kerlix and ACE  -AISHA to assess for underlying concerns reported Left AISHA 1.1.  -Labs ordered: CBC, CMP, CRP, sed rate, prealbumin, and hemoglobain A1C to assess inflammatory markers and nutritional status as this both and negatively impact wound healing if not properly treated.   -Wound culture obtained   -Measured for farrow wraps   -Recommend elroy protein diet 120g/day along with vitamin C 2000mg/day, vitamin A 5000 Units/day, vitamin D3 5000 Units/day, zinc 50mg/day to help promote wound healing        Current every day smoker [F17.200]  -Tissue perfusion is lower in users of tobacco and other forms of nicotine. Reduced tissue perfusion strongly inhibits wound healing, increases risk of infection, and dramatically increases risk of limb loss.      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  Time out was called prior to procedure.   Pre procedure Pain assessment: a  0  on a scale of 0-10  Pre debridement measurements: 6 X 4 X 0.5cm   Volume:12  Surface area:24  sinus/tunnelYes, undermining No    Post Procedure  Post-Procedure Diagnosis: Non-pressure chronic ulcer of other part of left lower leg with fat layer exposed [L97.822]  Post debridement measurements: 6.1 X 4.1 X 0.51cm    Volume:12.7551 Surface area: 25  01cm  sinus/tunnelNo, undermining No  Post procedure Pain assessment: a  0 on a scale of 0-10  Graft/Implant/Prosthetics/Implanted Device/Transplants:  None  Complication(s):  None    Procedure details:  Method of Debridement: excissional (Surgical removal or cutting away, outside or beyond the wound margin devitalized tissue, necrosis or slough.)  Procedure: The site was prepared using clean techniques, subcutaneous tissue was removed by surgical excision.  Instrument(s) used: Curette 4mm  Anesthesia:After checking patient allergies,lidocaine topical 2% was administered to provide anesthesia.  Tissue removed: subuctaneous, Percent Removed 80%  Culture or Biopsy: culture and sensitivity  Estimated Blood Loss: Small  Hemostasis Obtained: pressure    Clinical Impression:Moderate Complexity    Follow-up: 1 week    BIANCA Russell,FRANCISCOS  [facility]  01/22/2024  0903

## 2024-01-31 ENCOUNTER — HOSPITAL ENCOUNTER (OUTPATIENT)
Dept: WOUND CARE | Facility: HOSPITAL | Age: 29
Discharge: HOME | End: 2024-01-31
Payer: COMMERCIAL

## 2024-01-31 DIAGNOSIS — T14.8XXA OPEN WOUND: Primary | ICD-10-CM

## 2024-01-31 PROCEDURE — 97602 WOUND(S) CARE NON-SELECTIVE: CPT

## 2024-01-31 RX ORDER — LIDOCAINE HYDROCHLORIDE 20 MG/ML
10 INJECTION, SOLUTION INFILTRATION; PERINEURAL ONCE
OUTPATIENT
Start: 2024-01-31 | End: 2024-01-31

## 2024-01-31 RX ORDER — CASTOR OIL AND BALSAM, PERU 788; 87 MG/G; MG/G
1 OINTMENT TOPICAL AS NEEDED
OUTPATIENT
Start: 2024-01-31

## 2024-01-31 RX ORDER — LIDOCAINE HYDROCHLORIDE 20 MG/ML
6 JELLY TOPICAL ONCE
Status: CANCELLED | OUTPATIENT
Start: 2024-01-31 | End: 2024-01-31

## 2024-01-31 RX ORDER — DIAPER,BRIEF,INFANT-TODD,DISP
1 EACH MISCELLANEOUS ONCE
OUTPATIENT
Start: 2024-01-31 | End: 2024-01-31

## 2024-01-31 RX ORDER — SODIUM HYPOCHLORITE 2.5 MG/ML
1 SOLUTION TOPICAL AS NEEDED
OUTPATIENT
Start: 2024-01-31

## 2024-01-31 RX ORDER — LIDOCAINE HYDROCHLORIDE 20 MG/ML
6 JELLY TOPICAL ONCE
Status: COMPLETED | OUTPATIENT
Start: 2024-01-31 | End: 2024-01-31

## 2024-01-31 RX ORDER — LIDOCAINE HYDROCHLORIDE AND EPINEPHRINE BITARTRATE 20; .01 MG/ML; MG/ML
10 INJECTION, SOLUTION SUBCUTANEOUS ONCE
OUTPATIENT
Start: 2024-01-31 | End: 2024-01-31

## 2024-01-31 RX ORDER — SODIUM HYPOCHLORITE 1.25 MG/ML
1 SOLUTION TOPICAL AS NEEDED
OUTPATIENT
Start: 2024-01-31

## 2024-01-31 RX ORDER — SODIUM HYPOCHLORITE 2.5 MG/ML
1 SOLUTION TOPICAL AS NEEDED
Status: DISCONTINUED | OUTPATIENT
Start: 2024-01-31 | End: 2024-02-01 | Stop reason: HOSPADM

## 2024-01-31 RX ORDER — LIDOCAINE HYDROCHLORIDE 20 MG/ML
JELLY TOPICAL AS NEEDED
OUTPATIENT
Start: 2024-01-31

## 2024-01-31 RX ADMIN — HYOSCYAMINE SULFATE 473 ML: 16 SOLUTION at 15:19

## 2024-01-31 RX ADMIN — LIDOCAINE HYDROCHLORIDE 6 ML: 20 JELLY TOPICAL at 15:19

## 2024-02-05 ENCOUNTER — HOSPITAL ENCOUNTER (OUTPATIENT)
Dept: WOUND CARE | Facility: HOSPITAL | Age: 29
Discharge: HOME | End: 2024-02-05
Admitting: NURSE PRACTITIONER
Payer: COMMERCIAL

## 2024-02-05 VITALS
HEART RATE: 74 BPM | SYSTOLIC BLOOD PRESSURE: 108 MMHG | TEMPERATURE: 98.6 F | DIASTOLIC BLOOD PRESSURE: 71 MMHG | RESPIRATION RATE: 20 BRPM

## 2024-02-05 DIAGNOSIS — T14.8XXA OPEN WOUND: Primary | ICD-10-CM

## 2024-02-05 PROCEDURE — 87070 CULTURE OTHR SPECIMN AEROBIC: CPT | Performed by: NURSE PRACTITIONER

## 2024-02-05 PROCEDURE — 87205 SMEAR GRAM STAIN: CPT | Performed by: NURSE PRACTITIONER

## 2024-02-05 RX ORDER — LIDOCAINE HYDROCHLORIDE 20 MG/ML
6 JELLY TOPICAL ONCE
Status: COMPLETED | OUTPATIENT
Start: 2024-02-05 | End: 2024-02-05

## 2024-02-05 RX ORDER — LIDOCAINE HYDROCHLORIDE AND EPINEPHRINE BITARTRATE 20; .01 MG/ML; MG/ML
10 INJECTION, SOLUTION SUBCUTANEOUS ONCE
OUTPATIENT
Start: 2024-02-05 | End: 2024-02-05

## 2024-02-05 RX ORDER — LIDOCAINE HYDROCHLORIDE 20 MG/ML
JELLY TOPICAL AS NEEDED
OUTPATIENT
Start: 2024-02-05

## 2024-02-05 RX ORDER — SODIUM HYPOCHLORITE 1.25 MG/ML
1 SOLUTION TOPICAL AS NEEDED
OUTPATIENT
Start: 2024-02-05

## 2024-02-05 RX ORDER — SODIUM HYPOCHLORITE 2.5 MG/ML
1 SOLUTION TOPICAL AS NEEDED
OUTPATIENT
Start: 2024-02-05

## 2024-02-05 RX ORDER — CASTOR OIL AND BALSAM, PERU 788; 87 MG/G; MG/G
1 OINTMENT TOPICAL AS NEEDED
OUTPATIENT
Start: 2024-02-05

## 2024-02-05 RX ORDER — DIAPER,BRIEF,INFANT-TODD,DISP
1 EACH MISCELLANEOUS ONCE
OUTPATIENT
Start: 2024-02-05 | End: 2024-02-05

## 2024-02-05 RX ORDER — LIDOCAINE HYDROCHLORIDE 20 MG/ML
6 JELLY TOPICAL ONCE
OUTPATIENT
Start: 2024-02-05 | End: 2024-02-05

## 2024-02-05 RX ORDER — LIDOCAINE HYDROCHLORIDE 20 MG/ML
10 INJECTION, SOLUTION INFILTRATION; PERINEURAL ONCE
OUTPATIENT
Start: 2024-02-05 | End: 2024-02-05

## 2024-02-05 RX ADMIN — COLLAGENASE SANTYL 1 APPLICATION: 250 OINTMENT TOPICAL at 15:26

## 2024-02-05 RX ADMIN — LIDOCAINE HYDROCHLORIDE 6 ML: 20 JELLY TOPICAL at 15:26

## 2024-02-07 LAB
BACTERIA SPEC AEROBE CULT: ABNORMAL
BACTERIA SPEC AEROBE CULT: ABNORMAL
GRAM STN SPEC: ABNORMAL

## 2024-02-07 NOTE — PROGRESS NOTES
Wound Clinic Note  Patient Identification:  Name:  Sailaja Alarcon  Age:  28 y.o.  Sex:  female  :  1995  MRN:  [id number]   Visit Number:  [id number]  Primary Care Physician:  Margaret Baxter APRN     Subjective     Chief complaint:     Left ankle wound     History of presenting illness:     Patient is a 28 y.o. female with past medical history significant for hepatitis c and current everyday smoker. Presented today for evaluation of left ankle wound. Reports area has been present for approximately 2 months. Reports area developed as blister that continued to worsen. She has been soaking in epsom salt and alcohol. She has completed course of clindamycin and Bactrim. She was seen in ED on 2024. CT completed at that time: Soft tissue ulceration identified along the lateral aspect of the  distal left fibula at the level of the distal left fibular  metadiaphysis. Soft tissue ulceration extends from image 658 through image 704, series 6 and is limited to the subcutaneous fat. No foreign body. No air in the soft tissues. Soft tissue swelling and stranding extends from the mid to distal diaphysis of the left tibia and fibula and extends to the left ankle consistent with edema and/or cellulitis related changes. No abscess or hematoma. No acute fracture or dislocation. No features of osteomyelitis. Small tibiotalar joint effusion. Denies any fever or chills. No new issues or concerns reported. Moderate pain reported to area.     Interval History:   2024: Seen in clinic today for follow-up to left ankle. Wound with less necrotic tissue. There is thick slough to distal portion of wound base. No evidence of surrounding cellulitis. Denies any fever or chills. Mild pain reported. Tolerating current treatment without complications. Was only able to obtain CRP which is elevated 5.38.    ---------------------------------------------------------------------------------------------------------------------   Review of Systems   Constitutional:  Negative for chills and fever.   HENT:  Negative for congestion and rhinorrhea.    Eyes:  Negative for pain and redness.   Respiratory:  Negative for cough and shortness of breath.    Cardiovascular:  Negative for chest pain and leg swelling.   Gastrointestinal:  Negative for nausea and vomiting.   Genitourinary:  Negative for difficulty urinating and frequency.   Musculoskeletal:  Negative for back pain and gait problem.   Skin:  Positive for wound.   Neurological:  Negative for dizziness and weakness.   Hematological:  Does not bruise/bleed easily.   Psychiatric/Behavioral:  Negative for confusion. The patient is not nervous/anxious.       ---------------------------------------------------------------------------------------------------------------------   Past Medical History:   Diagnosis Date    Hepatitis C     Hepatitis C antibody positive in blood     Migraines      Past Surgical History:   Procedure Laterality Date    HEAD/NECK ABSCESS INCISION AND DRAINAGE Left 7/8/2022    Procedure: HEAD NECK ABSCESS INCISION AND DRAINAGE;  Surgeon: Miquel Grayson MD;  Location: [facility] OR;  Service: General;  Laterality: Left;    INCISION AND DRAINAGE ABSCESS Right 10/10/2021    Procedure: INCISION AND DRAINAGE ABSCESS CENTRAL LINE PLACEMENT;  Surgeon: Naz Payne MD;  Location:  [facility] OR;  Service: General;  Laterality: Right;  I&D ABSCESS= INFECTED.  CENTRAL LINE = CLEAN.     Family History   Problem Relation Age of Onset    Hypertension Mother     Cancer Father     No Known Problems Sister     No Known Problems Brother     No Known Problems Son     No Known Problems Daughter     Hypertension Maternal Grandmother     Diabetes Maternal Grandfather     No Known Problems Paternal Grandmother     No Known Problems Paternal Grandfather     No Known  "Problems Cousin     Diabetes Maternal Aunt     Rheum arthritis Neg Hx     Osteoarthritis Neg Hx     Asthma Neg Hx     Heart failure Neg Hx     Hyperlipidemia Neg Hx     Migraines Neg Hx     Rashes / Skin problems Neg Hx     Seizures Neg Hx     Stroke Neg Hx     Thyroid disease Neg Hx      Social History     Socioeconomic History    Marital status:    Tobacco Use    Smoking status: Every Day     Packs/day: 1.00     Years: 15.00     Additional pack years: 0.00     Total pack years: 15.00     Types: Cigarettes    Smokeless tobacco: Never   Vaping Use    Vaping Use: Never used   Substance and Sexual Activity    Alcohol use: No     Alcohol/week: 0.0 standard drinks of alcohol    Drug use: Not Currently     Frequency: 3.0 times per week     Types: IV     Comment: last use 5 YEARS AGO    Sexual activity: Yes     Partners: Male     ---------------------------------------------------------------------------------------------------------------------   Allergies:  Amoxicillin, Penicillins, Vancomycin, and Zofran [ondansetron hcl]  ---------------------------------------------------------------------------------------------------------------------  Objective     ---------------------------------------------------------------------------------------------------------------------   Vital Signs:  There were no vitals taken for this visit.  Estimated body mass index is 25.69 kg/m² as calculated from the following:    Height as of 1/22/24: 160 cm (63\").    Weight as of 1/22/24: 65.8 kg (145 lb).  There is no height or weight on file to calculate BMI.  Wt Readings from Last 3 Encounters:   01/22/24 65.8 kg (145 lb)   01/12/24 65.8 kg (145 lb)   12/04/23 57.2 kg (126 lb)       ---------------------------------------------------------------------------------------------------------------------   Physical Exam  Wound Assessment:  Location: Left, lower, leg  Wound Measurements: 6 X 4 X 0.3cm  Tunneling: Yes Undermining: " No  Dressing Appearance: dressingappearance: clean  Closure: NA  Changes since last exam: bioburden coverage has diminished   Etiology and classification: non-pressure chronic ulcer   Wound bed structures/characteristics: full-thickness (subcutaneous tissue is exposed in at least a portion of the wound), moist, red, slough  Edges dry  Periwound characteristics: edematous, redness   Periwound Temperature: normal turgor and temperature  Drainage characteristics: moderate, malodorous  Perfusion characteristics: suggest some degree of PAD    Wound Goal (s):Free of infection and No further symptoms  Assessment & Plan      Patient Active Problem List    Diagnosis     Non-pressure chronic ulcer of other part of left lower leg with fat layer exposed [L97.822]  -Clean with Dakin's, apply santyl to area and secure with kerlix and ACE  -Left AISHA 1.1.  -Measured for farrow wraps   -Recommend elroy protein diet 120g/day along with vitamin C 2000mg/day, vitamin A 5000 Units/day, vitamin D3 5000 Units/day, zinc 50mg/day to help promote wound healing        Current every day smoker [F17.200]  -Tissue perfusion is lower in users of tobacco and other forms of nicotine. Reduced tissue perfusion strongly inhibits wound healing, increases risk of infection, and dramatically increases risk of limb loss.        Clinical Impression:Moderate Complexity    Follow-up: 1 week    BIANCA Russell,FRANCISCOS  [facility]  01/31/2024  1500

## 2024-02-11 NOTE — PROGRESS NOTES
Wound Clinic Note  Patient Identification:  Name:  Sailaja Alarcon  Age:  28 y.o.  Sex:  female  :  1995  MRN:  [id number]   Visit Number:  [id number]  Primary Care Physician:  Margaret Baxter APRN     Subjective     Chief complaint:     Left ankle wound     History of presenting illness:     Patient is a 28 y.o. female with past medical history significant for hepatitis c and current everyday smoker. Presented today for evaluation of left ankle wound. Reports area has been present for approximately 2 months. Reports area developed as blister that continued to worsen. She has been soaking in epsom salt and alcohol. She has completed course of clindamycin and Bactrim. She was seen in ED on 2024. CT completed at that time: Soft tissue ulceration identified along the lateral aspect of the  distal left fibula at the level of the distal left fibular  metadiaphysis. Soft tissue ulceration extends from image 658 through image 704, series 6 and is limited to the subcutaneous fat. No foreign body. No air in the soft tissues. Soft tissue swelling and stranding extends from the mid to distal diaphysis of the left tibia and fibula and extends to the left ankle consistent with edema and/or cellulitis related changes. No abscess or hematoma. No acute fracture or dislocation. No features of osteomyelitis. Small tibiotalar joint effusion. Denies any fever or chills. No new issues or concerns reported. Moderate pain reported to area.     Interval History:   2024: Seen in clinic today for follow-up to left ankle. Wound with less necrotic tissue. There is thick slough to distal portion of wound base. No evidence of surrounding cellulitis. Denies any fever or chills. Mild pain reported. Tolerating current treatment without complications. Was only able to obtain CRP which is elevated 5.38.     2024: Seen in clinic today for follow-up to left ankle. Wound with less necrotic tissue. There is  thick slough to distal portion of wound base, unchanged. No evidence of surrounding cellulitis. Denies any fever or chills. Mild pain reported. Tolerating current treatment without complications.  ---------------------------------------------------------------------------------------------------------------------   Review of Systems   Constitutional:  Negative for chills and fever.   HENT:  Negative for congestion and rhinorrhea.    Respiratory:  Negative for cough and shortness of breath.    Cardiovascular:  Negative for chest pain and leg swelling.   Gastrointestinal:  Negative for nausea and vomiting.   Musculoskeletal:  Negative for back pain and gait problem.   Skin:  Positive for wound.   Neurological:  Negative for dizziness and weakness.      ---------------------------------------------------------------------------------------------------------------------   Past Medical History:   Diagnosis Date    Hepatitis C     Hepatitis C antibody positive in blood     Migraines      Past Surgical History:   Procedure Laterality Date    HEAD/NECK ABSCESS INCISION AND DRAINAGE Left 7/8/2022    Procedure: HEAD NECK ABSCESS INCISION AND DRAINAGE;  Surgeon: Miquel Grayson MD;  Location:  [facility] OR;  Service: General;  Laterality: Left;    INCISION AND DRAINAGE ABSCESS Right 10/10/2021    Procedure: INCISION AND DRAINAGE ABSCESS CENTRAL LINE PLACEMENT;  Surgeon: Naz Payne MD;  Location:  [facility] OR;  Service: General;  Laterality: Right;  I&D ABSCESS= INFECTED.  CENTRAL LINE = CLEAN.     Family History   Problem Relation Age of Onset    Hypertension Mother     Cancer Father     No Known Problems Sister     No Known Problems Brother     No Known Problems Son     No Known Problems Daughter     Hypertension Maternal Grandmother     Diabetes Maternal Grandfather     No Known Problems Paternal Grandmother     No Known Problems Paternal Grandfather     No Known Problems Cousin     Diabetes Maternal Aunt     Rheum  "arthritis Neg Hx     Osteoarthritis Neg Hx     Asthma Neg Hx     Heart failure Neg Hx     Hyperlipidemia Neg Hx     Migraines Neg Hx     Rashes / Skin problems Neg Hx     Seizures Neg Hx     Stroke Neg Hx     Thyroid disease Neg Hx      Social History     Socioeconomic History    Marital status:    Tobacco Use    Smoking status: Every Day     Packs/day: 1.00     Years: 15.00     Additional pack years: 0.00     Total pack years: 15.00     Types: Cigarettes    Smokeless tobacco: Never   Vaping Use    Vaping Use: Never used   Substance and Sexual Activity    Alcohol use: No     Alcohol/week: 0.0 standard drinks of alcohol    Drug use: Not Currently     Frequency: 3.0 times per week     Types: IV     Comment: last use 5 YEARS AGO    Sexual activity: Yes     Partners: Male     ---------------------------------------------------------------------------------------------------------------------   Allergies:  Amoxicillin, Penicillins, Vancomycin, and Zofran [ondansetron hcl]  ---------------------------------------------------------------------------------------------------------------------  Objective     ---------------------------------------------------------------------------------------------------------------------   Vital Signs:  /71   Pulse 74   Temp 98.6 °F (37 °C) (Infrared)   Resp 20   Estimated body mass index is 25.69 kg/m² as calculated from the following:    Height as of 1/22/24: 160 cm (63\").    Weight as of 1/22/24: 65.8 kg (145 lb).  There is no height or weight on file to calculate BMI.  Wt Readings from Last 3 Encounters:   01/22/24 65.8 kg (145 lb)   01/12/24 65.8 kg (145 lb)   12/04/23 57.2 kg (126 lb)       ---------------------------------------------------------------------------------------------------------------------   Physical Exam  Wound Assessment:  Location: Left, lower, leg  Wound Measurements: 6 X 4 X 0.3cm  Tunneling: Yes Undermining: No  Dressing Appearance: " dressingappearance: clean  Closure: NA  Changes since last exam: bioburden coverage has diminished   Etiology and classification: non-pressure chronic ulcer   Wound bed structures/characteristics: full-thickness (subcutaneous tissue is exposed in at least a portion of the wound), moist, red, slough  Edges dry  Periwound characteristics: edematous, redness   Periwound Temperature: normal turgor and temperature  Drainage characteristics: moderate, malodorous  Perfusion characteristics: suggest some degree of PAD    Wound Goal (s):Free of infection and No further symptoms  Assessment & Plan      Patient Active Problem List    Diagnosis     Non-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  -Measured for farrow wraps   -Recommend elroy protein diet 120g/day along with vitamin C 2000mg/day, vitamin A 5000 Units/day, vitamin D3 5000 Units/day, zinc 50mg/day to help promote wound healing        Current every day smoker [F17.200]  -Tissue perfusion is lower in users of tobacco and other forms of nicotine. Reduced tissue perfusion strongly inhibits wound healing, increases risk of infection, and dramatically increases risk of limb loss.      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  Time out was called prior to procedure.   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  Instrument(s) used: Curette 4mm  Anesthesia:After checking patient allergies,lidocaine topical 2% was administered to provide anesthesia.  Tissue removed: subuctaneous, Percent Removed 80%  Culture or Biopsy: culture and sensitivity  Estimated Blood Loss: Small  Hemostasis Obtained: pressure    Clinical Impression:Moderate Complexity    Follow-up: 1 week    BIANCA Russell,CWS  [facility]  02/05/2024  [id number]

## 2024-09-24 ENCOUNTER — HOSPITAL ENCOUNTER (EMERGENCY)
Facility: HOSPITAL | Age: 29
Discharge: HOME | End: 2024-09-24
Attending: INTERNAL MEDICINE | Admitting: INTERNAL MEDICINE
Payer: COMMERCIAL

## 2024-09-24 ENCOUNTER — APPOINTMENT (OUTPATIENT)
Dept: GENERAL RADIOLOGY | Facility: HOSPITAL | Age: 29
End: 2024-09-24
Payer: COMMERCIAL

## 2024-09-24 ENCOUNTER — APPOINTMENT (OUTPATIENT)
Dept: ULTRASOUND IMAGING | Facility: HOSPITAL | Age: 29
End: 2024-09-24
Payer: COMMERCIAL

## 2024-09-24 VITALS
HEIGHT: 63 IN | WEIGHT: 160 LBS | OXYGEN SATURATION: 100 % | RESPIRATION RATE: 17 BRPM | TEMPERATURE: 97.9 F | SYSTOLIC BLOOD PRESSURE: 115 MMHG | BODY MASS INDEX: 28.35 KG/M2 | DIASTOLIC BLOOD PRESSURE: 79 MMHG | HEART RATE: 59 BPM

## 2024-09-24 DIAGNOSIS — R60.1 GENERALIZED EDEMA: Primary | ICD-10-CM

## 2024-09-24 LAB
ALBUMIN SERPL-MCNC: 3.5 G/DL (ref 3.5–5.2)
ALBUMIN/GLOB SERPL: 1.2 G/DL
ALP SERPL-CCNC: 70 U/L (ref 39–117)
ALT SERPL W P-5'-P-CCNC: 7 U/L (ref 1–33)
ANION GAP SERPL CALCULATED.3IONS-SCNC: 9.3 MMOL/L (ref 5–15)
AST SERPL-CCNC: 13 U/L (ref 1–32)
BASOPHILS # BLD AUTO: 0.02 10*3/MM3 (ref 0–0.2)
BASOPHILS NFR BLD AUTO: 0.7 % (ref 0–1.5)
BILIRUB SERPL-MCNC: 0.2 MG/DL (ref 0–1.2)
BUN SERPL-MCNC: 7 MG/DL (ref 6–20)
BUN/CREAT SERPL: 11.7 (ref 7–25)
CALCIUM SPEC-SCNC: 8.8 MG/DL (ref 8.6–10.5)
CHLORIDE SERPL-SCNC: 105 MMOL/L (ref 98–107)
CO2 SERPL-SCNC: 22.7 MMOL/L (ref 22–29)
CREAT SERPL-MCNC: 0.6 MG/DL (ref 0.57–1)
DEPRECATED RDW RBC AUTO: 46.3 FL (ref 37–54)
EGFRCR SERPLBLD CKD-EPI 2021: 124 ML/MIN/1.73
EOSINOPHIL # BLD AUTO: 0.05 10*3/MM3 (ref 0–0.4)
EOSINOPHIL NFR BLD AUTO: 1.9 % (ref 0.3–6.2)
ERYTHROCYTE [DISTWIDTH] IN BLOOD BY AUTOMATED COUNT: 13.3 % (ref 12.3–15.4)
GLOBULIN UR ELPH-MCNC: 3 GM/DL
GLUCOSE SERPL-MCNC: 93 MG/DL (ref 65–99)
HCT VFR BLD AUTO: 35.2 % (ref 34–46.6)
HGB BLD-MCNC: 11.1 G/DL (ref 12–15.9)
IMM GRANULOCYTES # BLD AUTO: 0.01 10*3/MM3 (ref 0–0.05)
IMM GRANULOCYTES NFR BLD AUTO: 0.4 % (ref 0–0.5)
LYMPHOCYTES # BLD AUTO: 1.03 10*3/MM3 (ref 0.7–3.1)
LYMPHOCYTES NFR BLD AUTO: 38.4 % (ref 19.6–45.3)
MCH RBC QN AUTO: 29.8 PG (ref 26.6–33)
MCHC RBC AUTO-ENTMCNC: 31.5 G/DL (ref 31.5–35.7)
MCV RBC AUTO: 94.4 FL (ref 79–97)
MONOCYTES # BLD AUTO: 0.18 10*3/MM3 (ref 0.1–0.9)
MONOCYTES NFR BLD AUTO: 6.7 % (ref 5–12)
NEUTROPHILS NFR BLD AUTO: 1.39 10*3/MM3 (ref 1.7–7)
NEUTROPHILS NFR BLD AUTO: 51.9 % (ref 42.7–76)
NRBC BLD AUTO-RTO: 0 /100 WBC (ref 0–0.2)
PLATELET # BLD AUTO: 159 10*3/MM3 (ref 140–450)
PMV BLD AUTO: 11.5 FL (ref 6–12)
POTASSIUM SERPL-SCNC: 4.1 MMOL/L (ref 3.5–5.2)
PROT SERPL-MCNC: 6.5 G/DL (ref 6–8.5)
RBC # BLD AUTO: 3.73 10*6/MM3 (ref 3.77–5.28)
SODIUM SERPL-SCNC: 137 MMOL/L (ref 136–145)
WBC NRBC COR # BLD AUTO: 2.68 10*3/MM3 (ref 3.4–10.8)

## 2024-09-24 PROCEDURE — 71045 X-RAY EXAM CHEST 1 VIEW: CPT

## 2024-09-24 PROCEDURE — 36415 COLL VENOUS BLD VENIPUNCTURE: CPT

## 2024-09-24 PROCEDURE — 93971 EXTREMITY STUDY: CPT | Performed by: RADIOLOGY

## 2024-09-24 PROCEDURE — 71045 X-RAY EXAM CHEST 1 VIEW: CPT | Performed by: RADIOLOGY

## 2024-09-24 PROCEDURE — 93971 EXTREMITY STUDY: CPT

## 2024-09-24 PROCEDURE — 80053 COMPREHEN METABOLIC PANEL: CPT | Performed by: INTERNAL MEDICINE

## 2024-09-24 PROCEDURE — 87040 BLOOD CULTURE FOR BACTERIA: CPT | Performed by: INTERNAL MEDICINE

## 2024-09-24 PROCEDURE — 99284 EMERGENCY DEPT VISIT MOD MDM: CPT

## 2024-09-24 PROCEDURE — 85025 COMPLETE CBC W/AUTO DIFF WBC: CPT | Performed by: INTERNAL MEDICINE

## 2024-09-29 LAB
BACTERIA SPEC AEROBE CULT: NORMAL
BACTERIA SPEC AEROBE CULT: NORMAL

## 2024-12-01 ENCOUNTER — APPOINTMENT (OUTPATIENT)
Dept: GENERAL RADIOLOGY | Facility: HOSPITAL | Age: 29
DRG: 831 | End: 2024-12-01
Payer: COMMERCIAL

## 2024-12-01 ENCOUNTER — HOSPITAL ENCOUNTER (EMERGENCY)
Facility: HOSPITAL | Age: 29
Discharge: HOME | DRG: 831 | End: 2024-12-01
Attending: EMERGENCY MEDICINE | Admitting: EMERGENCY MEDICINE
Payer: COMMERCIAL

## 2024-12-01 VITALS
OXYGEN SATURATION: 100 % | WEIGHT: 131 LBS | DIASTOLIC BLOOD PRESSURE: 72 MMHG | SYSTOLIC BLOOD PRESSURE: 90 MMHG | TEMPERATURE: 97.5 F | HEIGHT: 64 IN | HEART RATE: 68 BPM | BODY MASS INDEX: 22.36 KG/M2 | RESPIRATION RATE: 20 BRPM

## 2024-12-01 DIAGNOSIS — J18.9 PNEUMONIA OF LEFT LOWER LOBE DUE TO INFECTIOUS ORGANISM: Primary | ICD-10-CM

## 2024-12-01 LAB
ALBUMIN SERPL-MCNC: 3.6 G/DL (ref 3.5–5.2)
ALBUMIN/GLOB SERPL: 1 G/DL
ALP SERPL-CCNC: 76 U/L (ref 39–117)
ALT SERPL W P-5'-P-CCNC: 7 U/L (ref 1–33)
ANION GAP SERPL CALCULATED.3IONS-SCNC: 11 MMOL/L (ref 5–15)
AST SERPL-CCNC: 7 U/L (ref 1–32)
B PARAPERT DNA SPEC QL NAA+PROBE: NOT DETECTED
B PERT DNA SPEC QL NAA+PROBE: NOT DETECTED
BASOPHILS # BLD AUTO: 0.01 10*3/MM3 (ref 0–0.2)
BASOPHILS NFR BLD AUTO: 0.3 % (ref 0–1.5)
BILIRUB SERPL-MCNC: 0.4 MG/DL (ref 0–1.2)
BUN SERPL-MCNC: 7 MG/DL (ref 6–20)
BUN/CREAT SERPL: 11.5 (ref 7–25)
C PNEUM DNA NPH QL NAA+NON-PROBE: NOT DETECTED
CALCIUM SPEC-SCNC: 9.5 MG/DL (ref 8.6–10.5)
CHLORIDE SERPL-SCNC: 102 MMOL/L (ref 98–107)
CO2 SERPL-SCNC: 22 MMOL/L (ref 22–29)
CREAT SERPL-MCNC: 0.61 MG/DL (ref 0.57–1)
D-LACTATE SERPL-SCNC: 1.2 MMOL/L (ref 0.5–2)
DEPRECATED RDW RBC AUTO: 42.6 FL (ref 37–54)
EGFRCR SERPLBLD CKD-EPI 2021: 123.5 ML/MIN/1.73
EOSINOPHIL # BLD AUTO: 0.06 10*3/MM3 (ref 0–0.4)
EOSINOPHIL NFR BLD AUTO: 1.6 % (ref 0.3–6.2)
ERYTHROCYTE [DISTWIDTH] IN BLOOD BY AUTOMATED COUNT: 12.9 % (ref 12.3–15.4)
FLUAV SUBTYP SPEC NAA+PROBE: NOT DETECTED
FLUBV RNA ISLT QL NAA+PROBE: NOT DETECTED
GLOBULIN UR ELPH-MCNC: 3.6 GM/DL
GLUCOSE SERPL-MCNC: 93 MG/DL (ref 65–99)
HADV DNA SPEC NAA+PROBE: NOT DETECTED
HCOV 229E RNA SPEC QL NAA+PROBE: NOT DETECTED
HCOV HKU1 RNA SPEC QL NAA+PROBE: NOT DETECTED
HCOV NL63 RNA SPEC QL NAA+PROBE: NOT DETECTED
HCOV OC43 RNA SPEC QL NAA+PROBE: NOT DETECTED
HCT VFR BLD AUTO: 30.9 % (ref 34–46.6)
HGB BLD-MCNC: 10.3 G/DL (ref 12–15.9)
HMPV RNA NPH QL NAA+NON-PROBE: NOT DETECTED
HOLD SPECIMEN: NORMAL
HOLD SPECIMEN: NORMAL
HPIV1 RNA ISLT QL NAA+PROBE: NOT DETECTED
HPIV2 RNA SPEC QL NAA+PROBE: NOT DETECTED
HPIV3 RNA NPH QL NAA+PROBE: NOT DETECTED
HPIV4 P GENE NPH QL NAA+PROBE: NOT DETECTED
IMM GRANULOCYTES # BLD AUTO: 0.02 10*3/MM3 (ref 0–0.05)
IMM GRANULOCYTES NFR BLD AUTO: 0.5 % (ref 0–0.5)
LYMPHOCYTES # BLD AUTO: 1.17 10*3/MM3 (ref 0.7–3.1)
LYMPHOCYTES NFR BLD AUTO: 31.8 % (ref 19.6–45.3)
M PNEUMO IGG SER IA-ACNC: NOT DETECTED
MCH RBC QN AUTO: 30.2 PG (ref 26.6–33)
MCHC RBC AUTO-ENTMCNC: 33.3 G/DL (ref 31.5–35.7)
MCV RBC AUTO: 90.6 FL (ref 79–97)
MONOCYTES # BLD AUTO: 0.29 10*3/MM3 (ref 0.1–0.9)
MONOCYTES NFR BLD AUTO: 7.9 % (ref 5–12)
NEUTROPHILS NFR BLD AUTO: 2.13 10*3/MM3 (ref 1.7–7)
NEUTROPHILS NFR BLD AUTO: 57.9 % (ref 42.7–76)
NRBC BLD AUTO-RTO: 0 /100 WBC (ref 0–0.2)
PLATELET # BLD AUTO: 116 10*3/MM3 (ref 140–450)
PMV BLD AUTO: 11 FL (ref 6–12)
POTASSIUM SERPL-SCNC: 4 MMOL/L (ref 3.5–5.2)
PROT SERPL-MCNC: 7.2 G/DL (ref 6–8.5)
RBC # BLD AUTO: 3.41 10*6/MM3 (ref 3.77–5.28)
RHINOVIRUS RNA SPEC NAA+PROBE: NOT DETECTED
RSV RNA NPH QL NAA+NON-PROBE: NOT DETECTED
SARS-COV-2 RNA NPH QL NAA+NON-PROBE: NOT DETECTED
SODIUM SERPL-SCNC: 135 MMOL/L (ref 136–145)
TROPONIN T SERPL HS-MCNC: <6 NG/L
WBC NRBC COR # BLD AUTO: 3.68 10*3/MM3 (ref 3.4–10.8)
WHOLE BLOOD HOLD COAG: NORMAL
WHOLE BLOOD HOLD SPECIMEN: NORMAL

## 2024-12-01 PROCEDURE — 99284 EMERGENCY DEPT VISIT MOD MDM: CPT

## 2024-12-01 PROCEDURE — 0202U NFCT DS 22 TRGT SARS-COV-2: CPT | Performed by: NURSE PRACTITIONER

## 2024-12-01 PROCEDURE — 84484 ASSAY OF TROPONIN QUANT: CPT | Performed by: EMERGENCY MEDICINE

## 2024-12-01 PROCEDURE — 87147 CULTURE TYPE IMMUNOLOGIC: CPT | Performed by: NURSE PRACTITIONER

## 2024-12-01 PROCEDURE — 80053 COMPREHEN METABOLIC PANEL: CPT | Performed by: EMERGENCY MEDICINE

## 2024-12-01 PROCEDURE — 87186 SC STD MICRODIL/AGAR DIL: CPT | Performed by: NURSE PRACTITIONER

## 2024-12-01 PROCEDURE — 93005 ELECTROCARDIOGRAM TRACING: CPT | Performed by: EMERGENCY MEDICINE

## 2024-12-01 PROCEDURE — 83605 ASSAY OF LACTIC ACID: CPT | Performed by: NURSE PRACTITIONER

## 2024-12-01 PROCEDURE — 85025 COMPLETE CBC W/AUTO DIFF WBC: CPT | Performed by: EMERGENCY MEDICINE

## 2024-12-01 PROCEDURE — 71045 X-RAY EXAM CHEST 1 VIEW: CPT

## 2024-12-01 PROCEDURE — 96360 HYDRATION IV INFUSION INIT: CPT

## 2024-12-01 PROCEDURE — 36415 COLL VENOUS BLD VENIPUNCTURE: CPT

## 2024-12-01 PROCEDURE — 87154 CUL TYP ID BLD PTHGN 6+ TRGT: CPT | Performed by: NURSE PRACTITIONER

## 2024-12-01 PROCEDURE — 25810000003 SODIUM CHLORIDE 0.9 % SOLUTION: Performed by: NURSE PRACTITIONER

## 2024-12-01 PROCEDURE — 87040 BLOOD CULTURE FOR BACTERIA: CPT | Performed by: NURSE PRACTITIONER

## 2024-12-01 RX ORDER — AZITHROMYCIN 250 MG/1
500 TABLET, FILM COATED ORAL ONCE
Status: COMPLETED | OUTPATIENT
Start: 2024-12-01 | End: 2024-12-01

## 2024-12-01 RX ORDER — AZITHROMYCIN 250 MG/1
TABLET, FILM COATED ORAL
Qty: 6 TABLET | Refills: 0 | Status: ON HOLD | OUTPATIENT
Start: 2024-12-01 | End: 2024-12-10

## 2024-12-01 RX ORDER — BROMPHENIRAMINE MALEATE, PSEUDOEPHEDRINE HYDROCHLORIDE, AND DEXTROMETHORPHAN HYDROBROMIDE 2; 30; 10 MG/5ML; MG/5ML; MG/5ML
5 SYRUP ORAL 3 TIMES DAILY PRN
Qty: 118 ML | Refills: 0 | Status: ON HOLD | OUTPATIENT
Start: 2024-12-01 | End: 2024-12-10

## 2024-12-01 RX ORDER — SODIUM CHLORIDE 0.9 % (FLUSH) 0.9 %
10 SYRINGE (ML) INJECTION AS NEEDED
Status: DISCONTINUED | OUTPATIENT
Start: 2024-12-01 | End: 2024-12-01 | Stop reason: HOSPADM

## 2024-12-01 RX ORDER — ASPIRIN 81 MG/1
324 TABLET, CHEWABLE ORAL ONCE
Status: COMPLETED | OUTPATIENT
Start: 2024-12-01 | End: 2024-12-01

## 2024-12-01 RX ADMIN — AZITHROMYCIN DIHYDRATE 500 MG: 250 TABLET ORAL at 15:42

## 2024-12-01 RX ADMIN — SODIUM CHLORIDE 1000 ML: 9 INJECTION, SOLUTION INTRAVENOUS at 13:08

## 2024-12-01 RX ADMIN — ASPIRIN 324 MG: 81 TABLET, CHEWABLE ORAL at 12:46

## 2024-12-01 NOTE — DISCHARGE INSTRUCTIONS

## 2024-12-01 NOTE — ED NOTES
Attempted to stick pt, pt requested to sign a paper for no more blood work, or fluids. Provider aware.

## 2024-12-01 NOTE — ED PROVIDER NOTES
Subjective   History of Present Illness  Patient is a 28-year-old female presents to the emergency room today for left-sided chest pain.  Patient reports pain in the left lateral portion of her chest.  Patient reports pain is worse with inspiration.  Patient reports she is also had a cough.  Patient denies any alleviating worsening factors.    Chest Pain      Review of Systems   Constitutional: Negative.    HENT: Negative.     Eyes: Negative.    Respiratory: Negative.     Cardiovascular:  Positive for chest pain.   Gastrointestinal: Negative.    Endocrine: Negative.    Genitourinary: Negative.    Musculoskeletal: Negative.    Skin: Negative.    Allergic/Immunologic: Negative.    Neurological: Negative.    Hematological: Negative.    Psychiatric/Behavioral: Negative.         Past Medical History:   Diagnosis Date    Hepatitis C     Hepatitis C antibody positive in blood     Migraines        Allergies   Allergen Reactions    Amoxicillin Anaphylaxis    Penicillins Anaphylaxis    Vancomycin Itching     Pt states she had full body itch and burning sensation, and she turned red. Reaction potentially Red Man Syndrome.    Zofran [Ondansetron Hcl] Hives and Rash       Past Surgical History:   Procedure Laterality Date    HEAD/NECK ABSCESS INCISION AND DRAINAGE Left 7/8/2022    Procedure: HEAD NECK ABSCESS INCISION AND DRAINAGE;  Surgeon: Miquel Grayson MD;  Location: [facility];  Service: General;  Laterality: Left;    INCISION AND DRAINAGE ABSCESS Right 10/10/2021    Procedure: INCISION AND DRAINAGE ABSCESS CENTRAL LINE PLACEMENT;  Surgeon: Naz Payne MD;  Location: [facility];  Service: General;  Laterality: Right;  I&D ABSCESS= INFECTED.  CENTRAL LINE = CLEAN.       Family History   Problem Relation Age of Onset    Hypertension Mother     Cancer Father     No Known Problems Sister     No Known Problems Brother     No Known Problems Son     No Known Problems Daughter     Hypertension Maternal Grandmother      Diabetes Maternal Grandfather     No Known Problems Paternal Grandmother     No Known Problems Paternal Grandfather     No Known Problems Cousin     Diabetes Maternal Aunt     Rheum arthritis Neg Hx     Osteoarthritis Neg Hx     Asthma Neg Hx     Heart failure Neg Hx     Hyperlipidemia Neg Hx     Migraines Neg Hx     Rashes / Skin problems Neg Hx     Seizures Neg Hx     Stroke Neg Hx     Thyroid disease Neg Hx        Social History     Socioeconomic History    Marital status:    Tobacco Use    Smoking status: Every Day     Current packs/day: 1.00     Average packs/day: 1 pack/day for 15.0 years (15.0 ttl pk-yrs)     Types: Cigarettes    Smokeless tobacco: Never   Vaping Use    Vaping status: Never Used   Substance and Sexual Activity    Alcohol use: No     Alcohol/week: 0.0 standard drinks of alcohol    Drug use: Not Currently     Frequency: 3.0 times per week     Types: IV     Comment: last use 5 YEARS AGO    Sexual activity: Yes     Partners: Male           Objective   Physical Exam  Vitals and nursing note reviewed.   Constitutional:       Appearance: She is well-developed.   HENT:      Head: Normocephalic.      Right Ear: External ear normal.      Left Ear: External ear normal.   Eyes:      Conjunctiva/sclera: Conjunctivae normal.      Pupils: Pupils are equal, round, and reactive to light.   Cardiovascular:      Rate and Rhythm: Normal rate and regular rhythm.      Heart sounds: Normal heart sounds.   Pulmonary:      Effort: Pulmonary effort is normal.      Breath sounds: Normal breath sounds.   Abdominal:      General: Bowel sounds are normal.      Palpations: Abdomen is soft.   Musculoskeletal:         General: Normal range of motion.      Cervical back: Normal range of motion and neck supple.   Skin:     General: Skin is warm and dry.      Capillary Refill: Capillary refill takes less than 2 seconds.   Neurological:      Mental Status: She is alert and oriented to person, place, and time.    Psychiatric:         Behavior: Behavior normal.         Thought Content: Thought content normal.         Procedures           ED Course  ED Course as of 12/01/24 2042   Sun Dec 01, 2024   1545 Normal sinus rhythm  Nonspecific T wave abnormality [ES]      ED Course User Index  [ES] Efren Frias MD                                           Results for orders placed or performed during the hospital encounter of 12/01/24   Respiratory Panel PCR w/COVID-19(SARS-CoV-2) ANGELA/AMELIA/JERRI/[facility]ENN[facility]ER In-House, NP Swab in UTM/VTM, 2 HR TAT - Swab, Nasopharynx    Collection Time: 12/01/24 12:42 PM    Specimen: Nasopharynx; Swab   Result Value Ref Range    ADENOVIRUS, PCR Not Detected Not Detected    Coronavirus 229E Not Detected Not Detected    Coronavirus HKU1 Not Detected Not Detected    Coronavirus NL63 Not Detected Not Detected    Coronavirus OC43 Not Detected Not Detected    COVID19 Not Detected Not Detected - Ref. Range    Human Metapneumovirus Not Detected Not Detected    Human Rhinovirus/Enterovirus Not Detected Not Detected    Influenza A PCR Not Detected Not Detected    Influenza B PCR Not Detected Not Detected    Parainfluenza Virus 1 Not Detected Not Detected    Parainfluenza Virus 2 Not Detected Not Detected    Parainfluenza Virus 3 Not Detected Not Detected    Parainfluenza Virus 4 Not Detected Not Detected    RSV, PCR Not Detected Not Detected    Bordetella pertussis pcr Not Detected Not Detected    Bordetella parapertussis PCR Not Detected Not Detected    Chlamydophila pneumoniae PCR Not Detected Not Detected    Mycoplasma pneumo by PCR Not Detected Not Detected   Comprehensive Metabolic Panel    Collection Time: 12/01/24  1:05 PM    Specimen: Arm, Right; Blood   Result Value Ref Range    Glucose 93 65 - 99 mg/dL    BUN 7 6 - 20 mg/dL    Creatinine 0.61 0.57 - 1.00 mg/dL    Sodium 135 (L) 136 - 145 mmol/L    Potassium 4.0 3.5 - 5.2 mmol/L    Chloride 102 98 - 107 mmol/L    CO2 22.0 22.0 - 29.0 mmol/L     Calcium 9.5 8.6 - 10.5 mg/dL    Total Protein 7.2 6.0 - 8.5 g/dL    Albumin 3.6 3.5 - 5.2 g/dL    ALT (SGPT) 7 1 - 33 U/L    AST (SGOT) 7 1 - 32 U/L    Alkaline Phosphatase 76 39 - 117 U/L    Total Bilirubin 0.4 0.0 - 1.2 mg/dL    Globulin 3.6 gm/dL    A/G Ratio 1.0 g/dL    BUN/Creatinine Ratio 11.5 7.0 - 25.0    Anion Gap 11.0 5.0 - 15.0 mmol/L    eGFR 123.5 >60.0 mL/min/1.73   High Sensitivity Troponin T    Collection Time: 12/01/24  1:05 PM    Specimen: Arm, Right; Blood   Result Value Ref Range    HS Troponin T <6 <14 ng/L   CBC Auto Differential    Collection Time: 12/01/24  1:05 PM    Specimen: Arm, Right; Blood   Result Value Ref Range    WBC 3.68 3.40 - 10.80 10*3/mm3    RBC 3.41 (L) 3.77 - 5.28 10*6/mm3    Hemoglobin 10.3 (L) 12.0 - 15.9 g/dL    Hematocrit 30.9 (L) 34.0 - 46.6 %    MCV 90.6 79.0 - 97.0 fL    MCH 30.2 26.6 - 33.0 pg    MCHC 33.3 31.5 - 35.7 g/dL    RDW 12.9 12.3 - 15.4 %    RDW-SD 42.6 37.0 - 54.0 fl    MPV 11.0 6.0 - 12.0 fL    Platelets 116 (L) 140 - 450 10*3/mm3    Neutrophil % 57.9 42.7 - 76.0 %    Lymphocyte % 31.8 19.6 - 45.3 %    Monocyte % 7.9 5.0 - 12.0 %    Eosinophil % 1.6 0.3 - 6.2 %    Basophil % 0.3 0.0 - 1.5 %    Immature Grans % 0.5 0.0 - 0.5 %    Neutrophils, Absolute 2.13 1.70 - 7.00 10*3/mm3    Lymphocytes, Absolute 1.17 0.70 - 3.10 10*3/mm3    Monocytes, Absolute 0.29 0.10 - 0.90 10*3/mm3    Eosinophils, Absolute 0.06 0.00 - 0.40 10*3/mm3    Basophils, Absolute 0.01 0.00 - 0.20 10*3/mm3    Immature Grans, Absolute 0.02 0.00 - 0.05 10*3/mm3    nRBC 0.0 0.0 - 0.2 /100 WBC   Lactic Acid, Plasma    Collection Time: 12/01/24  1:05 PM    Specimen: Arm, Right; Blood   Result Value Ref Range    Lactate 1.2 0.5 - 2.0 mmol/L   Green Top (Gel)    Collection Time: 12/01/24  1:05 PM   Result Value Ref Range    Extra Tube Hold for add-ons.    Lavender Top    Collection Time: 12/01/24  1:05 PM   Result Value Ref Range    Extra Tube hold for add-on    Gold Top - SST    Collection  Time: 12/01/24  1:05 PM   Result Value Ref Range    Extra Tube Hold for add-ons.    Light Blue Top    Collection Time: 12/01/24  1:05 PM   Result Value Ref Range    Extra Tube Hold for add-ons.      XR Chest 1 View   Final Result       Lung opacification in the left costophrenic angle which may suggest   pneumonia in the proper clinical setting. Please follow-up as clinically   appropriate.       This report was finalized on 12/1/2024 2:45 PM by Lisa Raman MD.                          Medical Decision Making  MDM:    Escalation of care including admission/observation considered    - Discussions of management with other providers:  None    - Discussed/reviewed with Radiology regarding test interpretation    - Independent interpretation: None    - Additional patient history obtained from: None    - Review of external non-ED record (if available):  Prior Inpt record, Office record, Outpt record, Prior Outpt labs, PCP record, Outside ED record, Other    - Chronic conditions affecting care: See HPI and medical Hx.    - Social Determinants of health significantly affecting care:  None        Medical Decision Making Discussion:    Patient is a 28-year-old female presents to the emergency room today for left-sided chest pain.  Patient reports pain in the left lateral portion of her chest.  Patient reports pain is worse with inspiration.  Patient reports she is also had a cough.  Patient denies any alleviating worsening factors.      The patient has been given very strict return precautions to return to the emergency department should there be any acute change or worsening of their condition.  I have explained my findings and the patient has expressed understanding to me.  I explained that the work-up performed in the ED has been based on the specific complaint and concern, as the nature of emergency medicine is complaint driven and they understand that new symptoms may arise.  I have told them that, should there be  any new symptoms, worsening or changing symptoms, a new work-up may be indicated that they are encouraged to return to the emergency department or promptly contact their primary care physician. We have employed a shared decision-making process as the discussion of their disposition.  The patient has been educated as to the nature of the visit, the tests and work-up performed and the findings from today's visit. At this time, there does not appear to be any acute emergent process that necessitates admission to the hospital, however, the patient understands that this can change unexpectedly. At this time, the patient is stable for discharge home and agrees to follow-up with her primary care physician in the next 24 to 48 hours or earlier should they be able to obtain an appointment.    The patient was counseled regarding diagnostic results and treatment plan and patient has indicated understanding of these instructions.      Problems Addressed:  Pneumonia of left lower lobe due to infectious organism: complicated acute illness or injury    Amount and/or Complexity of Data Reviewed  Labs: ordered. Decision-making details documented in ED Course.  Radiology: ordered. Decision-making details documented in ED Course.    Risk  OTC drugs.  Prescription drug management.        Final diagnoses:   Pneumonia of left lower lobe due to infectious organism       ED Disposition  ED Disposition       ED Disposition   Discharge    Condition   Stable    Comment   --               Margaret Baxter, APRN  [address]  [facility] [address]  [phone]    Schedule an appointment as soon as possible for a visit   For further evaluation         Medication List        New Prescriptions      azithromycin 250 MG tablet  Commonly known as: Zithromax  Take 2 tablets the first day, then 1 tablet daily for 4 days.     brompheniramine-pseudoephedrine-DM 30-2-10 MG/5ML syrup  Take 5 mL by mouth 3 (Three) Times a Day As Needed for Cough  or Congestion.               Where to Get Your Medications        These medications were sent to [facility] - [place], [place] - [address] [place] [address] [phone]  - [phone] [address] [address]      Phone: [phone]   azithromycin 250 MG tablet  brompheniramine-pseudoephedrine-DM 30-2-10 MG/5ML syrup            Ross Hyde, APRN  12/01/24 2046

## 2024-12-01 NOTE — Clinical Note
[facility] EMERGENCY DEPARTMENT  [address] [facility] [address]  Phone: [phone]    Sailaja Alarcon was seen and treated in our emergency department on 12/1/2024.  She may return to work on 12/02/2024.         Thank you for choosing [facility].    Ross Hyde, BIANCA

## 2024-12-01 NOTE — ED NOTES
Unsuccessful IV access attempt x1. Pt reports former IVDU, continued attempts deferred to other RN.

## 2024-12-02 LAB
BACTERIA BLD CULT: ABNORMAL
BOTTLE TYPE: ABNORMAL

## 2024-12-03 ENCOUNTER — HOSPITAL ENCOUNTER (INPATIENT)
Facility: HOSPITAL | Age: 29
LOS: 5 days | Discharge: LEFT BEFORE DISCHARGE | DRG: 831 | End: 2024-12-09
Attending: STUDENT IN AN ORGANIZED HEALTH CARE EDUCATION/TRAINING PROGRAM | Admitting: INTERNAL MEDICINE
Payer: COMMERCIAL

## 2024-12-03 ENCOUNTER — APPOINTMENT (OUTPATIENT)
Dept: GENERAL RADIOLOGY | Facility: HOSPITAL | Age: 29
DRG: 831 | End: 2024-12-03
Payer: COMMERCIAL

## 2024-12-03 ENCOUNTER — APPOINTMENT (OUTPATIENT)
Dept: ULTRASOUND IMAGING | Facility: HOSPITAL | Age: 29
DRG: 831 | End: 2024-12-03
Payer: COMMERCIAL

## 2024-12-03 DIAGNOSIS — R78.81 BACTEREMIA: Primary | ICD-10-CM

## 2024-12-03 LAB
ALBUMIN SERPL-MCNC: 4.2 G/DL (ref 3.5–5.2)
ALBUMIN/GLOB SERPL: 1.1 G/DL
ALP SERPL-CCNC: 86 U/L (ref 39–117)
ALT SERPL W P-5'-P-CCNC: 7 U/L (ref 1–33)
AMPHET+METHAMPHET UR QL: NEGATIVE
AMPHETAMINES UR QL: NEGATIVE
ANION GAP SERPL CALCULATED.3IONS-SCNC: 15.3 MMOL/L (ref 5–15)
AST SERPL-CCNC: 10 U/L (ref 1–32)
B PARAPERT DNA SPEC QL NAA+PROBE: NOT DETECTED
B PERT DNA SPEC QL NAA+PROBE: NOT DETECTED
BACTERIA UR QL AUTO: ABNORMAL /HPF
BARBITURATES UR QL SCN: NEGATIVE
BASOPHILS # BLD AUTO: 0.02 10*3/MM3 (ref 0–0.2)
BASOPHILS NFR BLD AUTO: 0.4 % (ref 0–1.5)
BENZODIAZ UR QL SCN: NEGATIVE
BILIRUB SERPL-MCNC: 0.4 MG/DL (ref 0–1.2)
BILIRUB UR QL STRIP: NEGATIVE
BUN SERPL-MCNC: 5 MG/DL (ref 6–20)
BUN/CREAT SERPL: 8.5 (ref 7–25)
BUPRENORPHINE SERPL-MCNC: POSITIVE NG/ML
C PNEUM DNA NPH QL NAA+NON-PROBE: NOT DETECTED
CALCIUM SPEC-SCNC: 9.8 MG/DL (ref 8.6–10.5)
CANNABINOIDS SERPL QL: NEGATIVE
CHLORIDE SERPL-SCNC: 99 MMOL/L (ref 98–107)
CLARITY UR: ABNORMAL
CO2 SERPL-SCNC: 20.7 MMOL/L (ref 22–29)
COCAINE UR QL: NEGATIVE
COLOR UR: YELLOW
CREAT SERPL-MCNC: 0.59 MG/DL (ref 0.57–1)
CRP SERPL-MCNC: 6.58 MG/DL (ref 0–0.5)
D-LACTATE SERPL-SCNC: 2 MMOL/L (ref 0.5–2)
DEPRECATED RDW RBC AUTO: 42.3 FL (ref 37–54)
EGFRCR SERPLBLD CKD-EPI 2021: 124.5 ML/MIN/1.73
EOSINOPHIL # BLD AUTO: 0.09 10*3/MM3 (ref 0–0.4)
EOSINOPHIL NFR BLD AUTO: 2 % (ref 0.3–6.2)
ERYTHROCYTE [DISTWIDTH] IN BLOOD BY AUTOMATED COUNT: 12.6 % (ref 12.3–15.4)
ERYTHROCYTE [SEDIMENTATION RATE] IN BLOOD: 72 MM/HR (ref 0–20)
FENTANYL UR-MCNC: NEGATIVE NG/ML
FLUAV SUBTYP SPEC NAA+PROBE: NOT DETECTED
FLUBV RNA ISLT QL NAA+PROBE: NOT DETECTED
GLOBULIN UR ELPH-MCNC: 3.9 GM/DL
GLUCOSE SERPL-MCNC: 97 MG/DL (ref 65–99)
GLUCOSE UR STRIP-MCNC: NEGATIVE MG/DL
HADV DNA SPEC NAA+PROBE: NOT DETECTED
HCG INTACT+B SERPL-ACNC: NORMAL MIU/ML
HCOV 229E RNA SPEC QL NAA+PROBE: NOT DETECTED
HCOV HKU1 RNA SPEC QL NAA+PROBE: NOT DETECTED
HCOV NL63 RNA SPEC QL NAA+PROBE: NOT DETECTED
HCOV OC43 RNA SPEC QL NAA+PROBE: NOT DETECTED
HCT VFR BLD AUTO: 35.8 % (ref 34–46.6)
HGB BLD-MCNC: 11.7 G/DL (ref 12–15.9)
HGB UR QL STRIP.AUTO: NEGATIVE
HMPV RNA NPH QL NAA+NON-PROBE: NOT DETECTED
HOLD SPECIMEN: NORMAL
HPIV1 RNA ISLT QL NAA+PROBE: NOT DETECTED
HPIV2 RNA SPEC QL NAA+PROBE: NOT DETECTED
HPIV3 RNA NPH QL NAA+PROBE: NOT DETECTED
HPIV4 P GENE NPH QL NAA+PROBE: NOT DETECTED
HYALINE CASTS UR QL AUTO: ABNORMAL /LPF
IMM GRANULOCYTES # BLD AUTO: 0.03 10*3/MM3 (ref 0–0.05)
IMM GRANULOCYTES NFR BLD AUTO: 0.7 % (ref 0–0.5)
KETONES UR QL STRIP: NEGATIVE
LEUKOCYTE ESTERASE UR QL STRIP.AUTO: ABNORMAL
LYMPHOCYTES # BLD AUTO: 1.2 10*3/MM3 (ref 0.7–3.1)
LYMPHOCYTES NFR BLD AUTO: 26.5 % (ref 19.6–45.3)
M PNEUMO IGG SER IA-ACNC: NOT DETECTED
MAGNESIUM SERPL-MCNC: 2.1 MG/DL (ref 1.6–2.6)
MCH RBC QN AUTO: 29.7 PG (ref 26.6–33)
MCHC RBC AUTO-ENTMCNC: 32.7 G/DL (ref 31.5–35.7)
MCV RBC AUTO: 90.9 FL (ref 79–97)
METHADONE UR QL SCN: NEGATIVE
MONOCYTES # BLD AUTO: 0.26 10*3/MM3 (ref 0.1–0.9)
MONOCYTES NFR BLD AUTO: 5.8 % (ref 5–12)
NEUTROPHILS NFR BLD AUTO: 2.92 10*3/MM3 (ref 1.7–7)
NEUTROPHILS NFR BLD AUTO: 64.6 % (ref 42.7–76)
NITRITE UR QL STRIP: POSITIVE
NRBC BLD AUTO-RTO: 0 /100 WBC (ref 0–0.2)
OPIATES UR QL: NEGATIVE
OXYCODONE UR QL SCN: NEGATIVE
PCP UR QL SCN: NEGATIVE
PH UR STRIP.AUTO: 7 [PH] (ref 5–8)
PLATELET # BLD AUTO: 185 10*3/MM3 (ref 140–450)
PMV BLD AUTO: 10.9 FL (ref 6–12)
POTASSIUM SERPL-SCNC: 3.6 MMOL/L (ref 3.5–5.2)
PROCALCITONIN SERPL-MCNC: 0.07 NG/ML (ref 0–0.25)
PROT SERPL-MCNC: 8.1 G/DL (ref 6–8.5)
PROT UR QL STRIP: ABNORMAL
QT INTERVAL: 394 MS
QTC INTERVAL: 434 MS
RBC # BLD AUTO: 3.94 10*6/MM3 (ref 3.77–5.28)
RBC # UR STRIP: ABNORMAL /HPF
REF LAB TEST METHOD: ABNORMAL
RHINOVIRUS RNA SPEC NAA+PROBE: DETECTED
RSV RNA NPH QL NAA+NON-PROBE: NOT DETECTED
SARS-COV-2 RNA NPH QL NAA+NON-PROBE: NOT DETECTED
SODIUM SERPL-SCNC: 135 MMOL/L (ref 136–145)
SP GR UR STRIP: 1.01 (ref 1–1.03)
SQUAMOUS #/AREA URNS HPF: ABNORMAL /HPF
TRICYCLICS UR QL SCN: NEGATIVE
TROPONIN T SERPL HS-MCNC: <6 NG/L
UROBILINOGEN UR QL STRIP: ABNORMAL
WBC # UR STRIP: ABNORMAL /HPF
WBC NRBC COR # BLD AUTO: 4.52 10*3/MM3 (ref 3.4–10.8)
WHOLE BLOOD HOLD COAG: NORMAL
WHOLE BLOOD HOLD SPECIMEN: NORMAL

## 2024-12-03 PROCEDURE — 87086 URINE CULTURE/COLONY COUNT: CPT | Performed by: INTERNAL MEDICINE

## 2024-12-03 PROCEDURE — 99285 EMERGENCY DEPT VISIT HI MDM: CPT

## 2024-12-03 PROCEDURE — 25010000002 CEFTRIAXONE PER 250 MG: Performed by: INTERNAL MEDICINE

## 2024-12-03 PROCEDURE — 81001 URINALYSIS AUTO W/SCOPE: CPT | Performed by: NURSE PRACTITIONER

## 2024-12-03 PROCEDURE — 73630 X-RAY EXAM OF FOOT: CPT | Performed by: RADIOLOGY

## 2024-12-03 PROCEDURE — 86140 C-REACTIVE PROTEIN: CPT | Performed by: NURSE PRACTITIONER

## 2024-12-03 PROCEDURE — 83605 ASSAY OF LACTIC ACID: CPT | Performed by: NURSE PRACTITIONER

## 2024-12-03 PROCEDURE — 73630 X-RAY EXAM OF FOOT: CPT

## 2024-12-03 PROCEDURE — 84145 PROCALCITONIN (PCT): CPT | Performed by: NURSE PRACTITIONER

## 2024-12-03 PROCEDURE — 83735 ASSAY OF MAGNESIUM: CPT | Performed by: INTERNAL MEDICINE

## 2024-12-03 PROCEDURE — 71045 X-RAY EXAM CHEST 1 VIEW: CPT | Performed by: RADIOLOGY

## 2024-12-03 PROCEDURE — 87147 CULTURE TYPE IMMUNOLOGIC: CPT | Performed by: NURSE PRACTITIONER

## 2024-12-03 PROCEDURE — 87186 SC STD MICRODIL/AGAR DIL: CPT | Performed by: INTERNAL MEDICINE

## 2024-12-03 PROCEDURE — 93010 ELECTROCARDIOGRAM REPORT: CPT | Performed by: INTERNAL MEDICINE

## 2024-12-03 PROCEDURE — 80307 DRUG TEST PRSMV CHEM ANLYZR: CPT | Performed by: NURSE PRACTITIONER

## 2024-12-03 PROCEDURE — 80053 COMPREHEN METABOLIC PANEL: CPT | Performed by: NURSE PRACTITIONER

## 2024-12-03 PROCEDURE — 84702 CHORIONIC GONADOTROPIN TEST: CPT | Performed by: NURSE PRACTITIONER

## 2024-12-03 PROCEDURE — G0378 HOSPITAL OBSERVATION PER HR: HCPCS

## 2024-12-03 PROCEDURE — 73610 X-RAY EXAM OF ANKLE: CPT | Performed by: RADIOLOGY

## 2024-12-03 PROCEDURE — 93005 ELECTROCARDIOGRAM TRACING: CPT | Performed by: NURSE PRACTITIONER

## 2024-12-03 PROCEDURE — 99223 1ST HOSP IP/OBS HIGH 75: CPT | Performed by: INTERNAL MEDICINE

## 2024-12-03 PROCEDURE — 71045 X-RAY EXAM CHEST 1 VIEW: CPT

## 2024-12-03 PROCEDURE — 36415 COLL VENOUS BLD VENIPUNCTURE: CPT

## 2024-12-03 PROCEDURE — 85025 COMPLETE CBC W/AUTO DIFF WBC: CPT | Performed by: NURSE PRACTITIONER

## 2024-12-03 PROCEDURE — 25010000002 AZTREONAM PER 500 MG: Performed by: NURSE PRACTITIONER

## 2024-12-03 PROCEDURE — 84484 ASSAY OF TROPONIN QUANT: CPT | Performed by: NURSE PRACTITIONER

## 2024-12-03 PROCEDURE — 0202U NFCT DS 22 TRGT SARS-COV-2: CPT | Performed by: NURSE PRACTITIONER

## 2024-12-03 PROCEDURE — 85652 RBC SED RATE AUTOMATED: CPT | Performed by: NURSE PRACTITIONER

## 2024-12-03 PROCEDURE — 73610 X-RAY EXAM OF ANKLE: CPT

## 2024-12-03 PROCEDURE — 76801 OB US < 14 WKS SINGLE FETUS: CPT

## 2024-12-03 PROCEDURE — 87088 URINE BACTERIA CULTURE: CPT | Performed by: INTERNAL MEDICINE

## 2024-12-03 PROCEDURE — 76801 OB US < 14 WKS SINGLE FETUS: CPT | Performed by: RADIOLOGY

## 2024-12-03 PROCEDURE — 87040 BLOOD CULTURE FOR BACTERIA: CPT | Performed by: NURSE PRACTITIONER

## 2024-12-03 RX ORDER — NICOTINE 21 MG/24HR
1 PATCH, TRANSDERMAL 24 HOURS TRANSDERMAL
Status: DISCONTINUED | OUTPATIENT
Start: 2024-12-04 | End: 2024-12-03

## 2024-12-03 RX ORDER — SODIUM CHLORIDE 9 MG/ML
40 INJECTION, SOLUTION INTRAVENOUS AS NEEDED
Status: DISCONTINUED | OUTPATIENT
Start: 2024-12-03 | End: 2024-12-09 | Stop reason: HOSPADM

## 2024-12-03 RX ORDER — CALCIUM CARBONATE 500 MG/1
2 TABLET, CHEWABLE ORAL 2 TIMES DAILY PRN
Status: DISCONTINUED | OUTPATIENT
Start: 2024-12-03 | End: 2024-12-09 | Stop reason: HOSPADM

## 2024-12-03 RX ORDER — GABAPENTIN 800 MG/1
800 TABLET ORAL 3 TIMES DAILY
COMMUNITY

## 2024-12-03 RX ORDER — ENOXAPARIN SODIUM 100 MG/ML
40 INJECTION SUBCUTANEOUS EVERY 24 HOURS
Status: DISCONTINUED | OUTPATIENT
Start: 2024-12-03 | End: 2024-12-09 | Stop reason: HOSPADM

## 2024-12-03 RX ORDER — SODIUM CHLORIDE 0.9 % (FLUSH) 0.9 %
10 SYRINGE (ML) INJECTION AS NEEDED
Status: DISCONTINUED | OUTPATIENT
Start: 2024-12-03 | End: 2024-12-09 | Stop reason: HOSPADM

## 2024-12-03 RX ORDER — SODIUM CHLORIDE 0.9 % (FLUSH) 0.9 %
10 SYRINGE (ML) INJECTION EVERY 12 HOURS SCHEDULED
Status: DISCONTINUED | OUTPATIENT
Start: 2024-12-03 | End: 2024-12-09 | Stop reason: HOSPADM

## 2024-12-03 RX ORDER — ACETAMINOPHEN 160 MG/5ML
650 SOLUTION ORAL EVERY 4 HOURS PRN
Status: DISCONTINUED | OUTPATIENT
Start: 2024-12-03 | End: 2024-12-09 | Stop reason: HOSPADM

## 2024-12-03 RX ORDER — DIPHENHYDRAMINE HCL 25 MG
25 CAPSULE ORAL 2 TIMES DAILY PRN
COMMUNITY

## 2024-12-03 RX ORDER — NICOTINE 21 MG/24HR
1 PATCH, TRANSDERMAL 24 HOURS TRANSDERMAL
Status: DISCONTINUED | OUTPATIENT
Start: 2024-12-03 | End: 2024-12-04 | Stop reason: SDUPTHER

## 2024-12-03 RX ORDER — ACETAMINOPHEN 325 MG/1
650 TABLET ORAL EVERY 4 HOURS PRN
Status: DISCONTINUED | OUTPATIENT
Start: 2024-12-03 | End: 2024-12-09 | Stop reason: HOSPADM

## 2024-12-03 RX ORDER — GABAPENTIN 400 MG/1
800 CAPSULE ORAL EVERY 8 HOURS SCHEDULED
Status: CANCELLED | OUTPATIENT
Start: 2024-12-03

## 2024-12-03 RX ORDER — POTASSIUM CHLORIDE 1500 MG/1
40 TABLET, EXTENDED RELEASE ORAL EVERY 4 HOURS
Status: COMPLETED | OUTPATIENT
Start: 2024-12-03 | End: 2024-12-03

## 2024-12-03 RX ORDER — BUPRENORPHINE HYDROCHLORIDE AND NALOXONE HYDROCHLORIDE DIHYDRATE 8; 2 MG/1; MG/1
2 TABLET SUBLINGUAL DAILY
Status: DISCONTINUED | OUTPATIENT
Start: 2024-12-03 | End: 2024-12-09 | Stop reason: HOSPADM

## 2024-12-03 RX ORDER — ACETAMINOPHEN 650 MG/1
650 SUPPOSITORY RECTAL EVERY 4 HOURS PRN
Status: DISCONTINUED | OUTPATIENT
Start: 2024-12-03 | End: 2024-12-09 | Stop reason: HOSPADM

## 2024-12-03 RX ADMIN — AZTREONAM 2 G: 2 INJECTION, POWDER, LYOPHILIZED, FOR SOLUTION INTRAMUSCULAR; INTRAVENOUS at 14:35

## 2024-12-03 RX ADMIN — POTASSIUM CHLORIDE 40 MEQ: 1500 TABLET, EXTENDED RELEASE ORAL at 23:55

## 2024-12-03 RX ADMIN — BUPRENORPHINE AND NALOXONE 2 TABLET: 8; 2 TABLET SUBLINGUAL at 18:47

## 2024-12-03 RX ADMIN — Medication 10 ML: at 20:20

## 2024-12-03 RX ADMIN — CEFTRIAXONE 1000 MG: 1 INJECTION, POWDER, FOR SOLUTION INTRAMUSCULAR; INTRAVENOUS at 18:47

## 2024-12-03 RX ADMIN — POTASSIUM CHLORIDE 40 MEQ: 1500 TABLET, EXTENDED RELEASE ORAL at 18:47

## 2024-12-03 RX ADMIN — NICOTINE 1 PATCH: 21 PATCH TRANSDERMAL at 20:18

## 2024-12-03 NOTE — NURSING NOTE
Pt A/O x 3. Skin warm to touch, bilateral. Radial and femoral pulse 2 + bilateral. Hr regular, S1 and S2 present. No murmur noted. Breathing unlabored in all lobes. Cap refil > 3 seconds in upper and lower extremity. Pt refused skin assessment. Bowl sounds normoactive in all quadrants. Attending physician was notified because pt was trying to sneak out of room because pt was upset that she could not smoke due to the pt being in contact and droplet isolation. Pt was stopped at elevator by nurse. Nurse educated pt on being in contact and droplet isolation. Pt stated to nurse that she will stay the night and sign out AMA in the morning.

## 2024-12-03 NOTE — H&P
[facility] HOSPITALIST HISTORY AND PHYSICAL      Admit Date: 12/3/2024        Chief complaint Positive blood cultures     Subjective     Sailaja Alarcon is a 28 y.o. female recently seen in the ED at our facility on 12/1, diagnosed with LLL pneumonia and discharged home with PO antibiotics. Blood cultures were collected during that visit revealing Staph, spp, not aureus or lugdunensis and she was contacted to return to the ED for further evaluation. She reports sinus congestion and pressure with associated headache, left-sided chest pain and cough. She reports dysuria with foul smelling urine. Reports some mild abdominal pain. No reported fever or chills. No reported vomiting. Reports a LLE wound with no reported drainage. States she suspected she may be pregnant with missed period last month but had not taken a test to confirm. Reports hx of substance abuse but reports sobriety for several years stating she is enrolled in a program utilizing Suboxone. States she has received Rocephin before with no reported allergic reaction.     In the ED, she was afebrile and hemodynamically stable. Routine labs revealed WBC WNL, borderline hypokalemia, mild anion gap metabolic acidosis, elevated CRP, normal lactate and normal procal. HCG elevated with US revealing gestational sac within the uterus corresponding to a 6-week 3-day gestation. UA was abnormal concerning for UTI. Repeat blood cultures were obtained. She was given a dose of Azactam. ID was contacted with recs for outpatient follow up in their clinic with blood cultures likely reflecting contamination. However, 2/2 concern for pt's ability to follow up as an outpatient the hospitalist service was contacted for admission.       History  Past Medical History:   Diagnosis Date    Hepatitis C     Hepatitis C antibody positive in blood     Migraines      Past Surgical History:   Procedure Laterality Date    HEAD/NECK ABSCESS INCISION AND DRAINAGE Left  7/8/2022    Procedure: HEAD NECK ABSCESS INCISION AND DRAINAGE;  Surgeon: Miquel Grayson MD;  Location:  [facility];  Service: General;  Laterality: Left;    INCISION AND DRAINAGE ABSCESS Right 10/10/2021    Procedure: INCISION AND DRAINAGE ABSCESS CENTRAL LINE PLACEMENT;  Surgeon: Naz Payne MD;  Location:  [facility];  Service: General;  Laterality: Right;  I&D ABSCESS= INFECTED.  CENTRAL LINE = CLEAN.     Family History   Problem Relation Age of Onset    Hypertension Mother     Cancer Father     No Known Problems Sister     No Known Problems Brother     No Known Problems Son     No Known Problems Daughter     Hypertension Maternal Grandmother     Diabetes Maternal Grandfather     No Known Problems Paternal Grandmother     No Known Problems Paternal Grandfather     No Known Problems Cousin     Diabetes Maternal Aunt     Rheum arthritis Neg Hx     Osteoarthritis Neg Hx     Asthma Neg Hx     Heart failure Neg Hx     Hyperlipidemia Neg Hx     Migraines Neg Hx     Rashes / Skin problems Neg Hx     Seizures Neg Hx     Stroke Neg Hx     Thyroid disease Neg Hx      Social History     Tobacco Use    Smoking status: Every Day     Current packs/day: 1.00     Average packs/day: 1 pack/day for 15.0 years (15.0 ttl pk-yrs)     Types: Cigarettes    Smokeless tobacco: Never   Vaping Use    Vaping status: Never Used   Substance Use Topics    Alcohol use: No     Alcohol/week: 0.0 standard drinks of alcohol    Drug use: Not Currently     Frequency: 3.0 times per week     Types: IV     Comment: last use 5 YEARS AGO     (Not in a hospital admission)    Allergies:  Amoxicillin, Penicillins, Vancomycin, and Zofran [ondansetron hcl]      Review of Systems    Review of Systems   Constitutional:  Negative for chills and fever.   HENT:  Positive for congestion and sinus pressure. Negative for nosebleeds.    Eyes:  Negative for visual disturbance.   Respiratory:  Positive for cough and shortness of breath.    Cardiovascular:   Positive for chest pain. Negative for palpitations and leg swelling.   Gastrointestinal:  Positive for abdominal pain. Negative for blood in stool and vomiting.   Endocrine: Negative for polydipsia, polyphagia and polyuria.   Genitourinary:  Positive for dysuria. Negative for hematuria and vaginal bleeding.   Musculoskeletal:  Negative for neck pain and neck stiffness.   Skin:  Positive for wound.   Neurological:  Positive for headaches. Negative for dizziness, seizures and syncope.   Psychiatric/Behavioral:  Negative for agitation and confusion.         Objective     Vital Signs  Temp:  [97.7 °F (36.5 °C)] 97.7 °F (36.5 °C)  Heart Rate:  [60-77] 60  Resp:  [18] 18  BP: ()/(50-65) 110/53    Physical Exam: Pt seen and examined in the ED with her grandmother present at bedside.   General:    Awake, alert, in no acute distress   Head:    Normocephalic, atraumatic   Eyes:           PERRLA, EOMI. Conjunctivae and sclerae normal. No icterus or pallor.   Ears:    Ears appear intact with no abnormalities noted.   Throat:   No oral lesions or thrush. Oral mucosa moist. Poor dentition.    Heart:      Normal S1 and S2. Regular rate and rhythm. No significant murmur, rubs or gallops appreciated.   Lungs:     Respirations regular, even and unlabored. Lungs clear to auscultation B/L. No wheezes, rales or rhonchi.   Abdomen:   Soft and nontender. No guarding, rebound tenderness or  organomegaly noted. Bowel sounds present x 4.   MS:   Muscular strength intact and equal B/L. No joint swelling, deformity, or tenderness.   Extremities:  No clubbing, cyanosis or edema noted. Moves UE and LE equally B/L.   Pulses:   Pulses palpable and equal bilaterally.   Skin:   (+) wound LLE with no erythema or drainage appreciated.    Lymph nodes:   No palpable adenopathy   Neurologic:   Cranial nerves 2 - 12 grossly intact. Sensation intact.        Results Review:     I reviewed the patient's new imaging results and agree with the  interpretation.  I reviewed the patient's other test results and agree with the interpretation.    Results from last 7 days   Lab Units 12/03/24  1246 12/01/24  1305   WBC 10*3/mm3 4.52 3.68   HEMOGLOBIN g/dL 11.7* 10.3*   PLATELETS 10*3/mm3 185 116*     Results from last 7 days   Lab Units 12/03/24  1246 12/01/24  1305   SODIUM mmol/L 135* 135*   POTASSIUM mmol/L 3.6 4.0   CHLORIDE mmol/L 99 102   CO2 mmol/L 20.7* 22.0   BUN mg/dL 5* 7   CREATININE mg/dL 0.59 0.61   CALCIUM mg/dL 9.8 9.5   GLUCOSE mg/dL 97 93     Results from last 7 days   Lab Units 12/03/24  1246 12/01/24  1305   BILIRUBIN mg/dL 0.4 0.4   ALK PHOS U/L 86 76   AST (SGOT) U/L 10 7   ALT (SGPT) U/L 7 7             Results from last 7 days   Lab Units 12/03/24  1246 12/01/24  1305   HSTROP T ng/L <6 <6       Imaging Results (Last 24 Hours)       Procedure Component Value Units Date/Time    US Ob < 14 Weeks Single or First Gestation [[id number]] Collected: 12/03/24 1507     Updated: 12/03/24 1512    Narrative:      PROCEDURE: US OB < 14 WEEKS SINGLE OR FIRST GESTATION-     HISTORY: unsure gestation, bacteremia     TECHNIQUE: Sonographic images of the pelvis were obtained  transabdominally.     COMPARISON: None.     FINDINGS: A gestational sac  is present within the uterus containing a  fetal pole corresponding to a 6-week 3-day gestation. No fetal cardiac  movement was identified. There is no evidence of a significant   subchorionic hemorrhage. There is no significant  free fluid in the  pelvis.       Impression:      Impression: Gestational sac within the uterus containing a fetal pole  corresponding to a 6-week 3-day gestation. No fetal cardiac movement was  seen on the exam. Short interval follow-up is recommended.        This report was finalized on 12/3/2024 3:09 PM by Pal Hedrick M.D..       XR Foot 3+ View Left [[id number]] Collected: 12/03/24 1351     Updated: 12/03/24 1354    Narrative:      PROCEDURE: XR FOOT 3+ VW LEFT-     History:  left foot wound     COMPARISON:  None.     FINDINGS:  A 3 view exam demonstrates no acute fracture or dislocation.  The joint spaces are preserved. No soft tissue abnormality is seen.       Impression:      No acute fracture.                 This report was finalized on 12/3/2024 1:52 PM by Pal Hedrick M.D..       XR Chest 1 View [[id number]] Collected: 12/03/24 1350     Updated: 12/03/24 1353    Narrative:      PROCEDURE: XR CHEST 1 VW-       HISTORY: staph penumonia     COMPARISON: 12/1/2024.     FINDINGS: The heart is normal in size. The mediastinum is unremarkable.  There is persistent airspace disease in the left lung base which is  unchanged. There is no pneumothorax. There are no acute osseous  abnormalities.       Impression:      No change in the left basilar airspace disease.        This report was finalized on 12/3/2024 1:51 PM by Pal Hedrick M.D..       XR Ankle 3+ View Left [[id number]] Collected: 12/03/24 1350     Updated: 12/03/24 1352    Narrative:      PROCEDURE: XR ANKLE 3+ VW LEFT-     History: left ankle wound     COMPARISON:  None.     FINDINGS:  A 3 view exam demonstrates no acute fracture or dislocation.  The joint spaces are preserved. Soft tissue swelling is present within  the ankle.       Impression:      Soft tissue swelling with no osseous abnormality.                 This report was finalized on 12/3/2024 1:50 PM by Pal Hedrick M.D..                   Assessment & Plan   (+) blood cultures: Blood cultures from ED visit on 12/1 revealing Staph spp, not aureus or lugdunensis. ID contacted in the ED with suspected contamination. Repeat blood cultures obtained in the ED. Consult ID and follow cultures.     UTI: Add urine culture to specimen obtained in the ED. Received Azactam in the ED in the setting of PCN allergy. Order Rocephin as pt states she has previously tolerated with no reported allergic reaction.     Recently diagnosed LLL pneumonia: Procal is normal.  Rocephin as above.     (+) Rhinovirus: Supportive treatment.     Borderline hypokalemia: Check Mg. Initiate electrolyte replacement protocols.     Intrauterine pregnancy: Measuring approx 6-week 3-day gestation on US. Will need outpatient follow up with OB.      Hx of substance abuse: UDS positive for buprenorphine which is confirmed on MEGHAN.     DVT PPX: Lovenox     I discussed the patient's findings and my recommendations with patient and family.       Khanh Kruse DO  12/03/24  17:22 EST

## 2024-12-03 NOTE — CASE MANAGEMENT/SOCIAL WORK
Discharge Planning Assessment   Jorge A     Patient Name: Sailaja Alarcon  MRN: [id number]  Today's Date: 12/3/2024    Admit Date: 12/3/2024    Plan: Spoke with patient at bedside. Patient lives at home with spouse and will return at discharge. Patient has no POA or Living Will. Patient uses a nebulizer from [facility] and has no HH she stated she was supposed to have them but they never showed up for left ankle wound care. Patients PCP Yohana Baxter and pharmacy [facility]. Patients family will transport home at discharge.   Discharge Needs Assessment    No documentation.                  Discharge Plan    No documentation.                 Continued Care and Services - Admitted Since 12/3/2024    No active coordination exists for this encounter.       Expected Discharge Date and Time       Expected Discharge Date Expected Discharge Time    Dec 4, 2024            Demographic Summary       Row Name 12/03/24 9251       General Information    Admission Type observation    Arrived From home    Referral Source emergency department    Reason for Consult discharge planning    Preferred Language English                     Angela Valderrama

## 2024-12-03 NOTE — ED PROVIDER NOTES
Subjective   History of Present Illness  Patient is a 28-year-old female with significant past medical history positive for hepatitis C and migraines presenting to the ER for positive blood cultures.  Patient reports no improvement in her symptoms.  Patient still complaints of cough, shortness of air and now chest pain.  Patient is also pregnant.  This is her sixth pregnancy with 5 living children.  Patient denies any prenatal care.  Unsure gestational age.  Patient also has a left ankle wound that has been there for several months.  Patient has transportation issues and was unable to come to our wound care outpatient appointments.  Patient reports that is now dried and crusty.  Patient denies any new injuries or any additional symptoms at this time.    History provided by:  Patient   used: No        Review of Systems   Constitutional: Negative.  Negative for fever.   HENT: Negative.     Respiratory:  Positive for cough and shortness of breath.    Cardiovascular:  Positive for chest pain.   Gastrointestinal: Negative.  Negative for abdominal pain.   Endocrine: Negative.    Genitourinary: Negative.  Negative for dysuria.   Skin: Negative.    Neurological: Negative.    Psychiatric/Behavioral: Negative.     All other systems reviewed and are negative.      Past Medical History:   Diagnosis Date    Hepatitis C     Hepatitis C antibody positive in blood     Migraines        Allergies   Allergen Reactions    Amoxicillin Anaphylaxis    Penicillins Anaphylaxis     Has tolerated rocephin per patient    Vancomycin Itching     Pt states she had full body itch and burning sensation, and she turned red. Reaction potentially Red Man Syndrome.    Zofran [Ondansetron Hcl] Hives and Rash       Past Surgical History:   Procedure Laterality Date    HEAD/NECK ABSCESS INCISION AND DRAINAGE Left 7/8/2022    Procedure: HEAD NECK ABSCESS INCISION AND DRAINAGE;  Surgeon: Miquel Grayson MD;  Location: [facility] OR;   Service: General;  Laterality: Left;    INCISION AND DRAINAGE ABSCESS Right 10/10/2021    Procedure: INCISION AND DRAINAGE ABSCESS CENTRAL LINE PLACEMENT;  Surgeon: Naz Payne MD;  Location: [facility];  Service: General;  Laterality: Right;  I&D ABSCESS= INFECTED.  CENTRAL LINE = CLEAN.       Family History   Problem Relation Age of Onset    Hypertension Mother     Cancer Father     No Known Problems Sister     No Known Problems Brother     No Known Problems Son     No Known Problems Daughter     Hypertension Maternal Grandmother     Diabetes Maternal Grandfather     No Known Problems Paternal Grandmother     No Known Problems Paternal Grandfather     No Known Problems Cousin     Diabetes Maternal Aunt     Rheum arthritis Neg Hx     Osteoarthritis Neg Hx     Asthma Neg Hx     Heart failure Neg Hx     Hyperlipidemia Neg Hx     Migraines Neg Hx     Rashes / Skin problems Neg Hx     Seizures Neg Hx     Stroke Neg Hx     Thyroid disease Neg Hx        Social History     Socioeconomic History    Marital status:    Tobacco Use    Smoking status: Every Day     Current packs/day: 1.00     Average packs/day: 1 pack/day for 15.0 years (15.0 ttl pk-yrs)     Types: Cigarettes    Smokeless tobacco: Never   Vaping Use    Vaping status: Never Used   Substance and Sexual Activity    Alcohol use: No     Alcohol/week: 0.0 standard drinks of alcohol    Drug use: Not Currently     Frequency: 3.0 times per week     Types: IV     Comment: last use 5 YEARS AGO    Sexual activity: Yes     Partners: Male           Objective   Physical Exam  Vitals and nursing note reviewed.   Constitutional:       General: She is not in acute distress.     Appearance: She is well-developed. She is not diaphoretic.   HENT:      Head: Normocephalic and atraumatic.      Right Ear: External ear normal.      Left Ear: External ear normal.      Nose: Nose normal.   Eyes:      Conjunctiva/sclera: Conjunctivae normal.      Pupils: Pupils are equal,  round, and reactive to light.   Neck:      Vascular: No JVD.      Trachea: No tracheal deviation.   Cardiovascular:      Rate and Rhythm: Normal rate and regular rhythm.      Heart sounds: Normal heart sounds. No murmur heard.  Pulmonary:      Effort: Pulmonary effort is normal. No respiratory distress.      Breath sounds: Normal breath sounds. No wheezing.   Abdominal:      General: Bowel sounds are normal.      Palpations: Abdomen is soft.      Tenderness: There is no abdominal tenderness.   Musculoskeletal:         General: No deformity. Normal range of motion.      Cervical back: Normal range of motion and neck supple.   Skin:     General: Skin is warm and dry.      Coloration: Skin is not pale.      Findings: No erythema or rash.   Neurological:      Mental Status: She is alert and oriented to person, place, and time.      Cranial Nerves: No cranial nerve deficit.   Psychiatric:         Behavior: Behavior normal.         Thought Content: Thought content normal.         Procedures       Results for orders placed or performed during the hospital encounter of 12/03/24   ECG 12 Lead Chest Pain    Collection Time: 12/03/24 12:29 PM   Result Value Ref Range    QT Interval 410 ms    QTC Interval 426 ms   Comprehensive Metabolic Panel    Collection Time: 12/03/24 12:46 PM    Specimen: Arm, Left; Blood   Result Value Ref Range    Glucose 97 65 - 99 mg/dL    BUN 5 (L) 6 - 20 mg/dL    Creatinine 0.59 0.57 - 1.00 mg/dL    Sodium 135 (L) 136 - 145 mmol/L    Potassium 3.6 3.5 - 5.2 mmol/L    Chloride 99 98 - 107 mmol/L    CO2 20.7 (L) 22.0 - 29.0 mmol/L    Calcium 9.8 8.6 - 10.5 mg/dL    Total Protein 8.1 6.0 - 8.5 g/dL    Albumin 4.2 3.5 - 5.2 g/dL    ALT (SGPT) 7 1 - 33 U/L    AST (SGOT) 10 1 - 32 U/L    Alkaline Phosphatase 86 39 - 117 U/L    Total Bilirubin 0.4 0.0 - 1.2 mg/dL    Globulin 3.9 gm/dL    A/G Ratio 1.1 g/dL    BUN/Creatinine Ratio 8.5 7.0 - 25.0    Anion Gap 15.3 (H) 5.0 - 15.0 mmol/L    eGFR 124.5 >60.0  mL/min/1.73   hCG, Quantitative, Pregnancy    Collection Time: 12/03/24 12:46 PM    Specimen: Arm, Left; Blood   Result Value Ref Range    HCG Quantitative 20,307.00 mIU/mL   Single High Sensitivity Troponin T    Collection Time: 12/03/24 12:46 PM    Specimen: Arm, Left; Blood   Result Value Ref Range    HS Troponin T <6 <14 ng/L   Lactic Acid, Plasma    Collection Time: 12/03/24 12:46 PM    Specimen: Arm, Left; Blood   Result Value Ref Range    Lactate 2.0 0.5 - 2.0 mmol/L   Sedimentation Rate    Collection Time: 12/03/24 12:46 PM    Specimen: Arm, Left; Blood   Result Value Ref Range    Sed Rate 72 (H) 0 - 20 mm/hr   C-reactive Protein    Collection Time: 12/03/24 12:46 PM    Specimen: Arm, Left; Blood   Result Value Ref Range    C-Reactive Protein 6.58 (H) 0.00 - 0.50 mg/dL   Procalcitonin    Collection Time: 12/03/24 12:46 PM    Specimen: Arm, Left; Blood   Result Value Ref Range    Procalcitonin 0.07 0.00 - 0.25 ng/mL   CBC Auto Differential    Collection Time: 12/03/24 12:46 PM    Specimen: Arm, Left; Blood   Result Value Ref Range    WBC 4.52 3.40 - 10.80 10*3/mm3    RBC 3.94 3.77 - 5.28 10*6/mm3    Hemoglobin 11.7 (L) 12.0 - 15.9 g/dL    Hematocrit 35.8 34.0 - 46.6 %    MCV 90.9 79.0 - 97.0 fL    MCH 29.7 26.6 - 33.0 pg    MCHC 32.7 31.5 - 35.7 g/dL    RDW 12.6 12.3 - 15.4 %    RDW-SD 42.3 37.0 - 54.0 fl    MPV 10.9 6.0 - 12.0 fL    Platelets 185 140 - 450 10*3/mm3    Neutrophil % 64.6 42.7 - 76.0 %    Lymphocyte % 26.5 19.6 - 45.3 %    Monocyte % 5.8 5.0 - 12.0 %    Eosinophil % 2.0 0.3 - 6.2 %    Basophil % 0.4 0.0 - 1.5 %    Immature Grans % 0.7 (H) 0.0 - 0.5 %    Neutrophils, Absolute 2.92 1.70 - 7.00 10*3/mm3    Lymphocytes, Absolute 1.20 0.70 - 3.10 10*3/mm3    Monocytes, Absolute 0.26 0.10 - 0.90 10*3/mm3    Eosinophils, Absolute 0.09 0.00 - 0.40 10*3/mm3    Basophils, Absolute 0.02 0.00 - 0.20 10*3/mm3    Immature Grans, Absolute 0.03 0.00 - 0.05 10*3/mm3    nRBC 0.0 0.0 - 0.2 /100 WBC   Magnesium     Collection Time: 12/03/24 12:46 PM    Specimen: Arm, Left; Blood   Result Value Ref Range    Magnesium 2.1 1.6 - 2.6 mg/dL   Green Top (Gel)    Collection Time: 12/03/24 12:46 PM   Result Value Ref Range    Extra Tube Hold for add-ons.    Lavender Top    Collection Time: 12/03/24 12:46 PM   Result Value Ref Range    Extra Tube hold for add-on    Gold Top - SST    Collection Time: 12/03/24 12:46 PM   Result Value Ref Range    Extra Tube Hold for add-ons.    Light Blue Top    Collection Time: 12/03/24 12:46 PM   Result Value Ref Range    Extra Tube Hold for add-ons.    Respiratory Panel PCR w/COVID-19(SARS-CoV-2) ANGELA/AMELIA/JERRI/PAD/COR/JENNIFER In-House, NP Swab in UTM/VTM, 2 HR TAT - Swab, Nasopharynx    Collection Time: 12/03/24 12:48 PM    Specimen: Nasopharynx; Swab   Result Value Ref Range    ADENOVIRUS, PCR Not Detected Not Detected    Coronavirus 229E Not Detected Not Detected    Coronavirus HKU1 Not Detected Not Detected    Coronavirus NL63 Not Detected Not Detected    Coronavirus OC43 Not Detected Not Detected    COVID19 Not Detected Not Detected - Ref. Range    Human Metapneumovirus Not Detected Not Detected    Human Rhinovirus/Enterovirus Detected (A) Not Detected    Influenza A PCR Not Detected Not Detected    Influenza B PCR Not Detected Not Detected    Parainfluenza Virus 1 Not Detected Not Detected    Parainfluenza Virus 2 Not Detected Not Detected    Parainfluenza Virus 3 Not Detected Not Detected    Parainfluenza Virus 4 Not Detected Not Detected    RSV, PCR Not Detected Not Detected    Bordetella pertussis pcr Not Detected Not Detected    Bordetella parapertussis PCR Not Detected Not Detected    Chlamydophila pneumoniae PCR Not Detected Not Detected    Mycoplasma pneumo by PCR Not Detected Not Detected   Urinalysis With Microscopic If Indicated (No Culture) - Urine, Clean Catch    Collection Time: 12/03/24 12:48 PM    Specimen: Urine, Clean Catch   Result Value Ref Range    Color, UA Yellow Yellow, Straw     Appearance, UA Cloudy (A) Clear    pH, UA 7.0 5.0 - 8.0    Specific Gravity, UA 1.015 1.005 - 1.030    Glucose, UA Negative Negative    Ketones, UA Negative Negative    Bilirubin, UA Negative Negative    Blood, UA Negative Negative    Protein, UA Trace (A) Negative    Leuk Esterase, UA Large (3+) (A) Negative    Nitrite, UA Positive (A) Negative    Urobilinogen, UA 1.0 E.U./dL 0.2 - 1.0 E.U./dL   Urine Drug Screen - Urine, Clean Catch    Collection Time: 12/03/24 12:48 PM    Specimen: Urine, Clean Catch   Result Value Ref Range    THC, Screen, Urine Negative Negative    Phencyclidine (PCP), Urine Negative Negative    Cocaine Screen, Urine Negative Negative    Methamphetamine, Ur Negative Negative    Opiate Screen Negative Negative    Amphetamine Screen, Urine Negative Negative    Benzodiazepine Screen, Urine Negative Negative    Tricyclic Antidepressants Screen Negative Negative    Methadone Screen, Urine Negative Negative    Barbiturates Screen, Urine Negative Negative    Oxycodone Screen, Urine Negative Negative    Buprenorphine, Screen, Urine Positive (A) Negative   Fentanyl, Urine - Urine, Clean Catch    Collection Time: 12/03/24 12:48 PM    Specimen: Urine, Clean Catch   Result Value Ref Range    Fentanyl, Urine Negative Negative   Urinalysis, Microscopic Only - Urine, Clean Catch    Collection Time: 12/03/24 12:48 PM    Specimen: Urine, Clean Catch   Result Value Ref Range    RBC, UA 0-2 None Seen, 0-2 /HPF    WBC, UA 21-50 (A) None Seen, 0-2 /HPF    Bacteria, UA 4+ (A) None Seen /HPF    Squamous Epithelial Cells, UA 21-30 (A) None Seen, 0-2 /HPF    Hyaline Casts, UA None Seen None Seen /LPF    Methodology Manual Light Microscopy    [place] Urine Culture Tube - Urine, Clean Catch    Collection Time: 12/03/24 12:48 PM    Specimen: Urine, Clean Catch   Result Value Ref Range    Extra Tube Hold for add-ons.       US Ob < 14 Weeks Single or First Gestation   Final Result   Impression: Gestational sac within the  uterus containing a fetal pole   corresponding to a 6-week 3-day gestation. No fetal cardiac movement was   seen on the exam. Short interval follow-up is recommended.           This report was finalized on 12/3/2024 3:09 PM by Pal Hedrick M.D..          XR Foot 3+ View Left   Final Result   No acute fracture.                       This report was finalized on 12/3/2024 1:52 PM by Pal Hedrick M.D..          XR Ankle 3+ View Left   Final Result   Soft tissue swelling with no osseous abnormality.                       This report was finalized on 12/3/2024 1:50 PM by Pal Hedrick M.D..          XR Chest 1 View   Final Result   No change in the left basilar airspace disease.           This report was finalized on 12/3/2024 1:51 PM by Pal Hedrick M.D..               ED Course  ED Course as of 12/03/24 2048   Tue Dec 03, 2024   1318 EKG notes sinus rhythm.  65 bpm.  I directly evaluated the EKG of this patient and noted no acute abnormalities.  Electronically signed by George Renee DO, 12/03/24, 1:19 PM EST.   [SF]   1413 Human Rhinovirus/Enterovirus(!): Detected [SS]   1413 Bacteria, UA(!): 4+ [SS]   1413 Buprenorphine, Screen, Urine(!): Positive [SS]   1413 Nitrite, UA(!): Positive [SS]   1413 Leukocytes, UA(!): Large (3+) [SS]   1413 WBC: 4.52 [SS]   1413 Sed Rate(!): 72 [SS]   1413 C-Reactive Protein(!): 6.58 [SS]   1413 XR Ankle 3+ View Left [SS]   1413 XR Chest 1 View [SS]   1413 XR Foot 3+ View Left [SS]   1415 HCG Quantitative: 20,307.00 [SS]   1424 D/W Dr. Espitia agrees to admit.  [SS]   1525 US Ob < 14 Weeks Single or First Gestation [SS]   1527 D/W Ciara with ID will call me back with recommendations.  [SS]   1534 D/W Ciara with ID, advises patient doesn't need admission and advises to discharge, repeat cultures and referral to ID outpatient.  [SS]   1540 D/W Attending physician Dr. Renee he advises patient should stay for admission.  [SS]   1604 D/W Ciara with I&D. She  advises she still wouldn't treat the bacteremia and repeat the cultures but advises if we don't feel comfortable with discharge the patient can be admitted pending cultures.  [SS]   1605 Paged hospitalist [SS]   1646 D/W Dr. Espitia again advises patient doesn't meet admission criteria and consult with case management on safe transportation after discharge. D/W Angela with case management will see patient at bedside.  [SS]   1651 D/W Angela with case management. All patient phone numbers have been updated, patient does have transportation via family that is at bedside.  [SS]      ED Course User Index  [SF] George Renee,   [SS] Mary Brown, BIANCA                                                       Medical Decision Making  Patient is a 28-year-old female with significant past medical history positive for hepatitis C and migraines presenting to the ER for positive blood cultures.  Patient reports no improvement in her symptoms.  Patient still complaints of cough, shortness of air and now chest pain.  Patient is also pregnant.  This is her sixth pregnancy with 5 living children.  Patient denies any prenatal care.  Unsure gestational age.  Patient also has a left ankle wound that has been there for several months.  Patient has transportation issues and was unable to come to our wound care outpatient appointments.  Patient reports that is now dried and crusty.  Patient denies any new injuries or any additional symptoms at this time.    Patient to be admitted for further evaluation and treatment.    Problems Addressed:  Bacteremia: complicated acute illness or injury    Amount and/or Complexity of Data Reviewed  Labs: ordered. Decision-making details documented in ED Course.  Radiology: ordered. Decision-making details documented in ED Course.  ECG/medicine tests: ordered.    Risk  Prescription drug management.  Decision regarding hospitalization.        Final diagnoses:   Bacteremia       ED Disposition  ED  Disposition       ED Disposition   Decision to Admit    Condition   --    Comment   Level of Care: Med/Surg [1]   Diagnosis: Positive blood cultures [230099]   Admitting Physician: SAMMI ALAMO [[id number]]   Attending Physician: SAMMI ALAMO [[id number]]                 No follow-up provider specified.       Medication List      No changes were made to your prescriptions during this visit.            Mary Brown, APRN  12/03/24 2043

## 2024-12-04 ENCOUNTER — APPOINTMENT (OUTPATIENT)
Dept: MRI IMAGING | Facility: HOSPITAL | Age: 29
DRG: 831 | End: 2024-12-04
Payer: COMMERCIAL

## 2024-12-04 ENCOUNTER — APPOINTMENT (OUTPATIENT)
Dept: CARDIOLOGY | Facility: HOSPITAL | Age: 29
DRG: 831 | End: 2024-12-04
Payer: COMMERCIAL

## 2024-12-04 PROBLEM — R78.81 MRSA BACTEREMIA: Status: ACTIVE | Noted: 2024-12-04

## 2024-12-04 PROBLEM — B95.62 MRSA BACTEREMIA: Status: ACTIVE | Noted: 2024-12-04

## 2024-12-04 LAB
ALBUMIN SERPL-MCNC: 3 G/DL (ref 3.5–5.2)
ALBUMIN/GLOB SERPL: 0.8 G/DL
ALP SERPL-CCNC: 65 U/L (ref 39–117)
ALT SERPL W P-5'-P-CCNC: 5 U/L (ref 1–33)
ANION GAP SERPL CALCULATED.3IONS-SCNC: 11 MMOL/L (ref 5–15)
AST SERPL-CCNC: 7 U/L (ref 1–32)
BACTERIA SPEC AEROBE CULT: ABNORMAL
BACTERIA SPEC AEROBE CULT: ABNORMAL
BH CV ECHO MEAS - ACS: 1.5 CM
BH CV ECHO MEAS - AO MAX PG: 10.4 MMHG
BH CV ECHO MEAS - AO MEAN PG: 6 MMHG
BH CV ECHO MEAS - AO ROOT DIAM: 2.45 CM
BH CV ECHO MEAS - AO V2 MAX: 161 CM/SEC
BH CV ECHO MEAS - AO V2 VTI: 34.8 CM
BH CV ECHO MEAS - EDV(CUBED): 97.3 ML
BH CV ECHO MEAS - EDV(MOD-SP2): 81.7 ML
BH CV ECHO MEAS - EDV(MOD-SP4): 78.3 ML
BH CV ECHO MEAS - EF(MOD-BP): 64.6 %
BH CV ECHO MEAS - EF(MOD-SP2): 65.2 %
BH CV ECHO MEAS - EF(MOD-SP4): 61.2 %
BH CV ECHO MEAS - ESV(CUBED): 29.8 ML
BH CV ECHO MEAS - ESV(MOD-SP2): 28.4 ML
BH CV ECHO MEAS - ESV(MOD-SP4): 30.4 ML
BH CV ECHO MEAS - FS: 32.6 %
BH CV ECHO MEAS - IVS/LVPW: 0.86 CM
BH CV ECHO MEAS - IVSD: 0.6 CM
BH CV ECHO MEAS - LA DIMENSION: 3.7 CM
BH CV ECHO MEAS - LAT PEAK E' VEL: 18.2 CM/SEC
BH CV ECHO MEAS - LV DIASTOLIC VOL/BSA (35-75): 48.5 CM2
BH CV ECHO MEAS - LV MASS(C)D: 90.5 GRAMS
BH CV ECHO MEAS - LV SYSTOLIC VOL/BSA (12-30): 18.8 CM2
BH CV ECHO MEAS - LVIDD: 4.6 CM
BH CV ECHO MEAS - LVIDS: 3.1 CM
BH CV ECHO MEAS - LVOT AREA: 3.1 CM2
BH CV ECHO MEAS - LVOT DIAM: 2 CM
BH CV ECHO MEAS - LVPWD: 0.7 CM
BH CV ECHO MEAS - MED PEAK E' VEL: 12.6 CM/SEC
BH CV ECHO MEAS - MV A MAX VEL: 41.6 CM/SEC
BH CV ECHO MEAS - MV E MAX VEL: 70.7 CM/SEC
BH CV ECHO MEAS - MV E/A: 1.7
BH CV ECHO MEAS - PA ACC TIME: 0.11 SEC
BH CV ECHO MEAS - RAP SYSTOLE: 10 MMHG
BH CV ECHO MEAS - RVSP: 26.6 MMHG
BH CV ECHO MEAS - SV(MOD-SP2): 53.3 ML
BH CV ECHO MEAS - SV(MOD-SP4): 47.9 ML
BH CV ECHO MEAS - SVI(MOD-SP2): 33 ML/M2
BH CV ECHO MEAS - SVI(MOD-SP4): 29.6 ML/M2
BH CV ECHO MEAS - TAPSE (>1.6): 2.46 CM
BH CV ECHO MEAS - TR MAX PG: 16.6 MMHG
BH CV ECHO MEAS - TR MAX VEL: 204 CM/SEC
BH CV ECHO MEASUREMENTS AVERAGE E/E' RATIO: 4.59
BILIRUB SERPL-MCNC: 0.2 MG/DL (ref 0–1.2)
BUN SERPL-MCNC: 9 MG/DL (ref 6–20)
BUN/CREAT SERPL: 16.4 (ref 7–25)
CALCIUM SPEC-SCNC: 9 MG/DL (ref 8.6–10.5)
CHLORIDE SERPL-SCNC: 104 MMOL/L (ref 98–107)
CO2 SERPL-SCNC: 17 MMOL/L (ref 22–29)
CREAT SERPL-MCNC: 0.55 MG/DL (ref 0.57–1)
EGFRCR SERPLBLD CKD-EPI 2021: 126.6 ML/MIN/1.73
GLOBULIN UR ELPH-MCNC: 3.7 GM/DL
GLUCOSE BLDC GLUCOMTR-MCNC: 104 MG/DL (ref 70–130)
GLUCOSE SERPL-MCNC: 99 MG/DL (ref 65–99)
GRAM STN SPEC: ABNORMAL
GRAM STN SPEC: ABNORMAL
ISOLATED FROM: ABNORMAL
ISOLATED FROM: ABNORMAL
LEFT ATRIUM VOLUME INDEX: 13.5 ML/M2
POTASSIUM SERPL-SCNC: 4.7 MMOL/L (ref 3.5–5.2)
POTASSIUM SERPL-SCNC: 4.7 MMOL/L (ref 3.5–5.2)
PROT SERPL-MCNC: 6.7 G/DL (ref 6–8.5)
QT INTERVAL: 410 MS
QTC INTERVAL: 426 MS
SODIUM SERPL-SCNC: 132 MMOL/L (ref 136–145)

## 2024-12-04 PROCEDURE — 25010000002 DAPTOMYCIN PER 1 MG: Performed by: NURSE PRACTITIONER

## 2024-12-04 PROCEDURE — 82948 REAGENT STRIP/BLOOD GLUCOSE: CPT

## 2024-12-04 PROCEDURE — 84132 ASSAY OF SERUM POTASSIUM: CPT | Performed by: INTERNAL MEDICINE

## 2024-12-04 PROCEDURE — 99232 SBSQ HOSP IP/OBS MODERATE 35: CPT | Performed by: INTERNAL MEDICINE

## 2024-12-04 PROCEDURE — 80053 COMPREHEN METABOLIC PANEL: CPT | Performed by: INTERNAL MEDICINE

## 2024-12-04 PROCEDURE — 93306 TTE W/DOPPLER COMPLETE: CPT | Performed by: INTERNAL MEDICINE

## 2024-12-04 PROCEDURE — 25010000002 CEFTRIAXONE PER 250 MG: Performed by: NURSE PRACTITIONER

## 2024-12-04 PROCEDURE — 93306 TTE W/DOPPLER COMPLETE: CPT

## 2024-12-04 PROCEDURE — 73721 MRI JNT OF LWR EXTRE W/O DYE: CPT | Performed by: RADIOLOGY

## 2024-12-04 PROCEDURE — 73721 MRI JNT OF LWR EXTRE W/O DYE: CPT

## 2024-12-04 PROCEDURE — 99254 IP/OBS CNSLTJ NEW/EST MOD 60: CPT | Performed by: NURSE PRACTITIONER

## 2024-12-04 RX ORDER — FOLIC ACID 1 MG/1
5 TABLET ORAL DAILY
Status: DISCONTINUED | OUTPATIENT
Start: 2024-12-04 | End: 2024-12-09 | Stop reason: HOSPADM

## 2024-12-04 RX ORDER — GABAPENTIN 400 MG/1
800 CAPSULE ORAL EVERY 8 HOURS SCHEDULED
Status: DISCONTINUED | OUTPATIENT
Start: 2024-12-04 | End: 2024-12-09 | Stop reason: HOSPADM

## 2024-12-04 RX ORDER — GUAIFENESIN/DEXTROMETHORPHAN 100-10MG/5
5 SYRUP ORAL EVERY 4 HOURS PRN
Status: DISCONTINUED | OUTPATIENT
Start: 2024-12-04 | End: 2024-12-09 | Stop reason: HOSPADM

## 2024-12-04 RX ORDER — NICOTINE 21 MG/24HR
1 PATCH, TRANSDERMAL 24 HOURS TRANSDERMAL EVERY 24 HOURS
Status: DISCONTINUED | OUTPATIENT
Start: 2024-12-04 | End: 2024-12-09 | Stop reason: HOSPADM

## 2024-12-04 RX ORDER — GABAPENTIN 400 MG/1
800 CAPSULE ORAL EVERY 8 HOURS SCHEDULED
Status: CANCELLED | OUTPATIENT
Start: 2024-12-04

## 2024-12-04 RX ADMIN — DAPTOMYCIN 500 MG: 500 INJECTION, POWDER, LYOPHILIZED, FOR SOLUTION INTRAVENOUS at 12:09

## 2024-12-04 RX ADMIN — Medication 10 ML: at 08:45

## 2024-12-04 RX ADMIN — ACETAMINOPHEN 650 MG: 325 TABLET ORAL at 15:33

## 2024-12-04 RX ADMIN — FOLIC ACID 5 MG: 1 TABLET ORAL at 10:48

## 2024-12-04 RX ADMIN — Medication 10 ML: at 21:19

## 2024-12-04 RX ADMIN — SODIUM CHLORIDE 2000 MG: 9 INJECTION, SOLUTION INTRAVENOUS at 14:33

## 2024-12-04 RX ADMIN — NICOTINE 1 PATCH: 21 PATCH TRANSDERMAL at 22:08

## 2024-12-04 RX ADMIN — GABAPENTIN 800 MG: 400 CAPSULE ORAL at 21:19

## 2024-12-04 RX ADMIN — BUPRENORPHINE AND NALOXONE 2 TABLET: 8; 2 TABLET SUBLINGUAL at 08:44

## 2024-12-04 RX ADMIN — GABAPENTIN 800 MG: 400 CAPSULE ORAL at 10:48

## 2024-12-04 NOTE — PLAN OF CARE
"Goal Outcome Evaluation:           Progress: improving  Outcome Evaluation: Pt's resting in bed, A&O, stable on RA, ambulated independently, stated \"feel better\". MRI LLE done, no abscess, no osteo. Echo done and cardiology consult noted per result. Pt's NPO after MN. Con't IV ABX. Will con't to monitor.                             "

## 2024-12-04 NOTE — CONSULTS
INFECTIOUS DISEASE CONSULTATION REPORT        Patient Identification:  Name:  Sailaaj Alarcon  Age:  28 y.o.  Sex:  female  :  1995  MRN:  [id number]   Visit Number:  [id number]  Primary Care Physician:  Margaret Baxter APRN        Referring Provider: Dr. Kruse    Reason for consult: MRSA bacteremia       LOS: 0 days        Subjective       Subjective     History of present illness:      Thank you Dr. Kruse for allowing us to participate in the care of your patient.  As you well know, Ms. Sailaja Alarcon is a 28 y.o. female with past medical history significant for history of substance use, who presented to [facility] on 12/3/2024 for call back due to positive blood cultures.  The patient was recently seen at the ED on , diagnosed with LLL pneumonia and discharged home with oral antibiotics.  Blood cultures were collected during that visit, initially revealing staph species not aureus or lugdunensis and she was contacted to return to the ED for further evaluation.  She reported sinus congestion and pressure with associated headache, left-sided chest pain and cough.  She reported dysuria with foul-smelling urine.  Also reported some mild abdominal pain.  No reported fever or chills.  No reported vomiting.  Reported LLE wound which has been present for some time, with reported drainage.  The patient reported she may be pregnant with missed period last month.  The patient reported a history of substance abuse but reported sobriety for the past several years, stating she is enrolled in a program utilizing Suboxone.  Reported she received Rocephin in the past with no reported allergic reaction.    In the ED was afebrile.  Hemodynamically stable.  WBC WNL.  Borderline hypokalemia.  Elevated CRP.  Normal lactate.  Normal procalcitonin.  hCG elevated with ultrasound revealing gestational sac within the uterus corresponding to 6-week 3-day  gestation.  Urinalysis was abnormal concerning for UTI.  Urine culture preliminarily reporting greater than 100,000 CFU E. coli.  Blood cultures from 12/1 with 1 of 2 reporting coagulase-negative Staphylococcus and 1 of 2 reporting MRSA.  Repeat blood cultures from 12/3 preliminarily report no growth at 24 hours.  Respiratory panel PCR positive for rhinovirus.  Chest x-ray from 12/3 reported no change in the left basilar airspace disease.  Left foot x-ray reported no acute fracture.  Left ankle x-ray reported soft tissue swelling with no osseous abnormality.    The patient was seen with Dr. Kruse with her family members present.  The patient is awake and alert, sitting up comfortably in bed.  On room air with no apparent distress.  Afebrile.  Denies diarrhea.  The patient reported she still experiences a cough with some left-sided pleuritic pain and soreness.  Reported that dysuria and foul odor have cleared.  The patient is noted to have poor dentition, she reports she has many broken teeth and has experienced several dental abscesses.  She reported a history of an abscess to her left neck which was felt to be due to dental infection.  Lung exam is clear to auscultation bilaterally.  Abdomen soft and nontender with normoactive bowel sounds.  The left ankle wound has been examined, is currently dry with scabbing and no surrounding erythema, induration or drainage.  The patient reports this wound has been present for approximately 2 years and frequently opens and drains, especially with physical activity.      Infectious Disease consultation was requested for antimicrobial management.      ---------------------------------------------------------------------------------------------------------------------     Review Of Systems:    Constitutional: no fever, chills and night sweats. No appetite change or unexpected weight change. No fatigue.  Eyes: no eye drainage, itching or redness.  HEENT: no mouth sores,  dysphagia or nose bleed.  Respiratory: no for shortness of breath, improving cough.  Cardiovascular: no chest pain, no palpitations, no orthopnea.  Gastrointestinal: no nausea, vomiting or diarrhea. No abdominal pain, hematemesis or rectal bleeding.  Genitourinary: no dysuria or polyuria.  Hematologic/lymphatic: no lymph node abnormalities, no easy bruising or easy bleeding.  Musculoskeletal: no muscle or joint pain.  Skin: No rash and no itching.  Chronic wound to left ankle  Neurological: no loss of consciousness, no seizure, no headache.  Behavioral/Psych: no depression or suicidal ideation.  Endocrine: no hot flashes.  Immunologic: negative.    ---------------------------------------------------------------------------------------------------------------------     Past Medical History    Past Medical History:   Diagnosis Date    Hepatitis C     Hepatitis C antibody positive in blood     Migraines        Past Surgical History    Past Surgical History:   Procedure Laterality Date    HEAD/NECK ABSCESS INCISION AND DRAINAGE Left 7/8/2022    Procedure: HEAD NECK ABSCESS INCISION AND DRAINAGE;  Surgeon: Miquel Grayson MD;  Location: [facility];  Service: General;  Laterality: Left;    INCISION AND DRAINAGE ABSCESS Right 10/10/2021    Procedure: INCISION AND DRAINAGE ABSCESS CENTRAL LINE PLACEMENT;  Surgeon: Naz Payne MD;  Location: [facility];  Service: General;  Laterality: Right;  I&D ABSCESS= INFECTED.  CENTRAL LINE = CLEAN.       Family History    Family History   Problem Relation Age of Onset    Hypertension Mother     Cancer Father     No Known Problems Sister     No Known Problems Brother     No Known Problems Son     No Known Problems Daughter     Hypertension Maternal Grandmother     Diabetes Maternal Grandfather     No Known Problems Paternal Grandmother     No Known Problems Paternal Grandfather     No Known Problems Cousin     Diabetes Maternal Aunt     Rheum arthritis Neg Hx      Osteoarthritis Neg Hx     Asthma Neg Hx     Heart failure Neg Hx     Hyperlipidemia Neg Hx     Migraines Neg Hx     Rashes / Skin problems Neg Hx     Seizures Neg Hx     Stroke Neg Hx     Thyroid disease Neg Hx        Social History    Social History     Tobacco Use    Smoking status: Every Day     Current packs/day: 1.00     Average packs/day: 1 pack/day for 15.0 years (15.0 ttl pk-yrs)     Types: Cigarettes    Smokeless tobacco: Never   Vaping Use    Vaping status: Never Used   Substance Use Topics    Alcohol use: No     Alcohol/week: 0.0 standard drinks of alcohol    Drug use: Not Currently     Frequency: 3.0 times per week     Types: IV     Comment: last use 5 YEARS AGO       Allergies    Amoxicillin, Penicillins, Vancomycin, and Zofran [ondansetron hcl]  ---------------------------------------------------------------------------------------------------------------------     Home Medications:    Prior to Admission Medications       Prescriptions Last Dose Informant Patient Reported? Taking?    azithromycin (Zithromax) 250 MG tablet 12/2/2024 Other, Self No Yes    Take 2 tablets the first day, then 1 tablet daily for 4 days.    buprenorphine-naloxone (SUBOXONE) 8-2 MG per SL tablet 12/2/2024 Self, Other Yes Yes    Place 2 tablets under the tongue Daily.    diphenhydrAMINE (BENADRYL) 25 mg capsule Past Month Other, Self Yes Yes    Take 1 capsule by mouth 2 (Two) Times a Day As Needed for Itching, Allergies or Sleep.    gabapentin (NEURONTIN) 800 MG tablet 12/2/2024 Other, Self Yes Yes    Take 1 tablet by mouth 3 (Three) Times a Day.    brompheniramine-pseudoephedrine-DM 30-2-10 MG/5ML syrup  Other, Self No No    Take 5 mL by mouth 3 (Three) Times a Day As Needed for Cough or Congestion.          ---------------------------------------------------------------------------------------------------------------------    Objective       Objective     Hospital Scheduled Meds:  buprenorphine-naloxone, 2 tablet, Sublingual,  Daily  cefTRIAXone, 1,000 mg, Intravenous, Q24H  DAPTOmycin, 8 mg/kg, Intravenous, Q24H  enoxaparin, 40 mg, Subcutaneous, Q24H  folic acid, 5 mg, Oral, Daily  gabapentin, 800 mg, Oral, Q8H  influenza vaccine, 0.5 mL, Intramuscular, Daily  nicotine, 1 patch, Transdermal, Q24H  sodium chloride, 10 mL, Intravenous, Q12H         ---------------------------------------------------------------------------------------------------------------------   Vital Signs:  Temp:  [97.5 °F (36.4 °C)-98.2 °F (36.8 °C)] 97.7 °F (36.5 °C)  Heart Rate:  [58-77] 63  Resp:  [16-18] 18  BP: ()/(50-56) 90/50  Mean Arterial Pressure (Non-Invasive) for the past 24 hrs (Last 3 readings):   Noninvasive MAP (mmHg)   12/03/24 1430 71   12/03/24 1415 67     SpO2 Percentage    12/03/24 1642 12/03/24 1701 12/03/24 1900   SpO2: 100% 100% 95%     SpO2:  [95 %-100 %] 95 %  on   ;   Device (Oxygen Therapy): room air    Body mass index is 23.24 kg/m².  Wt Readings from Last 3 Encounters:   12/03/24 59.5 kg (131 lb 2.8 oz)   12/01/24 59.4 kg (131 lb)   09/24/24 72.6 kg (160 lb)     ---------------------------------------------------------------------------------------------------------------------     Physical Exam:    Constitutional:  Well-developed and well-nourished.  Sitting up comfortably in bed on room air with no respiratory distress.  Family members at the bedside.      HENT:  Head: Normocephalic and atraumatic.  Mouth:  Moist mucous membranes.  Poor dentition, multiple broken teeth and dental caries.  Eyes:  Conjunctivae and EOM are normal.  No scleral icterus.  Neck:  Neck supple.  No JVD present.    Cardiovascular:  Normal rate, regular rhythm and normal heart sounds with no murmur. No edema.  Pulmonary/Chest: Lung exam is clear to auscultation bilaterally.  No respiratory distress, no wheezes, no crackles, with normal breath sounds and good air movement.  Abdominal:  Soft.  Bowel sounds are normal.  No distension and no tenderness.  "  Musculoskeletal:  No edema, no tenderness, and no deformity.  No swelling or redness of joints.  Neurological:  Alert and oriented to person, place, and time.  No facial droop.  No slurred speech.   Skin:  Skin is warm and dry.  No rash noted.  No pallor.  Left ankle wound with scabbing, no erythema/edema, induration or drainage.  Psychiatric:  Normal mood and affect.  Behavior is normal.    ---------------------------------------------------------------------------------------------------------------------    Results from last 7 days   Lab Units 12/03/24  1246 12/01/24  1305   HSTROP T ng/L <6 <6           Results from last 7 days   Lab Units 12/03/24  1246 12/01/24  1305   CRP mg/dL 6.58*  --    LACTATE mmol/L 2.0 1.2   WBC 10*3/mm3 4.52 3.68   HEMOGLOBIN g/dL 11.7* 10.3*   HEMATOCRIT % 35.8 30.9*   MCV fL 90.9 90.6   MCHC g/dL 32.7 33.3   PLATELETS 10*3/mm3 185 116*     Results from last 7 days   Lab Units 12/04/24  0243 12/03/24  1246 12/01/24  1305   SODIUM mmol/L 132* 135* 135*   POTASSIUM mmol/L 4.7  4.7 3.6 4.0   MAGNESIUM mg/dL  --  2.1  --    CHLORIDE mmol/L 104 99 102   CO2 mmol/L 17.0* 20.7* 22.0   BUN mg/dL 9 5* 7   CREATININE mg/dL 0.55* 0.59 0.61   CALCIUM mg/dL 9.0 9.8 9.5   GLUCOSE mg/dL 99 97 93   ALBUMIN g/dL 3.0* 4.2 3.6   BILIRUBIN mg/dL 0.2 0.4 0.4   ALK PHOS U/L 65 86 76   AST (SGOT) U/L 7 10 7   ALT (SGPT) U/L 5 7 7   Estimated Creatinine Clearance: 140.5 mL/min (A) (by C-G formula based on SCr of 0.55 mg/dL (L)).  No results found for: \"AMMONIA\"    Glucose   Date/Time Value Ref Range Status   12/04/2024 0655 104 70 - 130 mg/dL Final     No results found for: \"HGBA1C\"  No results found for: \"TSH\", \"FREET4\"    Blood Culture   Date Value Ref Range Status   12/03/2024 No growth at 24 hours  Preliminary   12/03/2024 No growth at 24 hours  Preliminary   12/01/2024 Staphylococcus aureus, MRSA (C)  Final     Comment:       Infectious disease consultation is highly recommended to rule out distant " "foci of infection.  Methicillin resistant Staphylococcus aureus, Patient may be an isolation risk.   12/01/2024 Staphylococcus, coagulase negative (C)  Final     Urine Culture   Date Value Ref Range Status   12/03/2024 >100,000 CFU/mL Escherichia coli (A)  Preliminary     No results found for: \"WOUNDCX\"  No results found for: \"STOOLCX\"  No results found for: \"RESPCX\"  Pain Management Panel  More data exists         Latest Ref Rng & Units 12/3/2024 3/6/2023   Pain Management Panel   Amphetamine, Urine Qual Negative Negative  Negative    Barbiturates Screen, Urine Negative Negative  Negative    Benzodiazepine Screen, Urine Negative Negative  Negative    Buprenorphine, Screen, Urine Negative Positive  Positive    Cocaine Screen, Urine Negative Negative  Negative    Fentanyl, Urine Negative Negative  -   Methadone Screen , Urine Negative Negative  Negative    Methamphetamine, Ur Negative Negative  Negative       Details                 I have personally reviewed the above laboratory results.   ---------------------------------------------------------------------------------------------------------------------  Imaging Results (Last 7 Days)       Procedure Component Value Units Date/Time    US Ob < 14 Weeks Single or First Gestation [[id number]] Collected: 12/03/24 1507     Updated: 12/03/24 1512    Narrative:      PROCEDURE: US OB < 14 WEEKS SINGLE OR FIRST GESTATION-     HISTORY: unsure gestation, bacteremia     TECHNIQUE: Sonographic images of the pelvis were obtained  transabdominally.     COMPARISON: None.     FINDINGS: A gestational sac  is present within the uterus containing a  fetal pole corresponding to a 6-week 3-day gestation. No fetal cardiac  movement was identified. There is no evidence of a significant   subchorionic hemorrhage. There is no significant  free fluid in the  pelvis.       Impression:      Impression: Gestational sac within the uterus containing a fetal pole  corresponding to a 6-week 3-day " gestation. No fetal cardiac movement was  seen on the exam. Short interval follow-up is recommended.        This report was finalized on 12/3/2024 3:09 PM by Pal Hedrick M.D..       XR Foot 3+ View Left [[id number]] Collected: 12/03/24 1351     Updated: 12/03/24 1354    Narrative:      PROCEDURE: XR FOOT 3+ VW LEFT-     History: left foot wound     COMPARISON:  None.     FINDINGS:  A 3 view exam demonstrates no acute fracture or dislocation.  The joint spaces are preserved. No soft tissue abnormality is seen.       Impression:      No acute fracture.                 This report was finalized on 12/3/2024 1:52 PM by Pal Hedrick M.D..       XR Chest 1 View [[id number]] Collected: 12/03/24 1350     Updated: 12/03/24 1353    Narrative:      PROCEDURE: XR CHEST 1 VW-       HISTORY: staph penumonia     COMPARISON: 12/1/2024.     FINDINGS: The heart is normal in size. The mediastinum is unremarkable.  There is persistent airspace disease in the left lung base which is  unchanged. There is no pneumothorax. There are no acute osseous  abnormalities.       Impression:      No change in the left basilar airspace disease.        This report was finalized on 12/3/2024 1:51 PM by Pal Hedrick M.D..       XR Ankle 3+ View Left [[id number]] Collected: 12/03/24 1350     Updated: 12/03/24 1352    Narrative:      PROCEDURE: XR ANKLE 3+ VW LEFT-     History: left ankle wound     COMPARISON:  None.     FINDINGS:  A 3 view exam demonstrates no acute fracture or dislocation.  The joint spaces are preserved. Soft tissue swelling is present within  the ankle.       Impression:      Soft tissue swelling with no osseous abnormality.                 This report was finalized on 12/3/2024 1:50 PM by Pal Hedrick M.D..             I have personally reviewed the above radiology results.   ---------------------------------------------------------------------------------------------------------------------      Pertinent  Infectious Disease Results          Assessment & Plan      Assessment      MRSA bacteremia  Left lower extremity chronic wound  UTI  Pregnancy, 6 weeks gestation      Plan      Ms. Sailaja Alarcon is a 28 y.o. female with past medical history significant for history of substance use, who presented to [facility] Emergency Department on 12/3/2024 for call back due to positive blood cultures.  The patient was recently seen at the ED on 12/1, diagnosed with LLL pneumonia and discharged home with oral antibiotics.  Blood cultures were collected during that visit, initially revealing staph species not aureus or lugdunensis and she was contacted to return to the ED for further evaluation.  She reported sinus congestion and pressure with associated headache, left-sided chest pain and cough.  She reported dysuria with foul-smelling urine.  Also reported some mild abdominal pain.  No reported fever or chills.  No reported vomiting.  Reported LLE wound which has been present for some time, with reported drainage.  The patient reported she may be pregnant with missed period last month.  The patient reported a history of substance abuse but reported sobriety for the past several years, stating she is enrolled in a program utilizing Suboxone.  Reported she received Rocephin in the past with no reported allergic reaction.    In the ED was afebrile.  Hemodynamically stable.  WBC WNL.  Borderline hypokalemia.  Elevated CRP.  Normal lactate.  Normal procalcitonin.  hCG elevated with ultrasound revealing gestational sac within the uterus corresponding to 6-week 3-day gestation.  Urinalysis was abnormal concerning for UTI.  Urine culture preliminarily reporting greater than 100,000 CFU E. coli.  Blood cultures from 12/1 with 1 of 2 reporting coagulase-negative Staphylococcus and 1 of 2 reporting MRSA.  Repeat blood cultures from 12/3 preliminarily report no growth at 24 hours.  Respiratory panel PCR positive for  rhinovirus.  Chest x-ray from 12/3 reported no change in the left basilar airspace disease.  Left foot x-ray reported no acute fracture.  Left ankle x-ray reported soft tissue swelling with no osseous abnormality.    The patient was seen with Dr. Kruse with her family members present.  The patient is awake and alert, sitting up comfortably in bed.  On room air with no apparent distress.  Afebrile.  Denies diarrhea.  The patient reported she still experiences a cough with some left-sided pleuritic pain and soreness.  Reported that dysuria and foul odor have cleared.  The patient is noted to have poor dentition, she reports she has many broken teeth and has experienced several dental abscesses.  She reported a history of an abscess to her left neck which was felt to be due to dental infection.  Lung exam is clear to auscultation bilaterally.  Abdomen soft and nontender with normoactive bowel sounds.  The left ankle wound has been examined, is currently dry with scabbing and no surrounding erythema, induration or drainage.  The patient reports this wound has been present for approximately 2 years and frequently opens and drains, especially with physical activity.    The patient was initiated on ceftriaxone course in the setting of UTI, this was adjusted to 2 g IV every 24 hours.  Urine culture preliminarily reporting E. coli, awaiting susceptibility report.    Initially blood cultures reported staph species not aureus and were felt to be contaminant, but have since updated to 1 reporting staph species not aureus and 1 reporting MRSA.  Case was discussed with microbiology lab who did report true MRSA on slides.  In the setting of MRSA bacteremia and pregnancy, case was discussed with pharmacy who reported that daptomycin 8 mg/kg IV every 24 hours would be the safest option for treatment.      Case discussed with Dr. Kruse this morning, with recommendation for echocardiogram to rule out infective endocarditis as well  as MRI without contrast of the left ankle to rule out osteomyelitis/possible source of bacteremia.  Echo and MRI pending.    We will continue to monitor closely and adjust antibiotic regimen as appropriate.    ANTIMICROBIAL THERAPY    azithromycin - 250 MG  cefTRIAXone (ROCEPHIN) 1000 mg IVPB in 100 mL NS (VTB)  daptomycin       Again, thank you Dr. Kruse for allowing us to participate in the care of your patient and please feel free to call for any questions you may have.        Code Status:     Code Status and Medical Interventions: CPR (Attempt to Resuscitate); Full Support   Ordered at: 12/03/24 1721     Code Status (Patient has no pulse and is not breathing):    CPR (Attempt to Resuscitate)     Medical Interventions (Patient has pulse or is breathing):    Full Support         Ciara Espitia, APRN  12/04/24  13:38 EST

## 2024-12-04 NOTE — PROGRESS NOTES
[facility] HOSPITALIST PROGRESS NOTE    Subjective     History:   Sailaja Alarcon is a 28 y.o. female admitted on 12/3/2024 secondary to Positive blood cultures     Procedures: None    CC: Follow up bacteremia     Patient seen and examined with ID APRN. Awake and alert with family members including grandmother and aunt at bedside. States she feels better today with improving dysuria. Reports some left-sided pleuritic pain and soreness. No reported vomiting. No acute events overnight per RN.     History taken from: patient, chart, and RN.      Objective     Vital Signs  Temp:  [97.5 °F (36.4 °C)-98.2 °F (36.8 °C)] 97.7 °F (36.5 °C)  Heart Rate:  [58-77] 63  Resp:  [16-18] 18  BP: ()/(50-65) 90/50    Intake/Output Summary (Last 24 hours) at 12/4/2024 1314  Last data filed at 12/4/2024 1310  Gross per 24 hour   Intake 820 ml   Output --   Net 820 ml         Physical Exam:  General:    Awake, alert, in no acute distress, poor dentition    Heart:      Normal S1 and S2. Regular rate and rhythm. No significant murmur, rubs or gallops appreciated.   Lungs:     Respirations regular, even and unlabored. Lungs clear to auscultation B/L. No wheezes, rales or rhonchi.   Abdomen:   Soft and nontender. No guarding, rebound tenderness or  organomegaly noted. Bowel sounds present x 4.   Extremities:  No clubbing, cyanosis or edema noted. Moves UE and LE equally B/L. LLE/ankle wound with no surrounding erythema or drainage appreciated.      Results Review:    Results from last 7 days   Lab Units 12/03/24  1246 12/01/24  1305   WBC 10*3/mm3 4.52 3.68   HEMOGLOBIN g/dL 11.7* 10.3*   PLATELETS 10*3/mm3 185 116*     Results from last 7 days   Lab Units 12/04/24  0243 12/03/24  1246 12/01/24  1305   SODIUM mmol/L 132* 135* 135*   POTASSIUM mmol/L 4.7  4.7 3.6 4.0   CHLORIDE mmol/L 104 99 102   CO2 mmol/L 17.0* 20.7* 22.0   BUN mg/dL 9 5* 7   CREATININE mg/dL 0.55* 0.59 0.61   CALCIUM mg/dL 9.0 9.8 9.5    GLUCOSE mg/dL 99 97 93     Results from last 7 days   Lab Units 12/04/24  0243 12/03/24  1246 12/01/24  1305   BILIRUBIN mg/dL 0.2 0.4 0.4   ALK PHOS U/L 65 86 76   AST (SGOT) U/L 7 10 7   ALT (SGPT) U/L 5 7 7     Results from last 7 days   Lab Units 12/03/24  1246   MAGNESIUM mg/dL 2.1         Results from last 7 days   Lab Units 12/03/24  1246 12/01/24  1305   HSTROP T ng/L <6 <6       Imaging Results (Last 24 Hours)       Procedure Component Value Units Date/Time    US Ob < 14 Weeks Single or First Gestation [[id number]] Collected: 12/03/24 1507     Updated: 12/03/24 1512    Narrative:      PROCEDURE: US OB < 14 WEEKS SINGLE OR FIRST GESTATION-     HISTORY: unsure gestation, bacteremia     TECHNIQUE: Sonographic images of the pelvis were obtained  transabdominally.     COMPARISON: None.     FINDINGS: A gestational sac  is present within the uterus containing a  fetal pole corresponding to a 6-week 3-day gestation. No fetal cardiac  movement was identified. There is no evidence of a significant   subchorionic hemorrhage. There is no significant  free fluid in the  pelvis.       Impression:      Impression: Gestational sac within the uterus containing a fetal pole  corresponding to a 6-week 3-day gestation. No fetal cardiac movement was  seen on the exam. Short interval follow-up is recommended.        This report was finalized on 12/3/2024 3:09 PM by Pal Hedrick M.D..       XR Foot 3+ View Left [[id number]] Collected: 12/03/24 1351     Updated: 12/03/24 1354    Narrative:      PROCEDURE: XR FOOT 3+ VW LEFT-     History: left foot wound     COMPARISON:  None.     FINDINGS:  A 3 view exam demonstrates no acute fracture or dislocation.  The joint spaces are preserved. No soft tissue abnormality is seen.       Impression:      No acute fracture.                 This report was finalized on 12/3/2024 1:52 PM by Pal Hedrick M.D..       XR Chest 1 View [[id number]] Collected: 12/03/24 1350     Updated:  12/03/24 1353    Narrative:      PROCEDURE: XR CHEST 1 VW-       HISTORY: staph penumonia     COMPARISON: 12/1/2024.     FINDINGS: The heart is normal in size. The mediastinum is unremarkable.  There is persistent airspace disease in the left lung base which is  unchanged. There is no pneumothorax. There are no acute osseous  abnormalities.       Impression:      No change in the left basilar airspace disease.        This report was finalized on 12/3/2024 1:51 PM by Pal Hedrick M.D..       XR Ankle 3+ View Left [[id number]] Collected: 12/03/24 1350     Updated: 12/03/24 1352    Narrative:      PROCEDURE: XR ANKLE 3+ VW LEFT-     History: left ankle wound     COMPARISON:  None.     FINDINGS:  A 3 view exam demonstrates no acute fracture or dislocation.  The joint spaces are preserved. Soft tissue swelling is present within  the ankle.       Impression:      Soft tissue swelling with no osseous abnormality.                 This report was finalized on 12/3/2024 1:50 PM by Pal Hedrick M.D..                 Medications:  buprenorphine-naloxone, 2 tablet, Sublingual, Daily  cefTRIAXone, 1,000 mg, Intravenous, Q24H  DAPTOmycin, 8 mg/kg, Intravenous, Q24H  enoxaparin, 40 mg, Subcutaneous, Q24H  folic acid, 5 mg, Oral, Daily  gabapentin, 800 mg, Oral, Q8H  influenza vaccine, 0.5 mL, Intramuscular, Daily  nicotine, 1 patch, Transdermal, Q24H  sodium chloride, 10 mL, Intravenous, Q12H               Assessment & Plan   MRSA bacteremia: Blood cultures from ED visit on 12/1 initially revealed coagulase negative Staph thought to be a contaminant. However, 1/2 cultures from 12/1 now revealing MRSA.  Repeat blood cultures pending. Order echo. LLE wound noted with no erythema or drainage appreciated at present but pt reports recent concern for active infection. X-rays of left ankle reveals soft tissue swelling with no obvious bony abnormalities. Order MRI. Daptomycin initiated per ID. Follow cultures and repeat labs  in the AM.      E coli UTI: Cont Rocephin as she appears to be tolerating well.      Recently diagnosed LLL pneumonia: Procal is normal. Antibiotics as above.      (+) Rhinovirus: Supportive treatment.     LLE/ankle wound: No evidence of erythema or drainage at present. No acute bony findings on x-ray. Order MRI in the setting of bacteremia.      Borderline hypokalemia: K+ improved with supplementation. Mg is >2. Cont electrolyte replacement protocols.      Intrauterine pregnancy: Measuring approx 6-week 3-day gestation on US. Will need outpatient follow up with OB.       Hx of substance abuse: UDS positive for buprenorphine which is confirmed on MEGHAN.      DVT PPX: Lovenox     Discussed with ID APRN.     Disposition Pending additional workup with finalization of cultures.     Khanh Kruse,   12/04/24  13:14 EST

## 2024-12-04 NOTE — CASE MANAGEMENT/SOCIAL WORK
Continued Stay Note   Jorge A     Patient Name: Sailaja Alarcon  MRN: [id number]  Today's Date: 12/4/2024    Admit Date: 12/3/2024    Plan: CM spoke with patient via telephone, as she is in isolation for Rhinovirus, to discuss discharge plan. Discharge plan remains unchanged. She lives at home with her spouse, John, and 3 small children. She plans to return home at discharge with her grandmother, Serena, providing transporation. Patient's PCP is BIANCA Bhatt and she gets her prescriptions filled at [facility]. No HH/DME and denies the need for any. CM will continue to follow.   Discharge Plan       Row Name 12/04/24 1504       Plan    Plan CM spoke with patient via telephone, as she is in isolation for Rhinovirus, to discuss discharge plan. Discharge plan remains unchanged. She lives at home with her spouse, John, and 3 small children. She plans to return home at discharge with her grandmother, Serena, providing transporation. Patient's PCP is BIANCA Bhatt and she gets her prescriptions filled at [facility]. No HH/DME and denies the need for any. CM will continue to follow.    Patient/Family in Agreement with Plan yes      Row Name 12/04/24 3070       Plan    Plan --               Expected Discharge Date and Time       Expected Discharge Date Expected Discharge Time    Dec 6,  2024               Jessi Orourke RN

## 2024-12-04 NOTE — PAYOR COMM NOTE
"CONTACT:  BRAYAN NATHAN MSN, RN  UTILIZATION MANAGEMENT DEPT.  [facility]  [address] [facility], [address]  PHONE:  [phone]  FAX: [phone]    INPATIENT AUTHORIZATION REQUEST    Rochelle Robertson (28 y.o. Female)       Date of Birth   1995    Social Security Number       Address   [facility] [address] [place] [id number]    Home Phone   [phone]    MRN   [id number]       Hinduism   Yazdanism    Marital Status                               Admission Date   12/3/24    Admission Type   Emergency    Admitting Provider   Khanh Kruse DO    Attending Provider   Khanh Kruse DO    Department, Room/Bed   [address], [address]/       Discharge Date       Discharge Disposition       Discharge Destination                                 Attending Provider: Khanh Kruse DO    Allergies: Amoxicillin, Penicillins, Vancomycin, Zofran [Ondansetron Hcl]    Isolation: Droplet   Infection: MRSA (10/13/21), Rhinovirus  (12/03/24)   Code Status: CPR    Ht: 160 cm (63\")   Wt: 59.5 kg (131 lb 2.8 oz)    Admission Cmt: None   Principal Problem: Positive blood cultures [R78.81]                   Active Insurance as of 12/3/2024       Primary Coverage       Payor Plan Insurance Group Employer/Plan Group    [facility] BY CHAMBERS [facility] BY REYES [id number]       Payor Plan Address Payor Plan Phone Number Payor Plan Fax Number Effective Dates    [address] [address]   1/1/2021 - None Entered    [facility] [address]         Subscriber Name Subscriber Birth Date Member ID       ROCHELLE ROBERTSON 1995 [id number]                     Emergency Contacts        (Rel.) Home Phone Work Phone Mobile Phone    UMER ROBERTSON (Spouse) [phone] -- --    BIB RAIN (Grandparent) [phone] -- --    Vivian Kaba (Aunt) (Other) -- -- [phone]                 History & Physical        Khanh Kruse DO at " 12/03/24 1722              [facility] HOSPITALIST HISTORY AND PHYSICAL      Admit Date: 12/3/2024        Chief complaint Positive blood cultures     Subjective    Sailaja Alarcon is a 28 y.o. female recently seen in the ED at our facility on 12/1, diagnosed with LLL pneumonia and discharged home with PO antibiotics. Blood cultures were collected during that visit revealing Staph, spp, not aureus or lugdunensis and she was contacted to return to the ED for further evaluation. She reports sinus congestion and pressure with associated headache, left-sided chest pain and cough. She reports dysuria with foul smelling urine. Reports some mild abdominal pain. No reported fever or chills. No reported vomiting. Reports a LLE wound with no reported drainage. States she suspected she may be pregnant with missed period last month but had not taken a test to confirm. Reports hx of substance abuse but reports sobriety for several years stating she is enrolled in a program utilizing Suboxone. States she has received Rocephin before with no reported allergic reaction.     In the ED, she was afebrile and hemodynamically stable. Routine labs revealed WBC WNL, borderline hypokalemia, mild anion gap metabolic acidosis, elevated CRP, normal lactate and normal procal. HCG elevated with US revealing gestational sac within the uterus corresponding to a 6-week 3-day gestation. UA was abnormal concerning for UTI. Repeat blood cultures were obtained. She was given a dose of Azactam. ID was contacted with recs for outpatient follow up in their clinic with blood cultures likely reflecting contamination. However, 2/2 concern for pt's ability to follow up as an outpatient the hospitalist service was contacted for admission.       History  Past Medical History:   Diagnosis Date    Hepatitis C     Hepatitis C antibody positive in blood     Migraines      Past Surgical History:   Procedure Laterality Date    HEAD/NECK ABSCESS  INCISION AND DRAINAGE Left 7/8/2022    Procedure: HEAD NECK ABSCESS INCISION AND DRAINAGE;  Surgeon: Miquel Grayson MD;  Location:  [facility] OR;  Service: General;  Laterality: Left;    INCISION AND DRAINAGE ABSCESS Right 10/10/2021    Procedure: INCISION AND DRAINAGE ABSCESS CENTRAL LINE PLACEMENT;  Surgeon: Naz Payne MD;  Location:  [facility] OR;  Service: General;  Laterality: Right;  I&D ABSCESS= INFECTED.  CENTRAL LINE = CLEAN.     Family History   Problem Relation Age of Onset    Hypertension Mother     Cancer Father     No Known Problems Sister     No Known Problems Brother     No Known Problems Son     No Known Problems Daughter     Hypertension Maternal Grandmother     Diabetes Maternal Grandfather     No Known Problems Paternal Grandmother     No Known Problems Paternal Grandfather     No Known Problems Cousin     Diabetes Maternal Aunt     Rheum arthritis Neg Hx     Osteoarthritis Neg Hx     Asthma Neg Hx     Heart failure Neg Hx     Hyperlipidemia Neg Hx     Migraines Neg Hx     Rashes / Skin problems Neg Hx     Seizures Neg Hx     Stroke Neg Hx     Thyroid disease Neg Hx      Social History     Tobacco Use    Smoking status: Every Day     Current packs/day: 1.00     Average packs/day: 1 pack/day for 15.0 years (15.0 ttl pk-yrs)     Types: Cigarettes    Smokeless tobacco: Never   Vaping Use    Vaping status: Never Used   Substance Use Topics    Alcohol use: No     Alcohol/week: 0.0 standard drinks of alcohol    Drug use: Not Currently     Frequency: 3.0 times per week     Types: IV     Comment: last use 5 YEARS AGO     (Not in a hospital admission)    Allergies:  Amoxicillin, Penicillins, Vancomycin, and Zofran [ondansetron hcl]      Review of Systems    Review of Systems   Constitutional:  Negative for chills and fever.   HENT:  Positive for congestion and sinus pressure. Negative for nosebleeds.    Eyes:  Negative for visual disturbance.   Respiratory:  Positive for cough and shortness of  breath.    Cardiovascular:  Positive for chest pain. Negative for palpitations and leg swelling.   Gastrointestinal:  Positive for abdominal pain. Negative for blood in stool and vomiting.   Endocrine: Negative for polydipsia, polyphagia and polyuria.   Genitourinary:  Positive for dysuria. Negative for hematuria and vaginal bleeding.   Musculoskeletal:  Negative for neck pain and neck stiffness.   Skin:  Positive for wound.   Neurological:  Positive for headaches. Negative for dizziness, seizures and syncope.   Psychiatric/Behavioral:  Negative for agitation and confusion.         Objective    Vital Signs  Temp:  [97.7 °F (36.5 °C)] 97.7 °F (36.5 °C)  Heart Rate:  [60-77] 60  Resp:  [18] 18  BP: ()/(50-65) 110/53    Physical Exam: Pt seen and examined in the ED with her grandmother present at bedside.   General:    Awake, alert, in no acute distress   Head:    Normocephalic, atraumatic   Eyes:           PERRLA, EOMI. Conjunctivae and sclerae normal. No icterus or pallor.   Ears:    Ears appear intact with no abnormalities noted.   Throat:   No oral lesions or thrush. Oral mucosa moist. Poor dentition.    Heart:      Normal S1 and S2. Regular rate and rhythm. No significant murmur, rubs or gallops appreciated.   Lungs:     Respirations regular, even and unlabored. Lungs clear to auscultation B/L. No wheezes, rales or rhonchi.   Abdomen:   Soft and nontender. No guarding, rebound tenderness or  organomegaly noted. Bowel sounds present x 4.   MS:   Muscular strength intact and equal B/L. No joint swelling, deformity, or tenderness.   Extremities:  No clubbing, cyanosis or edema noted. Moves UE and LE equally B/L.   Pulses:   Pulses palpable and equal bilaterally.   Skin:   (+) wound LLE with no erythema or drainage appreciated.    Lymph nodes:   No palpable adenopathy   Neurologic:   Cranial nerves 2 - 12 grossly intact. Sensation intact.        Results Review:     I reviewed the patient's new imaging results  and agree with the interpretation.  I reviewed the patient's other test results and agree with the interpretation.    Results from last 7 days   Lab Units 12/03/24  1246 12/01/24  1305   WBC 10*3/mm3 4.52 3.68   HEMOGLOBIN g/dL 11.7* 10.3*   PLATELETS 10*3/mm3 185 116*     Results from last 7 days   Lab Units 12/03/24  1246 12/01/24  1305   SODIUM mmol/L 135* 135*   POTASSIUM mmol/L 3.6 4.0   CHLORIDE mmol/L 99 102   CO2 mmol/L 20.7* 22.0   BUN mg/dL 5* 7   CREATININE mg/dL 0.59 0.61   CALCIUM mg/dL 9.8 9.5   GLUCOSE mg/dL 97 93     Results from last 7 days   Lab Units 12/03/24  1246 12/01/24  1305   BILIRUBIN mg/dL 0.4 0.4   ALK PHOS U/L 86 76   AST (SGOT) U/L 10 7   ALT (SGPT) U/L 7 7             Results from last 7 days   Lab Units 12/03/24  1246 12/01/24  1305   HSTROP T ng/L <6 <6       Imaging Results (Last 24 Hours)       Procedure Component Value Units Date/Time    US Ob < 14 Weeks Single or First Gestation [[id number]] Collected: 12/03/24 1507     Updated: 12/03/24 1512    Narrative:      PROCEDURE: US OB < 14 WEEKS SINGLE OR FIRST GESTATION-     HISTORY: unsure gestation, bacteremia     TECHNIQUE: Sonographic images of the pelvis were obtained  transabdominally.     COMPARISON: None.     FINDINGS: A gestational sac  is present within the uterus containing a  fetal pole corresponding to a 6-week 3-day gestation. No fetal cardiac  movement was identified. There is no evidence of a significant   subchorionic hemorrhage. There is no significant  free fluid in the  pelvis.       Impression:      Impression: Gestational sac within the uterus containing a fetal pole  corresponding to a 6-week 3-day gestation. No fetal cardiac movement was  seen on the exam. Short interval follow-up is recommended.        This report was finalized on 12/3/2024 3:09 PM by Pal Hedrick M.D..       XR Foot 3+ View Left [[id number]] Collected: 12/03/24 1351     Updated: 12/03/24 1354    Narrative:      PROCEDURE: XR FOOT 3+ VW  LEFT-     History: left foot wound     COMPARISON:  None.     FINDINGS:  A 3 view exam demonstrates no acute fracture or dislocation.  The joint spaces are preserved. No soft tissue abnormality is seen.       Impression:      No acute fracture.                 This report was finalized on 12/3/2024 1:52 PM by Pal Hedrick M.D..       XR Chest 1 View [[id number]] Collected: 12/03/24 1350     Updated: 12/03/24 1353    Narrative:      PROCEDURE: XR CHEST 1 VW-       HISTORY: staph penumonia     COMPARISON: 12/1/2024.     FINDINGS: The heart is normal in size. The mediastinum is unremarkable.  There is persistent airspace disease in the left lung base which is  unchanged. There is no pneumothorax. There are no acute osseous  abnormalities.       Impression:      No change in the left basilar airspace disease.        This report was finalized on 12/3/2024 1:51 PM by Pal Hedrick M.D..       XR Ankle 3+ View Left [[id number]] Collected: 12/03/24 1350     Updated: 12/03/24 1352    Narrative:      PROCEDURE: XR ANKLE 3+ VW LEFT-     History: left ankle wound     COMPARISON:  None.     FINDINGS:  A 3 view exam demonstrates no acute fracture or dislocation.  The joint spaces are preserved. Soft tissue swelling is present within  the ankle.       Impression:      Soft tissue swelling with no osseous abnormality.                 This report was finalized on 12/3/2024 1:50 PM by Pal Hedrick M.D..                   Assessment & Plan  (+) blood cultures: Blood cultures from ED visit on 12/1 revealing Staph spp, not aureus or lugdunensis. ID contacted in the ED with suspected contamination. Repeat blood cultures obtained in the ED. Consult ID and follow cultures.     UTI: Add urine culture to specimen obtained in the ED. Received Azactam in the ED in the setting of PCN allergy. Order Rocephin as pt states she has previously tolerated with no reported allergic reaction.     Recently diagnosed LLL pneumonia:  Procal is normal. Rocephin as above.     (+) Rhinovirus: Supportive treatment.     Borderline hypokalemia: Check Mg. Initiate electrolyte replacement protocols.     Intrauterine pregnancy: Measuring approx 6-week 3-day gestation on US. Will need outpatient follow up with OB.      Hx of substance abuse: UDS positive for buprenorphine which is confirmed on MEGHAN.     DVT PPX: Lovenox     I discussed the patient's findings and my recommendations with patient and family.       Khanh Kruse DO  12/03/24  17:22 EST       Electronically signed by Khanh Kruse DO at 12/03/24 2255       ntains critical data Blood Culture - Blood, Arm, Right  Order: [id number]  Status: Edited Result - FINAL     Visible to patient: No (scheduled for 12/4/2024  9:21 AM)     Next appt: None     Specimen Information: Arm, Right; Blood   7 Result Notes    1 Follow-up Encounter  Blood Culture   Lab   Staphylococcus aureus, MRSA Critical    [facility]     Infectious disease consultation is highly recommended to rule out distant foci of infection.   Methicillin resistant Staphylococcus aureus, Patient may be an isolation risk.   Isolated from Anaerobic Bottle        No BCID performed, refer to previous blood culture specimen collected on 12- from left arm.             Gram Stain   Critical    Lab   Anaerobic Bottle Gram positive cocci in clusters  [facility]           Susceptibility     Staphylococcus aureus, MRSA     TREASURE     Daptomycin 0.25 ug/ml Susceptible (C) 1     Gentamicin <=0.5 ug/ml Susceptible     Oxacillin >=4 ug/ml Resistant     Rifampin <=0.5 ug/ml Susceptible     Vancomycin <=0.5 ug/ml Susceptible              1 Appended report. These results have been appended to a previously final verified report.        Linear View        Specimen Collected: 12/01/24 13:05 EST Last Resulted: 12/04/24 08:21 EST       Blood Culture - Blood, Arm, Left  Order: [id number]  Status: Final result     Visible to patient: No  (scheduled for 12/4/2024  7:28 AM)     Next appt: None     Specimen Information: Arm, Left; Blood   7 Result Notes    1 Follow-up Encounter  Blood Culture   Lab   Staphylococcus, coagulase negative Critical    [facility]   Isolated from Aerobic Bottle        Probable contaminant requires clinical correlation, susceptibility not performed unless requested by physician.              Gram Stain   Critical    Lab   Aerobic Bottle Gram positive cocci in clusters  [facility]                Specimen Collected: 12/01/24 13:05 EST Last Resulted: 12/04/24 06:28 EST           Contains abnormal data Blood Culture ID, PCR - Blood, Arm, Left  Order: [id number] - Reflex for Order [id number]  Status: Final result     Visible to patient: No (inaccessible in MyChart)     Next appt: None     Specimen Information: Arm, Left; Blood   7 Result Notes    1 Follow-up Encounter      Component  Ref Range & Units    BCID, PCR  Negative by BCID PCR. Culture to Follow. Staph spp, not aureus or lugdunensis. Identification by BCID2 PCR. Abnormal     BOTTLE TYPE Aerobic Bottle    Resulting Agency  [facility]             Specimen Collected: 12/01/24 13:05 EST Last Resulted: 12/02/24 14:27 EST          Emergency Department Notes        Mary Brown, APRN at 12/03/24 1528          Subjective   History of Present Illness  Patient is a 28-year-old female with significant past medical history positive for hepatitis C and migraines presenting to the ER for positive blood cultures.  Patient reports no improvement in her symptoms.  Patient still complaints of cough, shortness of air and now chest pain.  Patient is also pregnant.  This is her sixth pregnancy with 5 living children.  Patient denies any prenatal care.  Unsure gestational age.  Patient also has a left ankle wound that has been there for several months.  Patient has transportation issues and was unable to come to our wound care outpatient appointments.  Patient reports that is now dried and  crusty.  Patient denies any new injuries or any additional symptoms at this time.    History provided by:  Patient   used: No        Review of Systems   Constitutional: Negative.  Negative for fever.   HENT: Negative.     Respiratory:  Positive for cough and shortness of breath.    Cardiovascular:  Positive for chest pain.   Gastrointestinal: Negative.  Negative for abdominal pain.   Endocrine: Negative.    Genitourinary: Negative.  Negative for dysuria.   Skin: Negative.    Neurological: Negative.    Psychiatric/Behavioral: Negative.     All other systems reviewed and are negative.      Past Medical History:   Diagnosis Date    Hepatitis C     Hepatitis C antibody positive in blood     Migraines        Allergies   Allergen Reactions    Amoxicillin Anaphylaxis    Penicillins Anaphylaxis     Has tolerated rocephin per patient    Vancomycin Itching     Pt states she had full body itch and burning sensation, and she turned red. Reaction potentially Red Man Syndrome.    Zofran [Ondansetron Hcl] Hives and Rash       Past Surgical History:   Procedure Laterality Date    HEAD/NECK ABSCESS INCISION AND DRAINAGE Left 7/8/2022    Procedure: HEAD NECK ABSCESS INCISION AND DRAINAGE;  Surgeon: Miquel Grayson MD;  [facility];  Service: General;  Laterality: Left;    INCISION AND DRAINAGE ABSCESS Right 10/10/2021    Procedure: INCISION AND DRAINAGE ABSCESS CENTRAL LINE PLACEMENT;  Surgeon: Naz Payne MD;  Location: [facility] OR;  Service: General;  Laterality: Right;  I&D ABSCESS= INFECTED.  CENTRAL LINE = CLEAN.       Family History   Problem Relation Age of Onset    Hypertension Mother     Cancer Father     No Known Problems Sister     No Known Problems Brother     No Known Problems Son     No Known Problems Daughter     Hypertension Maternal Grandmother     Diabetes Maternal Grandfather     No Known Problems Paternal Grandmother     No Known Problems Paternal Grandfather     No Known  Problems Cousin     Diabetes Maternal Aunt     Rheum arthritis Neg Hx     Osteoarthritis Neg Hx     Asthma Neg Hx     Heart failure Neg Hx     Hyperlipidemia Neg Hx     Migraines Neg Hx     Rashes / Skin problems Neg Hx     Seizures Neg Hx     Stroke Neg Hx     Thyroid disease Neg Hx        Social History     Socioeconomic History    Marital status:    Tobacco Use    Smoking status: Every Day     Current packs/day: 1.00     Average packs/day: 1 pack/day for 15.0 years (15.0 ttl pk-yrs)     Types: Cigarettes    Smokeless tobacco: Never   Vaping Use    Vaping status: Never Used   Substance and Sexual Activity    Alcohol use: No     Alcohol/week: 0.0 standard drinks of alcohol    Drug use: Not Currently     Frequency: 3.0 times per week     Types: IV     Comment: last use 5 YEARS AGO    Sexual activity: Yes     Partners: Male           Objective   Physical Exam  Vitals and nursing note reviewed.   Constitutional:       General: She is not in acute distress.     Appearance: She is well-developed. She is not diaphoretic.   HENT:      Head: Normocephalic and atraumatic.      Right Ear: External ear normal.      Left Ear: External ear normal.      Nose: Nose normal.   Eyes:      Conjunctiva/sclera: Conjunctivae normal.      Pupils: Pupils are equal, round, and reactive to light.   Neck:      Vascular: No JVD.      Trachea: No tracheal deviation.   Cardiovascular:      Rate and Rhythm: Normal rate and regular rhythm.      Heart sounds: Normal heart sounds. No murmur heard.  Pulmonary:      Effort: Pulmonary effort is normal. No respiratory distress.      Breath sounds: Normal breath sounds. No wheezing.   Abdominal:      General: Bowel sounds are normal.      Palpations: Abdomen is soft.      Tenderness: There is no abdominal tenderness.   Musculoskeletal:         General: No deformity. Normal range of motion.      Cervical back: Normal range of motion and neck supple.   Skin:     General: Skin is warm and dry.       Coloration: Skin is not pale.      Findings: No erythema or rash.   Neurological:      Mental Status: She is alert and oriented to person, place, and time.      Cranial Nerves: No cranial nerve deficit.   Psychiatric:         Behavior: Behavior normal.         Thought Content: Thought content normal.         Procedures      Results for orders placed or performed during the hospital encounter of 12/03/24   ECG 12 Lead Chest Pain    Collection Time: 12/03/24 12:29 PM   Result Value Ref Range    QT Interval 410 ms    QTC Interval 426 ms   Comprehensive Metabolic Panel    Collection Time: 12/03/24 12:46 PM    Specimen: Arm, Left; Blood   Result Value Ref Range    Glucose 97 65 - 99 mg/dL    BUN 5 (L) 6 - 20 mg/dL    Creatinine 0.59 0.57 - 1.00 mg/dL    Sodium 135 (L) 136 - 145 mmol/L    Potassium 3.6 3.5 - 5.2 mmol/L    Chloride 99 98 - 107 mmol/L    CO2 20.7 (L) 22.0 - 29.0 mmol/L    Calcium 9.8 8.6 - 10.5 mg/dL    Total Protein 8.1 6.0 - 8.5 g/dL    Albumin 4.2 3.5 - 5.2 g/dL    ALT (SGPT) 7 1 - 33 U/L    AST (SGOT) 10 1 - 32 U/L    Alkaline Phosphatase 86 39 - 117 U/L    Total Bilirubin 0.4 0.0 - 1.2 mg/dL    Globulin 3.9 gm/dL    A/G Ratio 1.1 g/dL    BUN/Creatinine Ratio 8.5 7.0 - 25.0    Anion Gap 15.3 (H) 5.0 - 15.0 mmol/L    eGFR 124.5 >60.0 mL/min/1.73   hCG, Quantitative, Pregnancy    Collection Time: 12/03/24 12:46 PM    Specimen: Arm, Left; Blood   Result Value Ref Range    HCG Quantitative 20,307.00 mIU/mL   Single High Sensitivity Troponin T    Collection Time: 12/03/24 12:46 PM    Specimen: Arm, Left; Blood   Result Value Ref Range    HS Troponin T <6 <14 ng/L   Lactic Acid, Plasma    Collection Time: 12/03/24 12:46 PM    Specimen: Arm, Left; Blood   Result Value Ref Range    Lactate 2.0 0.5 - 2.0 mmol/L   Sedimentation Rate    Collection Time: 12/03/24 12:46 PM    Specimen: Arm, Left; Blood   Result Value Ref Range    Sed Rate 72 (H) 0 - 20 mm/hr   C-reactive Protein    Collection Time: 12/03/24 12:46 PM     Specimen: Arm, Left; Blood   Result Value Ref Range    C-Reactive Protein 6.58 (H) 0.00 - 0.50 mg/dL   Procalcitonin    Collection Time: 12/03/24 12:46 PM    Specimen: Arm, Left; Blood   Result Value Ref Range    Procalcitonin 0.07 0.00 - 0.25 ng/mL   CBC Auto Differential    Collection Time: 12/03/24 12:46 PM    Specimen: Arm, Left; Blood   Result Value Ref Range    WBC 4.52 3.40 - 10.80 10*3/mm3    RBC 3.94 3.77 - 5.28 10*6/mm3    Hemoglobin 11.7 (L) 12.0 - 15.9 g/dL    Hematocrit 35.8 34.0 - 46.6 %    MCV 90.9 79.0 - 97.0 fL    MCH 29.7 26.6 - 33.0 pg    MCHC 32.7 31.5 - 35.7 g/dL    RDW 12.6 12.3 - 15.4 %    RDW-SD 42.3 37.0 - 54.0 fl    MPV 10.9 6.0 - 12.0 fL    Platelets 185 140 - 450 10*3/mm3    Neutrophil % 64.6 42.7 - 76.0 %    Lymphocyte % 26.5 19.6 - 45.3 %    Monocyte % 5.8 5.0 - 12.0 %    Eosinophil % 2.0 0.3 - 6.2 %    Basophil % 0.4 0.0 - 1.5 %    Immature Grans % 0.7 (H) 0.0 - 0.5 %    Neutrophils, Absolute 2.92 1.70 - 7.00 10*3/mm3    Lymphocytes, Absolute 1.20 0.70 - 3.10 10*3/mm3    Monocytes, Absolute 0.26 0.10 - 0.90 10*3/mm3    Eosinophils, Absolute 0.09 0.00 - 0.40 10*3/mm3    Basophils, Absolute 0.02 0.00 - 0.20 10*3/mm3    Immature Grans, Absolute 0.03 0.00 - 0.05 10*3/mm3    nRBC 0.0 0.0 - 0.2 /100 WBC   Magnesium    Collection Time: 12/03/24 12:46 PM    Specimen: Arm, Left; Blood   Result Value Ref Range    Magnesium 2.1 1.6 - 2.6 mg/dL   Green Top (Gel)    Collection Time: 12/03/24 12:46 PM   Result Value Ref Range    Extra Tube Hold for add-ons.    Lavender Top    Collection Time: 12/03/24 12:46 PM   Result Value Ref Range    Extra Tube hold for add-on    Gold Top - SST    Collection Time: 12/03/24 12:46 PM   Result Value Ref Range    Extra Tube Hold for add-ons.    Light Blue Top    Collection Time: 12/03/24 12:46 PM   Result Value Ref Range    Extra Tube Hold for add-ons.    Respiratory Panel PCR w/COVID-19(SARS-CoV-2) [facility] In-House, NP Swab in UTM/VTM, 2 HR TAT -  Swab, Nasopharynx    Collection Time: 12/03/24 12:48 PM    Specimen: Nasopharynx; Swab   Result Value Ref Range    ADENOVIRUS, PCR Not Detected Not Detected    Coronavirus 229E Not Detected Not Detected    Coronavirus HKU1 Not Detected Not Detected    Coronavirus NL63 Not Detected Not Detected    Coronavirus OC43 Not Detected Not Detected    COVID19 Not Detected Not Detected - Ref. Range    Human Metapneumovirus Not Detected Not Detected    Human Rhinovirus/Enterovirus Detected (A) Not Detected    Influenza A PCR Not Detected Not Detected    Influenza B PCR Not Detected Not Detected    Parainfluenza Virus 1 Not Detected Not Detected    Parainfluenza Virus 2 Not Detected Not Detected    Parainfluenza Virus 3 Not Detected Not Detected    Parainfluenza Virus 4 Not Detected Not Detected    RSV, PCR Not Detected Not Detected    Bordetella pertussis pcr Not Detected Not Detected    Bordetella parapertussis PCR Not Detected Not Detected    Chlamydophila pneumoniae PCR Not Detected Not Detected    Mycoplasma pneumo by PCR Not Detected Not Detected   Urinalysis With Microscopic If Indicated (No Culture) - Urine, Clean Catch    Collection Time: 12/03/24 12:48 PM    Specimen: Urine, Clean Catch   Result Value Ref Range    Color, UA Yellow Yellow, Straw    Appearance, UA Cloudy (A) Clear    pH, UA 7.0 5.0 - 8.0    Specific Gravity, UA 1.015 1.005 - 1.030    Glucose, UA Negative Negative    Ketones, UA Negative Negative    Bilirubin, UA Negative Negative    Blood, UA Negative Negative    Protein, UA Trace (A) Negative    Leuk Esterase, UA Large (3+) (A) Negative    Nitrite, UA Positive (A) Negative    Urobilinogen, UA 1.0 E.U./dL 0.2 - 1.0 E.U./dL   Urine Drug Screen - Urine, Clean Catch    Collection Time: 12/03/24 12:48 PM    Specimen: Urine, Clean Catch   Result Value Ref Range    THC, Screen, Urine Negative Negative    Phencyclidine (PCP), Urine Negative Negative    Cocaine Screen, Urine Negative Negative    Methamphetamine,  Ur Negative Negative    Opiate Screen Negative Negative    Amphetamine Screen, Urine Negative Negative    Benzodiazepine Screen, Urine Negative Negative    Tricyclic Antidepressants Screen Negative Negative    Methadone Screen, Urine Negative Negative    Barbiturates Screen, Urine Negative Negative    Oxycodone Screen, Urine Negative Negative    Buprenorphine, Screen, Urine Positive (A) Negative   Fentanyl, Urine - Urine, Clean Catch    Collection Time: 12/03/24 12:48 PM    Specimen: Urine, Clean Catch   Result Value Ref Range    Fentanyl, Urine Negative Negative   Urinalysis, Microscopic Only - Urine, Clean Catch    Collection Time: 12/03/24 12:48 PM    Specimen: Urine, Clean Catch   Result Value Ref Range    RBC, UA 0-2 None Seen, 0-2 /HPF    WBC, UA 21-50 (A) None Seen, 0-2 /HPF    Bacteria, UA 4+ (A) None Seen /HPF    Squamous Epithelial Cells, UA 21-30 (A) None Seen, 0-2 /HPF    Hyaline Casts, UA None Seen None Seen /LPF    Methodology Manual Light Microscopy    [place] Urine Culture Tube - Urine, Clean Catch    Collection Time: 12/03/24 12:48 PM    Specimen: Urine, Clean Catch   Result Value Ref Range    Extra Tube Hold for add-ons.       US Ob < 14 Weeks Single or First Gestation   Final Result   Impression: Gestational sac within the uterus containing a fetal pole   corresponding to a 6-week 3-day gestation. No fetal cardiac movement was   seen on the exam. Short interval follow-up is recommended.           This report was finalized on 12/3/2024 3:09 PM by Pal Hedrick M.D..          XR Foot 3+ View Left   Final Result   No acute fracture.                       This report was finalized on 12/3/2024 1:52 PM by Pal Hedrick M.D..          XR Ankle 3+ View Left   Final Result   Soft tissue swelling with no osseous abnormality.                       This report was finalized on 12/3/2024 1:50 PM by Pal Hedrick M.D..          XR Chest 1 View   Final Result   No change in the left basilar  airspace disease.           This report was finalized on 12/3/2024 1:51 PM by Pal Hedrick M.D..               ED Course  ED Course as of 12/03/24 2048   Tue Dec 03, 2024   1318 EKG notes sinus rhythm.  65 bpm.  I directly evaluated the EKG of this patient and noted no acute abnormalities.  Electronically signed by George Renee, , 12/03/24, 1:19 PM EST.   [SF]   1413 Human Rhinovirus/Enterovirus(!): Detected [SS]   1413 Bacteria, UA(!): 4+ [SS]   1413 Buprenorphine, Screen, Urine(!): Positive [SS]   1413 Nitrite, UA(!): Positive [SS]   1413 Leukocytes, UA(!): Large (3+) [SS]   1413 WBC: 4.52 [SS]   1413 Sed Rate(!): 72 [SS]   1413 C-Reactive Protein(!): 6.58 [SS]   1413 XR Ankle 3+ View Left [SS]   1413 XR Chest 1 View [SS]   1413 XR Foot 3+ View Left [SS]   1415 HCG Quantitative: 20,307.00 [SS]   1424 D/W Dr. Espitia agrees to admit.  [SS]   1525 US Ob < 14 Weeks Single or First Gestation [SS]   1527 D/W Ciara with ID will call me back with recommendations.  [SS]   1534 D/W Ciara with ID, advises patient doesn't need admission and advises to discharge, repeat cultures and referral to ID outpatient.  [SS]   1540 D/W Attending physician Dr. Renee he advises patient should stay for admission.  [SS]   1604 D/W Ciraa with I&D. She advises she still wouldn't treat the bacteremia and repeat the cultures but advises if we don't feel comfortable with discharge the patient can be admitted pending cultures.  [SS]   1605 Paged hospitalist [SS]   1646 D/W Dr. Espitia again advises patient doesn't meet admission criteria and consult with case management on safe transportation after discharge. D/W Angela with case management will see patient at bedside.  [SS]   1651 D/W Angela with case management. All patient phone numbers have been updated, patient does have transportation via family that is at bedside.  [SS]      ED Course User Index  [SF] George Renee,   [SS] Mary Brown, APRN                                                        Medical Decision Making  Patient is a 28-year-old female with significant past medical history positive for hepatitis C and migraines presenting to the ER for positive blood cultures.  Patient reports no improvement in her symptoms.  Patient still complaints of cough, shortness of air and now chest pain.  Patient is also pregnant.  This is her sixth pregnancy with 5 living children.  Patient denies any prenatal care.  Unsure gestational age.  Patient also has a left ankle wound that has been there for several months.  Patient has transportation issues and was unable to come to our wound care outpatient appointments.  Patient reports that is now dried and crusty.  Patient denies any new injuries or any additional symptoms at this time.    Patient to be admitted for further evaluation and treatment.    Problems Addressed:  Bacteremia: complicated acute illness or injury    Amount and/or Complexity of Data Reviewed  Labs: ordered. Decision-making details documented in ED Course.  Radiology: ordered. Decision-making details documented in ED Course.  ECG/medicine tests: ordered.    Risk  Prescription drug management.  Decision regarding hospitalization.        Final diagnoses:   Bacteremia       ED Disposition  ED Disposition       ED Disposition   Decision to Admit    Condition   --    Comment   Level of Care: Med/Surg [1]   Diagnosis: Positive blood cultures [[id number]]   Admitting Physician: SAMMI ALAMO [[id number]]   Attending Physician: SAMMI ALAMO [[id number]]                 No follow-up provider specified.       Medication List      No changes were made to your prescriptions during this visit.            Mary Brown APRN  12/03/24 2048      Electronically signed by Mary Brown APRN at 12/03/24 2048       Vital Signs (last day)       Date/Time Temp Temp src Pulse Resp BP Patient Position SpO2    12/04/24 0615 98 (36.7) Oral 58 18 90/50 Lying --    12/03/24 1900  97.5 (36.4) Oral 68 18 99/53 Lying 95    12/03/24 1750 98.2 (36.8) Oral 68 16 106/56 Sitting --    12/03/24 1701 -- -- 60 -- -- -- 100    12/03/24 1642 -- -- 68 -- -- -- 100    12/03/24 1557 -- -- 67 -- -- -- 100    12/03/24 1430 -- -- 77 -- 110/53 -- --    12/03/24 1415 -- -- 77 -- 104/54 -- 100    12/03/24 1315 -- -- 73 -- 111/65 -- 100    12/03/24 1300 -- -- 72 -- 100/56 -- 100    12/03/24 1221 97.7 (36.5) Temporal 73 18 92/50 Sitting 95          Intake & Output (last day)         12/03 0701 12/04 0700 12/04 0701 12/05 0700    P.O.  360    IV Piggyback 100     Total Intake(mL/kg) 100 (1.7) 360 (6.1)    Net +100 +360          Urine Unmeasured Occurrence 1 x           Medication Administration Report for Sailaja Alarcon as of 12/3/24 through 12/4/24    Given  Hold  Not Given  Due  Canceled Entry  Other Actions  Time  Time  (Time)  Time  Time-Action    Discontinued  Completed  Future  MAR Hold  Linked    Medications  12/03/24 12/04/24        acetaminophen (TYLENOL) tablet 650 mg  Dose: 650 mg  Freq: Every 4 Hours PRN Route: PO  PRN Reason: Mild Pain  Start: 12/03/24 1802    If given for fever, use fever parameter: fever greater than 100.4 °F  Based on patient request - if ordered for moderate or severe pain, provider allows for administration of a medication prescribed for a lower pain scale.    Do not exceed 4 grams of acetaminophen in a 24 hr period. Max dose of 2gm for AST/ALT greater than 120 units/L.    If given for pain, use the following pain scale:   Mild Pain = Pain Score of 1-3, CPOT 1-2  Moderate Pain = Pain Score of 4-6, CPOT 3-4  Severe Pain = Pain Score of 7-10, CPOT 5-8  Or    acetaminophen (TYLENOL) 160 MG/5ML oral solution 650 mg  Dose: 650 mg  Freq: Every 4 Hours PRN Route: PO  PRN Reason: Mild Pain  Start: 12/03/24 1802      Admin Instructions:  If given for fever, use fever parameter: fever greater than 100.4 °F  Based on patient request - if ordered for moderate or severe pain,  provider allows for administration of a medication prescribed for a lower pain scale.    Do not exceed 4 grams of acetaminophen in a 24 hr period. Max dose of 2gm for AST/ALT greater than 120 units/L.    If given for pain, use the following pain scale:   Mild Pain = Pain Score of 1-3, CPOT 1-2  Moderate Pain = Pain Score of 4-6, CPOT 3-4  Severe Pain = Pain Score of 7-10, CPOT 5-8    buprenorphine-naloxone (SUBOXONE) 8-2 MG per SL tablet 2 tablet  Dose: 2 tablet  Freq: Daily Route: SL  Start: 12/03/24 1900    Sublingual. Do not chew, crush, or swallow. Place under tongue and allow to dissolve. May take a small sip of water prior to placing under the tongue to aid dissolution.  If given for pain, use the following pain scale:  Mild Pain = Pain Score of 1-3, CPOT 1-2  Moderate Pain = Pain Score of 4-6, CPOT 3-4  Severe Pain = Pain Score of 7-10, CPOT 5-8  Order specific questions:  Lab Results (all)       Procedure Component Value Units Date/Time    POC Glucose Once [[id number]]  (Normal) Collected: 12/04/24 0655    Specimen: Blood Updated: 12/04/24 0702     Glucose 104 mg/dL     Comprehensive Metabolic Panel [[id number]]  (Abnormal) Collected: 12/04/24 0243    Specimen: Blood Updated: 12/04/24 0319     Glucose 99 mg/dL      BUN 9 mg/dL      Creatinine 0.55 mg/dL      Sodium 132 mmol/L      Potassium 4.7 mmol/L      Comment: Slight hemolysis detected by analyzer. Result may be falsely elevated.        Chloride 104 mmol/L      CO2 17.0 mmol/L      Calcium 9.0 mg/dL      Total Protein 6.7 g/dL      Albumin 3.0 g/dL      ALT (SGPT) 5 U/L      AST (SGOT) 7 U/L      Alkaline Phosphatase 65 U/L      Total Bilirubin 0.2 mg/dL      Globulin 3.7 gm/dL      A/G Ratio 0.8 g/dL      BUN/Creatinine Ratio 16.4     Anion Gap 11.0 mmol/L      eGFR 126.6 mL/min/1.73     Narrative:      GFR Normal >60  Chronic Kidney Disease <60  Kidney Failure <15      Potassium [[id number]]  (Normal) Collected: 12/04/24 0243    Specimen: Blood Updated:  12/04/24 0318     Potassium 4.7 mmol/L      Comment: Slight hemolysis detected by analyzer. Result may be falsely elevated.       Magnesium [[id number]]  (Normal) Collected: 12/03/24 1246    Specimen: Blood from Arm, Left Updated: 12/03/24 1800     Magnesium 2.1 mg/dL     Urine Culture - Urine, Urine, Clean Catch [[id number]] Collected: 12/03/24 1248    Specimen: Urine, Clean Catch Updated: 12/03/24 1751    [place] Urine Culture Tube - Urine, Clean Catch [[id number]] Collected: 12/03/24 1248    Specimen: Urine, Clean Catch Updated: 12/03/24 1446     Extra Tube Hold for add-ons.     Comment: Auto resulted.       Respiratory Panel PCR w/COVID-19(SARS-CoV-2) ANGELA/AMELIA/JERRI/PAD/COR/JENNIFER In-House, NP Swab in UTM/VTM, 2 HR TAT - Swab, Nasopharynx [[id number]]  (Abnormal) Collected: 12/03/24 1248    Specimen: Swab from Nasopharynx Updated: 12/03/24 1400     ADENOVIRUS, PCR Not Detected     Coronavirus 229E Not Detected     Coronavirus HKU1 Not Detected     Coronavirus NL63 Not Detected     Coronavirus OC43 Not Detected     COVID19 Not Detected     Human Metapneumovirus Not Detected     Human Rhinovirus/Enterovirus Detected     Influenza A PCR Not Detected     Influenza B PCR Not Detected     Parainfluenza Virus 1 Not Detected     Parainfluenza Virus 2 Not Detected     Parainfluenza Virus 3 Not Detected     Parainfluenza Virus 4 Not Detected     RSV, PCR Not Detected     Bordetella pertussis pcr Not Detected     Bordetella parapertussis PCR Not Detected     Chlamydophila pneumoniae PCR Not Detected     Mycoplasma pneumo by PCR Not Detected    Narrative:      In the setting of a positive respiratory panel with a viral infection PLUS a negative procalcitonin without other underlying concern for bacterial infection, consider observing off antibiotics or discontinuation of antibiotics and continue supportive care. If the respiratory panel is positive for atypical bacterial infection (Bordetella pertussis, Chlamydophila pneumoniae, or  "Mycoplasma pneumoniae), consider antibiotic de-escalation to target atypical bacterial infection.    Procalcitonin [[id number]]  (Normal) Collected: 12/03/24 1246    Specimen: Blood from Arm, Left Updated: 12/03/24 1355     Procalcitonin 0.07 ng/mL     Narrative:      As a Marker for Sepsis (Non-Neonates):    1. <0.5 ng/mL represents a low risk of severe sepsis and/or septic shock.  2. >2 ng/mL represents a high risk of severe sepsis and/or septic shock.    As a Marker for Lower Respiratory Tract Infections that require antibiotic therapy:    PCT on Admission    Antibiotic Therapy       6-12 Hrs later    >0.5                Strongly Recommended  >0.25 - <0.5        Recommended   0.1 - 0.25          Discouraged              Remeasure/reassess PCT  <0.1                Strongly Discouraged     Remeasure/reassess PCT    As 28 day mortality risk marker: \"Change in Procalcitonin Result\" (>80% or <=80%) if Day 0 (or Day 1) and Day 4 values are available. Refer to http://www.[employer]-pct-calculator.com    Change in PCT <=80%  A decrease of PCT levels below or equal to 80% defines a positive change in PCT test result representing a higher risk for 28-day all-cause mortality of patients diagnosed with severe sepsis for septic shock.    Change in PCT >80%  A decrease of PCT levels of more than 80% defines a negative change in PCT result representing a lower risk for 28-day all-cause mortality of patients diagnosed with severe sepsis or septic shock.       hCG, Quantitative, Pregnancy [[id number]] Collected: 12/03/24 1246    Specimen: Blood from Arm, Left Updated: 12/03/24 1355     HCG Quantitative 20,307.00 mIU/mL     Narrative:      HCG Ranges by Gestational Age    Females - non-pregnant premenopausal   </= 1mIU/mL HCG  Females - postmenopausal               </= 7mIU/mL HCG    3 Weeks         5.8 -    71.2 mIU/mL  4 Weeks         9.5 -     750 mIU/mL  5 Weeks         217 -   7,138 mIU/mL  6 Weeks         158 -  31,795 mIU/mL  7 " Weeks       3,697 - 163,563 mIU/mL  8 Weeks      32,065 - 149,571 mIU/mL  9 Weeks      63,803 - 151,410 mIU/mL  10 Weeks     46,509 - 186,977 mIU/mL  12 Weeks     27,832 - 210,612 mIU/mL  14 Weeks     13,950 -  62,530 mIU/mL  15 Weeks     12,039 -  70,971 mIU/mL  16 Weeks      9,040 -  56,451 mIU/mL  17 Weeks      8,175 -  55,868 mIU/mL  18 Weeks      8,099 -  58,176 mIU/mL    Urinalysis, Microscopic Only - Urine, Clean Catch [[id number]]  (Abnormal) Collected: 12/03/24 1248    Specimen: Urine, Clean Catch Updated: 12/03/24 1349     RBC, UA 0-2 /HPF      WBC, UA 21-50 /HPF      Bacteria, UA 4+ /HPF      Squamous Epithelial Cells, UA 21-30 /HPF      Hyaline Casts, UA None Seen /LPF      Methodology Manual Light Microscopy    CBC & Differential [[id number]]  (Abnormal) Collected: 12/03/24 1246    Specimen: Blood from Arm, Left Updated: 12/03/24 1348    Narrative:      The following orders were created for panel order CBC & Differential.  Procedure                               Abnormality         Status                     ---------                               -----------         ------                     CBC Auto Differential[[id number]]        Abnormal            Final result                 Please view results for these tests on the individual orders.    CBC Auto Differential [[id number]]  (Abnormal) Collected: 12/03/24 1246    Specimen: Blood from Arm, Left Updated: 12/03/24 1348     WBC 4.52 10*3/mm3      RBC 3.94 10*6/mm3      Hemoglobin 11.7 g/dL      Hematocrit 35.8 %      MCV 90.9 fL      MCH 29.7 pg      MCHC 32.7 g/dL      RDW 12.6 %      RDW-SD 42.3 fl      MPV 10.9 fL      Platelets 185 10*3/mm3      Neutrophil % 64.6 %      Lymphocyte % 26.5 %      Monocyte % 5.8 %      Eosinophil % 2.0 %      Basophil % 0.4 %      Immature Grans % 0.7 %      Neutrophils, Absolute 2.92 10*3/mm3      Lymphocytes, Absolute 1.20 10*3/mm3      Monocytes, Absolute 0.26 10*3/mm3      Eosinophils, Absolute 0.09 10*3/mm3       Basophils, Absolute 0.02 10*3/mm3      Immature Grans, Absolute 0.03 10*3/mm3      nRBC 0.0 /100 WBC     Sedimentation Rate [[id number]]  (Abnormal) Collected: 12/03/24 1246    Specimen: Blood from Arm, Left Updated: 12/03/24 1333     Sed Rate 72 mm/hr     Fentanyl, Urine - Urine, Clean Catch [[id number]]  (Normal) Collected: 12/03/24 1248    Specimen: Urine, Clean Catch Updated: 12/03/24 1330     Fentanyl, Urine Negative    Narrative:      Negative Threshold:      Fentanyl 5 ng/mL     The normal value for the drug tested is negative. This report includes final unconfirmed screening results to be used for medical treatment purposes only. Unconfirmed results must not be used for non-medical purposes such as employment or legal testing. Clinical consideration should be applied to any drug of abuse test, particularly when unconfirmed results are used.           Comprehensive Metabolic Panel [[id number]]  (Abnormal) Collected: 12/03/24 1246    Specimen: Blood from Arm, Left Updated: 12/03/24 1330     Glucose 97 mg/dL      BUN 5 mg/dL      Creatinine 0.59 mg/dL      Sodium 135 mmol/L      Potassium 3.6 mmol/L      Chloride 99 mmol/L      CO2 20.7 mmol/L      Calcium 9.8 mg/dL      Total Protein 8.1 g/dL      Albumin 4.2 g/dL      ALT (SGPT) 7 U/L      AST (SGOT) 10 U/L      Alkaline Phosphatase 86 U/L      Total Bilirubin 0.4 mg/dL      Globulin 3.9 gm/dL      A/G Ratio 1.1 g/dL      BUN/Creatinine Ratio 8.5     Anion Gap 15.3 mmol/L      eGFR 124.5 mL/min/1.73     Narrative:      GFR Normal >60  Chronic Kidney Disease <60  Kidney Failure <15      Single High Sensitivity Troponin T [[id number]]  (Normal) Collected: 12/03/24 1246    Specimen: Blood from Arm, Left Updated: 12/03/24 1330     HS Troponin T <6 ng/L     Narrative:      High Sensitive Troponin T Reference Range:  <14.0 ng/L- Negative Female for AMI  <22.0 ng/L- Negative Male for AMI  >=14 - Abnormal Female indicating possible myocardial injury.  >=22 - Abnormal  Male indicating possible myocardial injury.   Clinicians would have to utilize clinical acumen, EKG, Troponin, and serial changes to determine if it is an Acute Myocardial Infarction or myocardial injury due to an underlying chronic condition.         C-reactive Protein [[id number]]  (Abnormal) Collected: 12/03/24 1246    Specimen: Blood from Arm, Left Updated: 12/03/24 1330     C-Reactive Protein 6.58 mg/dL     Lactic Acid, Plasma [[id number]]  (Normal) Collected: 12/03/24 1246    Specimen: Blood from Arm, Left Updated: 12/03/24 1323     Lactate 2.0 mmol/L     Urine Drug Screen - Urine, Clean Catch [[id number]]  (Abnormal) Collected: 12/03/24 1248    Specimen: Urine, Clean Catch Updated: 12/03/24 1320     THC, Screen, Urine Negative     Phencyclidine (PCP), Urine Negative     Cocaine Screen, Urine Negative     Methamphetamine, Ur Negative     Opiate Screen Negative     Amphetamine Screen, Urine Negative     Benzodiazepine Screen, Urine Negative     Tricyclic Antidepressants Screen Negative     Methadone Screen, Urine Negative     Barbiturates Screen, Urine Negative     Oxycodone Screen, Urine Negative     Buprenorphine, Screen, Urine Positive    Narrative:      Cutoff For Drugs Screened:    Amphetamines               500 ng/ml  Barbiturates               200 ng/ml  Benzodiazepines            150 ng/ml  Cocaine                    150 ng/ml  Methadone                  200 ng/ml  Opiates                    100 ng/ml  Phencyclidine               25 ng/ml  THC                         50 ng/ml  Methamphetamine            500 ng/ml  Tricyclic Antidepressants  300 ng/ml  Oxycodone                  100 ng/ml  Buprenorphine               10 ng/ml    The normal value for all drugs tested is negative. This report includes unconfirmed screening results, with the cutoff values listed, to be used for medical treatment purposes only.  Unconfirmed results must not be used for non-medical purposes such as employment or legal testing.   Clinical consideration should be applied to any drug of abuse test, particularly when unconfirmed results are used.      Blood Culture - Blood, Arm, Left [[id number]] Collected: 12/03/24 1246    Specimen: Blood from Arm, Left Updated: 12/03/24 1320    Blood Culture - Blood, Arm, Right [[id number]] Collected: 12/03/24 1246    Specimen: Blood from Arm, Right Updated: 12/03/24 1320    [place] Draw [[id number]] Collected: 12/03/24 1246    Specimen: Blood from Arm, Left Updated: 12/03/24 1316    Narrative:      The following orders were created for panel order [place] Draw.  Procedure                               Abnormality         Status                     ---------                               -----------         ------                     Green Top (Gel)[[id number]]                                  Final result               Lavender Top[[id number]]                                     Final result               Gold Top - SST[[id number]]                                   Final result               Light Blue Top[[id number]]                                   Final result                 Please view results for these tests on the individual orders.    Green Top (Gel) [[id number]] Collected: 12/03/24 1246    Specimen: Blood from Arm, Left Updated: 12/03/24 1316     Extra Tube Hold for add-ons.     Comment: Auto resulted.       Lavender Top [[id number]] Collected: 12/03/24 1246    Specimen: Blood from Arm, Left Updated: 12/03/24 1316     Extra Tube hold for add-on     Comment: Auto resulted       Gold Top - SST [[id number]] Collected: 12/03/24 1246    Specimen: Blood from Arm, Left Updated: 12/03/24 1316     Extra Tube Hold for add-ons.     Comment: Auto resulted.       Light Blue Top [[id number]] Collected: 12/03/24 1246    Specimen: Blood from Arm, Left Updated: 12/03/24 1316     Extra Tube Hold for add-ons.     Comment: Auto resulted       Urinalysis With Microscopic If Indicated (No Culture) - Urine, Clean Catch [[id number]]   (Abnormal) Collected: 12/03/24 1248    Specimen: Urine, Clean Catch Updated: 12/03/24 1312     Color, UA Yellow     Appearance, UA Cloudy     pH, UA 7.0     Specific Gravity, UA 1.015     Glucose, UA Negative     Ketones, UA Negative     Bilirubin, UA Negative     Blood, UA Negative     Protein, UA Trace     Leuk Esterase, UA Large (3+)     Nitrite, UA Positive     Urobilinogen, UA 1.0 E.U./dL          Imaging Results (All)       Procedure Component Value Units Date/Time    US Ob < 14 Weeks Single or First Gestation [[id number]] Collected: 12/03/24 1507     Updated: 12/03/24 1512    Narrative:      PROCEDURE: US OB < 14 WEEKS SINGLE OR FIRST GESTATION-     HISTORY: unsure gestation, bacteremia     TECHNIQUE: Sonographic images of the pelvis were obtained  transabdominally.     COMPARISON: None.     FINDINGS: A gestational sac  is present within the uterus containing a  fetal pole corresponding to a 6-week 3-day gestation. No fetal cardiac  movement was identified. There is no evidence of a significant   subchorionic hemorrhage. There is no significant  free fluid in the  pelvis.       Impression:      Impression: Gestational sac within the uterus containing a fetal pole  corresponding to a 6-week 3-day gestation. No fetal cardiac movement was  seen on the exam. Short interval follow-up is recommended.        This report was finalized on 12/3/2024 3:09 PM by Pal Hedrick M.D..       XR Foot 3+ View Left [[id number]] Collected: 12/03/24 1351     Updated: 12/03/24 1354    Narrative:      PROCEDURE: XR FOOT 3+ VW LEFT-     History: left foot wound     COMPARISON:  None.     FINDINGS:  A 3 view exam demonstrates no acute fracture or dislocation.  The joint spaces are preserved. No soft tissue abnormality is seen.       Impression:      No acute fracture.                 This report was finalized on 12/3/2024 1:52 PM by Pal Hedrick M.D..       XR Chest 1 View [[id number]] Collected: 12/03/24 1350     Updated:  12/03/24 1353    Narrative:      PROCEDURE: XR CHEST 1 VW-       HISTORY: staph penumonia     COMPARISON: 12/1/2024.     FINDINGS: The heart is normal in size. The mediastinum is unremarkable.  There is persistent airspace disease in the left lung base which is  unchanged. There is no pneumothorax. There are no acute osseous  abnormalities.       Impression:      No change in the left basilar airspace disease.        This report was finalized on 12/3/2024 1:51 PM by Pal Hedrick M.D..       XR Ankle 3+ View Left [[id number]] Collected: 12/03/24 1350     Updated: 12/03/24 1352    Narrative:      PROCEDURE: XR ANKLE 3+ VW LEFT-     History: left ankle wound     COMPARISON:  None.     FINDINGS:  A 3 view exam demonstrates no acute fracture or dislocation.  The joint spaces are preserved. Soft tissue swelling is present within  the ankle.       Impression:      Soft tissue swelling with no osseous abnormality.                 This report was finalized on 12/3/2024 1:50 PM by Pal Hedrick M.D..             Orders (all)        Start     Ordered    12/04/24 0921  Inpatient Admission  Once         12/04/24 0920    12/03/24 2000  Vital Signs  Every 4 Hours      Comments: Per per hospital policy    12/03/24 1802    12/03/24 1803  Notify Physician (with Parameters)  Until Discontinued         12/03/24 1802    12/03/24 1803  Activity - Ad Tara  Until Discontinued         12/03/24 1802    12/03/24 1803  Intake & Output  Every Shift       12/03/24 1802    12/03/24 1803  Weigh patient  Once         12/03/24 1802    12/03/24 1803  Insert Peripheral IV  Once         12/03/24 1802    12/03/24 1803  Saline Lock & Maintain IV Access  Continuous         12/03/24 1802    12/03/24 1803  Inpatient Infectious Diseases Consult  Once        Specialty:  Infectious Diseases  Provider:  Kerry Sheffield MD    12/03/24 1802    12/03/24 1720  Code Status and Medical Interventions: CPR (Attempt to Resuscitate); Full Support   Continuous         12/03/24 1721    12/03/24 1718  Midline Consult  Once        Provider:  (Not yet assigned)    12/03/24 1717    12/03/24 1711  Initiate Observation Status  Once         12/03/24 1710    12/03/24 1711  Discontinue Cardiac Monitoring  Once         12/03/24 1710    12/03/24 1648  Inpatient Hospitalist Consult  STAT        Specialty:  Hospitalist  Provider:  (Not yet assigned)    12/03/24 1648    12/03/24 1330  Diet: Regular/House; Fluid Consistency: Thin (IDDSI 0)  Diet Effective Now         12/03/24 1329    12/03/24 1330  DIET MESSAGE Requesting a diet tray for patient's family member.  Once        Comments: Requesting a diet tray for patient's family member.    12/03/24 1329    12/03/24 1229  Cardiac Monitoring  Continuous,   Status:  Canceled        Comments: Follow Standing Orders As Outlined in Process Instructions (Open Order Report to View Full Instructions)    12/03/24 1228    12/03/24 1229  Continuous Pulse Oximetry  Continuous         12/03/24 1228                  Physician Progress Notes (all)    No notes of this type exist for this encounter.       Consult Notes (all)    No notes of this type exist for this encounter.

## 2024-12-04 NOTE — PLAN OF CARE
Goal Outcome Evaluation:  Plan of Care Reviewed With: patient        Progress: no change  Outcome Evaluation: Patient resting in bed. VSS on room air. Family at bedside. Patient ambulated independently in room this shift. Patient voices no concerns at this time, will continue with POC.

## 2024-12-05 LAB
BACTERIA SPEC AEROBE CULT: ABNORMAL
BACTERIA SPEC AEROBE CULT: ABNORMAL
GRAM STN SPEC: ABNORMAL
ISOLATED FROM: ABNORMAL

## 2024-12-05 PROCEDURE — 25010000002 DAPTOMYCIN PER 1 MG: Performed by: NURSE PRACTITIONER

## 2024-12-05 PROCEDURE — C1751 CATH, INF, PER/CENT/MIDLINE: HCPCS

## 2024-12-05 PROCEDURE — 99232 SBSQ HOSP IP/OBS MODERATE 35: CPT | Performed by: NURSE PRACTITIONER

## 2024-12-05 PROCEDURE — 99232 SBSQ HOSP IP/OBS MODERATE 35: CPT | Performed by: INTERNAL MEDICINE

## 2024-12-05 PROCEDURE — 25010000002 CEFTRIAXONE PER 250 MG: Performed by: NURSE PRACTITIONER

## 2024-12-05 PROCEDURE — 36410 VNPNXR 3YR/> PHY/QHP DX/THER: CPT

## 2024-12-05 PROCEDURE — 87040 BLOOD CULTURE FOR BACTERIA: CPT | Performed by: NURSE PRACTITIONER

## 2024-12-05 RX ORDER — SODIUM CHLORIDE 9 MG/ML
40 INJECTION, SOLUTION INTRAVENOUS AS NEEDED
Status: DISCONTINUED | OUTPATIENT
Start: 2024-12-05 | End: 2024-12-09 | Stop reason: HOSPADM

## 2024-12-05 RX ORDER — POLYETHYLENE GLYCOL 3350 17 G/17G
17 POWDER, FOR SOLUTION ORAL DAILY
Status: DISCONTINUED | OUTPATIENT
Start: 2024-12-05 | End: 2024-12-09 | Stop reason: HOSPADM

## 2024-12-05 RX ORDER — SODIUM CHLORIDE 0.9 % (FLUSH) 0.9 %
10 SYRINGE (ML) INJECTION EVERY 12 HOURS SCHEDULED
Status: DISCONTINUED | OUTPATIENT
Start: 2024-12-05 | End: 2024-12-09 | Stop reason: HOSPADM

## 2024-12-05 RX ORDER — DOCUSATE SODIUM 100 MG/1
100 CAPSULE, LIQUID FILLED ORAL 2 TIMES DAILY
Status: DISCONTINUED | OUTPATIENT
Start: 2024-12-05 | End: 2024-12-09 | Stop reason: HOSPADM

## 2024-12-05 RX ORDER — SODIUM CHLORIDE 0.9 % (FLUSH) 0.9 %
10 SYRINGE (ML) INJECTION AS NEEDED
Status: DISCONTINUED | OUTPATIENT
Start: 2024-12-05 | End: 2024-12-09 | Stop reason: HOSPADM

## 2024-12-05 RX ADMIN — ACETAMINOPHEN 650 MG: 325 TABLET ORAL at 15:15

## 2024-12-05 RX ADMIN — BUPRENORPHINE AND NALOXONE 1 TABLET: 8; 2 TABLET SUBLINGUAL at 08:27

## 2024-12-05 RX ADMIN — GABAPENTIN 800 MG: 400 CAPSULE ORAL at 14:14

## 2024-12-05 RX ADMIN — FOLIC ACID 5 MG: 1 TABLET ORAL at 08:27

## 2024-12-05 RX ADMIN — POLYETHYLENE GLYCOL (3350) 17 G: 17 POWDER, FOR SOLUTION ORAL at 17:19

## 2024-12-05 RX ADMIN — DAPTOMYCIN 500 MG: 500 INJECTION, POWDER, LYOPHILIZED, FOR SOLUTION INTRAVENOUS at 11:46

## 2024-12-05 RX ADMIN — SODIUM CHLORIDE 2000 MG: 9 INJECTION, SOLUTION INTRAVENOUS at 14:14

## 2024-12-05 RX ADMIN — Medication 10 ML: at 08:29

## 2024-12-05 RX ADMIN — NICOTINE 1 PATCH: 21 PATCH TRANSDERMAL at 21:35

## 2024-12-05 RX ADMIN — Medication 10 ML: at 20:02

## 2024-12-05 RX ADMIN — DOCUSATE SODIUM 100 MG: 100 CAPSULE, LIQUID FILLED ORAL at 17:19

## 2024-12-05 RX ADMIN — GABAPENTIN 800 MG: 400 CAPSULE ORAL at 21:01

## 2024-12-05 RX ADMIN — GABAPENTIN 800 MG: 400 CAPSULE ORAL at 05:11

## 2024-12-05 NOTE — PROGRESS NOTES
PROGRESS NOTE         Patient Identification:  Name:  Sailaja Alarcon  Age:  28 y.o.  Sex:  female  :  1995  MRN:  [id number]  Visit Number:  [id number]  Primary Care Provider:  Margaret Baxter APRN         LOS: 1 day       ----------------------------------------------------------------------------------------------------------------------  Subjective       Chief Complaints:    Abnormal Lab        Interval History:      Patient resting comfortably in bed today.  Lungs clear to auscultation bilaterally.  Abdomen soft, nontender.  Afebrile, denies diarrhea.  Denies dysuria, urgency, frequency.  MRI of the left ankle from 2024 reports no evidence of soft tissue abscess or osteomyelitis.  Urine culture from 12/3/2024 finalizes greater than 100,000 colonies of E. coli.  2D echo from 2024 reported a freely mobile structure in the right atrium the, cannot rule out right atrial wall vegetation versus prominent eustachian valve.  Blood cultures from 12/3/2024 1 out of 2 sets positive for MRSA.    Review of Systems:    Constitutional: no fever, chills and night sweats.  Generalized fatigue.  Eyes: no eye drainage, itching or redness.  HEENT: no mouth sores, dysphagia or nose bleed.  Respiratory: no for shortness of breath, cough or production of sputum.  Cardiovascular: no chest pain, no palpitations, no orthopnea.  Gastrointestinal: no nausea, vomiting or diarrhea. No abdominal pain, hematemesis or rectal bleeding.  Genitourinary: no dysuria or polyuria.  Hematologic/lymphatic: no lymph node abnormalities, no easy bruising or easy bleeding.  Musculoskeletal: no muscle or joint pain.  Skin: No rash and no itching.  Neurological: no loss of consciousness, no seizure, no headache.  Behavioral/Psych: no depression or suicidal ideation.  Endocrine: no hot flashes.  Immunologic:  negative.    ----------------------------------------------------------------------------------------------------------------------      Objective       [facility] Meds:  buprenorphine-naloxone, 2 tablet, Sublingual, Daily  cefTRIAXone, 2,000 mg, Intravenous, Q24H  DAPTOmycin, 8 mg/kg, Intravenous, Q24H  enoxaparin, 40 mg, Subcutaneous, Q24H  folic acid, 5 mg, Oral, Daily  gabapentin, 800 mg, Oral, Q8H  nicotine, 1 patch, Transdermal, Q24H  sodium chloride, 10 mL, Intravenous, Q12H         ----------------------------------------------------------------------------------------------------------------------    Vital Signs:  Temp:  [97.9 °F (36.6 °C)-98.6 °F (37 °C)] 98.1 °F (36.7 °C)  Heart Rate:  [61-83] 83  Resp:  [16-18] 18  BP: (90-98)/(50-58) 98/56  Mean Arterial Pressure (Non-Invasive) for the past 24 hrs (Last 3 readings):   Noninvasive MAP (mmHg)   12/05/24 0615 60     SpO2 Percentage    12/04/24 1900 12/04/24 2300 12/05/24 0300   SpO2: 96% 98% 99%     SpO2:  [96 %-99 %] 99 %  on   ;   Device (Oxygen Therapy): room air    Body mass index is 23.24 kg/m².  Wt Readings from Last 3 Encounters:   12/03/24 59.5 kg (131 lb 2.8 oz)   12/01/24 59.4 kg (131 lb)   09/24/24 72.6 kg (160 lb)        Intake/Output Summary (Last 24 hours) at 12/5/2024 1339  Last data filed at 12/5/2024 1231  Gross per 24 hour   Intake 1350 ml   Output --   Net 1350 ml     Diet: Regular/House; Fluid Consistency: Thin (IDDSI 0)  NPO Diet NPO Type: Sips with Meds  ----------------------------------------------------------------------------------------------------------------------      Physical Exam:    Constitutional:  Well-developed and well-nourished.  No respiratory distress.      HENT:  Head: Normocephalic and atraumatic.  Mouth:  Moist mucous membranes.  Poor dentition, multiple broken teeth.  Eyes:  Conjunctivae and EOM are normal.  No scleral icterus.  Neck:  Neck supple.  No JVD present.    Cardiovascular:  Normal rate, regular  "rhythm and normal heart sounds with no murmur. No edema.  Pulmonary/Chest:  No respiratory distress, no wheezes, no crackles, with normal breath sounds and good air movement.  Abdominal:  Soft.  Bowel sounds are normal.  No distension and no tenderness.   Musculoskeletal:  No edema, no tenderness, and no deformity.  No swelling or redness of joints.  Neurological:  Alert and oriented to person, place, and time.  No facial droop.  No slurred speech.   Skin:  Skin is warm and dry.  No rash noted.  No pallor.  Left ankle wound with scabbing, no surrounding erythema or edema.  Psychiatric:  Normal mood and affect.  Behavior is normal.        ----------------------------------------------------------------------------------------------------------------------  Results from last 7 days   Lab Units 12/03/24  1246 12/01/24  1305   HSTROP T ng/L <6 <6           Results from last 7 days   Lab Units 12/03/24  1246 12/01/24  1305   CRP mg/dL 6.58*  --    LACTATE mmol/L 2.0 1.2   WBC 10*3/mm3 4.52 3.68   HEMOGLOBIN g/dL 11.7* 10.3*   HEMATOCRIT % 35.8 30.9*   MCV fL 90.9 90.6   MCHC g/dL 32.7 33.3   PLATELETS 10*3/mm3 185 116*     Results from last 7 days   Lab Units 12/04/24  0243 12/03/24  1246 12/01/24  1305   SODIUM mmol/L 132* 135* 135*   POTASSIUM mmol/L 4.7  4.7 3.6 4.0   MAGNESIUM mg/dL  --  2.1  --    CHLORIDE mmol/L 104 99 102   CO2 mmol/L 17.0* 20.7* 22.0   BUN mg/dL 9 5* 7   CREATININE mg/dL 0.55* 0.59 0.61   CALCIUM mg/dL 9.0 9.8 9.5   GLUCOSE mg/dL 99 97 93   ALBUMIN g/dL 3.0* 4.2 3.6   BILIRUBIN mg/dL 0.2 0.4 0.4   ALK PHOS U/L 65 86 76   AST (SGOT) U/L 7 10 7   ALT (SGPT) U/L 5 7 7   Estimated Creatinine Clearance: 140.5 mL/min (A) (by C-G formula based on SCr of 0.55 mg/dL (L)).  No results found for: \"AMMONIA\"    Glucose   Date/Time Value Ref Range Status   12/04/2024 0655 104 70 - 130 mg/dL Final     No results found for: \"HGBA1C\"  No results found for: \"TSH\", \"FREET4\"    Blood Culture   Date Value Ref Range " "Status   12/03/2024 Staphylococcus aureus, MRSA (C)  Final     Comment:       Infectious disease consultation is highly recommended to rule out distant foci of infection.  Methicillin resistant Staphylococcus aureus, Patient may be an isolation risk.   12/03/2024 No growth at 2 days  Preliminary   12/01/2024 Staphylococcus aureus, MRSA (C)  Final     Comment:       Infectious disease consultation is highly recommended to rule out distant foci of infection.  Methicillin resistant Staphylococcus aureus, Patient may be an isolation risk.   12/01/2024 Staphylococcus, coagulase negative (C)  Final     Urine Culture   Date Value Ref Range Status   12/03/2024 >100,000 CFU/mL Escherichia coli (A)  Final     No results found for: \"WOUNDCX\"  No results found for: \"STOOLCX\"  No results found for: \"RESPCX\"  Pain Management Panel  More data exists         Latest Ref Rng & Units 12/3/2024 3/6/2023   Pain Management Panel   Amphetamine, Urine Qual Negative Negative  Negative    Barbiturates Screen, Urine Negative Negative  Negative    Benzodiazepine Screen, Urine Negative Negative  Negative    Buprenorphine, Screen, Urine Negative Positive  Positive    Cocaine Screen, Urine Negative Negative  Negative    Fentanyl, Urine Negative Negative  -   Methadone Screen , Urine Negative Negative  Negative    Methamphetamine, Ur Negative Negative  Negative       Details                     ----------------------------------------------------------------------------------------------------------------------  Imaging Results (Last 24 Hours)       Procedure Component Value Units Date/Time    MRI Ankle Left Without Contrast [[id number]] Collected: 12/04/24 1443     Updated: 12/04/24 1447    Narrative:      PROCEDURE: MRI ANKLE LEFT  WO CONTRAST-     HISTORY: Wound, bacteremia; R78.81-Bacteremia     TECHNIQUE: Multiplanar multisequence imaging of the ankle was performed  without the use of intravenous contrast.     COMPARISON: None.     FINDINGS: " The anterior and posterior syndesmotic ligaments are intact.  The anterior talofibular ligament is intact. The deltoid ligament is  intact. The spring ligament is intact. The flexor and extensor tendons  are intact. The Achilles tendon is intact. The peroneal longus and  brevis tendons are intact. The visualized plantar fascia is normal. The  articular cartilage is preserved. Bone marrow signal is homogeneous..  Soft tissue edema is present. There is no discrete abscess.       Impression:      No evidence of a soft tissue abscess or osteomyelitis.        This report was finalized on 12/4/2024 2:45 PM by Pal Hedrick M.D..               ----------------------------------------------------------------------------------------------------------------------    Pertinent Infectious Disease Results                Assessment/Plan       Assessment       MRSA bacteremia  Concerns for endocarditis  Left lower extremity chronic wound  E. coli UTI  Pregnancy, 6 weeks gestation      Plan        Patient resting comfortably in bed today.  Lungs clear to auscultation bilaterally.  Abdomen soft, nontender.  Afebrile, denies diarrhea.  Denies dysuria, urgency, frequency.  MRI of the left ankle from 12/4/2024 reports no evidence of soft tissue abscess or osteomyelitis.  Urine culture from 12/3/2024 finalizes greater than 100,000 colonies of E. coli.  2D echo from 12/4/2024 reported a freely mobile structure in the right atrium the, cannot rule out right atrial wall vegetation versus prominent eustachian valve.  Blood cultures from 12/3/2024 1 out of 2 sets positive for MRSA.    Blood cultures from 12/1/2024 1 out of 2 sets positive for MRSA and 1 out of 2 sets positive for coagulase-negative Staphylococcus.    Based on 2D echo recommend CRISTOPHER to further evaluate for possible vegetation.  Repeat blood cultures x 2 ordered for today.  Recommend to continue daptomycin 8 mg/kg IV every 24 hours for MRSA bacteremia and ceftriaxone 2 g  IV every 24 hours for E. coli UTI.  We will continue to follow closely and adjust antibiotic therapy as needed.    ANTIMICROBIAL THERAPY    azithromycin - 250 MG  cefTRIAXone (ROCEPHIN) 2000 mg IVPB in 100 mL NS (VTB)  daptomycin     Code Status:   Code Status and Medical Interventions: CPR (Attempt to Resuscitate); Full Support   Ordered at: 12/03/24 1721     Code Status (Patient has no pulse and is not breathing):    CPR (Attempt to Resuscitate)     Medical Interventions (Patient has pulse or is breathing):    Full Support       BIANCA Truong  12/05/24  13:39 EST

## 2024-12-05 NOTE — PLAN OF CARE
Goal Outcome Evaluation:              Outcome Evaluation: Patient has rested in bed this shift, ambulated independently in room, NPO at midnight, no request or complaints at this time, VSS, Will continue plan of care.

## 2024-12-05 NOTE — PLAN OF CARE
"Goal Outcome Evaluation:  Plan of Care Reviewed With: patient        Progress: improving  Outcome Evaluation: Pt's resting in bed, A&O, stable on RA, ambulated independently, stated \"feel better\", had midline placed per MD order. Pt's NPO after MN for CRISTOPHER, consent obtained. Con't IV ABX. Will con't to monitor.                             "

## 2024-12-05 NOTE — PROGRESS NOTES
[facility] HOSPITALIST PROGRESS NOTE    Subjective     History:   Sailaja Alarcon is a 28 y.o. female admitted on 12/3/2024 secondary to Positive blood cultures     Procedures: None    CC: Follow up bacteremia     Patient seen and examined with JOSEPH Diaz. Awake and alert with family members including grandmother and aunt at bedside. States she feels better again today. Reports productive cough. Dysuria appears resolved. No reported vomiting. Reports constipation. No acute events overnight per RN.     History taken from: patient, chart, and RN.      Objective     Vital Signs  Temp:  [98 °F (36.7 °C)-98.6 °F (37 °C)] 98 °F (36.7 °C)  Heart Rate:  [61-83] 71  Resp:  [16-18] 18  BP: (90-98)/(51-58) 95/57    Intake/Output Summary (Last 24 hours) at 12/5/2024 1520  Last data filed at 12/5/2024 1231  Gross per 24 hour   Intake 1010 ml   Output --   Net 1010 ml         Physical Exam: Unchanged from previous.   General:    Awake, alert, in no acute distress, poor dentition    Heart:      Normal S1 and S2. Regular rate and rhythm. No significant murmur, rubs or gallops appreciated.   Lungs:     Respirations regular, even and unlabored. Lungs clear to auscultation B/L. No wheezes, rales or rhonchi.   Abdomen:   Soft and nontender. No guarding, rebound tenderness or  organomegaly noted. Bowel sounds present x 4.   Extremities:  No clubbing, cyanosis or edema noted. Moves UE and LE equally B/L. LLE/ankle wound with no surrounding erythema or drainage appreciated.      Results Review:    Results from last 7 days   Lab Units 12/03/24  1246 12/01/24  1305   WBC 10*3/mm3 4.52 3.68   HEMOGLOBIN g/dL 11.7* 10.3*   PLATELETS 10*3/mm3 185 116*     Results from last 7 days   Lab Units 12/04/24  0243 12/03/24  1246 12/01/24  1305   SODIUM mmol/L 132* 135* 135*   POTASSIUM mmol/L 4.7  4.7 3.6 4.0   CHLORIDE mmol/L 104 99 102   CO2 mmol/L 17.0* 20.7* 22.0   BUN mg/dL 9 5* 7   CREATININE mg/dL 0.55* 0.59 0.61    CALCIUM mg/dL 9.0 9.8 9.5   GLUCOSE mg/dL 99 97 93     Results from last 7 days   Lab Units 12/04/24  0243 12/03/24  1246 12/01/24  1305   BILIRUBIN mg/dL 0.2 0.4 0.4   ALK PHOS U/L 65 86 76   AST (SGOT) U/L 7 10 7   ALT (SGPT) U/L 5 7 7     Results from last 7 days   Lab Units 12/03/24  1246   MAGNESIUM mg/dL 2.1         Results from last 7 days   Lab Units 12/03/24  1246 12/01/24  1305   HSTROP T ng/L <6 <6       Imaging Results (Last 24 Hours)       ** No results found for the last 24 hours. **              Medications:  buprenorphine-naloxone, 2 tablet, Sublingual, Daily  cefTRIAXone, 2,000 mg, Intravenous, Q24H  DAPTOmycin, 8 mg/kg, Intravenous, Q24H  enoxaparin, 40 mg, Subcutaneous, Q24H  folic acid, 5 mg, Oral, Daily  gabapentin, 800 mg, Oral, Q8H  nicotine, 1 patch, Transdermal, Q24H  sodium chloride, 10 mL, Intravenous, Q12H               Assessment & Plan   MRSA bacteremia: Blood cultures from ED visit on 12/1 initially revealed coagulase negative Staph thought to be a contaminant. However, 1/2 cultures from 12/1 now revealing MRSA.  Repeat blood cultures obtained on 12/3 with 1/2 also revealing MRSA. LLE wound noted with no erythema or drainage appreciated at present but pt reports recent concern for active infection. X-rays of left ankle revealed soft tissue swelling with no obvious bony abnormalities. No evidence of abscess or osteomyelitis on MRI. Echo obtained revealing a freely mobile structure in the right atrium concerning for possible atrial wall vegetation vs prominent eustachian valve. Cardiology consulted with plans for CRISTOPHER on 12/6. Cont Daptomycin per ID. Follow cultures and repeat labs in the AM.      E coli UTI: Cont Rocephin as she appears to be tolerating well.      Recently diagnosed LLL pneumonia: Procal is normal. Antibiotics as above. Order sputum culture.      (+) Rhinovirus: Supportive treatment.     LLE/ankle wound: No evidence of erythema or drainage at present. No acute bony  findings on x-ray. No evidence of abscess or osteomyelitis on MRI.       Borderline hypokalemia: K+ improved with supplementation. Mg is >2. Cont electrolyte replacement protocols.      Intrauterine pregnancy: Measuring approx 6-week 3-day gestation on US. Will need outpatient follow up with OB.       Hx of substance abuse: UDS positive for buprenorphine which is confirmed on MEGHAN.      DVT PPX: Lovenox     Disposition Pending additional workup with finalization of cultures.     Khanh Kruse,   12/05/24  15:20 EST

## 2024-12-05 NOTE — PROGRESS NOTES
Adult Nutrition  Assessment/PES    Patient Name:  Sailaja Alarcon  YOB: 1995  MRN: [id number]  Admit Date:  12/3/2024    Assessment Date:  2024    Comments:  MST     Po good at this time.    Recommend: Add pre- vitamin daily    Will cont to follow and monitor.      Reason for Assessment       Row Name 24 1544          Reason for Assessment    Reason For Assessment identified at risk by screening criteria  Renard Hurtado     Diagnosis pregnancy complications;infection/sepsis;substance use/abuse  MRSA Bacteremia,  LE chronic wound, 6 weeks pregnant hx substance use     Identified At Risk by Screening Criteria MST SCORE 2+;large or nonhealing wound, burn or pressure injury                    Nutrition/Diet History       Row Name 24 1544          Nutrition/Diet History    Typical Intake (Food/Fluid/EN/PN) called to room , no answer noted                    Labs/Tests/Procedures/Meds       Row Name 24 1540          Labs/Procedures/Meds    Lab Results Reviewed reviewed     Lab Results Comments Na 132 L        Diagnostic Tests/Procedures    Diagnostic Test/Procedure Reviewed reviewed        Medications    Pertinent Medications Reviewed reviewed     Pertinent Medications Comments folic acid                      Estimated/Assessed Needs - Anthropometrics       Row Name 24 1541          Anthropometrics    Calculation Weight 59.5 kg (131 lb 2.8 oz)        Estimated/Assessed Needs    Additional Documentation Estimated Calorie Needs (Group);Fluid Requirements (Group);Protein Requirements (Group)        Estimated Calorie Needs    Estimated Calorie Require (kcal/day) 1800 kcal ( 30 kcal/kg )     Estimated Calorie Need Method kcal/kg        Protein Requirements    Est Protein Requirement Amount (gms/kg) 1.0 gm protein  60-72 gm pro (1-1.2 gm/kg pro)        Fluid Requirements    Estimated Fluid Requirement Method RDA Method  1800 ml                    Nutrition  Prescription Ordered       Row Name 12/05/24 1546          Nutrition Prescription PO    Current PO Diet Regular                    Evaluation of Received Nutrient/Fluid Intake       Row Name 12/05/24 1547          Fluid Intake Evaluation    Oral Fluid (mL) 1200        PO Evaluation    Number of Meals 3     % PO Intake 83                       Problem/Interventions:   Problem 1       Row Name 12/05/24 1547          Nutrition Diagnoses Problem 1    Problem 1 Other (comment)  Inadequate energy intake related to bacteremia, pregnancy as evidenced by reported wt loss & decreased appetite on admit                          Intervention Goal       Row Name 12/05/24 1547          Intervention Goal    General Meet nutritional needs for age/condition     PO Maintain intake;PO intake (%)     PO Intake % 75 %                    Nutrition Intervention       Row Name 12/05/24 1548          Nutrition Intervention    RD/Tech Action Encourage intake;Follow Tx progress                      Education/Evaluation       Row Name 12/05/24 1548          Education    Education Education not appropriate at this time        Monitor/Evaluation    Monitor Per protocol;I&O;PO intake;Pertinent labs;Weight;Symptoms                     Electronically signed by:  Bettye Gerard RD  12/05/24 15:48 EST

## 2024-12-05 NOTE — CONSULTS
"Assessment:  Diagnosis: positive blood cultures    Allergies   Allergen Reactions    Amoxicillin Anaphylaxis    Penicillins Anaphylaxis     Has tolerated rocephin per patient    Vancomycin Itching     Pt states she had full body itch and burning sensation, and she turned red. Reaction potentially Red Man Syndrome.    Zofran [Ondansetron Hcl] Hives and Rash       Order Date/Time: 12/3/2024 1717  Indications: difficult access; iv abx  LABS:  No results found for: \"INR\", \"PROTIME\"  No results found for: \"PTT\"  Lab Results   Component Value Date    WBC 4.52 12/03/2024    HGB 11.7 (L) 12/03/2024    HCT 35.8 12/03/2024    MCV 90.9 12/03/2024     12/03/2024     Lab Results   Component Value Date    BUN 9 12/04/2024     Lab Results   Component Value Date    CREATININE 0.55 (L) 12/04/2024     Lab Results   Component Value Date    EGFRIFNONA 94 10/10/2021     Labs Reviewed: all labs reviewed    Contraindications for PICC/Midline:  No contraindications noted    Recommendations:  PowerGlide Pro Midline Catheter; 18 g; 10 cm  Upper Right Brachial    Procedure Time Out:  Time out Time: 1620  Correct Patient Identity: Yes  Correct Surgical Side and Site Are Marked: Yes  Agreement on Procedure to be done: Yes  Antibiotic Given: N/A  RN: SURAJ Chairez RN    PowerGlide Pro Midline Catheter placed with ultrasound guidance and verified by blood return. Minimal blood loss noted. Catheter flushes easily. Blood return noted. Patient nurse, Emily RUIZ, made aware that midline is in upper R arm and ready for use.      Deirdre hCairez RN    "

## 2024-12-05 NOTE — SIGNIFICANT NOTE
[facility]  SIGNIFICANT EVENT NOTE      Patient information:  Name: Sailaja Alarcon  Age/Sex: 28 y.o. female  :  1995        PCP: Margaret Baxter APRN  Attending: Khanh Kruse DO  MRN:  [id number]  Visit Number:  [id number]    LOS:  LOS: 1 day   CODE STATUS:   Code Status and Medical Interventions: CPR (Attempt to Resuscitate); Full Support   Ordered at: 24 1721     Code Status (Patient has no pulse and is not breathing):    CPR (Attempt to Resuscitate)     Medical Interventions (Patient has pulse or is breathing):    Full Support       PROBLEM LIST:Principal Problem:    Positive blood cultures  Active Problems:    MRSA bacteremia      Reason for Cardiology follow-up: MRSA Bacteremia with concerns of vegetation on TTE- recommended CRISTOPHER    Subjective Data:   EVENT/HPI:    General Cardiology is planning to do a CRISTOPHER on 2024 as recommended on TTE results.    Objective Data:   Temp:  [98 °F (36.7 °C)-98.6 °F (37 °C)] 98 °F (36.7 °C)  Heart Rate:  [61-83] 71  Resp:  [16-18] 18  BP: (90-98)/(51-58) 95/57  Device (Oxygen Therapy): room air  Vital Signs (last 72 hrs)          0700   0659  0700   0659  0700   0659  0700   1659   Most Recent      Temp (°F)   97.5 -  98.2    97.7 -  98.6    98 -  98.1     98 (36.7)  1425    Heart Rate   58 -  77    61 -  73    71 -  83     71  1425    Resp   16 -  18    16 -  18      18     18  1425    BP   90/50 -  111/65    90/50 -  97/52    95/57 -  98/56     95/57  1425    SpO2 (%)   95 -  100    96 -  99       99  0300          Body mass index is 23.24 kg/m².      24  1221 24  1750   Weight: 60.3 kg (133 lb) 59.5 kg (131 lb 2.8 oz)       Results Reviewed:     Active medication list:   buprenorphine-naloxone, 2 tablet, Sublingual, Daily  cefTRIAXone, 2,000 mg, Intravenous, Q24H  DAPTOmycin, 8 mg/kg, Intravenous,  Q24H  docusate sodium, 100 mg, Oral, BID  enoxaparin, 40 mg, Subcutaneous, Q24H  folic acid, 5 mg, Oral, Daily  gabapentin, 800 mg, Oral, Q8H  nicotine, 1 patch, Transdermal, Q24H  polyethylene glycol, 17 g, Oral, Daily  sodium chloride, 10 mL, Intravenous, Q12H  sodium chloride, 10 mL, Intravenous, Q12H           Assessment and Plan:   Discussed with Dr. Huertas.  Patient has already been placed on NPO at Midnight tonight 12/05/2024.  Order placed for CRISTOPHER to be done by Dr. Phillips on Friday 12/06/2024 as recommended from TTE.     BIANCA Ferraro (Robin)   [facility] 16:59 EST  12/5/2024    Electronically signed by BIANCA Blackburn, 12/05/24, 5:03 PM EST.             Please note that portions of this note were copied and has been reviewed and is accurate as of 12/5/2024 .      Please note that portions of this note were completed with a voice recognition program.

## 2024-12-06 LAB
ANION GAP SERPL CALCULATED.3IONS-SCNC: 8.8 MMOL/L (ref 5–15)
BASOPHILS # BLD AUTO: 0.03 10*3/MM3 (ref 0–0.2)
BASOPHILS NFR BLD AUTO: 0.7 % (ref 0–1.5)
BUN SERPL-MCNC: 14 MG/DL (ref 6–20)
BUN/CREAT SERPL: 22.2 (ref 7–25)
CALCIUM SPEC-SCNC: 9.3 MG/DL (ref 8.6–10.5)
CHLORIDE SERPL-SCNC: 100 MMOL/L (ref 98–107)
CO2 SERPL-SCNC: 24.2 MMOL/L (ref 22–29)
CREAT SERPL-MCNC: 0.63 MG/DL (ref 0.57–1)
DEPRECATED RDW RBC AUTO: 40.7 FL (ref 37–54)
EGFRCR SERPLBLD CKD-EPI 2021: 122.6 ML/MIN/1.73
EOSINOPHIL # BLD AUTO: 0.1 10*3/MM3 (ref 0–0.4)
EOSINOPHIL NFR BLD AUTO: 2.3 % (ref 0.3–6.2)
ERYTHROCYTE [DISTWIDTH] IN BLOOD BY AUTOMATED COUNT: 12.4 % (ref 12.3–15.4)
GLUCOSE SERPL-MCNC: 108 MG/DL (ref 65–99)
HCT VFR BLD AUTO: 28.5 % (ref 34–46.6)
HGB BLD-MCNC: 9.4 G/DL (ref 12–15.9)
IMM GRANULOCYTES # BLD AUTO: 0.02 10*3/MM3 (ref 0–0.05)
IMM GRANULOCYTES NFR BLD AUTO: 0.5 % (ref 0–0.5)
LYMPHOCYTES # BLD AUTO: 1.84 10*3/MM3 (ref 0.7–3.1)
LYMPHOCYTES NFR BLD AUTO: 43.1 % (ref 19.6–45.3)
MCH RBC QN AUTO: 29.8 PG (ref 26.6–33)
MCHC RBC AUTO-ENTMCNC: 33 G/DL (ref 31.5–35.7)
MCV RBC AUTO: 90.5 FL (ref 79–97)
MONOCYTES # BLD AUTO: 0.5 10*3/MM3 (ref 0.1–0.9)
MONOCYTES NFR BLD AUTO: 11.7 % (ref 5–12)
NEUTROPHILS NFR BLD AUTO: 1.78 10*3/MM3 (ref 1.7–7)
NEUTROPHILS NFR BLD AUTO: 41.7 % (ref 42.7–76)
NRBC BLD AUTO-RTO: 0 /100 WBC (ref 0–0.2)
PLATELET # BLD AUTO: 201 10*3/MM3 (ref 140–450)
PMV BLD AUTO: 10.8 FL (ref 6–12)
POTASSIUM SERPL-SCNC: 4.3 MMOL/L (ref 3.5–5.2)
RBC # BLD AUTO: 3.15 10*6/MM3 (ref 3.77–5.28)
SODIUM SERPL-SCNC: 133 MMOL/L (ref 136–145)
WBC NRBC COR # BLD AUTO: 4.27 10*3/MM3 (ref 3.4–10.8)

## 2024-12-06 PROCEDURE — 99233 SBSQ HOSP IP/OBS HIGH 50: CPT | Performed by: NURSE PRACTITIONER

## 2024-12-06 PROCEDURE — 25010000002 ALTEPLASE 2 MG RECONSTITUTED SOLUTION 1 EACH VIAL

## 2024-12-06 PROCEDURE — 05H933Z INSERTION OF INFUSION DEVICE INTO RIGHT BRACHIAL VEIN, PERCUTANEOUS APPROACH: ICD-10-PCS | Performed by: INTERNAL MEDICINE

## 2024-12-06 PROCEDURE — 85025 COMPLETE CBC W/AUTO DIFF WBC: CPT | Performed by: INTERNAL MEDICINE

## 2024-12-06 PROCEDURE — 99232 SBSQ HOSP IP/OBS MODERATE 35: CPT | Performed by: INTERNAL MEDICINE

## 2024-12-06 PROCEDURE — 25010000002 DAPTOMYCIN PER 1 MG: Performed by: NURSE PRACTITIONER

## 2024-12-06 PROCEDURE — 25010000002 CEFTRIAXONE PER 250 MG: Performed by: NURSE PRACTITIONER

## 2024-12-06 PROCEDURE — 25810000003 LACTATED RINGERS SOLUTION: Performed by: INTERNAL MEDICINE

## 2024-12-06 PROCEDURE — 80048 BASIC METABOLIC PNL TOTAL CA: CPT | Performed by: INTERNAL MEDICINE

## 2024-12-06 RX ADMIN — GABAPENTIN 800 MG: 400 CAPSULE ORAL at 20:32

## 2024-12-06 RX ADMIN — NICOTINE 1 PATCH: 21 PATCH TRANSDERMAL at 20:32

## 2024-12-06 RX ADMIN — Medication 10 ML: at 08:39

## 2024-12-06 RX ADMIN — DAPTOMYCIN 500 MG: 500 INJECTION, POWDER, LYOPHILIZED, FOR SOLUTION INTRAVENOUS at 12:59

## 2024-12-06 RX ADMIN — Medication 10 ML: at 20:32

## 2024-12-06 RX ADMIN — ALTEPLASE: 2.2 INJECTION, POWDER, LYOPHILIZED, FOR SOLUTION INTRAVENOUS at 23:53

## 2024-12-06 RX ADMIN — FOLIC ACID 5 MG: 1 TABLET ORAL at 08:42

## 2024-12-06 RX ADMIN — SODIUM CHLORIDE, POTASSIUM CHLORIDE, SODIUM LACTATE AND CALCIUM CHLORIDE 1000 ML: 600; 310; 30; 20 INJECTION, SOLUTION INTRAVENOUS at 11:51

## 2024-12-06 RX ADMIN — DOCUSATE SODIUM 100 MG: 100 CAPSULE, LIQUID FILLED ORAL at 08:42

## 2024-12-06 RX ADMIN — SODIUM CHLORIDE 2000 MG: 9 INJECTION, SOLUTION INTRAVENOUS at 16:45

## 2024-12-06 RX ADMIN — GABAPENTIN 800 MG: 400 CAPSULE ORAL at 05:57

## 2024-12-06 RX ADMIN — DOCUSATE SODIUM 100 MG: 100 CAPSULE, LIQUID FILLED ORAL at 20:31

## 2024-12-06 RX ADMIN — Medication 10 ML: at 20:31

## 2024-12-06 RX ADMIN — ACETAMINOPHEN 650 MG: 325 TABLET ORAL at 12:59

## 2024-12-06 NOTE — PROGRESS NOTES
PROGRESS NOTE         Patient Identification:  Name:  Sailaja Alarcon  Age:  28 y.o.  Sex:  female  :  1995  MRN:  [id number]  Visit Number:  [id number]  Primary Care Provider:  Margaret Baxter APRN         LOS: 2 days       ----------------------------------------------------------------------------------------------------------------------  Subjective       Chief Complaints:    Abnormal Lab        Interval History:      Patient sitting up on side of bed this morning.  Continues on room air with no apparent distress.  Anxious to go home soon.  Explained in depth with patient the risk of going home with IV access and that is not recommended due to patient's past history of IV drug use.  Cardiology recommends to treat as infective endocarditis and has no plans to proceed with CRISTOPHER.  WBC 4.25.  Blood cultures from 2024 in process.    Review of Systems:    Constitutional: no fever, chills and night sweats.  Generalized fatigue.  Eyes: no eye drainage, itching or redness.  HEENT: no mouth sores, dysphagia or nose bleed.  Respiratory: no for shortness of breath, cough or production of sputum.  Cardiovascular: no chest pain, no palpitations, no orthopnea.  Gastrointestinal: no nausea, vomiting or diarrhea. No abdominal pain, hematemesis or rectal bleeding.  Genitourinary: no dysuria or polyuria.  Hematologic/lymphatic: no lymph node abnormalities, no easy bruising or easy bleeding.  Musculoskeletal: no muscle or joint pain.  Skin: No rash and no itching.  Neurological: no loss of consciousness, no seizure, no headache.  Behavioral/Psych: no depression or suicidal ideation.  Endocrine: no hot flashes.  Immunologic: negative.    ----------------------------------------------------------------------------------------------------------------------      Objective       [facility] Meds:  buprenorphine-naloxone, 2 tablet, Sublingual, Daily  cefTRIAXone, 2,000 mg, Intravenous,  Q24H  DAPTOmycin, 8 mg/kg, Intravenous, Q24H  docusate sodium, 100 mg, Oral, BID  enoxaparin, 40 mg, Subcutaneous, Q24H  folic acid, 5 mg, Oral, Daily  gabapentin, 800 mg, Oral, Q8H  lactated ringers, 1,000 mL, Intravenous, Once  nicotine, 1 patch, Transdermal, Q24H  polyethylene glycol, 17 g, Oral, Daily  sodium chloride, 10 mL, Intravenous, Q12H  sodium chloride, 10 mL, Intravenous, Q12H         ----------------------------------------------------------------------------------------------------------------------    Vital Signs:  Temp:  [97.6 °F (36.4 °C)-98 °F (36.7 °C)] 98 °F (36.7 °C)  Heart Rate:  [64-75] 71  Resp:  [16-18] 16  BP: (80-95)/(46-62) 82/46  Mean Arterial Pressure (Non-Invasive) for the past 24 hrs (Last 3 readings):   Noninvasive MAP (mmHg)   12/05/24 1700 65   12/05/24 1425 69     SpO2 Percentage    12/04/24 1900 12/04/24 2300 12/05/24 0300   SpO2: 96% 98% 99%        on   ;   Device (Oxygen Therapy): room air    Body mass index is 23.24 kg/m².  Wt Readings from Last 3 Encounters:   12/03/24 59.5 kg (131 lb 2.8 oz)   12/01/24 59.4 kg (131 lb)   09/24/24 72.6 kg (160 lb)        Intake/Output Summary (Last 24 hours) at 12/6/2024 1158  Last data filed at 12/6/2024 0834  Gross per 24 hour   Intake 510 ml   Output --   Net 510 ml     Diet: Regular/House; Fluid Consistency: Thin (IDDSI 0)  ----------------------------------------------------------------------------------------------------------------------      Physical Exam:    Constitutional:  Well-developed and well-nourished.  No respiratory distress.  Family at bedside.     HENT:  Head: Normocephalic and atraumatic.  Mouth:  Moist mucous membranes.  Poor dentition, multiple broken teeth.  Eyes:  Conjunctivae and EOM are normal.  No scleral icterus.  Neck:  Neck supple.  No JVD present.    Cardiovascular:  Normal rate, regular rhythm and normal heart sounds with no murmur. No edema.  Pulmonary/Chest:  No respiratory distress, no wheezes, no  "crackles, with normal breath sounds and good air movement.  Abdominal:  Soft.  Bowel sounds are normal.  No distension and no tenderness.   Musculoskeletal:  No edema, no tenderness, and no deformity.  No swelling or redness of joints.  Neurological:  Alert and oriented to person, place, and time.  No facial droop.  No slurred speech.   Skin:  Skin is warm and dry.  No rash noted.  No pallor.  Left ankle wound with scabbing, no surrounding erythema or edema.  Psychiatric:  Normal mood and affect.  Behavior is normal.        ----------------------------------------------------------------------------------------------------------------------  Results from last 7 days   Lab Units 12/03/24  1246 12/01/24  1305   HSTROP T ng/L <6 <6           Results from last 7 days   Lab Units 12/06/24  0139 12/03/24  1246 12/01/24  1305   CRP mg/dL  --  6.58*  --    LACTATE mmol/L  --  2.0 1.2   WBC 10*3/mm3 4.27 4.52 3.68   HEMOGLOBIN g/dL 9.4* 11.7* 10.3*   HEMATOCRIT % 28.5* 35.8 30.9*   MCV fL 90.5 90.9 90.6   MCHC g/dL 33.0 32.7 33.3   PLATELETS 10*3/mm3 201 185 116*     Results from last 7 days   Lab Units 12/06/24  0140 12/04/24  0243 12/03/24  1246 12/01/24  1305   SODIUM mmol/L 133* 132* 135* 135*   POTASSIUM mmol/L 4.3 4.7  4.7 3.6 4.0   MAGNESIUM mg/dL  --   --  2.1  --    CHLORIDE mmol/L 100 104 99 102   CO2 mmol/L 24.2 17.0* 20.7* 22.0   BUN mg/dL 14 9 5* 7   CREATININE mg/dL 0.63 0.55* 0.59 0.61   CALCIUM mg/dL 9.3 9.0 9.8 9.5   GLUCOSE mg/dL 108* 99 97 93   ALBUMIN g/dL  --  3.0* 4.2 3.6   BILIRUBIN mg/dL  --  0.2 0.4 0.4   ALK PHOS U/L  --  65 86 76   AST (SGOT) U/L  --  7 10 7   ALT (SGPT) U/L  --  5 7 7   Estimated Creatinine Clearance: 122.6 mL/min (by C-G formula based on SCr of 0.63 mg/dL).  No results found for: \"AMMONIA\"    Glucose   Date/Time Value Ref Range Status   12/04/2024 0655 104 70 - 130 mg/dL Final     No results found for: \"HGBA1C\"  No results found for: \"TSH\", \"FREET4\"    Blood Culture   Date Value " "Ref Range Status   12/03/2024 Staphylococcus aureus, MRSA (C)  Final     Comment:       Infectious disease consultation is highly recommended to rule out distant foci of infection.  Methicillin resistant Staphylococcus aureus, Patient may be an isolation risk.   12/03/2024 No growth at 2 days  Preliminary   12/01/2024 Staphylococcus aureus, MRSA (C)  Final     Comment:       Infectious disease consultation is highly recommended to rule out distant foci of infection.  Methicillin resistant Staphylococcus aureus, Patient may be an isolation risk.   12/01/2024 Staphylococcus, coagulase negative (C)  Final     Urine Culture   Date Value Ref Range Status   12/03/2024 >100,000 CFU/mL Escherichia coli (A)  Final     No results found for: \"WOUNDCX\"  No results found for: \"STOOLCX\"  No results found for: \"RESPCX\"  Pain Management Panel  More data exists         Latest Ref Rng & Units 12/3/2024 3/6/2023   Pain Management Panel   Amphetamine, Urine Qual Negative Negative  Negative    Barbiturates Screen, Urine Negative Negative  Negative    Benzodiazepine Screen, Urine Negative Negative  Negative    Buprenorphine, Screen, Urine Negative Positive  Positive    Cocaine Screen, Urine Negative Negative  Negative    Fentanyl, Urine Negative Negative  -   Methadone Screen , Urine Negative Negative  Negative    Methamphetamine, Ur Negative Negative  Negative       Details                     ----------------------------------------------------------------------------------------------------------------------  Imaging Results (Last 24 Hours)       ** No results found for the last 24 hours. **            ----------------------------------------------------------------------------------------------------------------------    Pertinent Infectious Disease Results                Assessment/Plan       Assessment       MRSA bacteremia  Concerns for endocarditis  Left lower extremity chronic wound  E. coli UTI  Pregnancy, 6 weeks " gestation      Plan        Patient sitting up on side of bed this morning.  Continues on room air with no apparent distress.  Anxious to go home soon.  Explained in depth with patient the risk of going home with IV access and that is not recommended due to patient's past history of IV drug use.  Cardiology recommends to treat as infective endocarditis and has no plans to proceed with CRISTOPHER.  WBC 4.25.  Blood cultures from 12/5/2024 in process.    MRI of the left ankle from 12/4/2024 reports no evidence of soft tissue abscess or osteomyelitis.  Urine culture from 12/3/2024 finalizes greater than 100,000 colonies of E. coli.  2D echo from 12/4/2024 reported a freely mobile structure in the right atrium the, cannot rule out right atrial wall vegetation versus prominent eustachian valve.  Blood cultures from 12/3/2024 1 out of 2 sets positive for MRSA.    Blood cultures from 12/1/2024 1 out of 2 sets positive for MRSA and 1 out of 2 sets positive for coagulase-negative Staphylococcus.    Recommend to continue daptomycin 8 mg/kg IV every 24 hours 6 weeks from first negative blood culture for MRSA bacteremia and infective endocarditis.  Recommend to continue ceftriaxone 2 g IV every 24 hours for E. coli UTI through 12/8/2024.  We will continue to follow closely and adjust antibiotic therapy as needed.    ANTIMICROBIAL THERAPY    azithromycin - 250 MG  cefTRIAXone (ROCEPHIN) 2000 mg IVPB in 100 mL NS (VTB)  daptomycin     Code Status:   Code Status and Medical Interventions: CPR (Attempt to Resuscitate); Full Support   Ordered at: 12/03/24 1721     Code Status (Patient has no pulse and is not breathing):    CPR (Attempt to Resuscitate)     Medical Interventions (Patient has pulse or is breathing):    Full Support       BIANCA Truong  12/06/24  11:57 EST

## 2024-12-06 NOTE — CASE MANAGEMENT/SOCIAL WORK
"Continued Stay Note   Jorge A     Patient Name: Sailaja Alarcon  MRN: [id number]  Today's Date: 12/6/2024    Admit Date: 12/3/2024     Discharge Plan       Row Name 12/06/24 1247       Plan    Plan CM f/u with pt at the bedside. Pt's grandmother and aunt are at the bedside as well. Pt lives at home with spouse John and 3 children and plans to return home at d/c. Pt does not have HH/DME. Pt will need 6 weeks IV antibiotics. Pt is not agreeable to [facility] referral as she wants to go home with her children. Pt stated she can come back for infusions weekly if that is an option. Pt asked grandmother if she could transport back to the hospital weekly and grandmother responded \"I don't know\". Pt's grandmother lives in [place]. Pt has used [facility] in the past and knows how to access the system for transportation. CM will follow and assist as needed.    Patient/Family in Agreement with Plan yes               Leslie Manzano RN    "

## 2024-12-06 NOTE — PROGRESS NOTES
[facility] HOSPITALIST PROGRESS NOTE    Subjective     History:   Sailaja Alarcon is a 28 y.o. female admitted on 12/3/2024 secondary to Positive blood cultures     Procedures:   12/5/24: Midline catheter placement right brachial vein     CC: Follow up bacteremia     Patient seen and examined with JOSEPH Becerril. Awake and alert with family members including grandmother and aunt at bedside. Feeling better. Dysuria appears resolved. No reported vomiting.  Constipation again reported. No acute events overnight per RN.     History taken from: patient, chart, and RN.      Objective     Vital Signs  Temp:  [97.8 °F (36.6 °C)-98 °F (36.7 °C)] 98 °F (36.7 °C)  Heart Rate:  [64-75] 68  Resp:  [16-18] 16  BP: (80-97)/(46-62) 97/53    Intake/Output Summary (Last 24 hours) at 12/6/2024 1826  Last data filed at 12/6/2024 1728  Gross per 24 hour   Intake 1464.22 ml   Output --   Net 1464.22 ml         Physical Exam: Unchanged from previous.   General:    Awake, alert, in no acute distress, poor dentition    Heart:      Normal S1 and S2. Regular rate and rhythm. No significant murmur, rubs or gallops appreciated.   Lungs:     Respirations regular, even and unlabored. Lungs clear to auscultation B/L. No wheezes, rales or rhonchi.   Abdomen:   Soft and nontender. No guarding, rebound tenderness or  organomegaly noted. Bowel sounds present x 4.   Extremities:  No clubbing, cyanosis or edema noted. Moves UE and LE equally B/L. LLE/ankle wound with no surrounding erythema or drainage appreciated.      Results Review:    Results from last 7 days   Lab Units 12/06/24  0139 12/03/24  1246 12/01/24  1305   WBC 10*3/mm3 4.27 4.52 3.68   HEMOGLOBIN g/dL 9.4* 11.7* 10.3*   PLATELETS 10*3/mm3 201 185 116*     Results from last 7 days   Lab Units 12/06/24  0140 12/04/24  0243 12/03/24  1246 12/01/24  1305   SODIUM mmol/L 133* 132* 135* 135*   POTASSIUM mmol/L 4.3 4.7  4.7 3.6 4.0   CHLORIDE mmol/L 100 104 99 102   CO2  mmol/L 24.2 17.0* 20.7* 22.0   BUN mg/dL 14 9 5* 7   CREATININE mg/dL 0.63 0.55* 0.59 0.61   CALCIUM mg/dL 9.3 9.0 9.8 9.5   GLUCOSE mg/dL 108* 99 97 93     Results from last 7 days   Lab Units 12/04/24  0243 12/03/24  1246 12/01/24  1305   BILIRUBIN mg/dL 0.2 0.4 0.4   ALK PHOS U/L 65 86 76   AST (SGOT) U/L 7 10 7   ALT (SGPT) U/L 5 7 7     Results from last 7 days   Lab Units 12/03/24  1246   MAGNESIUM mg/dL 2.1         Results from last 7 days   Lab Units 12/03/24  1246 12/01/24  1305   HSTROP T ng/L <6 <6       Imaging Results (Last 24 Hours)       ** No results found for the last 24 hours. **              Medications:  buprenorphine-naloxone, 2 tablet, Sublingual, Daily  cefTRIAXone, 2,000 mg, Intravenous, Q24H  DAPTOmycin, 8 mg/kg, Intravenous, Q24H  docusate sodium, 100 mg, Oral, BID  enoxaparin, 40 mg, Subcutaneous, Q24H  folic acid, 5 mg, Oral, Daily  gabapentin, 800 mg, Oral, Q8H  nicotine, 1 patch, Transdermal, Q24H  polyethylene glycol, 17 g, Oral, Daily  sodium chloride, 10 mL, Intravenous, Q12H  sodium chloride, 10 mL, Intravenous, Q12H               Assessment & Plan   MRSA bacteremia: Blood cultures from ED visit on 12/1 initially revealed coagulase negative Staph thought to be a contaminant. However, 1/2 cultures from 12/1 later revealed MRSA.  Repeat blood cultures obtained on 12/3 with 1/2 also revealing MRSA. Repeat cultures from 12/5 pending. LLE wound noted with no erythema or drainage appreciated at present but pt reports recent concern for active infection. X-rays of left ankle revealed soft tissue swelling with no obvious bony abnormalities. No evidence of abscess or osteomyelitis on MRI. Echo obtained revealing a freely mobile structure in the right atrium concerning for possible atrial wall vegetation vs prominent eustachian valve. Cardiology consulted with initial plans for CRISTOPHER on 12/6. However, discussed with cardiology today who feels that TTE strongly suggestive of infective  endocarditis and CRISTOPHER canceled in the setting of risk associated with anesthesia and pregnancy. Cont Daptomycin per ID with ID recommending 6 week course of treatment from 1st set of negative cultures. Follow cultures and repeat labs in the AM. ID and cardiology input appreciated.       E coli UTI: Complete course of Rocephin as she appears to be tolerating well.      Recently diagnosed LLL pneumonia: Procal is normal. Antibiotics as above. Sputum culture ordered.      (+) Rhinovirus: Supportive treatment.     LLE/ankle wound: No evidence of erythema or drainage at present. No acute bony findings on x-ray. No evidence of abscess or osteomyelitis on MRI.       Borderline hypokalemia: K+ improved with supplementation. Mg is >2. Cont electrolyte replacement protocols.      Intrauterine pregnancy: Measuring approx 6-week 3-day gestation on US. Will need outpatient follow up with OB.       Hx of substance abuse: UDS positive for buprenorphine which is confirmed on MEGHAN. Pt adamantly denies any recent IVDA.     Constipation: Order additional bowel regimen.      DVT PPX: Lovenox     Discussed with Dr. Phillips and ID APRN.     Disposition Pending finalization of cultures.     Khanh Kruse,   12/06/24  18:26 EST

## 2024-12-06 NOTE — PLAN OF CARE
Goal Outcome Evaluation:         Pt A/O, VSS, on RA, pt requested not to be woken while she is sleeping during the night, peeked in for rounds. Pt has had no complaints of pain this shift. Family is at bedside. No acute changes this shift. POC ongoing

## 2024-12-06 NOTE — PLAN OF CARE
Goal Outcome Evaluation:              Outcome Evaluation: Pt resting in bed, VSS on room air with family at bedside. Pt has ambulated independently this shift. Pt c/o headache, PRN medication given per MAR. Pt had a Tap water Enema, pt gave herself. Pt voices no concerns or complaints at this time, will continue wtih POC.

## 2024-12-06 NOTE — PAYOR COMM NOTE
"[facility]  NPI:[id number]    Utilization Review  Contact: Tracey Chowdary RN  Phone: [phone]  Fax:[phone]    CLINICAL UPDATE   [id number]        Rochelle Robertson (28 y.o. Female)       Date of Birth   1995    Social Security Number       Address   [facility] [place] [address] [address]    Home Phone   [phone]    MRN   [id number]       Orthodoxy   Anabaptism    Marital Status                               Admission Date   12/3/24    Admission Type   Emergency    Admitting Provider   Khanh Kruse DO    Attending Provider   Khanh Kruse DO    Department, Room/Bed   [address], [address]/       Discharge Date       Discharge Disposition       Discharge Destination                                 Attending Provider: Khanh Kruse DO    Allergies: Amoxicillin, Penicillins, Vancomycin, Zofran [Ondansetron Hcl]    Isolation: Droplet   Infection: MRSA (10/13/21), Rhinovirus  (12/03/24)   Code Status: CPR    Ht: 160 cm (63\")   Wt: 59.5 kg (131 lb 2.8 oz)    Admission Cmt: None   Principal Problem: Positive blood cultures [R78.81]                   Active Insurance as of 12/3/2024       Primary Coverage       Payor Plan Insurance Group Employer/Plan Group    [facility] BY REYES [facility] BY REYES [id number]       Payor Plan Address Payor Plan Phone Number Payor Plan Fax Number Effective Dates    [address] [address]   1/1/2021 - None Entered    [facility] [id number]         Subscriber Name Subscriber Birth Date Member ID       ROCHELLE ROBERTSON 1995 [id number]                     Emergency Contacts        (Rel.) Home Phone Work Phone Mobile Phone    UMER ROBERTSON (Spouse) [phone] -- --    BIB RAIN (Grandparent) [phone] -- --    Vivian Kaba (Aunt) (Other) -- -- [phone]                 Physician Progress Notes (last 24 hours)        Vero Haynes APRN at 12/06/24 " 1157                     PROGRESS NOTE         Patient Identification:  Name:  Sailaja Alarcon  Age:  28 y.o.  Sex:  female  :  1995  MRN:  [id number]  Visit Number:  [id number]  Primary Care Provider:  Margaret Baxter APRN         LOS: 2 days       ----------------------------------------------------------------------------------------------------------------------  Subjective       Chief Complaints:    Abnormal Lab        Interval History:      Patient sitting up on side of bed this morning.  Continues on room air with no apparent distress.  Anxious to go home soon.  Explained in depth with patient the risk of going home with IV access and that is not recommended due to patient's past history of IV drug use.  Cardiology recommends to treat as infective endocarditis and has no plans to proceed with CRISTOPHER.  WBC 4.25.  Blood cultures from 2024 in process.    Review of Systems:    Constitutional: no fever, chills and night sweats.  Generalized fatigue.  Eyes: no eye drainage, itching or redness.  HEENT: no mouth sores, dysphagia or nose bleed.  Respiratory: no for shortness of breath, cough or production of sputum.  Cardiovascular: no chest pain, no palpitations, no orthopnea.  Gastrointestinal: no nausea, vomiting or diarrhea. No abdominal pain, hematemesis or rectal bleeding.  Genitourinary: no dysuria or polyuria.  Hematologic/lymphatic: no lymph node abnormalities, no easy bruising or easy bleeding.  Musculoskeletal: no muscle or joint pain.  Skin: No rash and no itching.  Neurological: no loss of consciousness, no seizure, no headache.  Behavioral/Psych: no depression or suicidal ideation.  Endocrine: no hot flashes.  Immunologic: negative.    ----------------------------------------------------------------------------------------------------------------------      Objective       [facility] Meds:  buprenorphine-naloxone, 2 tablet, Sublingual, Daily  cefTRIAXone, 2,000 mg,  Intravenous, Q24H  DAPTOmycin, 8 mg/kg, Intravenous, Q24H  docusate sodium, 100 mg, Oral, BID  enoxaparin, 40 mg, Subcutaneous, Q24H  folic acid, 5 mg, Oral, Daily  gabapentin, 800 mg, Oral, Q8H  lactated ringers, 1,000 mL, Intravenous, Once  nicotine, 1 patch, Transdermal, Q24H  polyethylene glycol, 17 g, Oral, Daily  sodium chloride, 10 mL, Intravenous, Q12H  sodium chloride, 10 mL, Intravenous, Q12H         ----------------------------------------------------------------------------------------------------------------------    Vital Signs:  Temp:  [97.6 °F (36.4 °C)-98 °F (36.7 °C)] 98 °F (36.7 °C)  Heart Rate:  [64-75] 71  Resp:  [16-18] 16  BP: (80-95)/(46-62) 82/46  Mean Arterial Pressure (Non-Invasive) for the past 24 hrs (Last 3 readings):   Noninvasive MAP (mmHg)   12/05/24 1700 65   12/05/24 1425 69     SpO2 Percentage    12/04/24 1900 12/04/24 2300 12/05/24 0300   SpO2: 96% 98% 99%        on   ;   Device (Oxygen Therapy): room air    Body mass index is 23.24 kg/m².  Wt Readings from Last 3 Encounters:   12/03/24 59.5 kg (131 lb 2.8 oz)   12/01/24 59.4 kg (131 lb)   09/24/24 72.6 kg (160 lb)        Intake/Output Summary (Last 24 hours) at 12/6/2024 1150  Last data filed at 12/6/2024 0834  Gross per 24 hour   Intake 510 ml   Output --   Net 510 ml     Diet: Regular/House; Fluid Consistency: Thin (IDDSI 0)  ----------------------------------------------------------------------------------------------------------------------      Physical Exam:    Constitutional:  Well-developed and well-nourished.  No respiratory distress.  Family at bedside.     HENT:  Head: Normocephalic and atraumatic.  Mouth:  Moist mucous membranes.  Poor dentition, multiple broken teeth.  Eyes:  Conjunctivae and EOM are normal.  No scleral icterus.  Neck:  Neck supple.  No JVD present.    Cardiovascular:  Normal rate, regular rhythm and normal heart sounds with no murmur. No edema.  Pulmonary/Chest:  No respiratory distress, no  "wheezes, no crackles, with normal breath sounds and good air movement.  Abdominal:  Soft.  Bowel sounds are normal.  No distension and no tenderness.   Musculoskeletal:  No edema, no tenderness, and no deformity.  No swelling or redness of joints.  Neurological:  Alert and oriented to person, place, and time.  No facial droop.  No slurred speech.   Skin:  Skin is warm and dry.  No rash noted.  No pallor.  Left ankle wound with scabbing, no surrounding erythema or edema.  Psychiatric:  Normal mood and affect.  Behavior is normal.        ----------------------------------------------------------------------------------------------------------------------  Results from last 7 days   Lab Units 12/03/24  1246 12/01/24  1305   HSTROP T ng/L <6 <6           Results from last 7 days   Lab Units 12/06/24  0139 12/03/24  1246 12/01/24  1305   CRP mg/dL  --  6.58*  --    LACTATE mmol/L  --  2.0 1.2   WBC 10*3/mm3 4.27 4.52 3.68   HEMOGLOBIN g/dL 9.4* 11.7* 10.3*   HEMATOCRIT % 28.5* 35.8 30.9*   MCV fL 90.5 90.9 90.6   MCHC g/dL 33.0 32.7 33.3   PLATELETS 10*3/mm3 201 185 116*     Results from last 7 days   Lab Units 12/06/24  0140 12/04/24  0243 12/03/24  1246 12/01/24  1305   SODIUM mmol/L 133* 132* 135* 135*   POTASSIUM mmol/L 4.3 4.7  4.7 3.6 4.0   MAGNESIUM mg/dL  --   --  2.1  --    CHLORIDE mmol/L 100 104 99 102   CO2 mmol/L 24.2 17.0* 20.7* 22.0   BUN mg/dL 14 9 5* 7   CREATININE mg/dL 0.63 0.55* 0.59 0.61   CALCIUM mg/dL 9.3 9.0 9.8 9.5   GLUCOSE mg/dL 108* 99 97 93   ALBUMIN g/dL  --  3.0* 4.2 3.6   BILIRUBIN mg/dL  --  0.2 0.4 0.4   ALK PHOS U/L  --  65 86 76   AST (SGOT) U/L  --  7 10 7   ALT (SGPT) U/L  --  5 7 7   Estimated Creatinine Clearance: 122.6 mL/min (by C-G formula based on SCr of 0.63 mg/dL).  No results found for: \"AMMONIA\"    Glucose   Date/Time Value Ref Range Status   12/04/2024 0655 104 70 - 130 mg/dL Final     No results found for: \"HGBA1C\"  No results found for: \"TSH\", \"FREET4\"    Blood Culture " "  Date Value Ref Range Status   12/03/2024 Staphylococcus aureus, MRSA (C)  Final     Comment:       Infectious disease consultation is highly recommended to rule out distant foci of infection.  Methicillin resistant Staphylococcus aureus, Patient may be an isolation risk.   12/03/2024 No growth at 2 days  Preliminary   12/01/2024 Staphylococcus aureus, MRSA (C)  Final     Comment:       Infectious disease consultation is highly recommended to rule out distant foci of infection.  Methicillin resistant Staphylococcus aureus, Patient may be an isolation risk.   12/01/2024 Staphylococcus, coagulase negative (C)  Final     Urine Culture   Date Value Ref Range Status   12/03/2024 >100,000 CFU/mL Escherichia coli (A)  Final     No results found for: \"WOUNDCX\"  No results found for: \"STOOLCX\"  No results found for: \"RESPCX\"  Pain Management Panel  More data exists         Latest Ref Rng & Units 12/3/2024 3/6/2023   Pain Management Panel   Amphetamine, Urine Qual Negative Negative  Negative    Barbiturates Screen, Urine Negative Negative  Negative    Benzodiazepine Screen, Urine Negative Negative  Negative    Buprenorphine, Screen, Urine Negative Positive  Positive    Cocaine Screen, Urine Negative Negative  Negative    Fentanyl, Urine Negative Negative  -   Methadone Screen , Urine Negative Negative  Negative    Methamphetamine, Ur Negative Negative  Negative       Details                     ----------------------------------------------------------------------------------------------------------------------  Imaging Results (Last 24 Hours)       ** No results found for the last 24 hours. **            ----------------------------------------------------------------------------------------------------------------------    Pertinent Infectious Disease Results                Assessment/Plan       Assessment       MRSA bacteremia  Concerns for endocarditis  Left lower extremity chronic wound  E. coli UTI  Pregnancy, 6 weeks " gestation      Plan        Patient sitting up on side of bed this morning.  Continues on room air with no apparent distress.  Anxious to go home soon.  Explained in depth with patient the risk of going home with IV access and that is not recommended due to patient's past history of IV drug use.  Cardiology recommends to treat as infective endocarditis and has no plans to proceed with CRISTOPHER.  WBC 4.25.  Blood cultures from 12/5/2024 in process.    MRI of the left ankle from 12/4/2024 reports no evidence of soft tissue abscess or osteomyelitis.  Urine culture from 12/3/2024 finalizes greater than 100,000 colonies of E. coli.  2D echo from 12/4/2024 reported a freely mobile structure in the right atrium the, cannot rule out right atrial wall vegetation versus prominent eustachian valve.  Blood cultures from 12/3/2024 1 out of 2 sets positive for MRSA.    Blood cultures from 12/1/2024 1 out of 2 sets positive for MRSA and 1 out of 2 sets positive for coagulase-negative Staphylococcus.    Recommend to continue daptomycin 8 mg/kg IV every 24 hours 6 weeks from first negative blood culture for MRSA bacteremia and infective endocarditis.  Recommend to continue ceftriaxone 2 g IV every 24 hours for E. coli UTI through 12/8/2024.  We will continue to follow closely and adjust antibiotic therapy as needed.    ANTIMICROBIAL THERAPY    azithromycin - 250 MG  cefTRIAXone (ROCEPHIN) 2000 mg IVPB in 100 mL NS (VTB)  daptomycin     Code Status:   Code Status and Medical Interventions: CPR (Attempt to Resuscitate); Full Support   Ordered at: 12/03/24 1721     Code Status (Patient has no pulse and is not breathing):    CPR (Attempt to Resuscitate)     Medical Interventions (Patient has pulse or is breathing):    Full Support       BIANCA Truong  12/06/24  11:57 EST    Electronically signed by Vero Haynes APRN at 12/06/24 1156       Khanh Kruse DO at 12/05/24 1520                [facility]  PROGRESS NOTE    Subjective     History:   Sailaja Alarcon is a 28 y.o. female admitted on 12/3/2024 secondary to Positive blood cultures     Procedures: None    CC: Follow up bacteremia     Patient seen and examined with JOSEPH Diaz. Awake and alert with family members including grandmother and aunt at bedside. States she feels better again today. Reports productive cough. Dysuria appears resolved. No reported vomiting. Reports constipation. No acute events overnight per RN.     History taken from: patient, chart, and RN.      Objective     Vital Signs  Temp:  [98 °F (36.7 °C)-98.6 °F (37 °C)] 98 °F (36.7 °C)  Heart Rate:  [61-83] 71  Resp:  [16-18] 18  BP: (90-98)/(51-58) 95/57    Intake/Output Summary (Last 24 hours) at 12/5/2024 1520  Last data filed at 12/5/2024 1231  Gross per 24 hour   Intake 1010 ml   Output --   Net 1010 ml         Physical Exam: Unchanged from previous.   General:    Awake, alert, in no acute distress, poor dentition    Heart:      Normal S1 and S2. Regular rate and rhythm. No significant murmur, rubs or gallops appreciated.   Lungs:     Respirations regular, even and unlabored. Lungs clear to auscultation B/L. No wheezes, rales or rhonchi.   Abdomen:   Soft and nontender. No guarding, rebound tenderness or  organomegaly noted. Bowel sounds present x 4.   Extremities:  No clubbing, cyanosis or edema noted. Moves UE and LE equally B/L. LLE/ankle wound with no surrounding erythema or drainage appreciated.      Results Review:    Results from last 7 days   Lab Units 12/03/24  1246 12/01/24  1305   WBC 10*3/mm3 4.52 3.68   HEMOGLOBIN g/dL 11.7* 10.3*   PLATELETS 10*3/mm3 185 116*     Results from last 7 days   Lab Units 12/04/24  0243 12/03/24  1246 12/01/24  1305   SODIUM mmol/L 132* 135* 135*   POTASSIUM mmol/L 4.7  4.7 3.6 4.0   CHLORIDE mmol/L 104 99 102   CO2 mmol/L 17.0* 20.7* 22.0   BUN mg/dL 9 5* 7   CREATININE mg/dL 0.55* 0.59 0.61   CALCIUM mg/dL 9.0 9.8 9.5   GLUCOSE mg/dL 99  97 93     Results from last 7 days   Lab Units 12/04/24  0243 12/03/24  1246 12/01/24  1305   BILIRUBIN mg/dL 0.2 0.4 0.4   ALK PHOS U/L 65 86 76   AST (SGOT) U/L 7 10 7   ALT (SGPT) U/L 5 7 7     Results from last 7 days   Lab Units 12/03/24  1246   MAGNESIUM mg/dL 2.1         Results from last 7 days   Lab Units 12/03/24  1246 12/01/24  1305   HSTROP T ng/L <6 <6       Imaging Results (Last 24 Hours)       ** No results found for the last 24 hours. **              Medications:  buprenorphine-naloxone, 2 tablet, Sublingual, Daily  cefTRIAXone, 2,000 mg, Intravenous, Q24H  DAPTOmycin, 8 mg/kg, Intravenous, Q24H  enoxaparin, 40 mg, Subcutaneous, Q24H  folic acid, 5 mg, Oral, Daily  gabapentin, 800 mg, Oral, Q8H  nicotine, 1 patch, Transdermal, Q24H  sodium chloride, 10 mL, Intravenous, Q12H               Assessment & Plan   MRSA bacteremia: Blood cultures from ED visit on 12/1 initially revealed coagulase negative Staph thought to be a contaminant. However, 1/2 cultures from 12/1 now revealing MRSA.  Repeat blood cultures obtained on 12/3 with 1/2 also revealing MRSA. LLE wound noted with no erythema or drainage appreciated at present but pt reports recent concern for active infection. X-rays of left ankle revealed soft tissue swelling with no obvious bony abnormalities. No evidence of abscess or osteomyelitis on MRI. Echo obtained revealing a freely mobile structure in the right atrium concerning for possible atrial wall vegetation vs prominent eustachian valve. Cardiology consulted with plans for RCISTOPHER on 12/6. Cont Daptomycin per ID. Follow cultures and repeat labs in the AM.      E coli UTI: Cont Rocephin as she appears to be tolerating well.      Recently diagnosed LLL pneumonia: Procal is normal. Antibiotics as above. Order sputum culture.      (+) Rhinovirus: Supportive treatment.     LLE/ankle wound: No evidence of erythema or drainage at present. No acute bony findings on x-ray. No evidence of abscess or  osteomyelitis on MRI.       Borderline hypokalemia: K+ improved with supplementation. Mg is >2. Cont electrolyte replacement protocols.      Intrauterine pregnancy: Measuring approx 6-week 3-day gestation on US. Will need outpatient follow up with OB.       Hx of substance abuse: UDS positive for buprenorphine which is confirmed on MEGHAN.      DVT PPX: Lovenox     Disposition Pending additional workup with finalization of cultures.     Khanh Kruse DO  24  15:20 EST     Electronically signed by Khanh Kruse DO at 24 [phone]       Vero Haynes APRN at 24 1338                     PROGRESS NOTE         Patient Identification:  Name:  Sailaja Alarcon  Age:  28 y.o.  Sex:  female  :  1995  MRN:  [id number]  Visit Number:  [id number]  Primary Care Provider:  Margaret Baxter APRN         LOS: 1 day       ----------------------------------------------------------------------------------------------------------------------  Subjective       Chief Complaints:    Abnormal Lab        Interval History:      Patient resting comfortably in bed today.  Lungs clear to auscultation bilaterally.  Abdomen soft, nontender.  Afebrile, denies diarrhea.  Denies dysuria, urgency, frequency.  MRI of the left ankle from 2024 reports no evidence of soft tissue abscess or osteomyelitis.  Urine culture from 12/3/2024 finalizes greater than 100,000 colonies of E. coli.  2D echo from 2024 reported a freely mobile structure in the right atrium the, cannot rule out right atrial wall vegetation versus prominent eustachian valve.  Blood cultures from 12/3/2024 1 out of 2 sets positive for MRSA.    Review of Systems:    Constitutional: no fever, chills and night sweats.  Generalized fatigue.  Eyes: no eye drainage, itching or redness.  HEENT: no mouth sores, dysphagia or nose bleed.  Respiratory: no for shortness of breath, cough or production of sputum.  Cardiovascular: no  chest pain, no palpitations, no orthopnea.  Gastrointestinal: no nausea, vomiting or diarrhea. No abdominal pain, hematemesis or rectal bleeding.  Genitourinary: no dysuria or polyuria.  Hematologic/lymphatic: no lymph node abnormalities, no easy bruising or easy bleeding.  Musculoskeletal: no muscle or joint pain.  Skin: No rash and no itching.  Neurological: no loss of consciousness, no seizure, no headache.  Behavioral/Psych: no depression or suicidal ideation.  Endocrine: no hot flashes.  Immunologic: negative.    ----------------------------------------------------------------------------------------------------------------------      Objective       [facility] [place] Meds:  buprenorphine-naloxone, 2 tablet, Sublingual, Daily  cefTRIAXone, 2,000 mg, Intravenous, Q24H  DAPTOmycin, 8 mg/kg, Intravenous, Q24H  enoxaparin, 40 mg, Subcutaneous, Q24H  folic acid, 5 mg, Oral, Daily  gabapentin, 800 mg, Oral, Q8H  nicotine, 1 patch, Transdermal, Q24H  sodium chloride, 10 mL, Intravenous, Q12H         ----------------------------------------------------------------------------------------------------------------------    Vital Signs:  Temp:  [97.9 °F (36.6 °C)-98.6 °F (37 °C)] 98.1 °F (36.7 °C)  Heart Rate:  [61-83] 83  Resp:  [16-18] 18  BP: (90-98)/(50-58) 98/56  Mean Arterial Pressure (Non-Invasive) for the past 24 hrs (Last 3 readings):   Noninvasive MAP (mmHg)   12/05/24 0615 60     SpO2 Percentage    12/04/24 1900 12/04/24 2300 12/05/24 0300   SpO2: 96% 98% 99%     SpO2:  [96 %-99 %] 99 %  on   ;   Device (Oxygen Therapy): room air    Body mass index is 23.24 kg/m².  Wt Readings from Last 3 Encounters:   12/03/24 59.5 kg (131 lb 2.8 oz)   12/01/24 59.4 kg (131 lb)   09/24/24 72.6 kg (160 lb)        Intake/Output Summary (Last 24 hours) at 12/5/2024 1339  Last data filed at 12/5/2024 1231  Gross per 24 hour   Intake 1350 ml   Output --   Net 1350 ml     Diet: Regular/House; Fluid Consistency: Thin (IDDSI 0)  NPO  Diet NPO Type: Sips with Meds  ----------------------------------------------------------------------------------------------------------------------      Physical Exam:    Constitutional:  Well-developed and well-nourished.  No respiratory distress.      HENT:  Head: Normocephalic and atraumatic.  Mouth:  Moist mucous membranes.  Poor dentition, multiple broken teeth.  Eyes:  Conjunctivae and EOM are normal.  No scleral icterus.  Neck:  Neck supple.  No JVD present.    Cardiovascular:  Normal rate, regular rhythm and normal heart sounds with no murmur. No edema.  Pulmonary/Chest:  No respiratory distress, no wheezes, no crackles, with normal breath sounds and good air movement.  Abdominal:  Soft.  Bowel sounds are normal.  No distension and no tenderness.   Musculoskeletal:  No edema, no tenderness, and no deformity.  No swelling or redness of joints.  Neurological:  Alert and oriented to person, place, and time.  No facial droop.  No slurred speech.   Skin:  Skin is warm and dry.  No rash noted.  No pallor.  Left ankle wound with scabbing, no surrounding erythema or edema.  Psychiatric:  Normal mood and affect.  Behavior is normal.        ----------------------------------------------------------------------------------------------------------------------  Results from last 7 days   Lab Units 12/03/24  1246 12/01/24  1305   HSTROP T ng/L <6 <6           Results from last 7 days   Lab Units 12/03/24  1246 12/01/24  1305   CRP mg/dL 6.58*  --    LACTATE mmol/L 2.0 1.2   WBC 10*3/mm3 4.52 3.68   HEMOGLOBIN g/dL 11.7* 10.3*   HEMATOCRIT % 35.8 30.9*   MCV fL 90.9 90.6   MCHC g/dL 32.7 33.3   PLATELETS 10*3/mm3 185 116*     Results from last 7 days   Lab Units 12/04/24  0243 12/03/24  1246 12/01/24  1305   SODIUM mmol/L 132* 135* 135*   POTASSIUM mmol/L 4.7  4.7 3.6 4.0   MAGNESIUM mg/dL  --  2.1  --    CHLORIDE mmol/L 104 99 102   CO2 mmol/L 17.0* 20.7* 22.0   BUN mg/dL 9 5* 7   CREATININE mg/dL 0.55* 0.59 0.61  "  CALCIUM mg/dL 9.0 9.8 9.5   GLUCOSE mg/dL 99 97 93   ALBUMIN g/dL 3.0* 4.2 3.6   BILIRUBIN mg/dL 0.2 0.4 0.4   ALK PHOS U/L 65 86 76   AST (SGOT) U/L 7 10 7   ALT (SGPT) U/L 5 7 7   Estimated Creatinine Clearance: 140.5 mL/min (A) (by C-G formula based on SCr of 0.55 mg/dL (L)).  No results found for: \"AMMONIA\"    Glucose   Date/Time Value Ref Range Status   12/04/2024 0655 104 70 - 130 mg/dL Final     No results found for: \"HGBA1C\"  No results found for: \"TSH\", \"FREET4\"    Blood Culture   Date Value Ref Range Status   12/03/2024 Staphylococcus aureus, MRSA (C)  Final     Comment:       Infectious disease consultation is highly recommended to rule out distant foci of infection.  Methicillin resistant Staphylococcus aureus, Patient may be an isolation risk.   12/03/2024 No growth at 2 days  Preliminary   12/01/2024 Staphylococcus aureus, MRSA (C)  Final     Comment:       Infectious disease consultation is highly recommended to rule out distant foci of infection.  Methicillin resistant Staphylococcus aureus, Patient may be an isolation risk.   12/01/2024 Staphylococcus, coagulase negative (C)  Final     Urine Culture   Date Value Ref Range Status   12/03/2024 >100,000 CFU/mL Escherichia coli (A)  Final     No results found for: \"WOUNDCX\"  No results found for: \"STOOLCX\"  No results found for: \"RESPCX\"  Pain Management Panel  More data exists         Latest Ref Rng & Units 12/3/2024 3/6/2023   Pain Management Panel   Amphetamine, Urine Qual Negative Negative  Negative    Barbiturates Screen, Urine Negative Negative  Negative    Benzodiazepine Screen, Urine Negative Negative  Negative    Buprenorphine, Screen, Urine Negative Positive  Positive    Cocaine Screen, Urine Negative Negative  Negative    Fentanyl, Urine Negative Negative  -   Methadone Screen , Urine Negative Negative  Negative    Methamphetamine, Ur Negative Negative  Negative       Details             "         ----------------------------------------------------------------------------------------------------------------------  Imaging Results (Last 24 Hours)       Procedure Component Value Units Date/Time    MRI Ankle Left Without Contrast [[id number]] Collected: 12/04/24 1443     Updated: 12/04/24 1447    Narrative:      PROCEDURE: MRI ANKLE LEFT  WO CONTRAST-     HISTORY: Wound, bacteremia; R78.81-Bacteremia     TECHNIQUE: Multiplanar multisequence imaging of the ankle was performed  without the use of intravenous contrast.     COMPARISON: None.     FINDINGS: The anterior and posterior syndesmotic ligaments are intact.  The anterior talofibular ligament is intact. The deltoid ligament is  intact. The spring ligament is intact. The flexor and extensor tendons  are intact. The Achilles tendon is intact. The peroneal longus and  brevis tendons are intact. The visualized plantar fascia is normal. The  articular cartilage is preserved. Bone marrow signal is homogeneous..  Soft tissue edema is present. There is no discrete abscess.       Impression:      No evidence of a soft tissue abscess or osteomyelitis.        This report was finalized on 12/4/2024 2:45 PM by Pal Hedrick M.D..               ----------------------------------------------------------------------------------------------------------------------    Pertinent Infectious Disease Results                Assessment/Plan       Assessment       MRSA bacteremia  Concerns for endocarditis  Left lower extremity chronic wound  E. coli UTI  Pregnancy, 6 weeks gestation      Plan        Patient resting comfortably in bed today.  Lungs clear to auscultation bilaterally.  Abdomen soft, nontender.  Afebrile, denies diarrhea.  Denies dysuria, urgency, frequency.  MRI of the left ankle from 12/4/2024 reports no evidence of soft tissue abscess or osteomyelitis.  Urine culture from 12/3/2024 finalizes greater than 100,000 colonies of E. coli.  2D echo from  12/4/2024 reported a freely mobile structure in the right atrium the, cannot rule out right atrial wall vegetation versus prominent eustachian valve.  Blood cultures from 12/3/2024 1 out of 2 sets positive for MRSA.    Blood cultures from 12/1/2024 1 out of 2 sets positive for MRSA and 1 out of 2 sets positive for coagulase-negative Staphylococcus.    Based on 2D echo recommend CRISTOPHER to further evaluate for possible vegetation.  Repeat blood cultures x 2 ordered for today.  Recommend to continue daptomycin 8 mg/kg IV every 24 hours for MRSA bacteremia and ceftriaxone 2 g IV every 24 hours for E. coli UTI.  We will continue to follow closely and adjust antibiotic therapy as needed.    ANTIMICROBIAL THERAPY    azithromycin - 250 MG  cefTRIAXone (ROCEPHIN) 2000 mg IVPB in 100 mL NS (VTB)  daptomycin     Code Status:   Code Status and Medical Interventions: CPR (Attempt to Resuscitate); Full Support   Ordered at: 12/03/24 1721     Code Status (Patient has no pulse and is not breathing):    CPR (Attempt to Resuscitate)     Medical Interventions (Patient has pulse or is breathing):    Full Support       BIANCA Truong  12/05/24  13:39 EST    Electronically signed by Vero Haynes APRN at 12/05/24 1351       Consult Notes (last 24 hours)  Notes from 12/05/24 1311 through 12/06/24 1311   No notes of this type exist for this encounter.

## 2024-12-07 LAB
ANION GAP SERPL CALCULATED.3IONS-SCNC: 11.2 MMOL/L (ref 5–15)
BASOPHILS # BLD AUTO: 0.03 10*3/MM3 (ref 0–0.2)
BASOPHILS NFR BLD AUTO: 0.6 % (ref 0–1.5)
BUN SERPL-MCNC: 13 MG/DL (ref 6–20)
BUN/CREAT SERPL: 18.6 (ref 7–25)
CALCIUM SPEC-SCNC: 9.6 MG/DL (ref 8.6–10.5)
CHLORIDE SERPL-SCNC: 98 MMOL/L (ref 98–107)
CO2 SERPL-SCNC: 22.8 MMOL/L (ref 22–29)
CREAT SERPL-MCNC: 0.7 MG/DL (ref 0.57–1)
DEPRECATED RDW RBC AUTO: 41.3 FL (ref 37–54)
EGFRCR SERPLBLD CKD-EPI 2021: 119.5 ML/MIN/1.73
EOSINOPHIL # BLD AUTO: 0.05 10*3/MM3 (ref 0–0.4)
EOSINOPHIL NFR BLD AUTO: 1.1 % (ref 0.3–6.2)
ERYTHROCYTE [DISTWIDTH] IN BLOOD BY AUTOMATED COUNT: 12.4 % (ref 12.3–15.4)
GLUCOSE SERPL-MCNC: 95 MG/DL (ref 65–99)
HCT VFR BLD AUTO: 29.5 % (ref 34–46.6)
HGB BLD-MCNC: 9.7 G/DL (ref 12–15.9)
IMM GRANULOCYTES # BLD AUTO: 0.04 10*3/MM3 (ref 0–0.05)
IMM GRANULOCYTES NFR BLD AUTO: 0.9 % (ref 0–0.5)
LYMPHOCYTES # BLD AUTO: 0.97 10*3/MM3 (ref 0.7–3.1)
LYMPHOCYTES NFR BLD AUTO: 20.6 % (ref 19.6–45.3)
MCH RBC QN AUTO: 30 PG (ref 26.6–33)
MCHC RBC AUTO-ENTMCNC: 32.9 G/DL (ref 31.5–35.7)
MCV RBC AUTO: 91.3 FL (ref 79–97)
MONOCYTES # BLD AUTO: 0.36 10*3/MM3 (ref 0.1–0.9)
MONOCYTES NFR BLD AUTO: 7.7 % (ref 5–12)
NEUTROPHILS NFR BLD AUTO: 3.25 10*3/MM3 (ref 1.7–7)
NEUTROPHILS NFR BLD AUTO: 69.1 % (ref 42.7–76)
NRBC BLD AUTO-RTO: 0 /100 WBC (ref 0–0.2)
PLATELET # BLD AUTO: 187 10*3/MM3 (ref 140–450)
PMV BLD AUTO: 10.8 FL (ref 6–12)
POTASSIUM SERPL-SCNC: 4.2 MMOL/L (ref 3.5–5.2)
RBC # BLD AUTO: 3.23 10*6/MM3 (ref 3.77–5.28)
SODIUM SERPL-SCNC: 132 MMOL/L (ref 136–145)
WBC NRBC COR # BLD AUTO: 4.7 10*3/MM3 (ref 3.4–10.8)

## 2024-12-07 PROCEDURE — 99232 SBSQ HOSP IP/OBS MODERATE 35: CPT | Performed by: INTERNAL MEDICINE

## 2024-12-07 PROCEDURE — 85025 COMPLETE CBC W/AUTO DIFF WBC: CPT | Performed by: INTERNAL MEDICINE

## 2024-12-07 PROCEDURE — 25010000002 DAPTOMYCIN PER 1 MG: Performed by: NURSE PRACTITIONER

## 2024-12-07 PROCEDURE — 25010000002 CEFTRIAXONE PER 250 MG: Performed by: NURSE PRACTITIONER

## 2024-12-07 PROCEDURE — 99233 SBSQ HOSP IP/OBS HIGH 50: CPT | Performed by: NURSE PRACTITIONER

## 2024-12-07 PROCEDURE — 80048 BASIC METABOLIC PNL TOTAL CA: CPT | Performed by: INTERNAL MEDICINE

## 2024-12-07 RX ORDER — FAMOTIDINE 20 MG/1
20 TABLET, FILM COATED ORAL
Status: DISCONTINUED | OUTPATIENT
Start: 2024-12-07 | End: 2024-12-09 | Stop reason: HOSPADM

## 2024-12-07 RX ORDER — FAMOTIDINE 20 MG/1
20 TABLET, FILM COATED ORAL
Status: DISCONTINUED | OUTPATIENT
Start: 2024-12-08 | End: 2024-12-07

## 2024-12-07 RX ORDER — FAMOTIDINE 20 MG/1
20 TABLET, FILM COATED ORAL
Status: DISCONTINUED | OUTPATIENT
Start: 2024-12-07 | End: 2024-12-07

## 2024-12-07 RX ADMIN — CALCIUM CARBONATE 2 TABLET: 500 TABLET, CHEWABLE ORAL at 15:25

## 2024-12-07 RX ADMIN — FAMOTIDINE 20 MG: 20 TABLET ORAL at 22:23

## 2024-12-07 RX ADMIN — DOCUSATE SODIUM 100 MG: 100 CAPSULE, LIQUID FILLED ORAL at 22:23

## 2024-12-07 RX ADMIN — DAPTOMYCIN 500 MG: 500 INJECTION, POWDER, LYOPHILIZED, FOR SOLUTION INTRAVENOUS at 12:50

## 2024-12-07 RX ADMIN — Medication 10 ML: at 09:15

## 2024-12-07 RX ADMIN — POLYETHYLENE GLYCOL (3350) 17 G: 17 POWDER, FOR SOLUTION ORAL at 09:18

## 2024-12-07 RX ADMIN — CALCIUM CARBONATE 2 TABLET: 500 TABLET, CHEWABLE ORAL at 00:45

## 2024-12-07 RX ADMIN — NICOTINE 1 PATCH: 21 PATCH TRANSDERMAL at 22:23

## 2024-12-07 RX ADMIN — BUPRENORPHINE AND NALOXONE 1 TABLET: 8; 2 TABLET SUBLINGUAL at 12:42

## 2024-12-07 RX ADMIN — DOCUSATE SODIUM 100 MG: 100 CAPSULE, LIQUID FILLED ORAL at 09:16

## 2024-12-07 RX ADMIN — GABAPENTIN 800 MG: 400 CAPSULE ORAL at 05:55

## 2024-12-07 RX ADMIN — FOLIC ACID 5 MG: 1 TABLET ORAL at 09:16

## 2024-12-07 RX ADMIN — ACETAMINOPHEN 650 MG: 325 TABLET ORAL at 13:45

## 2024-12-07 RX ADMIN — GABAPENTIN 800 MG: 400 CAPSULE ORAL at 13:44

## 2024-12-07 RX ADMIN — Medication 10 ML: at 22:23

## 2024-12-07 RX ADMIN — SODIUM CHLORIDE 2000 MG: 9 INJECTION, SOLUTION INTRAVENOUS at 15:17

## 2024-12-07 RX ADMIN — GABAPENTIN 800 MG: 400 CAPSULE ORAL at 22:23

## 2024-12-07 NOTE — NURSING NOTE
Patients blood pressure was 85/48, patient refused manual. Patient kept stating that was her normal.

## 2024-12-07 NOTE — PLAN OF CARE
Goal Outcome Evaluation:              Outcome Evaluation: pt resting in bed with no complaints at this time. pt a&o x4. pt on room air, VSS. pt has ambulated in room this shift. gave pt altepase for midline which now has blood return. will continue plan of care.

## 2024-12-07 NOTE — PROGRESS NOTES
[facility] HOSPITALIST PROGRESS NOTE    Subjective     History:   Sailaja Alarcon is a 28 y.o. female admitted on 12/3/2024 secondary to Positive blood cultures     Procedures:   12/5/24: Midline catheter placement right brachial vein     CC: Follow up bacteremia     Patient seen and examined with JOSEPH Becerril. Awake and alert with her aunt  present at bedside. Tearful regarding recs for continued hospitalization for prolonged course of IV antibiotics. No reported CP. No reported vomiting. No acute events overnight per RN.     History taken from: patient, chart, and RN.      Objective     Vital Signs  Temp:  [97.4 °F (36.3 °C)-98.2 °F (36.8 °C)] 97.4 °F (36.3 °C)  Heart Rate:  [68-84] 84  Resp:  [16-18] 18  BP: (84-98)/(48-58) 90/58    Intake/Output Summary (Last 24 hours) at 12/7/2024 1327  Last data filed at 12/7/2024 0500  Gross per 24 hour   Intake 1604.22 ml   Output --   Net 1604.22 ml         Physical Exam:   General:    Awake, alert, tearful, in no acute distress, poor dentition    Heart:      Normal S1 and S2. Regular rate and rhythm. No significant murmur, rubs or gallops appreciated.   Lungs:     Respirations regular, even and unlabored. Lungs clear to auscultation B/L. No wheezes, rales or rhonchi.   Abdomen:   Soft and nontender. No guarding, rebound tenderness or  organomegaly noted. Bowel sounds present x 4.   Extremities:  No clubbing, cyanosis or edema noted. Moves UE and LE equally B/L. LLE/ankle wound with no surrounding erythema or drainage appreciated.      Results Review:    Results from last 7 days   Lab Units 12/07/24  0112 12/06/24  0139 12/03/24  1246 12/01/24  1305   WBC 10*3/mm3 4.70 4.27 4.52 3.68   HEMOGLOBIN g/dL 9.7* 9.4* 11.7* 10.3*   PLATELETS 10*3/mm3 187 201 185 116*     Results from last 7 days   Lab Units 12/07/24  0112 12/06/24  0140 12/04/24  0243 12/03/24  1246 12/01/24  1305   SODIUM mmol/L 132* 133* 132* 135* 135*   POTASSIUM mmol/L 4.2 4.3 4.7  4.7  3.6 4.0   CHLORIDE mmol/L 98 100 104 99 102   CO2 mmol/L 22.8 24.2 17.0* 20.7* 22.0   BUN mg/dL 13 14 9 5* 7   CREATININE mg/dL 0.70 0.63 0.55* 0.59 0.61   CALCIUM mg/dL 9.6 9.3 9.0 9.8 9.5   GLUCOSE mg/dL 95 108* 99 97 93     Results from last 7 days   Lab Units 12/04/24  0243 12/03/24  1246 12/01/24  1305   BILIRUBIN mg/dL 0.2 0.4 0.4   ALK PHOS U/L 65 86 76   AST (SGOT) U/L 7 10 7   ALT (SGPT) U/L 5 7 7     Results from last 7 days   Lab Units 12/03/24  1246   MAGNESIUM mg/dL 2.1         Results from last 7 days   Lab Units 12/03/24  1246 12/01/24  1305   HSTROP T ng/L <6 <6       Imaging Results (Last 24 Hours)       ** No results found for the last 24 hours. **              Medications:  buprenorphine-naloxone, 2 tablet, Sublingual, Daily  cefTRIAXone, 2,000 mg, Intravenous, Q24H  DAPTOmycin, 8 mg/kg, Intravenous, Q24H  docusate sodium, 100 mg, Oral, BID  enoxaparin, 40 mg, Subcutaneous, Q24H  folic acid, 5 mg, Oral, Daily  gabapentin, 800 mg, Oral, Q8H  nicotine, 1 patch, Transdermal, Q24H  polyethylene glycol, 17 g, Oral, Daily  sodium chloride, 10 mL, Intravenous, Q12H  sodium chloride, 10 mL, Intravenous, Q12H               Assessment & Plan   MRSA bacteremia: Blood cultures from ED visit on 12/1 initially revealed coagulase negative Staph thought to be a contaminant. However, 1/2 cultures from 12/1 later revealed MRSA.  Repeat blood cultures obtained on 12/3 with 1/2 also revealing MRSA. Repeat cultures from 12/5 with NGTD. LLE wound noted with no erythema or drainage appreciated at present but pt reports recent concern for active infection. X-rays of left ankle revealed soft tissue swelling with no obvious bony abnormalities. No evidence of abscess or osteomyelitis on MRI. Echo obtained revealing a freely mobile structure in the right atrium concerning for possible atrial wall vegetation vs prominent eustachian valve. Cardiology consulted with initial plans for CRISTOPHER on 12/6. However, discussed with  cardiology at length who feels that TTE strongly suggestive of infective endocarditis and CRISTOPHER canceled in the setting of risk associated with anesthesia and pregnancy. Cont Daptomycin per ID with ID recommending 6 week course of treatment from 1st set of negative cultures. Pt at high risk for outpatient IV antibiotics in the setting of hx of substance abuse and concern for noncompliance with follow up. Follow cultures and repeat labs in the AM. ID and cardiology input appreciated.       E coli UTI: Complete course of Rocephin as she appears to be tolerating well.      Recently diagnosed LLL pneumonia: Procal is normal. Antibiotics as above. Sputum culture ordered.      (+) Rhinovirus: Supportive treatment.     LLE/ankle wound: No evidence of erythema or drainage at present. No acute bony findings on x-ray. No evidence of abscess or osteomyelitis on MRI.       Borderline hypokalemia: K+ improved with supplementation. Mg is >2. Cont electrolyte replacement protocols.      Intrauterine pregnancy: Measuring approx 6-week 3-day gestation on US. Will need outpatient follow up with OB.       Hx of substance abuse: UDS positive for buprenorphine which is confirmed on MEGHAN. Pt adamantly denies any recent IVDA.     Constipation: Cont bowel regimen.      DVT PPX: Lovenox     Discussed with ID APRN.     Disposition Pt will require 6 week course of IV antibiotics. Possible LTAC if patient agreeable to remain hospitalized.     Khanh Kruse,   12/07/24  13:27 EST

## 2024-12-07 NOTE — NURSING NOTE
Pt refused 2100 dose of Lovenox. I explained that she was at higher risk of blood clots related to her pregnancy, smoking status, and infection. She stated that she understood that the medicine was for older people who can't get out of bed and that she felt she got up enough that she doesn't need it. I again educated her on all the different factors increasing her risk and that blood clots can cause complications up to and including death and that it was prescribed because simply walking occasionally is not enough of a preventative against clots. She endorsed understanding but still refused. MICHELLE Danielle notified.

## 2024-12-07 NOTE — PROGRESS NOTES
PROGRESS NOTE         Patient Identification:  Name:  Sailaja Alarcon  Age:  28 y.o.  Sex:  female  :  1995  MRN:  [id number]  Visit Number:  [id number]  Primary Care Provider:  Margaret Baxter APRN         LOS: 3 days       ----------------------------------------------------------------------------------------------------------------------  Subjective       Chief Complaints:    Abnormal Lab        Interval History:      Patient anxious sitting up in bed.  Aunt at bedside.  Patient anxious to go home soon.  Continues on room air with no apparent distress.  Lungs clear to auscultation bilaterally.  Abdomen soft, nontender.  Afebrile, no reported diarrhea.  WBC normal at 4.70.  Blood cultures from 2024 show no growth thus far.    Review of Systems:    Constitutional: no fever, chills and night sweats.    Eyes: no eye drainage, itching or redness.  HEENT: no mouth sores, dysphagia or nose bleed.  Respiratory: no for shortness of breath, cough or production of sputum.  Cardiovascular: no chest pain, no palpitations, no orthopnea.  Gastrointestinal: no nausea, vomiting or diarrhea. No abdominal pain, hematemesis or rectal bleeding.  Genitourinary: no dysuria or polyuria.  Hematologic/lymphatic: no lymph node abnormalities, no easy bruising or easy bleeding.  Musculoskeletal: no muscle or joint pain.  Skin: No rash and no itching.  Neurological: no loss of consciousness, no seizure, no headache.  Behavioral/Psych: no depression or suicidal ideation.  Endocrine: no hot flashes.  Immunologic: negative.    ----------------------------------------------------------------------------------------------------------------------      Objective       [facility] Meds:  buprenorphine-naloxone, 2 tablet, Sublingual, Daily  cefTRIAXone, 2,000 mg, Intravenous, Q24H  DAPTOmycin, 8 mg/kg, Intravenous, Q24H  docusate sodium, 100 mg, Oral, BID  enoxaparin, 40 mg, Subcutaneous, Q24H  folic  acid, 5 mg, Oral, Daily  gabapentin, 800 mg, Oral, Q8H  nicotine, 1 patch, Transdermal, Q24H  polyethylene glycol, 17 g, Oral, Daily  sodium chloride, 10 mL, Intravenous, Q12H  sodium chloride, 10 mL, Intravenous, Q12H         ----------------------------------------------------------------------------------------------------------------------    Vital Signs:  Temp:  [97.4 °F (36.3 °C)-98.2 °F (36.8 °C)] 97.4 °F (36.3 °C)  Heart Rate:  [68-84] 84  Resp:  [16-18] 18  BP: (84-98)/(48-58) 90/58  No data found.    SpO2 Percentage    12/05/24 0300 12/06/24 2100 12/07/24 0300   SpO2: 99% 99% 99%     SpO2:  [99 %] 99 %  on   ;   Device (Oxygen Therapy): room air    Body mass index is 23.24 kg/m².  Wt Readings from Last 3 Encounters:   12/03/24 59.5 kg (131 lb 2.8 oz)   12/01/24 59.4 kg (131 lb)   09/24/24 72.6 kg (160 lb)        Intake/Output Summary (Last 24 hours) at 12/7/2024 1314  Last data filed at 12/7/2024 0500  Gross per 24 hour   Intake 1604.22 ml   Output --   Net 1604.22 ml     Diet: Regular/House; Fluid Consistency: Thin (IDDSI 0)  ----------------------------------------------------------------------------------------------------------------------      Physical Exam:    Constitutional:  Well-developed and well-nourished.  No respiratory distress.  Aunt at bedside.  HENT:  Head: Normocephalic and atraumatic.  Mouth:  Moist mucous membranes.  Poor dentition, multiple broken teeth.  Eyes:  Conjunctivae and EOM are normal.  No scleral icterus.  Neck:  Neck supple.  No JVD present.    Cardiovascular:  Normal rate, regular rhythm and normal heart sounds with no murmur. No edema.  Pulmonary/Chest:  No respiratory distress, no wheezes, no crackles, with normal breath sounds and good air movement.  Abdominal:  Soft.  Bowel sounds are normal.  No distension and no tenderness.   Musculoskeletal:  No edema, no tenderness, and no deformity.  No swelling or redness of joints.  Neurological:  Alert and oriented to person,  "place, and time.  No facial droop.  No slurred speech.   Skin:  Skin is warm and dry.  No rash noted.  No pallor.  Left ankle wound with scabbing, no surrounding erythema or edema.  Psychiatric:  Agitated, angry.        ----------------------------------------------------------------------------------------------------------------------  Results from last 7 days   Lab Units 12/03/24  1246 12/01/24  1305   HSTROP T ng/L <6 <6           Results from last 7 days   Lab Units 12/07/24  0112 12/06/24  0139 12/03/24  1246 12/01/24  1305   CRP mg/dL  --   --  6.58*  --    LACTATE mmol/L  --   --  2.0 1.2   WBC 10*3/mm3 4.70 4.27 4.52 3.68   HEMOGLOBIN g/dL 9.7* 9.4* 11.7* 10.3*   HEMATOCRIT % 29.5* 28.5* 35.8 30.9*   MCV fL 91.3 90.5 90.9 90.6   MCHC g/dL 32.9 33.0 32.7 33.3   PLATELETS 10*3/mm3 187 201 185 116*     Results from last 7 days   Lab Units 12/07/24  0112 12/06/24  0140 12/04/24  0243 12/03/24  1246 12/01/24  1305   SODIUM mmol/L 132* 133* 132* 135* 135*   POTASSIUM mmol/L 4.2 4.3 4.7  4.7 3.6 4.0   MAGNESIUM mg/dL  --   --   --  2.1  --    CHLORIDE mmol/L 98 100 104 99 102   CO2 mmol/L 22.8 24.2 17.0* 20.7* 22.0   BUN mg/dL 13 14 9 5* 7   CREATININE mg/dL 0.70 0.63 0.55* 0.59 0.61   CALCIUM mg/dL 9.6 9.3 9.0 9.8 9.5   GLUCOSE mg/dL 95 108* 99 97 93   ALBUMIN g/dL  --   --  3.0* 4.2 3.6   BILIRUBIN mg/dL  --   --  0.2 0.4 0.4   ALK PHOS U/L  --   --  65 86 76   AST (SGOT) U/L  --   --  7 10 7   ALT (SGPT) U/L  --   --  5 7 7   Estimated Creatinine Clearance: 110.4 mL/min (by C-G formula based on SCr of 0.7 mg/dL).  No results found for: \"AMMONIA\"    No results found for: \"HGBA1C\", \"POCGLU\"    No results found for: \"HGBA1C\"  No results found for: \"TSH\", \"FREET4\"    Blood Culture   Date Value Ref Range Status   12/03/2024 Staphylococcus aureus, MRSA (C)  Final     Comment:       Infectious disease consultation is highly recommended to rule out distant foci of infection.  Methicillin resistant Staphylococcus " "aureus, Patient may be an isolation risk.   12/03/2024 No growth at 2 days  Preliminary   12/01/2024 Staphylococcus aureus, MRSA (C)  Final     Comment:       Infectious disease consultation is highly recommended to rule out distant foci of infection.  Methicillin resistant Staphylococcus aureus, Patient may be an isolation risk.   12/01/2024 Staphylococcus, coagulase negative (C)  Final     Urine Culture   Date Value Ref Range Status   12/03/2024 >100,000 CFU/mL Escherichia coli (A)  Final     No results found for: \"WOUNDCX\"  No results found for: \"STOOLCX\"  No results found for: \"RESPCX\"  Pain Management Panel  More data exists         Latest Ref Rng & Units 12/3/2024 3/6/2023   Pain Management Panel   Amphetamine, Urine Qual Negative Negative  Negative    Barbiturates Screen, Urine Negative Negative  Negative    Benzodiazepine Screen, Urine Negative Negative  Negative    Buprenorphine, Screen, Urine Negative Positive  Positive    Cocaine Screen, Urine Negative Negative  Negative    Fentanyl, Urine Negative Negative  -   Methadone Screen , Urine Negative Negative  Negative    Methamphetamine, Ur Negative Negative  Negative       Details                     ----------------------------------------------------------------------------------------------------------------------  Imaging Results (Last 24 Hours)       ** No results found for the last 24 hours. **            ----------------------------------------------------------------------------------------------------------------------    Pertinent Infectious Disease Results                Assessment/Plan       Assessment       MRSA bacteremia  Concerns for endocarditis  Left lower extremity chronic wound  E. coli UTI  Pregnancy, 6 weeks gestation      Plan        Patient anxious sitting up in bed.  Aunt at bedside.  Patient anxious to go home soon.  Continues on room air with no apparent distress.  Lungs clear to auscultation bilaterally.  Abdomen soft, nontender.  " Afebrile, no reported diarrhea.  WBC normal at 4.70.  Blood cultures from 12/5/2024 show no growth thus far.    Explained in depth with patient the risk of going home with IV access and that is not recommended due to patient's past history of IV drug use.  Cardiology recommends to treat as infective endocarditis and has no plans to proceed with CRISTOPHER.     MRI of the left ankle from 12/4/2024 reports no evidence of soft tissue abscess or osteomyelitis.  Urine culture from 12/3/2024 finalizes greater than 100,000 colonies of E. coli.  2D echo from 12/4/2024 reported a freely mobile structure in the right atrium the, cannot rule out right atrial wall vegetation versus prominent eustachian valve.  Blood cultures from 12/3/2024 1 out of 2 sets positive for MRSA.    Blood cultures from 12/1/2024 1 out of 2 sets positive for MRSA and 1 out of 2 sets positive for coagulase-negative Staphylococcus.    Recommend to continue daptomycin 8 mg/kg IV every 24 hours 6 weeks from first negative blood culture for MRSA bacteremia and infective endocarditis through 1/16/2024.  Recommend to continue ceftriaxone 2 g IV every 24 hours for E. coli UTI through 12/8/2024.  Unfortunately Dalvance would not be a great option due to patient's pregnancy, as studies during pregnancy are very limited and previous studies with the use of Dalvance for approximately 4 weeks during the last month of pregnancy for treatment of MRSA endocarditis, the treatment was unsuccessful.  This was discussed in depth with patient and Dr. Kruse. Patient agitated and wants to leave AMA discussed in depth the risk of leaving AMA without receiving full treatment for endocarditis patient aware of these risks.  We will continue to follow closely and adjust antibiotic therapy as needed if patient agrees to stay and complete antibiotic therapy.    ANTIMICROBIAL THERAPY    azithromycin - 250 MG  cefTRIAXone (ROCEPHIN) 2000 mg IVPB in 100 mL NS (VTB)  daptomycin     Code  Status:   Code Status and Medical Interventions: CPR (Attempt to Resuscitate); Full Support   Ordered at: 12/03/24 1721     Code Status (Patient has no pulse and is not breathing):    CPR (Attempt to Resuscitate)     Medical Interventions (Patient has pulse or is breathing):    Full Support       BIANCA Truong  12/07/24  13:14 EST

## 2024-12-08 LAB
ANION GAP SERPL CALCULATED.3IONS-SCNC: 11.8 MMOL/L (ref 5–15)
BACTERIA SPEC AEROBE CULT: NORMAL
BASOPHILS # BLD AUTO: 0.04 10*3/MM3 (ref 0–0.2)
BASOPHILS NFR BLD AUTO: 0.9 % (ref 0–1.5)
BUN SERPL-MCNC: 14 MG/DL (ref 6–20)
BUN/CREAT SERPL: 20.3 (ref 7–25)
CALCIUM SPEC-SCNC: 9.5 MG/DL (ref 8.6–10.5)
CHLORIDE SERPL-SCNC: 99 MMOL/L (ref 98–107)
CK SERPL-CCNC: 7 U/L (ref 20–180)
CO2 SERPL-SCNC: 23.2 MMOL/L (ref 22–29)
CREAT SERPL-MCNC: 0.69 MG/DL (ref 0.57–1)
DEPRECATED RDW RBC AUTO: 41.5 FL (ref 37–54)
EGFRCR SERPLBLD CKD-EPI 2021: 119.9 ML/MIN/1.73
EOSINOPHIL # BLD AUTO: 0.06 10*3/MM3 (ref 0–0.4)
EOSINOPHIL NFR BLD AUTO: 1.4 % (ref 0.3–6.2)
ERYTHROCYTE [DISTWIDTH] IN BLOOD BY AUTOMATED COUNT: 12.3 % (ref 12.3–15.4)
GLUCOSE SERPL-MCNC: 90 MG/DL (ref 65–99)
HCT VFR BLD AUTO: 30.5 % (ref 34–46.6)
HGB BLD-MCNC: 9.8 G/DL (ref 12–15.9)
IMM GRANULOCYTES # BLD AUTO: 0.04 10*3/MM3 (ref 0–0.05)
IMM GRANULOCYTES NFR BLD AUTO: 0.9 % (ref 0–0.5)
LYMPHOCYTES # BLD AUTO: 1.76 10*3/MM3 (ref 0.7–3.1)
LYMPHOCYTES NFR BLD AUTO: 40.7 % (ref 19.6–45.3)
MCH RBC QN AUTO: 29.4 PG (ref 26.6–33)
MCHC RBC AUTO-ENTMCNC: 32.1 G/DL (ref 31.5–35.7)
MCV RBC AUTO: 91.6 FL (ref 79–97)
MONOCYTES # BLD AUTO: 0.49 10*3/MM3 (ref 0.1–0.9)
MONOCYTES NFR BLD AUTO: 11.3 % (ref 5–12)
NEUTROPHILS NFR BLD AUTO: 1.93 10*3/MM3 (ref 1.7–7)
NEUTROPHILS NFR BLD AUTO: 44.8 % (ref 42.7–76)
NRBC BLD AUTO-RTO: 0 /100 WBC (ref 0–0.2)
PLATELET # BLD AUTO: 220 10*3/MM3 (ref 140–450)
PMV BLD AUTO: 10.8 FL (ref 6–12)
POTASSIUM SERPL-SCNC: 4.5 MMOL/L (ref 3.5–5.2)
RBC # BLD AUTO: 3.33 10*6/MM3 (ref 3.77–5.28)
SODIUM SERPL-SCNC: 134 MMOL/L (ref 136–145)
WBC NRBC COR # BLD AUTO: 4.32 10*3/MM3 (ref 3.4–10.8)

## 2024-12-08 PROCEDURE — 85025 COMPLETE CBC W/AUTO DIFF WBC: CPT | Performed by: INTERNAL MEDICINE

## 2024-12-08 PROCEDURE — 99232 SBSQ HOSP IP/OBS MODERATE 35: CPT | Performed by: NURSE PRACTITIONER

## 2024-12-08 PROCEDURE — 25010000002 DAPTOMYCIN PER 1 MG: Performed by: NURSE PRACTITIONER

## 2024-12-08 PROCEDURE — 80048 BASIC METABOLIC PNL TOTAL CA: CPT | Performed by: INTERNAL MEDICINE

## 2024-12-08 PROCEDURE — 82550 ASSAY OF CK (CPK): CPT | Performed by: NURSE PRACTITIONER

## 2024-12-08 PROCEDURE — 99232 SBSQ HOSP IP/OBS MODERATE 35: CPT | Performed by: INTERNAL MEDICINE

## 2024-12-08 RX ADMIN — Medication 10 ML: at 08:07

## 2024-12-08 RX ADMIN — GABAPENTIN 800 MG: 400 CAPSULE ORAL at 13:16

## 2024-12-08 RX ADMIN — BUPRENORPHINE AND NALOXONE 2 TABLET: 8; 2 TABLET SUBLINGUAL at 08:11

## 2024-12-08 RX ADMIN — GABAPENTIN 800 MG: 400 CAPSULE ORAL at 21:35

## 2024-12-08 RX ADMIN — FAMOTIDINE 20 MG: 20 TABLET ORAL at 17:20

## 2024-12-08 RX ADMIN — Medication 10 ML: at 21:36

## 2024-12-08 RX ADMIN — DAPTOMYCIN 500 MG: 500 INJECTION, POWDER, LYOPHILIZED, FOR SOLUTION INTRAVENOUS at 11:07

## 2024-12-08 RX ADMIN — NICOTINE 1 PATCH: 21 PATCH TRANSDERMAL at 21:36

## 2024-12-08 RX ADMIN — FAMOTIDINE 20 MG: 20 TABLET ORAL at 08:06

## 2024-12-08 RX ADMIN — FOLIC ACID 5 MG: 1 TABLET ORAL at 08:06

## 2024-12-08 RX ADMIN — DOCUSATE SODIUM 100 MG: 100 CAPSULE, LIQUID FILLED ORAL at 21:35

## 2024-12-08 RX ADMIN — DOCUSATE SODIUM 100 MG: 100 CAPSULE, LIQUID FILLED ORAL at 08:06

## 2024-12-08 RX ADMIN — GABAPENTIN 800 MG: 400 CAPSULE ORAL at 06:09

## 2024-12-08 NOTE — PROGRESS NOTES
[facility] HOSPITALIST PROGRESS NOTE    Subjective     History:   Sailaja Alarcon is a 28 y.o. female admitted on 12/3/2024 secondary to Positive blood cultures     Procedures:   12/5/24: Midline catheter placement right brachial vein     CC: Follow up bacteremia     Patient seen and examined with JOSEPH Pickering. Awake and alert with her aunt present at bedside. Appears to be in better spirits today. No reported CP. No reported vomiting. No acute events overnight per RN.     History taken from: patient, chart, and RN.      Objective     Vital Signs  Temp:  [97.4 °F (36.3 °C)-98 °F (36.7 °C)] 97.9 °F (36.6 °C)  Heart Rate:  [58-70] 65  Resp:  [16-18] 16  BP: ()/(42-70) 106/70    Intake/Output Summary (Last 24 hours) at 12/8/2024 1142  Last data filed at 12/8/2024 0555  Gross per 24 hour   Intake 1252.77 ml   Output --   Net 1252.77 ml         Physical Exam:   General:    Awake, alert, in no acute distress, poor dentition    Heart:      Normal S1 and S2. Regular rate and rhythm. No significant murmur, rubs or gallops appreciated.   Lungs:     Respirations regular, even and unlabored. Lungs clear to auscultation B/L. No wheezes, rales or rhonchi.   Abdomen:   Soft and nontender. No guarding, rebound tenderness or  organomegaly noted. Bowel sounds present x 4.   Extremities:  No clubbing, cyanosis or edema noted. Moves UE and LE equally B/L. LLE/ankle wound with no surrounding erythema or drainage appreciated.      Results Review:    Results from last 7 days   Lab Units 12/08/24  0228 12/07/24  0112 12/06/24  0139 12/03/24  1246 12/01/24  1305   WBC 10*3/mm3 4.32 4.70 4.27 4.52 3.68   HEMOGLOBIN g/dL 9.8* 9.7* 9.4* 11.7* 10.3*   PLATELETS 10*3/mm3 220 187 201 185 116*     Results from last 7 days   Lab Units 12/08/24  0228 12/07/24  0112 12/06/24  0140 12/04/24  0243 12/03/24  1246 12/01/24  1305   SODIUM mmol/L 134* 132* 133* 132* 135* 135*   POTASSIUM mmol/L 4.5 4.2 4.3 4.7  4.7 3.6 4.0    CHLORIDE mmol/L 99 98 100 104 99 102   CO2 mmol/L 23.2 22.8 24.2 17.0* 20.7* 22.0   BUN mg/dL 14 13 14 9 5* 7   CREATININE mg/dL 0.69 0.70 0.63 0.55* 0.59 0.61   CALCIUM mg/dL 9.5 9.6 9.3 9.0 9.8 9.5   GLUCOSE mg/dL 90 95 108* 99 97 93     Results from last 7 days   Lab Units 12/04/24  0243 12/03/24  1246 12/01/24  1305   BILIRUBIN mg/dL 0.2 0.4 0.4   ALK PHOS U/L 65 86 76   AST (SGOT) U/L 7 10 7   ALT (SGPT) U/L 5 7 7     Results from last 7 days   Lab Units 12/03/24  1246   MAGNESIUM mg/dL 2.1         Results from last 7 days   Lab Units 12/08/24  0228 12/03/24  1246 12/01/24  1305   CK TOTAL U/L 7*  --   --    HSTROP T ng/L  --  <6 <6       Imaging Results (Last 24 Hours)       ** No results found for the last 24 hours. **              Medications:  buprenorphine-naloxone, 2 tablet, Sublingual, Daily  DAPTOmycin, 8 mg/kg, Intravenous, Q24H  docusate sodium, 100 mg, Oral, BID  enoxaparin, 40 mg, Subcutaneous, Q24H  famotidine, 20 mg, Oral, BID AC  folic acid, 5 mg, Oral, Daily  gabapentin, 800 mg, Oral, Q8H  nicotine, 1 patch, Transdermal, Q24H  polyethylene glycol, 17 g, Oral, Daily  sodium chloride, 10 mL, Intravenous, Q12H  sodium chloride, 10 mL, Intravenous, Q12H               Assessment & Plan   MRSA bacteremia: Blood cultures from ED visit on 12/1 initially revealed coagulase negative Staph thought to be a contaminant. However, 1/2 cultures from 12/1 later revealed MRSA.  Repeat blood cultures obtained on 12/3 with 1/2 also revealing MRSA. Repeat cultures from 12/5 with NGTD. LLE wound noted with no erythema or drainage appreciated at present but pt reports recent concern for active infection. X-rays of left ankle revealed soft tissue swelling with no obvious bony abnormalities. No evidence of abscess or osteomyelitis on MRI. Echo obtained revealing a freely mobile structure in the right atrium concerning for possible atrial wall vegetation vs prominent eustachian valve. Cardiology consulted with initial  plans for CRISTOPHER on 12/6. However, discussed with cardiology at length who feels that TTE strongly suggestive of infective endocarditis and CRISTOPHER canceled per cardiology recs in the setting of risk associated with anesthesia and pregnancy. Cont Daptomycin per ID with ID recommending 6 week course of treatment from 1st set of negative cultures. Pt at high risk for outpatient IV antibiotics in the setting of hx of substance abuse and concern for noncompliance with follow up. Will place LTAC referral. Follow cultures and repeat labs in the AM. ID and cardiology input appreciated.       E coli UTI: Completed course of Rocephin.      Recently diagnosed LLL pneumonia: Procal is normal. Antibiotics as above. Sputum culture ordered.      (+) Rhinovirus: Supportive treatment.     LLE/ankle wound: No evidence of erythema or drainage at present. No acute bony findings on x-ray. No evidence of abscess or osteomyelitis on MRI.       Borderline hypokalemia: K+ improved with supplementation and stable today. Mg is >2. Cont electrolyte replacement protocols.      Intrauterine pregnancy: Measuring approx 6-week 3-day gestation on US. Will need outpatient follow up with OB.       Hx of substance abuse: UDS positive for buprenorphine which is confirmed on MEGHAN. Pt adamantly denies any recent IVDA.     Constipation: Cont bowel regimen.     Normocytic anemia: Decreased from upon admission but overall stable. No overt signs of bleeding reported. Repeat CBC in the AM.      DVT PPX: Lovenox     Discussed with ID APRN.     Disposition Pt will require 6 week course of IV antibiotics. Place [facility] referral as patient is currently agreeable to remain hospitalized.     Khanh Kruse,   12/08/24  11:42 EST

## 2024-12-08 NOTE — PLAN OF CARE
Goal Outcome Evaluation:              Outcome Evaluation: Pt resting in bed, VSS on room air. Pt has ambulated many times indep this shift. Pt voices no concerns or complaints at this time, will continue with POC.

## 2024-12-08 NOTE — PLAN OF CARE
Goal Outcome Evaluation:  Plan of Care Reviewed With: patient        Progress: improving  Outcome Evaluation: Patient resting in bed at this time. VSS on RA. Patient has ambulated independently througout the shift. Patient voices no concerns at this time. Will continue with plan of care.

## 2024-12-08 NOTE — PROGRESS NOTES
PROGRESS NOTE         Patient Identification:  Name:  Sailaja Alarcon  Age:  28 y.o.  Sex:  female  :  1995  MRN:  [id number]  Visit Number:  [id number]  Primary Care Provider:  Margaret Baxter APRN         LOS: 4 days       ----------------------------------------------------------------------------------------------------------------------  Subjective       Chief Complaints:    Abnormal Lab        Interval History:      Patient much more calm and cooperative today.  Agreeable to stay for antibiotic therapy for now.  And at bedside.  No complaints this morning.  On room air with no apparent distress.  Lungs clear to auscultation bilaterally.  Abdomen soft, nontender.  WBC normal at 4.32.  CK low at 7.    Review of Systems:    Constitutional: no fever, chills and night sweats.    Eyes: no eye drainage, itching or redness.  HEENT: no mouth sores, dysphagia or nose bleed.  Respiratory: no for shortness of breath, cough or production of sputum.  Cardiovascular: no chest pain, no palpitations, no orthopnea.  Gastrointestinal: no nausea, vomiting or diarrhea. No abdominal pain, hematemesis or rectal bleeding.  Genitourinary: no dysuria or polyuria.  Hematologic/lymphatic: no lymph node abnormalities, no easy bruising or easy bleeding.  Musculoskeletal: no muscle or joint pain.  Skin: No rash and no itching.  Neurological: no loss of consciousness, no seizure, no headache.  Behavioral/Psych: no depression or suicidal ideation.  Endocrine: no hot flashes.  Immunologic: negative.    ----------------------------------------------------------------------------------------------------------------------      Objective       [facility] Meds:  buprenorphine-naloxone, 2 tablet, Sublingual, Daily  DAPTOmycin, 8 mg/kg, Intravenous, Q24H  docusate sodium, 100 mg, Oral, BID  enoxaparin, 40 mg, Subcutaneous, Q24H  famotidine, 20 mg, Oral, BID AC  folic acid, 5 mg, Oral, Daily  gabapentin, 800  mg, Oral, Q8H  nicotine, 1 patch, Transdermal, Q24H  polyethylene glycol, 17 g, Oral, Daily  sodium chloride, 10 mL, Intravenous, Q12H  sodium chloride, 10 mL, Intravenous, Q12H         ----------------------------------------------------------------------------------------------------------------------    Vital Signs:  Temp:  [97.4 °F (36.3 °C)-98 °F (36.7 °C)] 97.9 °F (36.6 °C)  Heart Rate:  [58-70] 65  Resp:  [16-18] 16  BP: ()/(42-70) 106/70  Mean Arterial Pressure (Non-Invasive) for the past 24 hrs (Last 3 readings):   Noninvasive MAP (mmHg)   12/07/24 2255 63       SpO2 Percentage    12/05/24 0300 12/06/24 2100 12/07/24 0300   SpO2: 99% 99% 99%        on   ;   Device (Oxygen Therapy): room air    Body mass index is 23.24 kg/m².  Wt Readings from Last 3 Encounters:   12/03/24 59.5 kg (131 lb 2.8 oz)   12/01/24 59.4 kg (131 lb)   09/24/24 72.6 kg (160 lb)        Intake/Output Summary (Last 24 hours) at 12/8/2024 1314  Last data filed at 12/8/2024 0555  Gross per 24 hour   Intake 1252.77 ml   Output --   Net 1252.77 ml     Diet: Regular/House; Fluid Consistency: Thin (IDDSI 0)  ----------------------------------------------------------------------------------------------------------------------      Physical Exam:    Constitutional:  Well-developed and well-nourished.  No respiratory distress.  Aunt at bedside.  HENT:  Head: Normocephalic and atraumatic.  Mouth:  Moist mucous membranes.  Poor dentition, multiple broken teeth.  Eyes:  Conjunctivae and EOM are normal.  No scleral icterus.  Neck:  Neck supple.  No JVD present.    Cardiovascular:  Normal rate, regular rhythm and normal heart sounds with no murmur. No edema.  Pulmonary/Chest:  No respiratory distress, no wheezes, no crackles, with normal breath sounds and good air movement.  Abdominal:  Soft.  Bowel sounds are normal.  No distension and no tenderness.   Musculoskeletal:  No edema, no tenderness, and no deformity.  No swelling or redness of  "joints.  Neurological:  Alert and oriented to person, place, and time.  No facial droop.  No slurred speech.   Skin:  Skin is warm and dry.  No rash noted.  No pallor.  Left ankle wound with scabbing, no surrounding erythema or edema.  Psychiatric: Calm, cooperative.        ----------------------------------------------------------------------------------------------------------------------  Results from last 7 days   Lab Units 12/08/24 0228 12/03/24  1246   CK TOTAL U/L 7*  --    HSTROP T ng/L  --  <6           Results from last 7 days   Lab Units 12/08/24 0228 12/07/24 0112 12/06/24  0139 12/03/24  1246   CRP mg/dL  --   --   --  6.58*   LACTATE mmol/L  --   --   --  2.0   WBC 10*3/mm3 4.32 4.70 4.27 4.52   HEMOGLOBIN g/dL 9.8* 9.7* 9.4* 11.7*   HEMATOCRIT % 30.5* 29.5* 28.5* 35.8   MCV fL 91.6 91.3 90.5 90.9   MCHC g/dL 32.1 32.9 33.0 32.7   PLATELETS 10*3/mm3 220 187 201 185     Results from last 7 days   Lab Units 12/08/24 0228 12/07/24 0112 12/06/24  0140 12/04/24  0243 12/03/24  1246   SODIUM mmol/L 134* 132* 133* 132* 135*   POTASSIUM mmol/L 4.5 4.2 4.3 4.7  4.7 3.6   MAGNESIUM mg/dL  --   --   --   --  2.1   CHLORIDE mmol/L 99 98 100 104 99   CO2 mmol/L 23.2 22.8 24.2 17.0* 20.7*   BUN mg/dL 14 13 14 9 5*   CREATININE mg/dL 0.69 0.70 0.63 0.55* 0.59   CALCIUM mg/dL 9.5 9.6 9.3 9.0 9.8   GLUCOSE mg/dL 90 95 108* 99 97   ALBUMIN g/dL  --   --   --  3.0* 4.2   BILIRUBIN mg/dL  --   --   --  0.2 0.4   ALK PHOS U/L  --   --   --  65 86   AST (SGOT) U/L  --   --   --  7 10   ALT (SGPT) U/L  --   --   --  5 7   Estimated Creatinine Clearance: 112 mL/min (by C-G formula based on SCr of 0.69 mg/dL).  No results found for: \"AMMONIA\"    No results found for: \"HGBA1C\", \"POCGLU\"    No results found for: \"HGBA1C\"  No results found for: \"TSH\", \"FREET4\"    Blood Culture   Date Value Ref Range Status   12/03/2024 Staphylococcus aureus, MRSA (C)  Final     Comment:       Infectious disease consultation is highly " "recommended to rule out distant foci of infection.  Methicillin resistant Staphylococcus aureus, Patient may be an isolation risk.   12/03/2024 No growth at 2 days  Preliminary   12/01/2024 Staphylococcus aureus, MRSA (C)  Final     Comment:       Infectious disease consultation is highly recommended to rule out distant foci of infection.  Methicillin resistant Staphylococcus aureus, Patient may be an isolation risk.   12/01/2024 Staphylococcus, coagulase negative (C)  Final     Urine Culture   Date Value Ref Range Status   12/03/2024 >100,000 CFU/mL Escherichia coli (A)  Final     No results found for: \"WOUNDCX\"  No results found for: \"STOOLCX\"  No results found for: \"RESPCX\"  Pain Management Panel  More data exists         Latest Ref Rng & Units 12/3/2024 3/6/2023   Pain Management Panel   Amphetamine, Urine Qual Negative Negative  Negative    Barbiturates Screen, Urine Negative Negative  Negative    Benzodiazepine Screen, Urine Negative Negative  Negative    Buprenorphine, Screen, Urine Negative Positive  Positive    Cocaine Screen, Urine Negative Negative  Negative    Fentanyl, Urine Negative Negative  -   Methadone Screen , Urine Negative Negative  Negative    Methamphetamine, Ur Negative Negative  Negative       Details                     ----------------------------------------------------------------------------------------------------------------------  Imaging Results (Last 24 Hours)       ** No results found for the last 24 hours. **            ----------------------------------------------------------------------------------------------------------------------    Pertinent Infectious Disease Results                Assessment/Plan       Assessment       MRSA bacteremia  Concerns for endocarditis  Left lower extremity chronic wound  E. coli UTI  Pregnancy, 6 weeks gestation      Plan        Patient much more calm and cooperative today.  Agreeable to stay for antibiotic therapy for now.  And at bedside.  " No complaints this morning.  On room air with no apparent distress.  Lungs clear to auscultation bilaterally.  Abdomen soft, nontender.  WBC normal at 4.32.  CK low at 7.    Explained in depth with patient the risk of going home with IV access and that is not recommended due to patient's past history of IV drug use.  Cardiology recommends to treat as infective endocarditis and has no plans to proceed with CRISTOPHER due to risk with pregnancy.     MRI of the left ankle from 12/4/2024 reports no evidence of soft tissue abscess or osteomyelitis.  Urine culture from 12/3/2024 finalizes greater than 100,000 colonies of E. coli.  2D echo from 12/4/2024 reported a freely mobile structure in the right atrium the, cannot rule out right atrial wall vegetation versus prominent eustachian valve.  Blood cultures from 12/3/2024 1 out of 2 sets positive for MRSA.    Blood cultures from 12/1/2024 1 out of 2 sets positive for MRSA and 1 out of 2 sets positive for coagulase-negative Staphylococcus.    Recommend to continue daptomycin 8 mg/kg IV every 24 hours 6 weeks from first negative blood culture for MRSA bacteremia and infective endocarditis through 1/16/2024.  Recommend to continue ceftriaxone 2 g IV every 24 hours for E. coli UTI through 12/8/2024, course completed today.  Unfortunately Dalvance would not be a great option due to patient's pregnancy, as studies during pregnancy are very limited and previous studies with the use of Dalvance for approximately 4 weeks during the last month of pregnancy for treatment of MRSA endocarditis, the treatment was unsuccessful.  This was discussed in depth with patient and Dr. Kruse.  We will continue to follow closely and adjust antibiotic therapy as needed if patient agrees to stay and complete antibiotic therapy.    ANTIMICROBIAL THERAPY    azithromycin - 250 MG  daptomycin     Code Status:   Code Status and Medical Interventions: CPR (Attempt to Resuscitate); Full Support   Ordered at:  12/03/24 1721     Code Status (Patient has no pulse and is not breathing):    CPR (Attempt to Resuscitate)     Medical Interventions (Patient has pulse or is breathing):    Full Support       Vero Haynes, BIANCA  12/08/24  13:14 EST

## 2024-12-08 NOTE — PLAN OF CARE
Goal Outcome Evaluation:  Plan of Care Reviewed With: patient           Patient rested in bed during shift. VSS on RA. Patient ambulated independently during shift. Patient educated on importance of not walking off floor. Patient has no complaints or concerns at this time. Will continue with plan of care.

## 2024-12-09 ENCOUNTER — HOSPITAL ENCOUNTER (EMERGENCY)
Facility: HOSPITAL | Age: 29
Discharge: LEFT BEFORE DISCHARGE | DRG: 289 | End: 2024-12-09
Attending: STUDENT IN AN ORGANIZED HEALTH CARE EDUCATION/TRAINING PROGRAM | Admitting: STUDENT IN AN ORGANIZED HEALTH CARE EDUCATION/TRAINING PROGRAM
Payer: COMMERCIAL

## 2024-12-09 VITALS
OXYGEN SATURATION: 100 % | BODY MASS INDEX: 23.21 KG/M2 | DIASTOLIC BLOOD PRESSURE: 62 MMHG | WEIGHT: 131 LBS | RESPIRATION RATE: 18 BRPM | HEIGHT: 63 IN | HEART RATE: 68 BPM | SYSTOLIC BLOOD PRESSURE: 114 MMHG | TEMPERATURE: 97.7 F

## 2024-12-09 VITALS
HEIGHT: 63 IN | WEIGHT: 131.17 LBS | DIASTOLIC BLOOD PRESSURE: 52 MMHG | HEART RATE: 70 BPM | OXYGEN SATURATION: 96 % | BODY MASS INDEX: 23.24 KG/M2 | TEMPERATURE: 98.3 F | RESPIRATION RATE: 16 BRPM | SYSTOLIC BLOOD PRESSURE: 90 MMHG

## 2024-12-09 LAB
ALBUMIN SERPL-MCNC: 3.9 G/DL (ref 3.5–5.2)
ALBUMIN/GLOB SERPL: 1.2 G/DL
ALP SERPL-CCNC: 70 U/L (ref 39–117)
ALT SERPL W P-5'-P-CCNC: 7 U/L (ref 1–33)
ANION GAP SERPL CALCULATED.3IONS-SCNC: 10.9 MMOL/L (ref 5–15)
AST SERPL-CCNC: 9 U/L (ref 1–32)
BASOPHILS # BLD AUTO: 0.04 10*3/MM3 (ref 0–0.2)
BASOPHILS NFR BLD AUTO: 0.9 % (ref 0–1.5)
BILIRUB SERPL-MCNC: 0.2 MG/DL (ref 0–1.2)
BUN SERPL-MCNC: 16 MG/DL (ref 6–20)
BUN/CREAT SERPL: 21.3 (ref 7–25)
CALCIUM SPEC-SCNC: 9.4 MG/DL (ref 8.6–10.5)
CHLORIDE SERPL-SCNC: 99 MMOL/L (ref 98–107)
CO2 SERPL-SCNC: 25.1 MMOL/L (ref 22–29)
CREAT SERPL-MCNC: 0.75 MG/DL (ref 0.57–1)
DEPRECATED RDW RBC AUTO: 41.7 FL (ref 37–54)
EGFRCR SERPLBLD CKD-EPI 2021: 110 ML/MIN/1.73
EOSINOPHIL # BLD AUTO: 0.05 10*3/MM3 (ref 0–0.4)
EOSINOPHIL NFR BLD AUTO: 1.1 % (ref 0.3–6.2)
ERYTHROCYTE [DISTWIDTH] IN BLOOD BY AUTOMATED COUNT: 12.4 % (ref 12.3–15.4)
GLOBULIN UR ELPH-MCNC: 3.3 GM/DL
GLUCOSE SERPL-MCNC: 116 MG/DL (ref 65–99)
HCT VFR BLD AUTO: 29.7 % (ref 34–46.6)
HGB BLD-MCNC: 9.6 G/DL (ref 12–15.9)
IMM GRANULOCYTES # BLD AUTO: 0.04 10*3/MM3 (ref 0–0.05)
IMM GRANULOCYTES NFR BLD AUTO: 0.9 % (ref 0–0.5)
LYMPHOCYTES # BLD AUTO: 1.96 10*3/MM3 (ref 0.7–3.1)
LYMPHOCYTES NFR BLD AUTO: 43.8 % (ref 19.6–45.3)
MCH RBC QN AUTO: 29.7 PG (ref 26.6–33)
MCHC RBC AUTO-ENTMCNC: 32.3 G/DL (ref 31.5–35.7)
MCV RBC AUTO: 92 FL (ref 79–97)
MONOCYTES # BLD AUTO: 0.34 10*3/MM3 (ref 0.1–0.9)
MONOCYTES NFR BLD AUTO: 7.6 % (ref 5–12)
NEUTROPHILS NFR BLD AUTO: 2.04 10*3/MM3 (ref 1.7–7)
NEUTROPHILS NFR BLD AUTO: 45.7 % (ref 42.7–76)
NRBC BLD AUTO-RTO: 0 /100 WBC (ref 0–0.2)
PLATELET # BLD AUTO: 207 10*3/MM3 (ref 140–450)
PMV BLD AUTO: 10.6 FL (ref 6–12)
POTASSIUM SERPL-SCNC: 4 MMOL/L (ref 3.5–5.2)
PROT SERPL-MCNC: 7.2 G/DL (ref 6–8.5)
RBC # BLD AUTO: 3.23 10*6/MM3 (ref 3.77–5.28)
SODIUM SERPL-SCNC: 135 MMOL/L (ref 136–145)
WBC NRBC COR # BLD AUTO: 4.47 10*3/MM3 (ref 3.4–10.8)

## 2024-12-09 PROCEDURE — 99211 OFF/OP EST MAY X REQ PHY/QHP: CPT | Performed by: STUDENT IN AN ORGANIZED HEALTH CARE EDUCATION/TRAINING PROGRAM

## 2024-12-09 PROCEDURE — 85025 COMPLETE CBC W/AUTO DIFF WBC: CPT | Performed by: INTERNAL MEDICINE

## 2024-12-09 PROCEDURE — 80053 COMPREHEN METABOLIC PANEL: CPT | Performed by: INTERNAL MEDICINE

## 2024-12-09 RX ORDER — SODIUM CHLORIDE 0.9 % (FLUSH) 0.9 %
10 SYRINGE (ML) INJECTION AS NEEDED
Status: DISCONTINUED | OUTPATIENT
Start: 2024-12-09 | End: 2024-12-09

## 2024-12-09 NOTE — PAYOR COMM NOTE
"[facility]  [facility]  PHONE  [phone]  FAX  [phone]  NPI:  [id number]    PATIENT D/C 12/9/224    Rochelle Robertson (28 y.o. Female)       Date of Birth   1995    Social Security Number       Address   [address] [facility] [address]    Home Phone   [phone]    MRN   [id number]       Taoist   Islam    Marital Status                               Admission Date   12/3/24    Admission Type   Emergency    Admitting Provider   Khanh Kruse DO    Attending Provider       Department, Room/Bed   [address], 3333/1P       Discharge Date   12/9/2024    Discharge Disposition   Left Against Medical Advice    Discharge Destination                                 Attending Provider: (none)   Allergies: Amoxicillin, Penicillins, Vancomycin, Zofran [Ondansetron Hcl]    Isolation: Droplet   Infection: MRSA (10/13/21), Rhinovirus  (12/03/24)   Code Status: Prior    Ht: 160 cm (63\")   Wt: 59.5 kg (131 lb 2.8 oz)    Admission Cmt: None   Principal Problem: Positive blood cultures [R78.81]                   Active Insurance as of 12/3/2024       Primary Coverage       Payor Plan Insurance Group Employer/Plan Group    [facility] BY REYES [facility] BY REYES [id number]       Payor Plan Address Payor Plan Phone Number Payor Plan Fax Number Effective Dates    [address] [address]   1/1/2021 - None Entered    [facility] [id number]         Subscriber Name Subscriber Birth Date Member ID       ROCHELLE ROBERTSON 1995 [id number]                     Emergency Contacts        (Rel.) Home Phone Work Phone Mobile Phone    UMER ROBERTSON (Spouse) [phone] -- --    BIB RAIN (Grandparent) [phone] -- --    Vivian Kaba (Aunt) (Other) -- -- [phone]              Discharge Summary    No notes of this type exist for this encounter.       "

## 2024-12-09 NOTE — NURSING NOTE
Patient  was arrested inside our facility and  brought children up to patient. Patient became upset and wanted to leave AMA. Explained the risk to patient including death and risks to unborn child. Removed midline and contacted dr. Paredes. Educated patient on the rights to come back through ER.

## 2024-12-10 ENCOUNTER — HOSPITAL ENCOUNTER (INPATIENT)
Facility: HOSPITAL | Age: 29
LOS: 1 days | Discharge: HOME | End: 2024-12-11
Admitting: STUDENT IN AN ORGANIZED HEALTH CARE EDUCATION/TRAINING PROGRAM
Payer: COMMERCIAL

## 2024-12-10 DIAGNOSIS — I33.0 ACUTE BACTERIAL ENDOCARDITIS: ICD-10-CM

## 2024-12-10 DIAGNOSIS — I33.0 SUBACUTE BACTERIAL ENDOCARDITIS: Primary | ICD-10-CM

## 2024-12-10 DIAGNOSIS — Z3A.01 LESS THAN 8 WEEKS GESTATION OF PREGNANCY: ICD-10-CM

## 2024-12-10 DIAGNOSIS — B95.62 MRSA BACTEREMIA: ICD-10-CM

## 2024-12-10 DIAGNOSIS — R78.81 MRSA BACTEREMIA: ICD-10-CM

## 2024-12-10 LAB
ALBUMIN SERPL-MCNC: 3.8 G/DL (ref 3.5–5.2)
ALBUMIN/GLOB SERPL: 1 G/DL
ALP SERPL-CCNC: 65 U/L (ref 39–117)
ALT SERPL W P-5'-P-CCNC: 6 U/L (ref 1–33)
AMPHET+METHAMPHET UR QL: NEGATIVE
AMPHETAMINES UR QL: NEGATIVE
ANION GAP SERPL CALCULATED.3IONS-SCNC: 11.9 MMOL/L (ref 5–15)
AST SERPL-CCNC: 8 U/L (ref 1–32)
BACTERIA SPEC AEROBE CULT: NORMAL
BACTERIA SPEC AEROBE CULT: NORMAL
BACTERIA UR QL AUTO: ABNORMAL /HPF
BARBITURATES UR QL SCN: NEGATIVE
BASOPHILS # BLD AUTO: 0.03 10*3/MM3 (ref 0–0.2)
BASOPHILS NFR BLD AUTO: 0.7 % (ref 0–1.5)
BENZODIAZ UR QL SCN: NEGATIVE
BILIRUB SERPL-MCNC: 0.3 MG/DL (ref 0–1.2)
BILIRUB UR QL STRIP: NEGATIVE
BUN SERPL-MCNC: 13 MG/DL (ref 6–20)
BUN/CREAT SERPL: 20.6 (ref 7–25)
BUPRENORPHINE SERPL-MCNC: POSITIVE NG/ML
CALCIUM SPEC-SCNC: 9.3 MG/DL (ref 8.6–10.5)
CANNABINOIDS SERPL QL: NEGATIVE
CHLORIDE SERPL-SCNC: 99 MMOL/L (ref 98–107)
CLARITY UR: CLEAR
CO2 SERPL-SCNC: 20.1 MMOL/L (ref 22–29)
COCAINE UR QL: NEGATIVE
COLOR UR: YELLOW
CREAT SERPL-MCNC: 0.63 MG/DL (ref 0.57–1)
CRP SERPL-MCNC: 0.32 MG/DL (ref 0–0.5)
D-LACTATE SERPL-SCNC: 1.4 MMOL/L (ref 0.5–2)
DEPRECATED RDW RBC AUTO: 42.6 FL (ref 37–54)
EGFRCR SERPLBLD CKD-EPI 2021: 122.6 ML/MIN/1.73
EOSINOPHIL # BLD AUTO: 0.02 10*3/MM3 (ref 0–0.4)
EOSINOPHIL NFR BLD AUTO: 0.4 % (ref 0.3–6.2)
ERYTHROCYTE [DISTWIDTH] IN BLOOD BY AUTOMATED COUNT: 12.6 % (ref 12.3–15.4)
FENTANYL UR-MCNC: NEGATIVE NG/ML
GLOBULIN UR ELPH-MCNC: 3.7 GM/DL
GLUCOSE SERPL-MCNC: 96 MG/DL (ref 65–99)
GLUCOSE UR STRIP-MCNC: NEGATIVE MG/DL
HCT VFR BLD AUTO: 31 % (ref 34–46.6)
HGB BLD-MCNC: 10.1 G/DL (ref 12–15.9)
HGB UR QL STRIP.AUTO: NEGATIVE
HYALINE CASTS UR QL AUTO: ABNORMAL /LPF
IMM GRANULOCYTES # BLD AUTO: 0.02 10*3/MM3 (ref 0–0.05)
IMM GRANULOCYTES NFR BLD AUTO: 0.4 % (ref 0–0.5)
KETONES UR QL STRIP: NEGATIVE
LEUKOCYTE ESTERASE UR QL STRIP.AUTO: ABNORMAL
LYMPHOCYTES # BLD AUTO: 1.51 10*3/MM3 (ref 0.7–3.1)
LYMPHOCYTES NFR BLD AUTO: 33.4 % (ref 19.6–45.3)
MAGNESIUM SERPL-MCNC: 2.1 MG/DL (ref 1.6–2.6)
MCH RBC QN AUTO: 30 PG (ref 26.6–33)
MCHC RBC AUTO-ENTMCNC: 32.6 G/DL (ref 31.5–35.7)
MCV RBC AUTO: 92 FL (ref 79–97)
METHADONE UR QL SCN: NEGATIVE
MONOCYTES # BLD AUTO: 0.32 10*3/MM3 (ref 0.1–0.9)
MONOCYTES NFR BLD AUTO: 7.1 % (ref 5–12)
NEUTROPHILS NFR BLD AUTO: 2.62 10*3/MM3 (ref 1.7–7)
NEUTROPHILS NFR BLD AUTO: 58 % (ref 42.7–76)
NITRITE UR QL STRIP: NEGATIVE
NRBC BLD AUTO-RTO: 0 /100 WBC (ref 0–0.2)
OPIATES UR QL: NEGATIVE
OXYCODONE UR QL SCN: NEGATIVE
PCP UR QL SCN: NEGATIVE
PH UR STRIP.AUTO: 7.5 [PH] (ref 5–8)
PLATELET # BLD AUTO: 227 10*3/MM3 (ref 140–450)
PMV BLD AUTO: 10.3 FL (ref 6–12)
POTASSIUM SERPL-SCNC: 4.1 MMOL/L (ref 3.5–5.2)
PROCALCITONIN SERPL-MCNC: 0.09 NG/ML (ref 0–0.25)
PROT SERPL-MCNC: 7.5 G/DL (ref 6–8.5)
PROT UR QL STRIP: ABNORMAL
RBC # BLD AUTO: 3.37 10*6/MM3 (ref 3.77–5.28)
RBC # UR STRIP: ABNORMAL /HPF
REF LAB TEST METHOD: ABNORMAL
SODIUM SERPL-SCNC: 131 MMOL/L (ref 136–145)
SP GR UR STRIP: 1.02 (ref 1–1.03)
SQUAMOUS #/AREA URNS HPF: ABNORMAL /HPF
TRICYCLICS UR QL SCN: NEGATIVE
UROBILINOGEN UR QL STRIP: ABNORMAL
WBC # UR STRIP: ABNORMAL /HPF
WBC NRBC COR # BLD AUTO: 4.52 10*3/MM3 (ref 3.4–10.8)

## 2024-12-10 PROCEDURE — C1751 CATH, INF, PER/CENT/MIDLINE: HCPCS

## 2024-12-10 PROCEDURE — 80307 DRUG TEST PRSMV CHEM ANLYZR: CPT | Performed by: PHYSICIAN ASSISTANT

## 2024-12-10 PROCEDURE — 81001 URINALYSIS AUTO W/SCOPE: CPT | Performed by: PHYSICIAN ASSISTANT

## 2024-12-10 PROCEDURE — 80053 COMPREHEN METABOLIC PANEL: CPT | Performed by: PHYSICIAN ASSISTANT

## 2024-12-10 PROCEDURE — 25010000002 DAPTOMYCIN PER 1 MG: Performed by: PHYSICIAN ASSISTANT

## 2024-12-10 PROCEDURE — 99222 1ST HOSP IP/OBS MODERATE 55: CPT

## 2024-12-10 PROCEDURE — 85025 COMPLETE CBC W/AUTO DIFF WBC: CPT | Performed by: PHYSICIAN ASSISTANT

## 2024-12-10 PROCEDURE — 36415 COLL VENOUS BLD VENIPUNCTURE: CPT

## 2024-12-10 PROCEDURE — 25010000002 CEFTRIAXONE PER 250 MG: Performed by: STUDENT IN AN ORGANIZED HEALTH CARE EDUCATION/TRAINING PROGRAM

## 2024-12-10 PROCEDURE — 99285 EMERGENCY DEPT VISIT HI MDM: CPT

## 2024-12-10 PROCEDURE — 83605 ASSAY OF LACTIC ACID: CPT | Performed by: PHYSICIAN ASSISTANT

## 2024-12-10 PROCEDURE — 36410 VNPNXR 3YR/> PHY/QHP DX/THER: CPT

## 2024-12-10 PROCEDURE — 86140 C-REACTIVE PROTEIN: CPT | Performed by: PHYSICIAN ASSISTANT

## 2024-12-10 PROCEDURE — 84145 PROCALCITONIN (PCT): CPT | Performed by: PHYSICIAN ASSISTANT

## 2024-12-10 PROCEDURE — 83735 ASSAY OF MAGNESIUM: CPT | Performed by: PHYSICIAN ASSISTANT

## 2024-12-10 PROCEDURE — 87040 BLOOD CULTURE FOR BACTERIA: CPT | Performed by: PHYSICIAN ASSISTANT

## 2024-12-10 RX ORDER — FOLIC ACID 1 MG/1
500 TABLET ORAL DAILY
Status: DISCONTINUED | OUTPATIENT
Start: 2024-12-10 | End: 2024-12-10

## 2024-12-10 RX ORDER — SODIUM CHLORIDE 0.9 % (FLUSH) 0.9 %
10 SYRINGE (ML) INJECTION EVERY 12 HOURS SCHEDULED
Status: DISCONTINUED | OUTPATIENT
Start: 2024-12-10 | End: 2024-12-11 | Stop reason: HOSPADM

## 2024-12-10 RX ORDER — BUPRENORPHINE HYDROCHLORIDE AND NALOXONE HYDROCHLORIDE DIHYDRATE 8; 2 MG/1; MG/1
2 TABLET SUBLINGUAL DAILY
Status: CANCELLED | OUTPATIENT
Start: 2024-12-11

## 2024-12-10 RX ORDER — SODIUM CHLORIDE 0.9 % (FLUSH) 0.9 %
10 SYRINGE (ML) INJECTION AS NEEDED
Status: DISCONTINUED | OUTPATIENT
Start: 2024-12-10 | End: 2024-12-11 | Stop reason: HOSPADM

## 2024-12-10 RX ORDER — DIPHENHYDRAMINE HCL 25 MG
25 CAPSULE ORAL 2 TIMES DAILY PRN
Status: CANCELLED | OUTPATIENT
Start: 2024-12-10

## 2024-12-10 RX ORDER — GABAPENTIN 400 MG/1
800 CAPSULE ORAL EVERY 8 HOURS SCHEDULED
Status: CANCELLED | OUTPATIENT
Start: 2024-12-10

## 2024-12-10 RX ORDER — PRENATAL VIT/IRON FUM/FOLIC AC 27MG-0.8MG
1 TABLET ORAL DAILY
Status: DISCONTINUED | OUTPATIENT
Start: 2024-12-10 | End: 2024-12-11 | Stop reason: HOSPADM

## 2024-12-10 RX ORDER — NICOTINE 21 MG/24HR
1 PATCH, TRANSDERMAL 24 HOURS TRANSDERMAL
Status: DISCONTINUED | OUTPATIENT
Start: 2024-12-10 | End: 2024-12-11 | Stop reason: HOSPADM

## 2024-12-10 RX ORDER — BUPRENORPHINE HYDROCHLORIDE AND NALOXONE HYDROCHLORIDE DIHYDRATE 8; 2 MG/1; MG/1
0.5 TABLET SUBLINGUAL DAILY
Status: DISCONTINUED | OUTPATIENT
Start: 2024-12-10 | End: 2024-12-11 | Stop reason: HOSPADM

## 2024-12-10 RX ORDER — GABAPENTIN 400 MG/1
800 CAPSULE ORAL EVERY 8 HOURS SCHEDULED
Status: DISCONTINUED | OUTPATIENT
Start: 2024-12-10 | End: 2024-12-10

## 2024-12-10 RX ORDER — PRENATAL VIT/IRON FUM/FOLIC AC 27MG-0.8MG
1 TABLET ORAL DAILY
Status: CANCELLED | OUTPATIENT
Start: 2024-12-11

## 2024-12-10 RX ORDER — GABAPENTIN 400 MG/1
400 CAPSULE ORAL EVERY 8 HOURS SCHEDULED
Status: DISCONTINUED | OUTPATIENT
Start: 2024-12-10 | End: 2024-12-11 | Stop reason: HOSPADM

## 2024-12-10 RX ORDER — FAMOTIDINE 20 MG/1
20 TABLET, FILM COATED ORAL
Status: DISCONTINUED | OUTPATIENT
Start: 2024-12-10 | End: 2024-12-11 | Stop reason: HOSPADM

## 2024-12-10 RX ORDER — FOLIC ACID 1 MG/1
4 TABLET ORAL DAILY
Status: DISCONTINUED | OUTPATIENT
Start: 2024-12-10 | End: 2024-12-11 | Stop reason: HOSPADM

## 2024-12-10 RX ORDER — SODIUM CHLORIDE 9 MG/ML
40 INJECTION, SOLUTION INTRAVENOUS AS NEEDED
Status: DISCONTINUED | OUTPATIENT
Start: 2024-12-10 | End: 2024-12-11 | Stop reason: HOSPADM

## 2024-12-10 RX ORDER — FAMOTIDINE 20 MG/1
20 TABLET, FILM COATED ORAL 2 TIMES DAILY
Status: CANCELLED | OUTPATIENT
Start: 2024-12-10

## 2024-12-10 RX ORDER — ACETAMINOPHEN 325 MG/1
650 TABLET ORAL EVERY 6 HOURS PRN
Status: DISCONTINUED | OUTPATIENT
Start: 2024-12-10 | End: 2024-12-11 | Stop reason: HOSPADM

## 2024-12-10 RX ORDER — FAMOTIDINE 20 MG/1
20 TABLET, FILM COATED ORAL 2 TIMES DAILY
COMMUNITY

## 2024-12-10 RX ORDER — IBUPROFEN 400 MG/1
800 TABLET, FILM COATED ORAL 2 TIMES DAILY PRN
Status: CANCELLED | OUTPATIENT
Start: 2024-12-10

## 2024-12-10 RX ORDER — PRENATAL VIT NO.126/IRON/FOLIC 28MG-0.8MG
1 TABLET ORAL DAILY
COMMUNITY

## 2024-12-10 RX ORDER — IBUPROFEN 800 MG/1
800 TABLET, FILM COATED ORAL 2 TIMES DAILY PRN
COMMUNITY

## 2024-12-10 RX ORDER — ENOXAPARIN SODIUM 100 MG/ML
40 INJECTION SUBCUTANEOUS NIGHTLY
Status: DISCONTINUED | OUTPATIENT
Start: 2024-12-10 | End: 2024-12-11 | Stop reason: HOSPADM

## 2024-12-10 RX ADMIN — FAMOTIDINE 20 MG: 20 TABLET ORAL at 21:35

## 2024-12-10 RX ADMIN — NICOTINE 1 PATCH: 21 PATCH TRANSDERMAL at 21:35

## 2024-12-10 RX ADMIN — FOLIC ACID 4 MG: 1 TABLET ORAL at 21:35

## 2024-12-10 RX ADMIN — DAPTOMYCIN 450 MG: 500 INJECTION, POWDER, LYOPHILIZED, FOR SOLUTION INTRAVENOUS at 15:38

## 2024-12-10 RX ADMIN — PRENATAL VITAMINS-IRON FUMARATE 27 MG IRON-FOLIC ACID 0.8 MG TABLET 1 TABLET: at 21:35

## 2024-12-10 RX ADMIN — GABAPENTIN 400 MG: 400 CAPSULE ORAL at 21:35

## 2024-12-10 RX ADMIN — CEFTRIAXONE 1000 MG: 1 INJECTION, POWDER, FOR SOLUTION INTRAMUSCULAR; INTRAVENOUS at 16:59

## 2024-12-10 RX ADMIN — Medication 10 ML: at 21:36

## 2024-12-10 RX ADMIN — Medication 10 MG: at 22:58

## 2024-12-10 RX ADMIN — ACETAMINOPHEN 650 MG: 325 TABLET ORAL at 18:05

## 2024-12-10 NOTE — DISCHARGE SUMMARY
[facility] DISCHARGE SUMMARY      Date of Admission: 12/3/2024    Date of Discharge:  12/9/2024    PCP: Margaret Baxter APRN    Admission Diagnosis:   Please see admission H&P    Discharge Diagnosis:   MRSA bacteremia  Infective endocarditis  E coli UTI  Recently diagnosed LLL bacterial pneumonia  (+) Rhinovirus  LLE/ankle wound  Borderline hypokalemia  Intrauterine pregnancy  Hx of substance abuse  Constipation  Normocytic anemia     Procedures Performed:  12/5/24: Midline catheter placement right brachial vein      Consults:   Consults       Date and Time Order Name Status Description    12/3/2024  6:02 PM Inpatient Infectious Diseases Consult Completed               History of Present Illness:  Sailaja Alarcon is a 28 y.o. female recently seen in the ED at our facility on 12/1, diagnosed with LLL pneumonia and discharged home with PO antibiotics. Blood cultures were collected during that visit revealing Staph, spp, not aureus or lugdunensis and she was contacted to return to the ED for further evaluation. Please see admission H&P for complete details.      In the ED, she was afebrile and hemodynamically stable. Routine labs revealed WBC WNL, borderline hypokalemia, mild anion gap metabolic acidosis, elevated CRP, normal lactate and normal procal. HCG elevated with US revealing gestational sac within the uterus corresponding to a 6-week 3-day gestation. UA was abnormal concerning for UTI. Repeat blood cultures were obtained. She was given a dose of Azactam. ID was contacted with recs for outpatient follow up in their clinic with blood cultures likely reflecting contamination. However, 2/2 concern for pt's ability to follow up as an outpatient the hospitalist service was contacted for admission.     Hospital Course  Sailaja Alarcon was admitted to the med/surg floor for further evaluation and treatment. She was started on Rocephin for treatment of her UTI. ID was consulted for  further input.     Blood cultures were followed with 1/2 cultures from ED visit on 12/1 ultimately revealing MRSA. Repeat blood cultures obtained on 12/3 also had 1/2 cultures revealing MRSA. ID added Daptomycin based on these culture results. A LLE/ankle wound was noted with no erythema or drainage appreciated but pt reported concern for recent active infection with drainage reported. X-rays of the left ankle revealed soft tissue swelling with no obvious abnormalities. An MRI was obtained with no evidence of abscess or osteomyelitis. A TTE was obtained revealing a freely mobile structure in the right atrium concerning for possible atrial wall vegetation vs prominent eustachian valve. Cardiology was consulted with initial plans for CRISTOPHER on 12/6. However, her care was discussed with cardiology at length who felt that TTE was strongly suggestive of infective endocarditis and CRISTOPHER was canceled per cardiology recs in the setting of risk associated with anesthesia and pregnancy. Repeat blood cultures from 12/5 remained negative and ID recommended a 6 week course of IV antibiotics. She completed a course of Rocephin for her UTI and tolerated this well. She also tested positive for Rhinovirus and remained clinically stable from respiratory standpoint. Disposition was discussed at length with patient preferring to complete course of antibiotics as an outpatient. However, in the setting of her hx of substance abuse and concern for compliance with outpatient follow up, the decision was made after discussion with ID and SS/CM that it would be in the patient's best interest to complete course of IV antibiotics as an inpatient. Mrs. Alarcon initially mentioned leaving AMA but after additional discussions including her family she initially agreed to remain hospitalized and an LTAC referral was placed. However, per report her  later visited the hospital with their young children and was subsequently arrested. Their children  were brought to the patient's room to be placed in the care of family members and she per report, she became upset and ultimately decided to leave AMA.       Condition on Discharge:  Guarded as pt left AMA.     Vital Signs  Vitals:    12/08/24 2232   Resp: 16         Discharge Disposition: Pt left AMA.       Discharge Medications:  Not provided as pt left AMA.         Discharge Diet:   regular diet      Activity at Discharge:  activity as tolerated    Follow-up Appointments:   Follow-up Information       Margaret Baxter APRN .    Specialty: Family Medicine  Contact information:  [address]  [facility] [address]  [phone]                           Your Scheduled Appointments      Dec 16, 2024 9:00 AM  New Patient with Marcos Phillips MD  [facility] ([place]) [address] [facility] [address]  [phone]   -Bring photo ID, insurance card, and list of medications to appointment  -If testing was completed outside of [facility] then patient must bring images on a disc  -Copay will be collected at time of appointment  -New patients should arrive 15 minutes prior to appointment  If your symptoms change or worsen, please contact your PCP or go to the nearest emergency room.                  Test Results Pending at Discharge:  Pending Labs       Order Current Status    Blood Culture - Blood, Arm, Left Preliminary result    Blood Culture - Blood, Arm, Left Preliminary result             The ASCVD Risk score (Carmen COHEN, et al., 2019) failed to calculate for the following reasons:    The 2019 ASCVD risk score is only valid for ages 40 to 79      Khanh Kruse DO  12/10/24  13:24 EST      Time: Greater than 30 minutes spent on this discharge.

## 2024-12-10 NOTE — ED PROVIDER NOTES
Subjective   History of Present Illness  28-year-old female presents to the ER chief complaint of bacteremia.  Patient verbalized that she was recently in the hospital and left AGAINST MEDICAL ADVICE.  Patient did have an ER visit last night however had to leave secondary to family emergency.  Patient does have known endocarditis.  Looks like patient was scheduled to be on LTAC for 6 weeks of treatment with daptomycin.  Patient presents today for treatment and admission.  Patient is 7 weeks pregnant.  Has not established OB/GYN care        Review of Systems   Constitutional: Negative.  Negative for fever.   HENT: Negative.     Respiratory: Negative.     Cardiovascular: Negative.  Negative for chest pain.   Gastrointestinal: Negative.  Negative for abdominal pain.   Endocrine: Negative.    Genitourinary: Negative.  Negative for dysuria.   Skin: Negative.    Neurological: Negative.    Psychiatric/Behavioral: Negative.     All other systems reviewed and are negative.      Past Medical History:   Diagnosis Date    Hepatitis C     Hepatitis C antibody positive in blood     Migraines        Allergies   Allergen Reactions    Amoxicillin Anaphylaxis    Penicillins Anaphylaxis     Has tolerated rocephin per patient    Vancomycin Itching     Pt states she had full body itch and burning sensation, and she turned red. Reaction potentially Red Man Syndrome.    Zofran [Ondansetron Hcl] Hives and Rash       Past Surgical History:   Procedure Laterality Date    HEAD/NECK ABSCESS INCISION AND DRAINAGE Left 7/8/2022    Procedure: HEAD NECK ABSCESS INCISION AND DRAINAGE;  Surgeon: Miquel Grayson MD;  Location: [facility] OR;  Service: General;  Laterality: Left;    INCISION AND DRAINAGE ABSCESS Right 10/10/2021    Procedure: INCISION AND DRAINAGE ABSCESS CENTRAL LINE PLACEMENT;  Surgeon: Naz Payne MD;  Location: [facility] OR;  Service: General;  Laterality: Right;  I&D ABSCESS= INFECTED.  CENTRAL LINE = CLEAN.       Family  History   Problem Relation Age of Onset    Hypertension Mother     Cancer Father     No Known Problems Sister     No Known Problems Brother     No Known Problems Son     No Known Problems Daughter     Hypertension Maternal Grandmother     Diabetes Maternal Grandfather     No Known Problems Paternal Grandmother     No Known Problems Paternal Grandfather     No Known Problems Cousin     Diabetes Maternal Aunt     Rheum arthritis Neg Hx     Osteoarthritis Neg Hx     Asthma Neg Hx     Heart failure Neg Hx     Hyperlipidemia Neg Hx     Migraines Neg Hx     Rashes / Skin problems Neg Hx     Seizures Neg Hx     Stroke Neg Hx     Thyroid disease Neg Hx        Social History     Socioeconomic History    Marital status:    Tobacco Use    Smoking status: Every Day     Current packs/day: 1.00     Average packs/day: 1 pack/day for 15.0 years (15.0 ttl pk-yrs)     Types: Cigarettes    Smokeless tobacco: Never   Vaping Use    Vaping status: Never Used   Substance and Sexual Activity    Alcohol use: No     Alcohol/week: 0.0 standard drinks of alcohol    Drug use: Not Currently     Frequency: 3.0 times per week     Types: IV     Comment: last use 5 YEARS AGO    Sexual activity: Yes     Partners: Male           Objective   Physical Exam  Vitals and nursing note reviewed.   Constitutional:       General: She is not in acute distress.     Appearance: She is well-developed. She is not diaphoretic.   HENT:      Head: Normocephalic and atraumatic.      Right Ear: External ear normal.      Left Ear: External ear normal.      Nose: Nose normal.   Eyes:      Conjunctiva/sclera: Conjunctivae normal.   Neck:      Vascular: No JVD.      Trachea: No tracheal deviation.   Cardiovascular:      Rate and Rhythm: Normal rate and regular rhythm.      Heart sounds: Normal heart sounds. No murmur heard.  Pulmonary:      Effort: Pulmonary effort is normal. No respiratory distress.      Breath sounds: Normal breath sounds. No wheezing.   Abdominal:       Palpations: Abdomen is soft.      Tenderness: There is no abdominal tenderness.   Musculoskeletal:         General: No deformity. Normal range of motion.      Cervical back: Normal range of motion and neck supple.   Skin:     General: Skin is warm and dry.      Coloration: Skin is not pale.      Findings: No erythema or rash.   Neurological:      Mental Status: She is alert and oriented to person, place, and time.      Cranial Nerves: No cranial nerve deficit.   Psychiatric:         Behavior: Behavior normal.         Thought Content: Thought content normal.         Procedures           ED Course  ED Course as of 12/10/24 1256   Tue Dec 10, 2024   1252 Discussed with Dr. Ayala who is agreeable to admission. [RB]      ED Course User Index  [RB] Jose Ramon Hyde II, PA                                                       Medical Decision Making      Final diagnoses:   Subacute bacterial endocarditis       ED Disposition  ED Disposition       ED Disposition   Decision to Admit    Condition   --    Comment   --               No follow-up provider specified.       Medication List      No changes were made to your prescriptions during this visit.            Jose Ramon Hyde II, PA  12/10/24 1258

## 2024-12-10 NOTE — ED NOTES
Contacted PICC team for midline access. Reports will be at least 1 hour until she can come to ED. Provider and  made aware.

## 2024-12-10 NOTE — CASE MANAGEMENT/SOCIAL WORK
Discharge Planning Assessment   Jorge A     Patient Name: Sailaja Alarcon  MRN: [id number]  Today's Date: 12/10/2024    Admit Date: 12/10/2024    Plan: Spoke with patient at bedside. Patient lives at home with spouse and will return at discharge. Patient has no POA or Living Will. Patient uses a nebulizer from [employer] [facility] and has no HH. Patient is requesting HH be arranged at discharge for mid line and possible IV abx at home when discharged. Patient stated she has 5 kids at home and will be hard to come to hospital daily for ABX.  Patients PCP Yohana Baxter and pharmacy [facility]. Patients family will transport home at discharge.   Discharge Needs Assessment       Row Name 12/10/24 1242       Living Environment    People in Home spouse    Current Living Arrangements home    Potentially Unsafe Housing Conditions none    In the past 12 months has the electric, gas, oil, or water company threatened to shut off services in your home? No    Primary Care Provided by self    Provides Primary Care For child(joyce)    Family Caregiver if Needed grandparent(s)    Family Caregiver Names [facility] Grandparent [phone]    Quality of Family Relationships helpful;supportive    Able to Return to Prior Arrangements yes       Resource/Environmental Concerns    Resource/Environmental Concerns none    Transportation Concerns none       Transportation Needs    In the past 12 months, has lack of transportation kept you from medical appointments or from getting medications? no    In the past 12 months, has lack of transportation kept you from meetings, work, or from getting things needed for daily living? No       Food Insecurity    Within the past 12 months, you worried that your food would run out before you got the money to buy more. Never true    Within the past 12 months, the food you bought just didn't last and you didn't have money to get more. Never true       Transition Planning    Patient/Family Anticipates  Transition to home with family    Patient/Family Anticipated Services at Transition none    Transportation Anticipated family or friend will provide       Discharge Needs Assessment    Readmission Within the Last 30 Days other (see comments)  eloped/ama    Equipment Currently Used at Home nebulizer    Concerns to be Addressed discharge planning    Do you want help finding or keeping work or a job? I do not need or want help    Do you want help with school or training? For example, starting or completing job training or getting a high school diploma, GED or equivalent No    Anticipated Changes Related to Illness none    Equipment Needed After Discharge none                   Discharge Plan       Row Name 12/10/24 7705       Plan    Plan Spoke with patient at bedside. Patient lives at home with spouse and will return at discharge. Patient has no POA or Living Will. Patient uses a nebulizer from [employer] and has no HH. Patient is requesting HH be arranged at discharge for mid line and possible IV abx at home when discharged. Patient stated she has 5 kids at home and will be hard to come to hospital daily for ABX.  Patients PCP Yohana Baxter and pharmacy [facility]. Patients family will transport home at discharge.    Patient/Family in Agreement with Plan yes                     Angela Valderrama

## 2024-12-10 NOTE — CONSULTS
"Assessment:  Diagnosis: infective endocarditis    Allergies   Allergen Reactions    Amoxicillin Anaphylaxis    Penicillins Anaphylaxis     Has tolerated rocephin per patient    Vancomycin Itching     Pt states she had full body itch and burning sensation, and she turned red. Reaction potentially Red Man Syndrome.    Zofran [Ondansetron Hcl] Hives and Rash       Order Date/Time: 12/10/2024 1042  Indications: no access; difficult access  LABS:  No results found for: \"INR\", \"PROTIME\"  No results found for: \"PTT\"  Lab Results   Component Value Date    WBC 4.52 12/10/2024    HGB 10.1 (L) 12/10/2024    HCT 31.0 (L) 12/10/2024    MCV 92.0 12/10/2024     12/10/2024     Lab Results   Component Value Date    BUN 13 12/10/2024     Lab Results   Component Value Date    CREATININE 0.63 12/10/2024     Lab Results   Component Value Date    EGFRIFNONA 94 10/10/2021     Labs Reviewed: all labs reviewed    Contraindications for PICC/Midline:  No contraindications noted    Recommendations:  PowerGlide Pro Midline Catheter; 18 g; 10 cm  Upper Right Brachial    Procedure Time Out:  Time out Time: 1515  Correct Patient Identity: Yes  Correct Surgical Side and Site Are Marked: Yes  Agreement on Procedure to be done: Yes  Antibiotic Given: N/A  RN:SURAJ Chairez RN    PowerGlide Pro Midline Catheter placed with ultrasound guidance and verified by blood return. Minimal blood loss noted. Catheter flushes easily. Blood return noted. Patient nurse, Bettina RUIZ, made aware that midline is in upper R arm and ready for use.      Deirdre Chairez RN    "

## 2024-12-10 NOTE — CASE MANAGEMENT/SOCIAL WORK
Case Management/Social Work    Patient Name:  Saliaja Alarcon  YOB: 1995  MRN: [id number]  Admit Date:  12/9/2024        SS notified by Dr. Vasquez that Pt eloped from the hospital, patient is pregnant, has MRSA bacteremia, three minor children are involved, as the police arrested the patient's  at our hospital last night for being intoxicated while having the children with him. SS received call from [facility] Supervisor Sanchez Smith who states he plans to follow up with Pt at home and will encourage her to return to the hospital for treatment for MRSA bacteremia, currently the children are safe and are with a grandmother. SS notified CPS Supervisor that Pt's UDS results were positive for buprenorphine.  notified Dr. Vasquez with update. No other needs identified.       Electronically signed by:  HELENA Nelson  12/10/24 08:48 EST

## 2024-12-10 NOTE — H&P
[facility] Medicine Services  History & Physical    Patient Identification:  Name:  Sailaja Alarcon  Age:  28 y.o.  Sex:  female  :  1995  MRN:  [id number]   Visit Number:  [id number]  Admit Date: 12/10/2024   Primary Care Physician:  Margaret Baxter APRN    Subjective     Chief complaint: Abnormal lab    History of presenting illness:      Sailaja Alarcon is a 28 y.o. female who presented for further evaluation of abnormal lab.  The patient recently admitted to this facility-left AMA from the hospitalist service and later from the ED on .  He was being treated for bacteremia, endocarditis with plan for discharge to LTAC for 6 weeks IV daptomycin therapy per infectious disease recommendation.  Clinical course has been complicated by her current 6-week IUP.  She was seen and examined in the ED reporting she felt better than she has in recent memory. She denies any chest pain or palpitations. No fevers. No abdominal pain. She is not having any nausea or vomiting. No diarrhea, difficulty with urination. She does have a chronic wound to the RLE which she believes is healing appropriately. She reports is no longer symptomatic from recent rhinovirus. No SOB, cough, congestion.     Past medical history is significant for substance abuse history, recently diagnosed MRSA bacteremia with infective endocarditis    Upon arrival to the ED, vital signs were temp 98.6, heart rate 73, respirations 20, /65, SpO2 100% on RA.  Laboratory workup showed mild hyponatremia at 131, no leukocytosis or left shift.  Normal Pro-Myron and CRP.     Emergency Department Room location at the time of my evaluation was 406.     ---------------------------------------------------------------------------------------------------------------------   Review of Systems   Constitutional:  Negative for chills and fever.   HENT:  Negative for congestion and rhinorrhea.    Respiratory:  Negative  for cough and shortness of breath.    Cardiovascular:  Negative for chest pain.   Gastrointestinal:  Negative for abdominal distention and diarrhea.   Genitourinary:  Negative for difficulty urinating and dysuria.   Musculoskeletal:  Negative for arthralgias and myalgias.   Skin:  Negative for rash and wound.   Neurological:  Negative for dizziness and light-headedness.        ---------------------------------------------------------------------------------------------------------------------   Past Medical History:   Diagnosis Date    Hepatitis C     Hepatitis C antibody positive in blood     Migraines      Past Surgical History:   Procedure Laterality Date    HEAD/NECK ABSCESS INCISION AND DRAINAGE Left 7/8/2022    Procedure: HEAD NECK ABSCESS INCISION AND DRAINAGE;  Surgeon: Miquel Grayson MD;  Location: [facility];  Service: General;  Laterality: Left;    INCISION AND DRAINAGE ABSCESS Right 10/10/2021    Procedure: INCISION AND DRAINAGE ABSCESS CENTRAL LINE PLACEMENT;  Surgeon: Naz Payne MD;  Location: [facility];  Service: General;  Laterality: Right;  I&D ABSCESS= INFECTED.  CENTRAL LINE = CLEAN.     Family History   Problem Relation Age of Onset    Hypertension Mother     Cancer Father     No Known Problems Sister     No Known Problems Brother     No Known Problems Son     No Known Problems Daughter     Hypertension Maternal Grandmother     Diabetes Maternal Grandfather     No Known Problems Paternal Grandmother     No Known Problems Paternal Grandfather     No Known Problems Cousin     Diabetes Maternal Aunt     Rheum arthritis Neg Hx     Osteoarthritis Neg Hx     Asthma Neg Hx     Heart failure Neg Hx     Hyperlipidemia Neg Hx     Migraines Neg Hx     Rashes / Skin problems Neg Hx     Seizures Neg Hx     Stroke Neg Hx     Thyroid disease Neg Hx      Social History     Socioeconomic History    Marital status:    Tobacco Use    Smoking status: Every Day     Current packs/day: 1.00      Average packs/day: 1 pack/day for 15.0 years (15.0 ttl pk-yrs)     Types: Cigarettes    Smokeless tobacco: Never   Vaping Use    Vaping status: Never Used   Substance and Sexual Activity    Alcohol use: No     Alcohol/week: 0.0 standard drinks of alcohol    Drug use: Not Currently     Frequency: 3.0 times per week     Types: IV     Comment: last use 5 YEARS AGO    Sexual activity: Yes     Partners: Male     ---------------------------------------------------------------------------------------------------------------------   Allergies:  Amoxicillin, Penicillins, Vancomycin, and Zofran [ondansetron hcl]  ---------------------------------------------------------------------------------------------------------------------   Home medications:    Medications below are reported home medications pulling from within the system; at this time, these medications have not been reconciled unless otherwise specified and are in the verification process for further verifcation as current home medications.  (Not in a hospital admission)      Hospital Scheduled Meds:  DAPTOmycin, 8 mg/kg, Intravenous, Q24H           Current listed hospital scheduled medications may not yet reflect those currently placed in orders that are signed and held awaiting patient's arrival to floor.   ---------------------------------------------------------------------------------------------------------------------     Objective     Vital Signs:  Temp:  [98.6 °F (37 °C)] 98.6 °F (37 °C)  Heart Rate:  [62-73] 62  Resp:  [16-20] 16  BP: ()/(53-65) 93/53      12/10/24  0858   Weight: 57.2 kg (126 lb)     Body mass index is 22.32 kg/m².  ---------------------------------------------------------------------------------------------------------------------       Physical Exam  Vitals and nursing note reviewed.   Constitutional:       General: She is not in acute distress.  HENT:      Head: Normocephalic and atraumatic.   Eyes:      Extraocular Movements:  Extraocular movements intact.   Cardiovascular:      Rate and Rhythm: Normal rate and regular rhythm.   Pulmonary:      Effort: Pulmonary effort is normal.      Breath sounds: Normal breath sounds.   Abdominal:      Palpations: Abdomen is soft.   Musculoskeletal:      Right lower leg: No edema.      Left lower leg: No edema.   Skin:     General: Skin is warm and dry.   Neurological:      Mental Status: She is alert. Mental status is at baseline.   Psychiatric:         Mood and Affect: Mood normal.         Behavior: Behavior normal.           ---------------------------------------------------------------------------------------------------------------------  EKG:      ---------------------------------------------------------------------------------------------------------------------   Results from last 7 days   Lab Units 12/10/24  1109 12/09/24 0058 12/08/24 0228   CRP mg/dL 0.32  --   --    LACTATE mmol/L 1.4  --   --    WBC 10*3/mm3 4.52 4.47 4.32   HEMOGLOBIN g/dL 10.1* 9.6* 9.8*   HEMATOCRIT % 31.0* 29.7* 30.5*   MCV fL 92.0 92.0 91.6   MCHC g/dL 32.6 32.3 32.1   PLATELETS 10*3/mm3 227 207 220         Results from last 7 days   Lab Units 12/10/24  1109 12/09/24  0058 12/08/24 0228 12/06/24  0140 12/04/24  0243   SODIUM mmol/L 131* 135* 134*   < > 132*   POTASSIUM mmol/L 4.1 4.0 4.5   < > 4.7  4.7   MAGNESIUM mg/dL 2.1  --   --   --   --    CHLORIDE mmol/L 99 99 99   < > 104   CO2 mmol/L 20.1* 25.1 23.2   < > 17.0*   BUN mg/dL 13 16 14   < > 9   CREATININE mg/dL 0.63 0.75 0.69   < > 0.55*   CALCIUM mg/dL 9.3 9.4 9.5   < > 9.0   GLUCOSE mg/dL 96 116* 90   < > 99   ALBUMIN g/dL 3.8 3.9  --   --  3.0*   BILIRUBIN mg/dL 0.3 0.2  --   --  0.2   ALK PHOS U/L 65 70  --   --  65   AST (SGOT) U/L 8 9  --   --  7   ALT (SGPT) U/L 6 7  --   --  5    < > = values in this interval not displayed.   Estimated Creatinine Clearance: 117.9 mL/min (by C-G formula based on SCr of 0.63 mg/dL).  No results found for:  "\"AMMONIA\"  Results from last 7 days   Lab Units 12/08/24  0228   CK TOTAL U/L 7*         No results found for: \"HGBA1C\"  No results found for: \"TSH\", \"FREET4\"  Lab Results   Component Value Date    PREGTESTUR Negative 07/07/2022    HCGQUANT 20,307.00 12/03/2024     Pain Management Panel  More data exists         Latest Ref Rng & Units 12/10/2024 12/3/2024   Pain Management Panel   Amphetamine, Urine Qual Negative Negative  Negative    Barbiturates Screen, Urine Negative Negative  Negative    Benzodiazepine Screen, Urine Negative Negative  Negative    Buprenorphine, Screen, Urine Negative Positive  Positive    Cocaine Screen, Urine Negative Negative  Negative    Fentanyl, Urine Negative Negative  Negative    Methadone Screen , Urine Negative Negative  Negative    Methamphetamine, Ur Negative Negative  Negative       Details                 Blood Culture   Date Value Ref Range Status   12/05/2024 No growth at 4 days  Preliminary   12/05/2024 No growth at 4 days  Preliminary     No results found for: \"URINECX\"  No results found for: \"WOUNDCX\"  No results found for: \"STOOLCX\"      ---------------------------------------------------------------------------------------------------------------------  Imaging Results (Last 7 Days)       ** No results found for the last 168 hours. **            Cultures:  Blood Culture   Date Value Ref Range Status   12/05/2024 No growth at 4 days  Preliminary   12/05/2024 No growth at 4 days  Preliminary       Last echocardiogram:  Results for orders placed during the hospital encounter of 12/03/24    Adult Transthoracic Echo Complete W/ Cont if Necessary Per Protocol    Interpretation Summary    Left ventricular systolic function is normal. Calculated left ventricular EF = 64.6% Left ventricular ejection fraction appears to be 61 - 65%.    Left ventricular diastolic function was normal.    Normal right ventricular systolic function noted.    Estimated right ventricular systolic pressure " from tricuspid regurgitation is normal (<35 mmHg).    Freely mobile structure in the right atrium, cannot rule out right atrial wall vegetation versus prominent eustachian valve.  Recommend CRISTOPHER for further evaluation.    There is no evidence of pericardial effusion.    No prior studies available.          I have personally reviewed the above radiology images and read the final radiology report on 12/10/24  ---------------------------------------------------------------------------------------------------------------------  Assessment / Plan     There are no active hospital problems to display for this patient.      ASSESSMENT/PLAN:    Recent MRSA bacteremia  Infective endocarditis  Hx substance abuse  Complicated by IUP  Patient return to care today after leaving [place] [facility] on 12/9/2024 from hospitalization during which she was treated for MRSA bacteremia and infective endocarditis with a plan in place for LTAC admission to complete 6-week course of IV antibiotic therapy.  Admit to the telemetry unit  Continue Rocephin and daptomycin  Consult infectious disease, assistance appreciated.  She will need PICC line replaced as was removed at previous [place] [facility] discharge  Continue supportive care measures  Trend labs  Will also likely need CM/SW assistance with discharge planning.  She will need follow-up with OB as an outpatient.    ----------  -DVT prophylaxis: Lovenox  -Activity: As tolerated, up in chair  -Expected length of stay: INPATIENT status due to the need for care which can only be reasonably provided in an hospital setting such as aggressive/expedited ancillary services and/or consultation services, the necessity for IV medications, close physician monitoring and/or the possible need for procedures.  In such, I feel patient’s risk for adverse outcomes and need for care warrant INPATIENT evaluation and predict the patient’s care encounter to likely last beyond 2 midnights.   -Disposition pending course    High risk  secondary to     There are no questions and answers to display.       Mohinder Casillas PA-C   12/10/24  13:33 EST

## 2024-12-10 NOTE — PLAN OF CARE
Goal Outcome Evaluation:   Pt admitted to the floor this shift. Pt on RA with sats maintaining above 90%. No s/s of acute distress noted at this time. Plan of care ongoing.

## 2024-12-10 NOTE — PROGRESS NOTES
[facility] HOSPITALISTS CROSS COVER NOTE    Patient Identification:  Name:  Sailaja Alarcon  Age:  28 y.o.  Sex:  female  :  1995  MRN:  [id number]  Visit number:  [id number]  Primary Care Provider:  Margaret Baxter APRN    Length of stay in inpatient status:  5    Brief Update     Dr. Peter was presenting this patient to me for possible admission; however, when Dr. Peter and I were on our way to see the patient, it was noted that she had eloped.  I will inform the on call  of this, as the patient is pregnant, has MRSA bacteremia, left the hospitalist service earlier today prior to this ED visit, and 3 other minor children are involved, as the police arrested the patient's  at our hospital last night and the children were found at our facility.    Alix Vasquez MD  [facility] Hospitalist  24  22:05 EST

## 2024-12-10 NOTE — ED NOTES
Reassessed patient at this time. Patient does not report any new complaints at this time. There is no acute distress that has been noted. Apologized to patient at this time for delay in rooming. Patient has been made aware that she will be taken to a treatment area in an appropriate time manner.

## 2024-12-11 ENCOUNTER — READMISSION MANAGEMENT (OUTPATIENT)
Dept: CALL CENTER | Facility: HOSPITAL | Age: 29
End: 2024-12-11
Payer: COMMERCIAL

## 2024-12-11 VITALS
DIASTOLIC BLOOD PRESSURE: 43 MMHG | HEIGHT: 63 IN | SYSTOLIC BLOOD PRESSURE: 100 MMHG | TEMPERATURE: 98.7 F | WEIGHT: 131.7 LBS | RESPIRATION RATE: 18 BRPM | BODY MASS INDEX: 23.34 KG/M2 | OXYGEN SATURATION: 98 % | HEART RATE: 62 BPM

## 2024-12-11 LAB
ANION GAP SERPL CALCULATED.3IONS-SCNC: 10.4 MMOL/L (ref 5–15)
BUN SERPL-MCNC: 14 MG/DL (ref 6–20)
BUN/CREAT SERPL: 20.9 (ref 7–25)
CALCIUM SPEC-SCNC: 9.3 MG/DL (ref 8.6–10.5)
CHLORIDE SERPL-SCNC: 100 MMOL/L (ref 98–107)
CO2 SERPL-SCNC: 23.6 MMOL/L (ref 22–29)
CREAT SERPL-MCNC: 0.67 MG/DL (ref 0.57–1)
EGFRCR SERPLBLD CKD-EPI 2021: 120.8 ML/MIN/1.73
GLUCOSE SERPL-MCNC: 96 MG/DL (ref 65–99)
POTASSIUM SERPL-SCNC: 4.2 MMOL/L (ref 3.5–5.2)
SODIUM SERPL-SCNC: 134 MMOL/L (ref 136–145)

## 2024-12-11 PROCEDURE — 99239 HOSP IP/OBS DSCHRG MGMT >30: CPT

## 2024-12-11 PROCEDURE — 25010000002 DAPTOMYCIN PER 1 MG: Performed by: NURSE PRACTITIONER

## 2024-12-11 PROCEDURE — 80048 BASIC METABOLIC PNL TOTAL CA: CPT | Performed by: STUDENT IN AN ORGANIZED HEALTH CARE EDUCATION/TRAINING PROGRAM

## 2024-12-11 PROCEDURE — 99254 IP/OBS CNSLTJ NEW/EST MOD 60: CPT | Performed by: NURSE PRACTITIONER

## 2024-12-11 RX ORDER — CEFDINIR 300 MG/1
300 CAPSULE ORAL 2 TIMES DAILY
Qty: 4 CAPSULE | Refills: 0 | Status: SHIPPED | OUTPATIENT
Start: 2024-12-11 | End: 2024-12-13

## 2024-12-11 RX ORDER — FOLIC ACID 1 MG/1
4 TABLET ORAL DAILY
Qty: 112 TABLET | Refills: 0 | Status: SHIPPED | OUTPATIENT
Start: 2024-12-12 | End: 2025-01-09

## 2024-12-11 RX ADMIN — BUPRENORPHINE AND NALOXONE 0.5 TABLET: 8; 2 TABLET SUBLINGUAL at 10:39

## 2024-12-11 RX ADMIN — DAPTOMYCIN 450 MG: 500 INJECTION, POWDER, LYOPHILIZED, FOR SOLUTION INTRAVENOUS at 11:40

## 2024-12-11 RX ADMIN — NICOTINE 1 PATCH: 21 PATCH TRANSDERMAL at 09:43

## 2024-12-11 RX ADMIN — FAMOTIDINE 20 MG: 20 TABLET ORAL at 06:37

## 2024-12-11 RX ADMIN — PRENATAL VITAMINS-IRON FUMARATE 27 MG IRON-FOLIC ACID 0.8 MG TABLET 1 TABLET: at 09:40

## 2024-12-11 RX ADMIN — Medication 10 ML: at 09:43

## 2024-12-11 RX ADMIN — FOLIC ACID 4 MG: 1 TABLET ORAL at 09:40

## 2024-12-11 RX ADMIN — GABAPENTIN 400 MG: 400 CAPSULE ORAL at 13:49

## 2024-12-11 RX ADMIN — GABAPENTIN 400 MG: 400 CAPSULE ORAL at 06:07

## 2024-12-11 NOTE — PLAN OF CARE
Goal Outcome Evaluation:  Patient to be DC on this date. Midline kept in place for outpatient IV abx, telemetry box removed as ordered.

## 2024-12-11 NOTE — PLAN OF CARE
Goal Outcome Evaluation:   Patient is being discharged home today. She has been scheduled for infusion clinic for con't ABX's tomorrow at 10:30. [employer] referral has been faxed for transportation.    Copy of conformation of [employer] referral was given to patient in the discharge packet by primary RN  and scanned into HIM.

## 2024-12-11 NOTE — DISCHARGE SUMMARY
[facility] Medicine Services  DISCHARGE SUMMARY    Patient Identification:  Name:  Sailaja Alarcon  Age:  28 y.o.  Sex:  female  :  1995  MRN:  [id number]  Visit Number:  [id number]    Date of Admission: 12/10/2024  Date of Discharge:  2024    PCP: Margaret Baxter APRN    Discharging Provider: Priscilla Bob PA-C; Dr. Kadeem Ayala     Admission/Discharge Diagnoses     Discharge Diagnoses:  Infective endocarditis  Recent MRSA bacteremia  History of substance abuse  Intrauterine pregnancy    Consults/Procedures     Consults:   Consults       Date and Time Order Name Status Description    12/10/2024  3:45 PM Inpatient Infectious Diseases Consult      12/10/2024 12:55 PM Hospitalist (on-call MD unless specified)      12/3/2024  6:02 PM Inpatient Infectious Diseases Consult Completed             Procedures Performed:     Midline placement     History of Presenting Illness     Sailaja Alarcon is a 28 y.o. female with past medical history significant for recent MRSA bacteremia, hepatitis C, tobacco abuse that presented to the [facility] emergency department for evaluation of positive blood cultures.  Please see the admitting history and physical for further details.     In the ED she was afebrile and hemodynamically stable.  Patient was recently admitted to our facility on 12/3/2024 due to MRSA bacteremia.  Patient TTE was obtained revealing a freely mobile structure in the right atrium concerning for possible atrial wall vegetation.  ID recommended a 6-week course of antibiotics.  At that time patient expressed her preference to complete antibiotics as an outpatient, however due to her history of substance abuse and concern for compliance with outpatient follow-up, it was ultimately decided it would be in the patient's best interest to the course of IV antibiotics as an inpatient.  Unfortunately, the patient's  was reportedly arrested and  their children had to be placed in the care of a family member, which upset the patient who then decided to leave AMA.  Hospital Course     Patient was admitted to the telemetry unit in order to complete the previously recommended antibiotic therapy.  She did openly admit she would not be able to stay for the 6 weeks to complete therapy and would prefer to complete infusions at home or come into the infusion clinic daily.  While this is not preferred given the patient's prior history of substance use and concerns for noncompliance, it is likely the best option for the patient since she has already left AMA from our service and the ED with her current condition.  Patient extensively educated on the risk of misusing her midline for illicit substances.  Patient still maintains that she has been sober since 2022 and has no intentions on using illicit substances.  Reports she has regular drug testing with her PCP due to her use of prescribed Suboxone.  Patient reports she has already arranged transport through [facility]. Request for less than 24 hr notice for [facility] placed. Daptomycin infusion given and patient prior to discharge with referral to outpatient infusion center for completion of course.  Referrals made to infectious disease outpatient.  Patient already has cardiology follow-up scheduled for 12/16/2024.  PCP follow-up recommended in 1 week.    Given patient's pregnancy, referral was made for OB, Dr. Steinberg, show evaluation the patient may ultimately need high risk OB care given current infective endocarditis.  Will discharge on 4 mg of folic acid daily given high risk of neural tube defects with patient on chronic gabapentin therapy. Patient will also be discharged with 2 days of omnicef to complete previously started course of antibiotics for complicated UTI, given IUP.     On day of discharge, it was felt that she had reached maximal inpatient benefit and was stable for discharge.      Discharge Vitals/Physical  Examination     Vital Signs:  Temp:  [97.7 °F (36.5 °C)-98.7 °F (37.1 °C)] 98.7 °F (37.1 °C)  Heart Rate:  [58-72] 62  Resp:  [14-18] 18  BP: ()/(38-60) 100/43  Mean Arterial Pressure (Non-Invasive) for the past 24 hrs (Last 3 readings):   Noninvasive MAP (mmHg)   12/10/24 2259 70   12/10/24 1555 69   12/10/24 1502 72     SpO2 Percentage    12/11/24 0324 12/11/24 0651 12/11/24 1050   SpO2: 98% 98%  Comment: rn is aware 98%     SpO2:  [98 %-100 %] 98 %  on   ;   Device (Oxygen Therapy): room air    Body mass index is 23.33 kg/m².  Wt Readings from Last 3 Encounters:   12/11/24 59.7 kg (131 lb 11.2 oz)   12/09/24 59.4 kg (131 lb)   12/03/24 59.5 kg (131 lb 2.8 oz)         Physical Exam:  Physical Exam  Constitutional:       General: She is not in acute distress.     Appearance: Normal appearance.   HENT:      Head: Normocephalic and atraumatic.      Right Ear: External ear normal.      Left Ear: External ear normal.      Nose: No nasal deformity.      Mouth/Throat:      Lips: Pink.      Mouth: Mucous membranes are moist.   Eyes:      Conjunctiva/sclera: Conjunctivae normal.      Pupils: Pupils are equal, round, and reactive to light.   Cardiovascular:      Rate and Rhythm: Normal rate and regular rhythm.      Heart sounds: Normal heart sounds.   Pulmonary:      Effort: Pulmonary effort is normal.      Breath sounds: Normal breath sounds. No wheezing, rhonchi or rales.   Abdominal:      General: Abdomen is flat. Bowel sounds are normal.      Palpations: Abdomen is soft.      Tenderness: There is no guarding or rebound.   Genitourinary:     Comments: No martínez catheter in place   Musculoskeletal:      Cervical back: Neck supple. Normal range of motion.      Right lower leg: No edema.      Left lower leg: No edema.   Skin:     General: Skin is warm and dry.   Neurological:      General: No focal deficit present.      Mental Status: She is alert and oriented to person, place, and time.   Psychiatric:         Mood and  "Affect: Mood normal.         Behavior: Behavior normal.       Pertinent Laboratory/Radiology Results     Pertinent Laboratory Results:  Results from last 7 days   Lab Units 12/08/24 0228   CK TOTAL U/L 7*           Results from last 7 days   Lab Units 12/10/24  1109 12/09/24  0058 12/08/24  0228   CRP mg/dL 0.32  --   --    LACTATE mmol/L 1.4  --   --    WBC 10*3/mm3 4.52 4.47 4.32   HEMOGLOBIN g/dL 10.1* 9.6* 9.8*   HEMATOCRIT % 31.0* 29.7* 30.5*   MCV fL 92.0 92.0 91.6   MCHC g/dL 32.6 32.3 32.1   PLATELETS 10*3/mm3 227 207 220     Results from last 7 days   Lab Units 12/11/24  0052 12/10/24  1109 12/09/24  0058   SODIUM mmol/L 134* 131* 135*   POTASSIUM mmol/L 4.2 4.1 4.0   MAGNESIUM mg/dL  --  2.1  --    CHLORIDE mmol/L 100 99 99   CO2 mmol/L 23.6 20.1* 25.1   BUN mg/dL 14 13 16   CREATININE mg/dL 0.67 0.63 0.75   CALCIUM mg/dL 9.3 9.3 9.4   GLUCOSE mg/dL 96 96 116*   ALBUMIN g/dL  --  3.8 3.9   BILIRUBIN mg/dL  --  0.3 0.2   ALK PHOS U/L  --  65 70   AST (SGOT) U/L  --  8 9   ALT (SGPT) U/L  --  6 7   Estimated Creatinine Clearance: 115.7 mL/min (by C-G formula based on SCr of 0.67 mg/dL).  No results found for: \"AMMONIA\"    No results found for: \"HGBA1C\", \"POCGLU\"  No results found for: \"HGBA1C\"  No results found for: \"TSH\", \"FREET4\"    Blood Culture   Date Value Ref Range Status   12/05/2024 No growth at 5 days  Final   12/05/2024 No growth at 5 days  Final     No results found for: \"URINECX\"  No results found for: \"WOUNDCX\"  No results found for: \"STOOLCX\"  No results found for: \"RESPCX\"  Microbiology Results (last 10 days)       Procedure Component Value - Date/Time    Blood Culture - Blood, Arm, Left [[id number]]  (Normal) Collected: 12/10/24 1109    Lab Status: Preliminary result Specimen: Blood from Arm, Left Updated: 12/11/24 1146     Blood Culture No growth at 24 hours    Blood Culture - Blood, Arm, Right [[id number]]  (Normal) Collected: 12/10/24 1109    Lab Status: Preliminary result Specimen: Blood " from Arm, Right Updated: 12/11/24 1146     Blood Culture No growth at 24 hours    Blood Culture - Blood, Arm, Left [[id number]]  (Normal) Collected: 12/05/24 1938    Lab Status: Final result Specimen: Blood from Arm, Left Updated: 12/10/24 1946     Blood Culture No growth at 5 days    Blood Culture - Blood, Arm, Left [[id number]]  (Normal) Collected: 12/05/24 1436    Lab Status: Final result Specimen: Blood from Arm, Left Updated: 12/10/24 1545     Blood Culture No growth at 5 days    Respiratory Panel PCR w/COVID-19(SARS-CoV-2) ANGELA/AMELIA/JERRI/PAD/COR/[facility]ER In-House, NP Swab in UTM/VTM, 2 HR TAT - Swab, Nasopharynx [[id number]]  (Abnormal) Collected: 12/03/24 1248    Lab Status: Final result Specimen: Swab from Nasopharynx Updated: 12/03/24 1400     ADENOVIRUS, PCR Not Detected     Coronavirus 229E Not Detected     Coronavirus HKU1 Not Detected     Coronavirus NL63 Not Detected     Coronavirus OC43 Not Detected     COVID19 Not Detected     Human Metapneumovirus Not Detected     Human Rhinovirus/Enterovirus Detected     Influenza A PCR Not Detected     Influenza B PCR Not Detected     Parainfluenza Virus 1 Not Detected     Parainfluenza Virus 2 Not Detected     Parainfluenza Virus 3 Not Detected     Parainfluenza Virus 4 Not Detected     RSV, PCR Not Detected     Bordetella pertussis pcr Not Detected     Bordetella parapertussis PCR Not Detected     Chlamydophila pneumoniae PCR Not Detected     Mycoplasma pneumo by PCR Not Detected    Narrative:      In the setting of a positive respiratory panel with a viral infection PLUS a negative procalcitonin without other underlying concern for bacterial infection, consider observing off antibiotics or discontinuation of antibiotics and continue supportive care. If the respiratory panel is positive for atypical bacterial infection (Bordetella pertussis, Chlamydophila pneumoniae, or Mycoplasma pneumoniae), consider antibiotic de-escalation to target atypical bacterial infection.     Urine Culture - Urine, Urine, Clean Catch [[id number]]  (Abnormal)  (Susceptibility) Collected: 12/03/24 1248    Lab Status: Final result Specimen: Urine, Clean Catch Updated: 12/05/24 1045     Urine Culture >100,000 CFU/mL Escherichia coli    Narrative:      Colonization of the urinary tract without infection is common. Treatment is discouraged unless the patient is symptomatic, pregnant, or undergoing an invasive urologic procedure.    Susceptibility        Escherichia coli      TREASURE      Amoxicillin + Clavulanate Resistant      Ampicillin Resistant      Ampicillin + Sulbactam Intermediate      Cefazolin (Urine) Resistant      Cefepime Susceptible      Ceftazidime Intermediate      Ceftriaxone Susceptible      Gentamicin Susceptible      Levofloxacin Susceptible      Nitrofurantoin Intermediate      Piperacillin + Tazobactam Susceptible      Trimethoprim + Sulfamethoxazole Resistant                           Blood Culture - Blood, Arm, Left [[id number]]  (Abnormal) Collected: 12/03/24 1246    Lab Status: Final result Specimen: Blood from Arm, Left Updated: 12/05/24 0658     Blood Culture Staphylococcus aureus, MRSA     Comment:   Infectious disease consultation is highly recommended to rule out distant foci of infection.  Methicillin resistant Staphylococcus aureus, Patient may be an isolation risk.        Isolated from Anaerobic Bottle     Gram Stain Anaerobic Bottle Gram positive cocci in clusters    Narrative:      Refer to previous blood culture collected on 12/01/2024 1305 for MICs    No BCID2 performed, refer to previous blood culture.     Blood Culture - Blood, Arm, Right [[id number]]  (Normal) Collected: 12/03/24 1246    Lab Status: Final result Specimen: Blood from Arm, Right Updated: 12/08/24 1330     Blood Culture No growth at 5 days    Blood Culture - Blood, Arm, Right [[id number]]  (Abnormal)  (Susceptibility) Collected: 12/01/24 1305    Lab Status: Edited Result - FINAL Specimen: Blood from Arm, Right  Updated: 12/04/24 0821     Blood Culture Staphylococcus aureus, MRSA     Comment:   Infectious disease consultation is highly recommended to rule out distant foci of infection.  Methicillin resistant Staphylococcus aureus, Patient may be an isolation risk.        Isolated from Anaerobic Bottle     Gram Stain Anaerobic Bottle Gram positive cocci in clusters    Narrative:      No BCID performed, refer to previous blood culture specimen collected on 12- from left arm.    Susceptibility        Staphylococcus aureus, MRSA      TREASURE      Daptomycin Susceptible (C)  [1]       Gentamicin Susceptible      Oxacillin Resistant      Rifampin Susceptible      Vancomycin Susceptible                   [1]  Appended report. These results have been appended to a previously final verified report.                    Blood Culture - Blood, Arm, Left [[id number]]  (Abnormal) Collected: 12/01/24 1305    Lab Status: Final result Specimen: Blood from Arm, Left Updated: 12/04/24 0628     Blood Culture Staphylococcus, coagulase negative     Isolated from Aerobic Bottle     Gram Stain Aerobic Bottle Gram positive cocci in clusters    Narrative:      Probable contaminant requires clinical correlation, susceptibility not performed unless requested by physician.      Blood Culture ID, PCR - Blood, Arm, Left [[id number]]  (Abnormal) Collected: 12/01/24 1305    Lab Status: Final result Specimen: Blood from Arm, Left Updated: 12/02/24 1427     BCID, PCR Staph spp, not aureus or lugdunensis. Identification by BCID2 PCR.     BOTTLE TYPE Aerobic Bottle    Respiratory Panel PCR w/COVID-19(SARS-CoV-2) ANGELA/AMELIA/JERRI/PAD/COR/[facility]ENN[facility] In-House, NP Swab in UTM/VTM, 2 HR TAT - Swab, Nasopharynx [[id number]]  (Normal) Collected: 12/01/24 1242    Lab Status: Final result Specimen: Swab from Nasopharynx Updated: 12/01/24 1352     ADENOVIRUS, PCR Not Detected     Coronavirus 229E Not Detected     Coronavirus HKU1 Not Detected     Coronavirus NL63 Not Detected      Coronavirus OC43 Not Detected     COVID19 Not Detected     Human Metapneumovirus Not Detected     Human Rhinovirus/Enterovirus Not Detected     Influenza A PCR Not Detected     Influenza B PCR Not Detected     Parainfluenza Virus 1 Not Detected     Parainfluenza Virus 2 Not Detected     Parainfluenza Virus 3 Not Detected     Parainfluenza Virus 4 Not Detected     RSV, PCR Not Detected     Bordetella pertussis pcr Not Detected     Bordetella parapertussis PCR Not Detected     Chlamydophila pneumoniae PCR Not Detected     Mycoplasma pneumo by PCR Not Detected    Narrative:      In the setting of a positive respiratory panel with a viral infection PLUS a negative procalcitonin without other underlying concern for bacterial infection, consider observing off antibiotics or discontinuation of antibiotics and continue supportive care. If the respiratory panel is positive for atypical bacterial infection (Bordetella pertussis, Chlamydophila pneumoniae, or Mycoplasma pneumoniae), consider antibiotic de-escalation to target atypical bacterial infection.          Pain Management Panel  More data exists         Latest Ref Rng & Units 12/10/2024 12/3/2024   Pain Management Panel   Amphetamine, Urine Qual Negative Negative  Negative    Barbiturates Screen, Urine Negative Negative  Negative    Benzodiazepine Screen, Urine Negative Negative  Negative    Buprenorphine, Screen, Urine Negative Positive  Positive    Cocaine Screen, Urine Negative Negative  Negative    Fentanyl, Urine Negative Negative  Negative    Methadone Screen , Urine Negative Negative  Negative    Methamphetamine, Ur Negative Negative  Negative       Details                   Pertinent Radiology Results:  Imaging Results (All)       None            Test Results Pending at Discharge:  Pending Labs       Order Current Status    Blood Culture - Blood, Arm, Left Preliminary result    Blood Culture - Blood, Arm, Right Preliminary result            Discharge  Disposition/Discharge Medications/Discharge Appointments     Discharge Disposition:   Home or Self CareHome, following up with infusion clinic daily.    Condition at Discharge:  Stable         Code Status While Inpatient:  Code Status and Medical Interventions: CPR (Attempt to Resuscitate); Full Support   Ordered at: 12/10/24 1336     Code Status (Patient has no pulse and is not breathing):    CPR (Attempt to Resuscitate)     Medical Interventions (Patient has pulse or is breathing):    Full Support       Discharge Medications:     Discharge Medications        New Medications        Instructions Start Date   cefdinir 300 MG capsule  Commonly known as: OMNICEF  Notes to patient: Separate from prenatal vitamin by at least 2 hours   300 mg, Oral, 2 Times Daily      daptomycin solution IVPB  Commonly known as: CUBICIN   450 mg, Intravenous, Every 24 Hours      folic acid 1 MG tablet  Commonly known as: FOLVITE   4 mg, Oral, Daily   Start Date: December 12, 2024            Continue These Medications        Instructions Start Date   buprenorphine-naloxone 8-2 MG per SL tablet  Commonly known as: SUBOXONE   0.25-0.5 tablets, Sublingual, Daily      diphenhydrAMINE 25 mg capsule  Commonly known as: BENADRYL   25 mg, Oral, 2 Times Daily PRN      famotidine 20 MG tablet  Commonly known as: PEPCID   20 mg, 2 Times Daily      gabapentin 800 MG tablet  Commonly known as: NEURONTIN   800 mg, 3 Times Daily      ibuprofen 800 MG tablet  Commonly known as: ADVIL,MOTRIN   800 mg, Oral, 2 Times Daily PRN      melatonin 5 MG tablet tablet   15 mg, Oral, Nightly PRN      prenatal (CLASSIC) vitamin 28-0.8 MG tablet tablet  Generic drug: prenatal vitamin   1 tablet, Daily               Discharge Appointments:  Your Scheduled Appointments      Dec 16, 2024 9:00 AM  New Patient with Marcos Phillips MD  [facility] CARDIOLOGY ([place]) [address]  [place] [address]  [phone]   -Bring photo ID, insurance card,  and list of medications to appointment  -If testing was completed outside of [facility] then patient must bring images on a disc  -Copay will be collected at time of appointment  -New patients should arrive 15 minutes prior to appointment  If your symptoms change or worsen, please contact your PCP or go to the nearest emergency room.                Additional Instructions for the Follow-ups that You Need to Schedule       Discharge Follow-up with PCP   As directed       Currently Documented PCP:    Margaret Baxter APRN    PCP Phone Number:    [phone]     Follow Up Details: 1 week post hospital follow up                Labs Pending at Discharge:     Pending Labs       Order Current Status    Blood Culture - Blood, Arm, Left Preliminary result    Blood Culture - Blood, Arm, Right Preliminary result             The ASCVD Risk score (Carmen DK, et al., 2019) failed to calculate for the following reasons:    The 2019 ASCVD risk score is only valid for ages 40 to 79     Attestation: I personally discussed the patient's hospital course, disposition, discharge planning, and discharge medications with attending physician, Kadeem Ibrahim DO, prior to time of discharge.       Priscilla Bob PA-C  Hospitalist Service -- [facility]       12/11/24  12:05 EST    Discharge Time: 1126    Please send a copy of this dictation to the following providers:  Margaret Baxter APRN

## 2024-12-11 NOTE — DISCHARGE INSTRUCTIONS
Please attend daily infusions as discussed and scheduled.   Please attend follow ups as scheduled.   Please take medications as directed.   Please seek care for new or worsening symptoms.

## 2024-12-11 NOTE — CASE MANAGEMENT/SOCIAL WORK
Continued Stay Note  WYATT Jules     Patient Name: Sailaja Alarcon  MRN: [id number]  Today's Date: 12/11/2024    Admit Date: 12/10/2024    Plan: This CM verified discharge plan at bedside with pt & spouse; discharge plan is to return home with O/P infusion clinic; pt has arranged transport through [facility], will need iv abx today as O/P infusion will start tomorrow; midline has been placed.  Samina Rios RN

## 2024-12-11 NOTE — NURSING NOTE
Received consult for right lower leg wound.    Patient with dry, scab covered wound to left lateral lower leg.  No drainage.  No erythema.  Adrienne wound intact and pink.  Patient reports the scab has came off multiple times in over the past few months but always forms back.  Instructed patient to leave scab intact.  Instructed patient on signs and symptoms of infection.  She verbalized understanding.  Order placed to assess wound and leave open to air daily.    Miguel Angel score 21.       12/11/24 1055   Wound 01/12/24 2122 Left anterior ankle    Placement Date/Time: 01/12/24 2122   Present on Original Admission: Yes  Side: Left  Orientation: anterior  Location: ankle  Primary Wound Type: (c)    Pressure Injury Stage O  (not a pressure related wound)   Dressing Appearance open to air   Closure None   Base dry;scab   Periwound intact;pink;dry   Periwound Temperature warm   Periwound Skin Turgor soft   Edges rolled/closed   Drainage Amount none   Dressing Care open to air

## 2024-12-11 NOTE — PAYOR COMM NOTE
"CONTACT:  ALBA CAR RN  UTILIZATION MANAGEMENT DEPT.   [facility]   [address] [facility], [address]   PHONE:  [phone]   FAX: [phone]         INPATIENT AUTH REQUEST            Rochelle Robertson (28 y.o. Female)       Date of Birth   1995    Social Security Number       Address   [address] [facility] [address] [facility] [address]    Home Phone   [phone]    MRN   [id number]       Roman Catholic   Religious    Marital Status                               Admission Date   12/10/24    Admission Type   Emergency    Admitting Provider   Kadeem Ayala DO    Attending Provider   Kadeem Ayala DO    Department, Room/Bed   [facility], 3305/1S       Discharge Date       Discharge Disposition       Discharge Destination                                 Attending Provider: Kadeem Ayala DO    Allergies: Amoxicillin, Penicillins, Vancomycin, Zofran [Ondansetron Hcl]    Isolation: None   Infection: MRSA (10/13/21)   Code Status: CPR    Ht: 160 cm (63\")   Wt: 59.7 kg (131 lb 11.2 oz)    Admission Cmt: None   Principal Problem: Infective endocarditis [I33.0]                   Active Insurance as of 12/10/2024       Primary Coverage       Payor Plan Insurance Group Employer/Plan Group    [facility] BY REYES [facility] BY REYES [id number]       Payor Plan Address Payor Plan Phone Number Payor Plan Fax Number Effective Dates    [address] [address]   1/1/2021 - None Entered    [facility] [id number]         Subscriber Name Subscriber Birth Date Member ID       ROCHELLE ROBERTSON 1995 [id number]                     Emergency Contacts        (Rel.) Home Phone Work Phone Mobile Phone    UMER ROBERTSON (Spouse) [phone] -- --    BIB RAIN (Grandparent) [phone] -- --    Vivian Kaba (Aunt) (Other) -- -- [phone]                 History & Physical        Mohinder Casillas PA-C at 12/10/24 1333       Attestation signed " by Kadeem Ayala DO at 12/10/24 1844    Patient seen and examined separately.  Patient calm and in no distress with no acute complaints.  Patient presents back to the hospital seeking completion of her treatment after leaving AMA earlier.  However patient does openly admit that she would not likely stay for 6 weeks is looking for options that would allow for antibiotic therapy to be given outside the hospital either at home or coming daily.  Given the fact the patient has left multiple times may need to consider this despite history of substance abuse as she reports being clean since last urine drug screening states that she undergoes frequent drug screening with primary care.  Patient chronically prescribed Suboxone.  Will resume previous daptomycin therapy.  Will start patient on folic acid given 7-week pregnancy.  Consider OB/GYN consultation versus referral at discharge pending clinical course.    I have reviewed this documentation and agree.                      [facility] Medicine Services  History & Physical    Patient Identification:  Name:  Sailaja Alarcon  Age:  28 y.o.  Sex:  female  :  1995  MRN:  [id number]   Visit Number:  [id number]  Admit Date: 12/10/2024   Primary Care Physician:  Margaret Baxter APRN    Subjective     Chief complaint: Abnormal lab    History of presenting illness:      Sailaja Alarcon is a 28 y.o. female who presented for further evaluation of abnormal lab.  The patient recently admitted to this facility-left AMA from the hospitalist service and later from the ED on .  He was being treated for bacteremia, endocarditis with plan for discharge to LTAC for 6 weeks IV daptomycin therapy per infectious disease recommendation.  Clinical course has been complicated by her current 6-week IUP.  She was seen and examined in the ED reporting she felt better than she has in recent memory. She denies any chest pain or palpitations. No  fevers. No abdominal pain. She is not having any nausea or vomiting. No diarrhea, difficulty with urination. She does have a chronic wound to the RLE which she believes is healing appropriately. She reports is no longer symptomatic from recent rhinovirus. No SOB, cough, congestion.     Past medical history is significant for substance abuse history, recently diagnosed MRSA bacteremia with infective endocarditis    Upon arrival to the ED, vital signs were temp 98.6, heart rate 73, respirations 20, /65, SpO2 100% on RA.  Laboratory workup showed mild hyponatremia at 131, no leukocytosis or left shift.  Normal Pro-Myron and CRP.     Emergency Department Room location at the time of my evaluation was 406.     ---------------------------------------------------------------------------------------------------------------------   Review of Systems   Constitutional:  Negative for chills and fever.   HENT:  Negative for congestion and rhinorrhea.    Respiratory:  Negative for cough and shortness of breath.    Cardiovascular:  Negative for chest pain.   Gastrointestinal:  Negative for abdominal distention and diarrhea.   Genitourinary:  Negative for difficulty urinating and dysuria.   Musculoskeletal:  Negative for arthralgias and myalgias.   Skin:  Negative for rash and wound.   Neurological:  Negative for dizziness and light-headedness.        ---------------------------------------------------------------------------------------------------------------------   Past Medical History:   Diagnosis Date    Hepatitis C     Hepatitis C antibody positive in blood     Migraines      Past Surgical History:   Procedure Laterality Date    HEAD/NECK ABSCESS INCISION AND DRAINAGE Left 7/8/2022    Procedure: HEAD NECK ABSCESS INCISION AND DRAINAGE;  Surgeon: Miquel Grayson MD;  Location: [facility];  Service: General;  Laterality: Left;    INCISION AND DRAINAGE ABSCESS Right 10/10/2021    Procedure: INCISION AND DRAINAGE  ABSCESS CENTRAL LINE PLACEMENT;  Surgeon: Naz Payne MD;  Location: [facility];  Service: General;  Laterality: Right;  I&D ABSCESS= INFECTED.  CENTRAL LINE = CLEAN.     Family History   Problem Relation Age of Onset    Hypertension Mother     Cancer Father     No Known Problems Sister     No Known Problems Brother     No Known Problems Son     No Known Problems Daughter     Hypertension Maternal Grandmother     Diabetes Maternal Grandfather     No Known Problems Paternal Grandmother     No Known Problems Paternal Grandfather     No Known Problems Cousin     Diabetes Maternal Aunt     Rheum arthritis Neg Hx     Osteoarthritis Neg Hx     Asthma Neg Hx     Heart failure Neg Hx     Hyperlipidemia Neg Hx     Migraines Neg Hx     Rashes / Skin problems Neg Hx     Seizures Neg Hx     Stroke Neg Hx     Thyroid disease Neg Hx      Social History     Socioeconomic History    Marital status:    Tobacco Use    Smoking status: Every Day     Current packs/day: 1.00     Average packs/day: 1 pack/day for 15.0 years (15.0 ttl pk-yrs)     Types: Cigarettes    Smokeless tobacco: Never   Vaping Use    Vaping status: Never Used   Substance and Sexual Activity    Alcohol use: No     Alcohol/week: 0.0 standard drinks of alcohol    Drug use: Not Currently     Frequency: 3.0 times per week     Types: IV     Comment: last use 5 YEARS AGO    Sexual activity: Yes     Partners: Male     ---------------------------------------------------------------------------------------------------------------------   Allergies:  Amoxicillin, Penicillins, Vancomycin, and Zofran [ondansetron hcl]  ---------------------------------------------------------------------------------------------------------------------   Home medications:    Medications below are reported home medications pulling from within the system; at this time, these medications have not been reconciled unless otherwise specified and are in the verification process for further  verifcation as current home medications.  (Not in a hospital admission)      Hospital Scheduled Meds:  DAPTOmycin, 8 mg/kg, Intravenous, Q24H           Current listed hospital scheduled medications may not yet reflect those currently placed in orders that are signed and held awaiting patient's arrival to floor.   ---------------------------------------------------------------------------------------------------------------------     Objective     Vital Signs:  Temp:  [98.6 °F (37 °C)] 98.6 °F (37 °C)  Heart Rate:  [62-73] 62  Resp:  [16-20] 16  BP: ()/(53-65) 93/53      12/10/24  0858   Weight: 57.2 kg (126 lb)     Body mass index is 22.32 kg/m².  ---------------------------------------------------------------------------------------------------------------------       Physical Exam  Vitals and nursing note reviewed.   Constitutional:       General: She is not in acute distress.  HENT:      Head: Normocephalic and atraumatic.   Eyes:      Extraocular Movements: Extraocular movements intact.   Cardiovascular:      Rate and Rhythm: Normal rate and regular rhythm.   Pulmonary:      Effort: Pulmonary effort is normal.      Breath sounds: Normal breath sounds.   Abdominal:      Palpations: Abdomen is soft.   Musculoskeletal:      Right lower leg: No edema.      Left lower leg: No edema.   Skin:     General: Skin is warm and dry.   Neurological:      Mental Status: She is alert. Mental status is at baseline.   Psychiatric:         Mood and Affect: Mood normal.         Behavior: Behavior normal.           ---------------------------------------------------------------------------------------------------------------------  EKG:      ---------------------------------------------------------------------------------------------------------------------   Results from last 7 days   Lab Units 12/10/24  1109 12/09/24  0058 12/08/24 0228   CRP mg/dL 0.32  --   --    LACTATE mmol/L 1.4  --   --    WBC 10*3/mm3 4.52 4.47 4.32  "  HEMOGLOBIN g/dL 10.1* 9.6* 9.8*   HEMATOCRIT % 31.0* 29.7* 30.5*   MCV fL 92.0 92.0 91.6   MCHC g/dL 32.6 32.3 32.1   PLATELETS 10*3/mm3 227 207 220         Results from last 7 days   Lab Units 12/10/24  1109 12/09/24  0058 12/08/24  0228 12/06/24  0140 12/04/24  0243   SODIUM mmol/L 131* 135* 134*   < > 132*   POTASSIUM mmol/L 4.1 4.0 4.5   < > 4.7  4.7   MAGNESIUM mg/dL 2.1  --   --   --   --    CHLORIDE mmol/L 99 99 99   < > 104   CO2 mmol/L 20.1* 25.1 23.2   < > 17.0*   BUN mg/dL 13 16 14   < > 9   CREATININE mg/dL 0.63 0.75 0.69   < > 0.55*   CALCIUM mg/dL 9.3 9.4 9.5   < > 9.0   GLUCOSE mg/dL 96 116* 90   < > 99   ALBUMIN g/dL 3.8 3.9  --   --  3.0*   BILIRUBIN mg/dL 0.3 0.2  --   --  0.2   ALK PHOS U/L 65 70  --   --  65   AST (SGOT) U/L 8 9  --   --  7   ALT (SGPT) U/L 6 7  --   --  5    < > = values in this interval not displayed.   Estimated Creatinine Clearance: 117.9 mL/min (by C-G formula based on SCr of 0.63 mg/dL).  No results found for: \"AMMONIA\"  Results from last 7 days   Lab Units 12/08/24 0228   CK TOTAL U/L 7*         No results found for: \"HGBA1C\"  No results found for: \"TSH\", \"FREET4\"  Lab Results   Component Value Date    PREGTESTUR Negative 07/07/2022    HCGQUANT 20,307.00 12/03/2024     Pain Management Panel  More data exists         Latest Ref Rng & Units 12/10/2024 12/3/2024   Pain Management Panel   Amphetamine, Urine Qual Negative Negative  Negative    Barbiturates Screen, Urine Negative Negative  Negative    Benzodiazepine Screen, Urine Negative Negative  Negative    Buprenorphine, Screen, Urine Negative Positive  Positive    Cocaine Screen, Urine Negative Negative  Negative    Fentanyl, Urine Negative Negative  Negative    Methadone Screen , Urine Negative Negative  Negative    Methamphetamine, Ur Negative Negative  Negative       Details                 Blood Culture   Date Value Ref Range Status   12/05/2024 No growth at 4 days  Preliminary   12/05/2024 No growth at 4 days  " "Preliminary     No results found for: \"URINECX\"  No results found for: \"WOUNDCX\"  No results found for: \"STOOLCX\"      ---------------------------------------------------------------------------------------------------------------------  Imaging Results (Last 7 Days)       ** No results found for the last 168 hours. **            Cultures:  Blood Culture   Date Value Ref Range Status   12/05/2024 No growth at 4 days  Preliminary   12/05/2024 No growth at 4 days  Preliminary       Last echocardiogram:  Results for orders placed during the hospital encounter of 12/03/24    Adult Transthoracic Echo Complete W/ Cont if Necessary Per Protocol    Interpretation Summary    Left ventricular systolic function is normal. Calculated left ventricular EF = 64.6% Left ventricular ejection fraction appears to be 61 - 65%.    Left ventricular diastolic function was normal.    Normal right ventricular systolic function noted.    Estimated right ventricular systolic pressure from tricuspid regurgitation is normal (<35 mmHg).    Freely mobile structure in the right atrium, cannot rule out right atrial wall vegetation versus prominent eustachian valve.  Recommend CRISTOPHER for further evaluation.    There is no evidence of pericardial effusion.    No prior studies available.          I have personally reviewed the above radiology images and read the final radiology report on 12/10/24  ---------------------------------------------------------------------------------------------------------------------  Assessment / Plan     There are no active hospital problems to display for this patient.      ASSESSMENT/PLAN:    Recent MRSA bacteremia  Infective endocarditis  Hx substance abuse  Complicated by IUP  Patient return to care today after leaving [place] on 12/9/2024 from hospitalization during which she was treated for MRSA bacteremia and infective endocarditis with a plan in place for LTAC admission to complete 6-week course of IV antibiotic " therapy.  Admit to the telemetry unit  Continue Rocephin and daptomycin  Consult infectious disease, assistance appreciated.  She will need PICC line replaced as was removed at previous AMA discharge  Continue supportive care measures  Trend labs  Will also likely need CM/SW assistance with discharge planning.  She will need follow-up with OB as an outpatient.    ----------  -DVT prophylaxis: Lovenox  -Activity: As tolerated, up in chair  -Expected length of stay: INPATIENT status due to the need for care which can only be reasonably provided in an hospital setting such as aggressive/expedited ancillary services and/or consultation services, the necessity for IV medications, close physician monitoring and/or the possible need for procedures.  In such, I feel patient’s risk for adverse outcomes and need for care warrant INPATIENT evaluation and predict the patient’s care encounter to likely last beyond 2 midnights.   -Disposition pending course    High risk secondary to     There are no questions and answers to display.       Mohinder Casillas PA-C   12/10/24  13:33 EST    Electronically signed by Kadeem Ayala DO at 12/10/24 1844          Emergency Department Notes        Jose Ramon Hyde II, PA at 12/10/24 1255          Subjective   History of Present Illness  28-year-old female presents to the ER chief complaint of bacteremia.  Patient verbalized that she was recently in the hospital and left AGAINST MEDICAL ADVICE.  Patient did have an ER visit last night however had to leave secondary to family emergency.  Patient does have known endocarditis.  Looks like patient was scheduled to be on LTAC for 6 weeks of treatment with daptomycin.  Patient presents today for treatment and admission.  Patient is 7 weeks pregnant.  Has not established OB/GYN care        Review of Systems   Constitutional: Negative.  Negative for fever.   HENT: Negative.     Respiratory: Negative.     Cardiovascular: Negative.  Negative for chest  pain.   Gastrointestinal: Negative.  Negative for abdominal pain.   Endocrine: Negative.    Genitourinary: Negative.  Negative for dysuria.   Skin: Negative.    Neurological: Negative.    Psychiatric/Behavioral: Negative.     All other systems reviewed and are negative.      Past Medical History:   Diagnosis Date    Hepatitis C     Hepatitis C antibody positive in blood     Migraines        Allergies   Allergen Reactions    Amoxicillin Anaphylaxis    Penicillins Anaphylaxis     Has tolerated rocephin per patient    Vancomycin Itching     Pt states she had full body itch and burning sensation, and she turned red. Reaction potentially Red Man Syndrome.    Zofran [Ondansetron Hcl] Hives and Rash       Past Surgical History:   Procedure Laterality Date    HEAD/NECK ABSCESS INCISION AND DRAINAGE Left 7/8/2022    Procedure: HEAD NECK ABSCESS INCISION AND DRAINAGE;  Surgeon: Miquel Grayson MD;  Location: [facility] OR;  Service: General;  Laterality: Left;    INCISION AND DRAINAGE ABSCESS Right 10/10/2021    Procedure: INCISION AND DRAINAGE ABSCESS CENTRAL LINE PLACEMENT;  Surgeon: Naz Payne MD;  Location: [facility] OR;  Service: General;  Laterality: Right;  I&D ABSCESS= INFECTED.  CENTRAL LINE = CLEAN.       Family History   Problem Relation Age of Onset    Hypertension Mother     Cancer Father     No Known Problems Sister     No Known Problems Brother     No Known Problems Son     No Known Problems Daughter     Hypertension Maternal Grandmother     Diabetes Maternal Grandfather     No Known Problems Paternal Grandmother     No Known Problems Paternal Grandfather     No Known Problems Cousin     Diabetes Maternal Aunt     Rheum arthritis Neg Hx     Osteoarthritis Neg Hx     Asthma Neg Hx     Heart failure Neg Hx     Hyperlipidemia Neg Hx     Migraines Neg Hx     Rashes / Skin problems Neg Hx     Seizures Neg Hx     Stroke Neg Hx     Thyroid disease Neg Hx        Social History     Socioeconomic History     Marital status:    Tobacco Use    Smoking status: Every Day     Current packs/day: 1.00     Average packs/day: 1 pack/day for 15.0 years (15.0 ttl pk-yrs)     Types: Cigarettes    Smokeless tobacco: Never   Vaping Use    Vaping status: Never Used   Substance and Sexual Activity    Alcohol use: No     Alcohol/week: 0.0 standard drinks of alcohol    Drug use: Not Currently     Frequency: 3.0 times per week     Types: IV     Comment: last use 5 YEARS AGO    Sexual activity: Yes     Partners: Male           Objective   Physical Exam  Vitals and nursing note reviewed.   Constitutional:       General: She is not in acute distress.     Appearance: She is well-developed. She is not diaphoretic.   HENT:      Head: Normocephalic and atraumatic.      Right Ear: External ear normal.      Left Ear: External ear normal.      Nose: Nose normal.   Eyes:      Conjunctiva/sclera: Conjunctivae normal.   Neck:      Vascular: No JVD.      Trachea: No tracheal deviation.   Cardiovascular:      Rate and Rhythm: Normal rate and regular rhythm.      Heart sounds: Normal heart sounds. No murmur heard.  Pulmonary:      Effort: Pulmonary effort is normal. No respiratory distress.      Breath sounds: Normal breath sounds. No wheezing.   Abdominal:      Palpations: Abdomen is soft.      Tenderness: There is no abdominal tenderness.   Musculoskeletal:         General: No deformity. Normal range of motion.      Cervical back: Normal range of motion and neck supple.   Skin:     General: Skin is warm and dry.      Coloration: Skin is not pale.      Findings: No erythema or rash.   Neurological:      Mental Status: She is alert and oriented to person, place, and time.      Cranial Nerves: No cranial nerve deficit.   Psychiatric:         Behavior: Behavior normal.         Thought Content: Thought content normal.         Procedures          ED Course  ED Course as of 12/10/24 1256   Tue Dec 10, 2024   1252 Discussed with Dr. Ayala who is  agreeable to admission. [RB]      ED Course User Index  [RB] Jose Ramon Hyde II, PA                                                       Medical Decision Making      Final diagnoses:   Subacute bacterial endocarditis       ED Disposition  ED Disposition       ED Disposition   Decision to Admit    Condition   --    Comment   --               No follow-up provider specified.       Medication List      No changes were made to your prescriptions during this visit.            Jose Ramon Hyde II, PA  12/10/24 1256      Electronically signed by Jose Ramon Hyde II, PA at 12/10/24 1256       Bettina Neal RN at 12/10/24 1055          Contacted PICC team for midline access. Reports will be at least 1 hour until she can come to ED. Provider and  made aware.     Electronically signed by Bettina Neal, RN at 12/10/24 1246       Facility-Administered Medications as of 12/11/2024   Medication Dose Route Frequency Provider Last Rate Last Admin    acetaminophen (TYLENOL) tablet 650 mg  650 mg Oral Q6H PRN Kadeem Ayala DO   650 mg at 12/10/24 1805    buprenorphine-naloxone (SUBOXONE) 8-2 MG per SL tablet 0.5 tablet  0.5 tablet Sublingual Daily Kadeem Ayala DO        cefTRIAXone (ROCEPHIN) 1,000 mg in sodium chloride 0.9 % 100 mL IVPB-VTB  1,000 mg Intravenous Q24H Kadeem Ayala  mL/hr at 12/10/24 1659 1,000 mg at 12/10/24 1659    [COMPLETED] DAPTOmycin (CUBICIN) 450 mg/50 mL 0.9% sodium chloride IVPB  8 mg/kg Intravenous Q24H Jose Ramon Hyde II, PA   450 mg at 12/10/24 1538    DAPTOmycin (CUBICIN) 450 mg/50 mL 0.9% sodium chloride IVPB  8 mg/kg Intravenous Q24H Kadeem Ayala DO        Enoxaparin Sodium (LOVENOX) syringe 40 mg  40 mg Subcutaneous Nightly Kadeem Ayala DO        famotidine (PEPCID) tablet 20 mg  20 mg Oral BID AC Kadeem Ayala DO   20 mg at 12/11/24 0637    folic acid (FOLVITE) tablet 4 mg  4 mg Oral Daily Kadeem Ayala DO   4 mg at 12/10/24 2137    gabapentin (NEURONTIN) capsule  400 mg  400 mg Oral Q8H Kadeem Ayala DO   400 mg at 12/11/24 0607    melatonin tablet 10 mg  10 mg Oral Nightly PRN Kadeem Ayala DO   10 mg at 12/10/24 2258    nicotine (NICODERM CQ) 21 MG/24HR patch 1 patch  1 patch Transdermal Q24H Blayne Perdomo PA-C   1 patch at 12/10/24 2135    prenatal vitamin tablet 1 tablet  1 tablet Oral Daily Kadeem Ayala DO   1 tablet at 12/10/24 2135    sodium chloride 0.9 % flush 10 mL  10 mL Intravenous PRN Jose Ramon Hyde II, PA        sodium chloride 0.9 % flush 10 mL  10 mL Intravenous Q12H Kadeem Ayala DO        sodium chloride 0.9 % flush 10 mL  10 mL Intravenous PRN Kadeem Ayala DO        sodium chloride 0.9 % flush 10 mL  10 mL Intravenous Q12H Dylan Haynes MD   10 mL at 12/10/24 2136    sodium chloride 0.9 % flush 10 mL  10 mL Intravenous PRN Dylan Haynes MD        sodium chloride 0.9 % infusion 40 mL  40 mL Intravenous PRN Kadeem Ayala DO        sodium chloride 0.9 % infusion 40 mL  40 mL Intravenous PRN Dylan Haynes MD         Orders (all)        Start     Ordered    12/11/24 1530  DAPTOmycin (CUBICIN) 450 mg/50 mL 0.9% sodium chloride IVPB  Every 24 Hours         12/10/24 1545    12/11/24 0600  Basic Metabolic Panel  Morning Draw         12/10/24 1846    12/10/24 2200  gabapentin (NEURONTIN) capsule 800 mg  Every 8 Hours Scheduled,   Status:  Discontinued         12/10/24 1850    12/10/24 2200  gabapentin (NEURONTIN) capsule 400 mg  Every 8 Hours Scheduled         12/10/24 1850    12/10/24 2100  sodium chloride 0.9 % flush 10 mL  Every 12 Hours Scheduled         12/10/24 1545    12/10/24 2100  sodium chloride 0.9 % flush 10 mL  Every 12 Hours Scheduled         12/10/24 1530    12/10/24 2100  Enoxaparin Sodium (LOVENOX) syringe 40 mg  Nightly         12/10/24 1553    12/10/24 2045  nicotine (NICODERM CQ) 21 MG/24HR patch 1 patch  Every 24 Hours Scheduled         12/10/24 2026    12/10/24 2000  folic acid (FOLVITE) tablet 4 mg   Daily         12/10/24 1900    12/10/24 1945  famotidine (PEPCID) tablet 20 mg  2 Times Daily Before Meals         12/10/24 1850    12/10/24 1945  prenatal vitamin tablet 1 tablet  Daily         12/10/24 1845    12/10/24 1945  folic acid (FOLVITE) tablet 500 mcg  Daily,   Status:  Discontinued         12/10/24 1845    12/10/24 1900  buprenorphine-naloxone (SUBOXONE) 8-2 MG per SL tablet 0.5 tablet  Daily         12/10/24 1850    12/10/24 1848  melatonin tablet 10 mg  Nightly PRN         12/10/24 1850    12/10/24 1800  Oral Care  2 Times Daily       12/10/24 1545    12/10/24 1723  acetaminophen (TYLENOL) tablet 650 mg  Every 6 Hours PRN         12/10/24 1723    12/10/24 1700  cefTRIAXone (ROCEPHIN) 1,000 mg in sodium chloride 0.9 % 100 mL IVPB-VTB  Every 24 Hours         12/10/24 1545    12/10/24 1606  Wound Ostomy Eval & Treat  Once         12/10/24 1605    12/10/24 1600  Vital Signs  Every 4 Hours       12/10/24 1545    12/10/24 1546  Intake & Output  Every Shift       12/10/24 1545    12/10/24 1546  Weigh Patient  Once         12/10/24 1545    12/10/24 1546  Insert Peripheral IV  Once         12/10/24 1545    12/10/24 1546  Saline Lock & Maintain IV Access  Continuous         12/10/24 1545    12/10/24 1546  Continuous Pulse Oximetry  Continuous         12/10/24 1545    12/10/24 1546  Diet: Regular/House; Fluid Consistency: Thin (IDDSI 0)  Diet Effective Now         12/10/24 1545    12/10/24 1546  Inpatient Infectious Diseases Consult  Once        Specialty:  Infectious Diseases  Provider:  Vero Haynes APRN    12/10/24 1545    12/10/24 1546  Bowel Regimen Not Indicated  Once         12/10/24 1545    12/10/24 1545  sodium chloride 0.9 % flush 10 mL  As Needed         12/10/24 1545    12/10/24 1545  sodium chloride 0.9 % infusion 40 mL  As Needed         12/10/24 1545    12/10/24 1545  Pharmacy to Dose enoxaparin (LOVENOX)  Continuous PRN,   Status:  Discontinued         12/10/24 1545    12/10/24 1532   "Connectors / Hubs Must Be Scrubbed 15 Seconds Using 70% Alcohol Before Access - Allow to Dry Before Accessing Line  Continuous         12/10/24 1530    12/10/24 1530  Change CHG Dressing or Transparent Dressing with CHG Disk, Needleless Connectors and Securement Device Every 7 Days  Weekly        Comments: Per [facility] Policy    12/10/24 1530    12/10/24 1529  sodium chloride 0.9 % flush 10 mL  As Needed         12/10/24 1530    12/10/24 1529  sodium chloride 0.9 % infusion 40 mL  As Needed         12/10/24 1530    12/10/24 1444  Diet: Regular/House; Fluid Consistency: Thin (IDDSI 0)  Diet Effective Now,   Status:  Canceled         12/10/24 1443    12/10/24 1330  Inpatient Admission  Once         12/10/24 1336    12/10/24 1330  Code Status and Medical Interventions: CPR (Attempt to Resuscitate); Full Support  Continuous         12/10/24 1336    12/10/24 1255  Hospitalist (on-call MD unless specified)  Once        Specialty:  Hospitalist  Provider:  (Not yet assigned)    12/10/24 1255    12/10/24 1144  Urinalysis, Microscopic Only - Urine, Clean Catch  Once         12/10/24 1143    12/10/24 1136  Fentanyl, Urine - Urine, Clean Catch  Once         12/10/24 1135    12/10/24 1130  DAPTOmycin (CUBICIN) 450 mg/50 mL 0.9% sodium chloride IVPB  Every 24 Hours         12/10/24 1040    12/10/24 1043  Midline Consult  Once        Provider:  (Not yet assigned)    12/10/24 1042    12/10/24 1034  Urine Drug Screen - Urine, Clean Catch  Once         12/10/24 1033    12/10/24 1033  CBC & Differential  Once         12/10/24 1033    12/10/24 1033  Comprehensive Metabolic Panel  Once         12/10/24 1033    12/10/24 1033  Urinalysis With Microscopic If Indicated (No Culture) - Urine, Clean Catch  Once         12/10/24 1033    12/10/24 1033  Insert Peripheral IV  Once        Placed in \"And\" Linked Group    12/10/24 1033    12/10/24 1033  Procalcitonin  Once         12/10/24 1033    12/10/24 1033  Lactic Acid, Plasma  Once         12/10/24 " "1033    12/10/24 1033  Blood Culture - Blood, Arm, Left  Once         12/10/24 1033    12/10/24 1033  Blood Culture - Blood, Arm, Right  Once         12/10/24 1033    12/10/24 1033  C-reactive Protein  Once         12/10/24 1033    12/10/24 1033  Magnesium  Once         12/10/24 1033    12/10/24 1033  CBC Auto Differential  PROCEDURE ONCE         12/10/24 1033    12/10/24 1032  sodium chloride 0.9 % flush 10 mL  As Needed        Placed in \"And\" Linked Group    12/10/24 1033    12/10/24 0000  Telemetry Scan  Once         12/10/24 0000    12/10/24 0000  Telemetry Scan  Once         12/10/24 0000    Unscheduled  Up in Chair  As Needed       12/10/24 1545    Unscheduled  Change Dressing to IV Site As Needed When Damp, Loose or Soiled  As Needed       12/10/24 1530    Unscheduled  Change Needleless Connectors  As Needed      Comments: Change Needleless Connectors When:  - Administration Set Changed  - Dressing Changed  - Removed For Any Reason  - Residual Blood or Debris Within Connector  - Prior to Drawing Blood Cultures  - Contamination of Connector  - After Administration of Blood or Blood Components    12/10/24 1530    --  famotidine (PEPCID) 20 MG tablet  2 Times Daily         12/10/24 1640    --  melatonin 5 MG tablet tablet  Nightly PRN         12/10/24 1640    --  ibuprofen (ADVIL,MOTRIN) 800 MG tablet  2 Times Daily PRN         12/10/24 1640    --  prenatal vitamin (prenatal, CLASSIC, vitamin) tablet  Daily         12/10/24 1640                  Physician Progress Notes (all)    No notes of this type exist for this encounter.       Consult Notes (all)    No notes of this type exist for this encounter.       "

## 2024-12-11 NOTE — CONSULTS
INFECTIOUS DISEASE CONSULTATION REPORT        Patient Identification:  Name:  Sailaja Alarcon  Age:  28 y.o.  Sex:  female  :  1995  MRN:  [id number]   Visit Number:  [id number]  Primary Care Physician:  Margaret Baxter APRN        Referring Provider:  Dr. Ayala    Reason for consult: MRSA bacteremia, Endocarditis       LOS: 1 day        Subjective       Subjective     History of present illness:      Thank you Dr. Ayala for allowing us to participate in the care of your patient.  As you well know, Ms. Sailaja Alarcon is a 28 y.o. female with past medical history significant for substance abuse history, who presented to [facility] Emergency Department on 12/10/2024 for abnormal labs.  Patient was recently admitted to this facility under hospitalist service and left AMA and again left AMA from the ED on 2024.  Patient was being treated for MRSA bacteremia and endocarditis with plans for discharge to LTAC facility for 6 weeks of IV daptomycin through 2024.  CRP 0.32.  Blood cultures from 12/10/2024 show no growth thus far.    Patient sitting up in bed.   at bedside.  Overall feels well today.  Denies chest pain, shortness of breath or palpitations.  On room air with no apparent distress.  Lungs clear to auscultation bilaterally.  Abdomen soft, nontender.  Midline to right upper arm with no signs of infection.      Infectious Disease consultation was requested for antimicrobial management.      ---------------------------------------------------------------------------------------------------------------------     Review Of Systems:    Constitutional: no fever, chills and night sweats. No appetite change or unexpected weight change. No fatigue.  Eyes: no eye drainage, itching or redness.  HEENT: no mouth sores, dysphagia or nose bleed.  Respiratory: no for shortness of breath, cough or production of sputum.  Cardiovascular: no chest pain, no  palpitations, no orthopnea.  Gastrointestinal: no nausea, vomiting or diarrhea. No abdominal pain, hematemesis or rectal bleeding.  Genitourinary: no dysuria or polyuria.  Hematologic/lymphatic: no lymph node abnormalities, no easy bruising or easy bleeding.  Musculoskeletal: no muscle or joint pain.  Skin: No rash and no itching.  Neurological: no loss of consciousness, no seizure, no headache.  Behavioral/Psych: no depression or suicidal ideation.  Endocrine: no hot flashes.  Immunologic: negative.    ---------------------------------------------------------------------------------------------------------------------     Past Medical History    Past Medical History:   Diagnosis Date    Hepatitis C     Hepatitis C antibody positive in blood     Migraines        Past Surgical History    Past Surgical History:   Procedure Laterality Date    HEAD/NECK ABSCESS INCISION AND DRAINAGE Left 7/8/2022    Procedure: HEAD NECK ABSCESS INCISION AND DRAINAGE;  Surgeon: Miquel Grayson MD;  Location: [facility];  Service: General;  Laterality: Left;    INCISION AND DRAINAGE ABSCESS Right 10/10/2021    Procedure: INCISION AND DRAINAGE ABSCESS CENTRAL LINE PLACEMENT;  Surgeon: Naz Payne MD;  Location: [facility];  Service: General;  Laterality: Right;  I&D ABSCESS= INFECTED.  CENTRAL LINE = CLEAN.       Family History    Family History   Problem Relation Age of Onset    Hypertension Mother     Cancer Father     No Known Problems Sister     No Known Problems Brother     No Known Problems Son     No Known Problems Daughter     Hypertension Maternal Grandmother     Diabetes Maternal Grandfather     No Known Problems Paternal Grandmother     No Known Problems Paternal Grandfather     No Known Problems Cousin     Diabetes Maternal Aunt     Rheum arthritis Neg Hx     Osteoarthritis Neg Hx     Asthma Neg Hx     Heart failure Neg Hx     Hyperlipidemia Neg Hx     Migraines Neg Hx     Rashes / Skin problems Neg Hx     Seizures  Neg Hx     Stroke Neg Hx     Thyroid disease Neg Hx        Social History    Social History     Tobacco Use    Smoking status: Every Day     Current packs/day: 1.00     Average packs/day: 1 pack/day for 15.0 years (15.0 ttl pk-yrs)     Types: Cigarettes    Smokeless tobacco: Never   Vaping Use    Vaping status: Never Used   Substance Use Topics    Alcohol use: No     Alcohol/week: 0.0 standard drinks of alcohol    Drug use: Not Currently     Frequency: 3.0 times per week     Types: IV     Comment: last use 5 YEARS AGO       Allergies    Amoxicillin, Penicillins, Vancomycin, and Zofran [ondansetron hcl]  ---------------------------------------------------------------------------------------------------------------------     Home Medications:    Prior to Admission Medications       Prescriptions Last Dose Informant Patient Reported? Taking?    buprenorphine-naloxone (SUBOXONE) 8-2 MG per SL tablet 12/10/2024 Self, Other Yes Yes    Place 0.25-0.5 tablets under the tongue Daily.    famotidine (PEPCID) 20 MG tablet 12/10/2024  Yes Yes    Take 1 tablet by mouth 2 (Two) Times a Day.    gabapentin (NEURONTIN) 800 MG tablet 12/10/2024 Other, Self Yes Yes    Take 1 tablet by mouth 3 (Three) Times a Day.    prenatal vitamin (prenatal, CLASSIC, vitamin) tablet 12/10/2024  Yes Yes    Take 1 tablet by mouth Daily.    diphenhydrAMINE (BENADRYL) 25 mg capsule Unknown Other, Self Yes No    Take 1 capsule by mouth 2 (Two) Times a Day As Needed for Itching, Allergies or Sleep.    ibuprofen (ADVIL,MOTRIN) 800 MG tablet Unknown  Yes No    Take 1 tablet by mouth 2 (Two) Times a Day As Needed for Mild Pain or Moderate Pain.    melatonin 5 MG tablet tablet Unknown  Yes No    Take 3 tablets by mouth At Night As Needed (Sleep).          ---------------------------------------------------------------------------------------------------------------------    Objective       Objective     Hospital Scheduled Meds:  buprenorphine-naloxone, 0.5  tablet, Sublingual, Daily  DAPTOmycin, 8 mg/kg, Intravenous, Q24H  enoxaparin, 40 mg, Subcutaneous, Nightly  famotidine, 20 mg, Oral, BID AC  folic acid, 4 mg, Oral, Daily  gabapentin, 400 mg, Oral, Q8H  nicotine, 1 patch, Transdermal, Q24H  prenatal vitamin, 1 tablet, Oral, Daily  sodium chloride, 10 mL, Intravenous, Q12H  sodium chloride, 10 mL, Intravenous, Q12H         ---------------------------------------------------------------------------------------------------------------------   Vital Signs:  Temp:  [97.7 °F (36.5 °C)-98.7 °F (37.1 °C)] 98.7 °F (37.1 °C)  Heart Rate:  [58-72] 62  Resp:  [14-18] 18  BP: ()/(38-60) 100/43  Mean Arterial Pressure (Non-Invasive) for the past 24 hrs (Last 3 readings):   Noninvasive MAP (mmHg)   12/10/24 2259 70   12/10/24 1555 69   12/10/24 1502 72     SpO2 Percentage    12/11/24 0324 12/11/24 0651 12/11/24 1050   SpO2: 98% 98%  Comment: rn is aware 98%     SpO2:  [98 %-100 %] 98 %  on   ;   Device (Oxygen Therapy): room air    Body mass index is 23.33 kg/m².  Wt Readings from Last 3 Encounters:   12/11/24 59.7 kg (131 lb 11.2 oz)   12/09/24 59.4 kg (131 lb)   12/03/24 59.5 kg (131 lb 2.8 oz)     ---------------------------------------------------------------------------------------------------------------------     Physical Exam:    Constitutional:  Well-developed and well-nourished.  No respiratory distress.      HENT:  Head: Normocephalic and atraumatic.  Mouth:  Moist mucous membranes.  Poor dental hygiene.  Eyes:  Conjunctivae and EOM are normal.  No scleral icterus.  Neck:  Neck supple.  No JVD present.    Cardiovascular:  Normal rate, regular rhythm and normal heart sounds with no murmur. No edema.  Pulmonary/Chest:  No respiratory distress, no wheezes, no crackles, with normal breath sounds and good air movement.  Abdominal:  Soft.  Bowel sounds are normal.  No distension and no tenderness. Pregnancy  Musculoskeletal:  No edema, no tenderness, and no deformity.   "No swelling or redness of joints.  Neurological:  Alert and oriented to person, place, and time.  No facial droop.  No slurred speech.   Skin:  Skin is warm and dry.  No rash noted.  No pallor.   Psychiatric:  Normal mood and affect.  Behavior is normal.    ---------------------------------------------------------------------------------------------------------------------    Results from last 7 days   Lab Units 12/08/24 0228   CK TOTAL U/L 7*           Results from last 7 days   Lab Units 12/10/24  1109 12/09/24  0058 12/08/24  0228   CRP mg/dL 0.32  --   --    LACTATE mmol/L 1.4  --   --    WBC 10*3/mm3 4.52 4.47 4.32   HEMOGLOBIN g/dL 10.1* 9.6* 9.8*   HEMATOCRIT % 31.0* 29.7* 30.5*   MCV fL 92.0 92.0 91.6   MCHC g/dL 32.6 32.3 32.1   PLATELETS 10*3/mm3 227 207 220     Results from last 7 days   Lab Units 12/11/24  0052 12/10/24  1109 12/09/24  0058   SODIUM mmol/L 134* 131* 135*   POTASSIUM mmol/L 4.2 4.1 4.0   MAGNESIUM mg/dL  --  2.1  --    CHLORIDE mmol/L 100 99 99   CO2 mmol/L 23.6 20.1* 25.1   BUN mg/dL 14 13 16   CREATININE mg/dL 0.67 0.63 0.75   CALCIUM mg/dL 9.3 9.3 9.4   GLUCOSE mg/dL 96 96 116*   ALBUMIN g/dL  --  3.8 3.9   BILIRUBIN mg/dL  --  0.3 0.2   ALK PHOS U/L  --  65 70   AST (SGOT) U/L  --  8 9   ALT (SGPT) U/L  --  6 7   Estimated Creatinine Clearance: 115.7 mL/min (by C-G formula based on SCr of 0.67 mg/dL).  No results found for: \"AMMONIA\"    No results found for: \"HGBA1C\", \"POCGLU\"  No results found for: \"HGBA1C\"  No results found for: \"TSH\", \"FREET4\"    Blood Culture   Date Value Ref Range Status   12/10/2024 No growth at 24 hours  Preliminary   12/10/2024 No growth at 24 hours  Preliminary   12/05/2024 No growth at 5 days  Final   12/05/2024 No growth at 5 days  Final     No results found for: \"URINECX\"  No results found for: \"WOUNDCX\"  No results found for: \"STOOLCX\"  No results found for: \"RESPCX\"  Pain Management Panel  More data exists         Latest Ref Rng & Units 12/10/2024 " 12/3/2024   Pain Management Panel   Amphetamine, Urine Qual Negative Negative  Negative    Barbiturates Screen, Urine Negative Negative  Negative    Benzodiazepine Screen, Urine Negative Negative  Negative    Buprenorphine, Screen, Urine Negative Positive  Positive    Cocaine Screen, Urine Negative Negative  Negative    Fentanyl, Urine Negative Negative  Negative    Methadone Screen , Urine Negative Negative  Negative    Methamphetamine, Ur Negative Negative  Negative       Details                 I have personally reviewed the above laboratory results.   ---------------------------------------------------------------------------------------------------------------------  Imaging Results (Last 7 Days)       ** No results found for the last 168 hours. **          I have personally reviewed the above radiology results.   ---------------------------------------------------------------------------------------------------------------------      Pertinent Infectious Disease Results          Assessment & Plan      Assessment        MRSA bacteremia  Endocarditis      Plan      Patient presented to [facility] Emergency Department on 12/10/2024 for abnormal labs.  Patient was recently admitted to this facility under hospitalist service and left AMA and again left AMA from the ED on 12/9/2024.  Patient was being treated for MRSA bacteremia and endocarditis with plans for discharge to LTAC facility for 6 weeks of IV daptomycin through 1/16/2024.  CRP 0.32.  Blood cultures from 12/10/2024 show no growth thus far.    Patient sitting up in bed.   at bedside.  Overall feels well today.  Denies chest pain, shortness of breath or palpitations.  On room air with no apparent distress.  Lungs clear to auscultation bilaterally.  Abdomen soft, nontender.  Midline to right upper arm with no signs of infection.    Recommend to continue current antibiotic regimen of daptomycin 8 mg/kg IV every 24 hours through 1/16/2024.   Patient only missed 1 antibiotic dose therefore end date does not need to be changed.  Patient is not a candidate for Dalvance infusions due to pregnancy.  Recommend outpatient infectious disease follow-up.  Plans to discharge with outpatient infusion today.  Recommend weekly CPK and avoid statin therapy while on daptomycin.        ANTIMICROBIAL THERAPY    cefdinir - 300 MG  daptomycin  daptomycin solution       Again, thank you Dr. Ayala for allowing us to participate in the care of your patient and please feel free to call for any questions you may have.        Code Status:     Code Status and Medical Interventions: CPR (Attempt to Resuscitate); Full Support   Ordered at: 12/10/24 1336     Code Status (Patient has no pulse and is not breathing):    CPR (Attempt to Resuscitate)     Medical Interventions (Patient has pulse or is breathing):    Full Support         Vero Haynes, BIANCA  12/11/24  11:54 EST

## 2024-12-11 NOTE — PLAN OF CARE
Goal Outcome Evaluation:  Patient is currently resting in bed. A&Ox4. VSS. Patient frequently leaving the floor for extended amount of time. Patient educated about wandering patient document signed upon admission. Patient verbalized understanding. No visible s/s of acute distress noted at this time. No requests or complaints at this time. Plan of care ongoing.

## 2024-12-12 ENCOUNTER — HOSPITAL ENCOUNTER (OUTPATIENT)
Dept: INFUSION THERAPY | Facility: HOSPITAL | Age: 29
Discharge: HOME | End: 2024-12-12
Payer: COMMERCIAL

## 2024-12-12 VITALS
HEART RATE: 56 BPM | DIASTOLIC BLOOD PRESSURE: 51 MMHG | SYSTOLIC BLOOD PRESSURE: 76 MMHG | OXYGEN SATURATION: 100 % | RESPIRATION RATE: 16 BRPM

## 2024-12-12 DIAGNOSIS — I33.0 ACUTE BACTERIAL ENDOCARDITIS: Primary | ICD-10-CM

## 2024-12-12 DIAGNOSIS — R78.81 MRSA BACTEREMIA: ICD-10-CM

## 2024-12-12 DIAGNOSIS — B95.62 MRSA BACTEREMIA: ICD-10-CM

## 2024-12-12 PROCEDURE — 96365 THER/PROPH/DIAG IV INF INIT: CPT

## 2024-12-12 PROCEDURE — 25010000002 DAPTOMYCIN PER 1 MG: Performed by: NURSE PRACTITIONER

## 2024-12-12 RX ADMIN — DAPTOMYCIN 500 MG: 500 INJECTION, POWDER, LYOPHILIZED, FOR SOLUTION INTRAVENOUS at 11:14

## 2024-12-12 NOTE — PAYOR COMM NOTE
"CONTACT:  ALBA CAR, JOSEPH  UTILIZATION MANAGEMENT DEPT.   [facility]   1 On license of [facility], [address]   PHONE:  [phone]   FAX: [phone]       [facility] HOME 12/11/2024---AUTH PENDING        Rochelle Robertson (28 y.o. Female)       Date of Birth   1995    Social Security Number       Address   [address] [facility] [address]    Home Phone   [phone]    MRN   [id number]       Shinto   Baptism    Marital Status                               Admission Date   12/10/24    Admission Type   Emergency    Admitting Provider   Kadeem Ayala DO    Attending Provider       Department, Room/Bed   [facility], 3305/1S       Discharge Date   12/11/2024    Discharge Disposition   Home or Self Care    Discharge Destination   Home                              Attending Provider: (none)   Allergies: Amoxicillin, Penicillins, Vancomycin, Zofran [Ondansetron Hcl]    Isolation: None   Infection: MRSA (10/13/21)   Code Status: Prior    Ht: 160 cm (63\")   Wt: 59.7 kg (131 lb 11.2 oz)    Admission Cmt: None   Principal Problem: Infective endocarditis [I33.0]                   Active Insurance as of 12/10/2024       Primary Coverage       Payor Plan Insurance Group Employer/Plan Group    [facility] BY REYES [facility] BY REYES [id number]       Payor Plan Address Payor Plan Phone Number Payor Plan Fax Number Effective Dates    [address] [address]   1/1/2021 - None Entered    [facility] [address]         Subscriber Name Subscriber Birth Date Member ID       ROCHELLE ROBERTSON 1995 [id number]                     Emergency Contacts        (Rel.) Home Phone Work Phone Mobile Phone    UMER ROBERTSON (Spouse) [phone] -- --    BIB RAIN (Grandparent) [phone] -- --    Vivian Kaba (Aunt) (Other) -- -- [phone]                 Discharge Summary        Priscilla Bob PA-C at 12/11/24 1124       Attestation signed by " "Kadeem Ayala DO at 12/11/24 1619    Patient with history of recent diagnosis of MRSA bacteremia as well as infective endocarditis recently having left the hospital AGAINST MEDICAL ADVICE on 12/8/2024.  Patient situation somewhat complex as does have spouse who was recently arrested for drug possession raising some concerns however patient with negative drug screen since 2022 and although on Suboxone only testing positive for this.  Patient open and honest about unwillingness to stay in hospital or anywhere other than home through January 16, 2025 as was recommended by infectious disease for treatment of her bacteremia and endocarditis.  Patient more than agreeable to come to outpatient infusion center using a heart attack rather than staying in hospital as she does have 4 children and is currently pregnant with fifth at 7 weeks.  Patient already previously having left AMA as noted above and on a revisit to the ER but then agreeable to this admission.  Given her high risk for recurrent AMA leaving and readmission in order to help facilitate continued appropriate antibiotic therapy decision was made to proceed with outpatient infusion clinic daily daptomycin rather than trying to \"restrain \"the patient here in hospital for her total antibiotic course.  She was encouraged and educated on the dangers of inappropriate use of IV access and was agreeable to maintain sobriety and come to her daily appointments for infusion.  As such as patient already with midline did send referrals and coordinate with  infectious disease for discharge home with outpatient antibiotic therapy at ambulatory care infusion clinic.  Patient will follow-up with infectious disease and her primary care postdischarge.    I have otherwise reviewed this documentation and agree.                      [facility]  DISCHARGE SUMMARY    Patient Identification:  Name:  Sailaja Alarcon  Age:  28 " y.o.  Sex:  female  :  1995  MRN:  [id number]  Visit Number:  [id number]    Date of Admission: 12/10/2024  Date of Discharge:  2024    PCP: Margaret Baxter APRN    Discharging Provider: Priscilla Bob PA-C; Dr. Kadeem Ayala     Admission/Discharge Diagnoses     Discharge Diagnoses:  Infective endocarditis  Recent MRSA bacteremia  History of substance abuse  Intrauterine pregnancy    Consults/Procedures     Consults:   Consults       Date and Time Order Name Status Description    12/10/2024  3:45 PM Inpatient Infectious Diseases Consult      12/10/2024 12:55 PM Hospitalist (on-call MD unless specified)      12/3/2024  6:02 PM Inpatient Infectious Diseases Consult Completed             Procedures Performed:     Midline placement     History of Presenting Illness     Sailaja Alarcon is a 28 y.o. female with past medical history significant for recent MRSA bacteremia, hepatitis C, tobacco abuse that presented to the [facility] emergency department for evaluation of positive blood cultures.  Please see the admitting history and physical for further details.     In the ED she was afebrile and hemodynamically stable.  Patient was recently admitted to our facility on 12/3/2024 due to MRSA bacteremia.  Patient TTE was obtained revealing a freely mobile structure in the right atrium concerning for possible atrial wall vegetation.  ID recommended a 6-week course of antibiotics.  At that time patient expressed her preference to complete antibiotics as an outpatient, however due to her history of substance abuse and concern for compliance with outpatient follow-up, it was ultimately decided it would be in the patient's best interest to the course of IV antibiotics as an inpatient.  Unfortunately, the patient's  was reportedly arrested and their children had to be placed in the care of a family member, which upset the patient who then decided to leave [place].  Hospital Course      Patient was admitted to the telemetry unit in order to complete the previously recommended antibiotic therapy.  She did openly admit she would not be able to stay for the 6 weeks to complete therapy and would prefer to complete infusions at home or come into the infusion clinic daily.  While this is not preferred given the patient's prior history of substance use and concerns for noncompliance, it is likely the best option for the patient since she has already left AMA from our service and the ED with her current condition.  Patient extensively educated on the risk of misusing her midline for illicit substances.  Patient still maintains that she has been sober since 2022 and has no intentions on using illicit substances.  Reports she has regular drug testing with her PCP due to her use of prescribed Suboxone.  Patient reports she has already arranged transport through [facility]. Request for less than 24 hr notice for [facility] placed. Daptomycin infusion given and patient prior to discharge with referral to outpatient infusion center for completion of course.  Referrals made to infectious disease outpatient.  Patient already has cardiology follow-up scheduled for 12/16/2024.  PCP follow-up recommended in 1 week.    Given patient's pregnancy, referral was made for OB, Dr. Steinberg, show evaluation the patient may ultimately need high risk OB care given current infective endocarditis.  Will discharge on 4 mg of folic acid daily given high risk of neural tube defects with patient on chronic gabapentin therapy. Patient will also be discharged with 2 days of omnicef to complete previously started course of antibiotics for complicated UTI, given IUP.     On day of discharge, it was felt that she had reached maximal inpatient benefit and was stable for discharge.      Discharge Vitals/Physical Examination     Vital Signs:  Temp:  [97.7 °F (36.5 °C)-98.7 °F (37.1 °C)] 98.7 °F (37.1 °C)  Heart Rate:  [58-72] 62  Resp:  [14-18]  18  BP: ()/(38-60) 100/43  Mean Arterial Pressure (Non-Invasive) for the past 24 hrs (Last 3 readings):   Noninvasive MAP (mmHg)   12/10/24 2259 70   12/10/24 1555 69   12/10/24 1502 72     SpO2 Percentage    12/11/24 0324 12/11/24 0651 12/11/24 1050   SpO2: 98% 98%  Comment: rn is aware 98%     SpO2:  [98 %-100 %] 98 %  on   ;   Device (Oxygen Therapy): room air    Body mass index is 23.33 kg/m².  Wt Readings from Last 3 Encounters:   12/11/24 59.7 kg (131 lb 11.2 oz)   12/09/24 59.4 kg (131 lb)   12/03/24 59.5 kg (131 lb 2.8 oz)         Physical Exam:  Physical Exam  Constitutional:       General: She is not in acute distress.     Appearance: Normal appearance.   HENT:      Head: Normocephalic and atraumatic.      Right Ear: External ear normal.      Left Ear: External ear normal.      Nose: No nasal deformity.      Mouth/Throat:      Lips: Pink.      Mouth: Mucous membranes are moist.   Eyes:      Conjunctiva/sclera: Conjunctivae normal.      Pupils: Pupils are equal, round, and reactive to light.   Cardiovascular:      Rate and Rhythm: Normal rate and regular rhythm.      Heart sounds: Normal heart sounds.   Pulmonary:      Effort: Pulmonary effort is normal.      Breath sounds: Normal breath sounds. No wheezing, rhonchi or rales.   Abdominal:      General: Abdomen is flat. Bowel sounds are normal.      Palpations: Abdomen is soft.      Tenderness: There is no guarding or rebound.   Genitourinary:     Comments: No martínez catheter in place   Musculoskeletal:      Cervical back: Neck supple. Normal range of motion.      Right lower leg: No edema.      Left lower leg: No edema.   Skin:     General: Skin is warm and dry.   Neurological:      General: No focal deficit present.      Mental Status: She is alert and oriented to person, place, and time.   Psychiatric:         Mood and Affect: Mood normal.         Behavior: Behavior normal.       Pertinent Laboratory/Radiology Results     Pertinent Laboratory  "Results:  Results from last 7 days   Lab Units 12/08/24 0228   CK TOTAL U/L 7*           Results from last 7 days   Lab Units 12/10/24  1109 12/09/24  0058 12/08/24  0228   CRP mg/dL 0.32  --   --    LACTATE mmol/L 1.4  --   --    WBC 10*3/mm3 4.52 4.47 4.32   HEMOGLOBIN g/dL 10.1* 9.6* 9.8*   HEMATOCRIT % 31.0* 29.7* 30.5*   MCV fL 92.0 92.0 91.6   MCHC g/dL 32.6 32.3 32.1   PLATELETS 10*3/mm3 227 207 220     Results from last 7 days   Lab Units 12/11/24  0052 12/10/24  1109 12/09/24  0058   SODIUM mmol/L 134* 131* 135*   POTASSIUM mmol/L 4.2 4.1 4.0   MAGNESIUM mg/dL  --  2.1  --    CHLORIDE mmol/L 100 99 99   CO2 mmol/L 23.6 20.1* 25.1   BUN mg/dL 14 13 16   CREATININE mg/dL 0.67 0.63 0.75   CALCIUM mg/dL 9.3 9.3 9.4   GLUCOSE mg/dL 96 96 116*   ALBUMIN g/dL  --  3.8 3.9   BILIRUBIN mg/dL  --  0.3 0.2   ALK PHOS U/L  --  65 70   AST (SGOT) U/L  --  8 9   ALT (SGPT) U/L  --  6 7   Estimated Creatinine Clearance: 115.7 mL/min (by C-G formula based on SCr of 0.67 mg/dL).  No results found for: \"AMMONIA\"    No results found for: \"HGBA1C\", \"POCGLU\"  No results found for: \"HGBA1C\"  No results found for: \"TSH\", \"FREET4\"    Blood Culture   Date Value Ref Range Status   12/05/2024 No growth at 5 days  Final   12/05/2024 No growth at 5 days  Final     No results found for: \"URINECX\"  No results found for: \"WOUNDCX\"  No results found for: \"STOOLCX\"  No results found for: \"RESPCX\"  Microbiology Results (last 10 days)       Procedure Component Value - Date/Time    Blood Culture - Blood, Arm, Left [[id number]]  (Normal) Collected: 12/10/24 1109    Lab Status: Preliminary result Specimen: Blood from Arm, Left Updated: 12/11/24 1146     Blood Culture No growth at 24 hours    Blood Culture - Blood, Arm, Right [[id number]]  (Normal) Collected: 12/10/24 1109    Lab Status: Preliminary result Specimen: Blood from Arm, Right Updated: 12/11/24 1146     Blood Culture No growth at 24 hours    Blood Culture - Blood, Arm, Left [[id number]]  " (Normal) Collected: 12/05/24 1938    Lab Status: Final result Specimen: Blood from Arm, Left Updated: 12/10/24 1946     Blood Culture No growth at 5 days    Blood Culture - Blood, Arm, Left [[id number]]  (Normal) Collected: 12/05/24 1436    Lab Status: Final result Specimen: Blood from Arm, Left Updated: 12/10/24 1545     Blood Culture No growth at 5 days    Respiratory Panel PCR w/COVID-19(SARS-CoV-2) ANGELA/AMELIA/JERRI/PAD/COR/JENNIFER In-House, NP Swab in UTM/VTM, 2 HR TAT - Swab, Nasopharynx [[id number]]  (Abnormal) Collected: 12/03/24 1248    Lab Status: Final result Specimen: Swab from Nasopharynx Updated: 12/03/24 1400     ADENOVIRUS, PCR Not Detected     Coronavirus 229E Not Detected     Coronavirus HKU1 Not Detected     Coronavirus NL63 Not Detected     Coronavirus OC43 Not Detected     COVID19 Not Detected     Human Metapneumovirus Not Detected     Human Rhinovirus/Enterovirus Detected     Influenza A PCR Not Detected     Influenza B PCR Not Detected     Parainfluenza Virus 1 Not Detected     Parainfluenza Virus 2 Not Detected     Parainfluenza Virus 3 Not Detected     Parainfluenza Virus 4 Not Detected     RSV, PCR Not Detected     Bordetella pertussis pcr Not Detected     Bordetella parapertussis PCR Not Detected     Chlamydophila pneumoniae PCR Not Detected     Mycoplasma pneumo by PCR Not Detected    Narrative:      In the setting of a positive respiratory panel with a viral infection PLUS a negative procalcitonin without other underlying concern for bacterial infection, consider observing off antibiotics or discontinuation of antibiotics and continue supportive care. If the respiratory panel is positive for atypical bacterial infection (Bordetella pertussis, Chlamydophila pneumoniae, or Mycoplasma pneumoniae), consider antibiotic de-escalation to target atypical bacterial infection.    Urine Culture - Urine, Urine, Clean Catch [[id number]]  (Abnormal)  (Susceptibility) Collected: 12/03/24 1248    Lab Status: Final  result Specimen: Urine, Clean Catch Updated: 12/05/24 1045     Urine Culture >100,000 CFU/mL Escherichia coli    Narrative:      Colonization of the urinary tract without infection is common. Treatment is discouraged unless the patient is symptomatic, pregnant, or undergoing an invasive urologic procedure.    Susceptibility        Escherichia coli      TREASURE      Amoxicillin + Clavulanate Resistant      Ampicillin Resistant      Ampicillin + Sulbactam Intermediate      Cefazolin (Urine) Resistant      Cefepime Susceptible      Ceftazidime Intermediate      Ceftriaxone Susceptible      Gentamicin Susceptible      Levofloxacin Susceptible      Nitrofurantoin Intermediate      Piperacillin + Tazobactam Susceptible      Trimethoprim + Sulfamethoxazole Resistant                           Blood Culture - Blood, Arm, Left [[id number]]  (Abnormal) Collected: 12/03/24 1246    Lab Status: Final result Specimen: Blood from Arm, Left Updated: 12/05/24 0658     Blood Culture Staphylococcus aureus, MRSA     Comment:   Infectious disease consultation is highly recommended to rule out distant foci of infection.  Methicillin resistant Staphylococcus aureus, Patient may be an isolation risk.        Isolated from Anaerobic Bottle     Gram Stain Anaerobic Bottle Gram positive cocci in clusters    Narrative:      Refer to previous blood culture collected on 12/01/2024 1305 for MICs    No BCID2 performed, refer to previous blood culture.     Blood Culture - Blood, Arm, Right [[id number]]  (Normal) Collected: 12/03/24 1246    Lab Status: Final result Specimen: Blood from Arm, Right Updated: 12/08/24 1330     Blood Culture No growth at 5 days    Blood Culture - Blood, Arm, Right [[id number]]  (Abnormal)  (Susceptibility) Collected: 12/01/24 1305    Lab Status: Edited Result - FINAL Specimen: Blood from Arm, Right Updated: 12/04/24 0821     Blood Culture Staphylococcus aureus, MRSA     Comment:   Infectious disease consultation is highly  recommended to rule out distant foci of infection.  Methicillin resistant Staphylococcus aureus, Patient may be an isolation risk.        Isolated from Anaerobic Bottle     Gram Stain Anaerobic Bottle Gram positive cocci in clusters    Narrative:      No BCID performed, refer to previous blood culture specimen collected on 12- from left arm.    Susceptibility        Staphylococcus aureus, MRSA      TREASURE      Daptomycin Susceptible (C)  [1]       Gentamicin Susceptible      Oxacillin Resistant      Rifampin Susceptible      Vancomycin Susceptible                   [1]  Appended report. These results have been appended to a previously final verified report.                    Blood Culture - Blood, Arm, Left [[id number]]  (Abnormal) Collected: 12/01/24 1305    Lab Status: Final result Specimen: Blood from Arm, Left Updated: 12/04/24 0628     Blood Culture Staphylococcus, coagulase negative     Isolated from Aerobic Bottle     Gram Stain Aerobic Bottle Gram positive cocci in clusters    Narrative:      Probable contaminant requires clinical correlation, susceptibility not performed unless requested by physician.      Blood Culture ID, PCR - Blood, Arm, Left [[id number]]  (Abnormal) Collected: 12/01/24 1305    Lab Status: Final result Specimen: Blood from Arm, Left Updated: 12/02/24 1427     BCID, PCR Staph spp, not aureus or lugdunensis. Identification by BCID2 PCR.     BOTTLE TYPE Aerobic Bottle    Respiratory Panel PCR w/COVID-19(SARS-CoV-2) ANGELA/AMELIA/JERRI/PAD/COR/JENNIFER In-House, NP Swab in UTM/VTM, 2 HR TAT - Swab, Nasopharynx [[id number]]  (Normal) Collected: 12/01/24 1242    Lab Status: Final result Specimen: Swab from Nasopharynx Updated: 12/01/24 1352     ADENOVIRUS, PCR Not Detected     Coronavirus 229E Not Detected     Coronavirus HKU1 Not Detected     Coronavirus NL63 Not Detected     Coronavirus OC43 Not Detected     COVID19 Not Detected     Human Metapneumovirus Not Detected     Human Rhinovirus/Enterovirus  Not Detected     Influenza A PCR Not Detected     Influenza B PCR Not Detected     Parainfluenza Virus 1 Not Detected     Parainfluenza Virus 2 Not Detected     Parainfluenza Virus 3 Not Detected     Parainfluenza Virus 4 Not Detected     RSV, PCR Not Detected     Bordetella pertussis pcr Not Detected     Bordetella parapertussis PCR Not Detected     Chlamydophila pneumoniae PCR Not Detected     Mycoplasma pneumo by PCR Not Detected    Narrative:      In the setting of a positive respiratory panel with a viral infection PLUS a negative procalcitonin without other underlying concern for bacterial infection, consider observing off antibiotics or discontinuation of antibiotics and continue supportive care. If the respiratory panel is positive for atypical bacterial infection (Bordetella pertussis, Chlamydophila pneumoniae, or Mycoplasma pneumoniae), consider antibiotic de-escalation to target atypical bacterial infection.          Pain Management Panel  More data exists         Latest Ref Rng & Units 12/10/2024 12/3/2024   Pain Management Panel   Amphetamine, Urine Qual Negative Negative  Negative    Barbiturates Screen, Urine Negative Negative  Negative    Benzodiazepine Screen, Urine Negative Negative  Negative    Buprenorphine, Screen, Urine Negative Positive  Positive    Cocaine Screen, Urine Negative Negative  Negative    Fentanyl, Urine Negative Negative  Negative    Methadone Screen , Urine Negative Negative  Negative    Methamphetamine, Ur Negative Negative  Negative       Details                   Pertinent Radiology Results:  Imaging Results (All)       None            Test Results Pending at Discharge:  Pending Labs       Order Current Status    Blood Culture - Blood, Arm, Left Preliminary result    Blood Culture - Blood, Arm, Right Preliminary result            Discharge Disposition/Discharge Medications/Discharge Appointments     Discharge Disposition:   Home or Self CareHome, following up with infusion  clinic daily.    Condition at Discharge:  Stable         Code Status While Inpatient:  Code Status and Medical Interventions: CPR (Attempt to Resuscitate); Full Support   Ordered at: 12/10/24 1336     Code Status (Patient has no pulse and is not breathing):    CPR (Attempt to Resuscitate)     Medical Interventions (Patient has pulse or is breathing):    Full Support       Discharge Medications:     Discharge Medications        New Medications        Instructions Start Date   cefdinir 300 MG capsule  Commonly known as: OMNICEF  Notes to patient: Separate from prenatal vitamin by at least 2 hours   300 mg, Oral, 2 Times Daily      daptomycin solution IVPB  Commonly known as: CUBICIN   450 mg, Intravenous, Every 24 Hours      folic acid 1 MG tablet  Commonly known as: FOLVITE   4 mg, Oral, Daily   Start Date: December 12, 2024            Continue These Medications        Instructions Start Date   buprenorphine-naloxone 8-2 MG per SL tablet  Commonly known as: SUBOXONE   0.25-0.5 tablets, Sublingual, Daily      diphenhydrAMINE 25 mg capsule  Commonly known as: BENADRYL   25 mg, Oral, 2 Times Daily PRN      famotidine 20 MG tablet  Commonly known as: PEPCID   20 mg, 2 Times Daily      gabapentin 800 MG tablet  Commonly known as: NEURONTIN   800 mg, 3 Times Daily      ibuprofen 800 MG tablet  Commonly known as: ADVIL,MOTRIN   800 mg, Oral, 2 Times Daily PRN      melatonin 5 MG tablet tablet   15 mg, Oral, Nightly PRN      prenatal (CLASSIC) vitamin 28-0.8 MG tablet tablet  Generic drug: prenatal vitamin   1 tablet, Daily               Discharge Appointments:  Your Scheduled Appointments      Dec 16, 2024 9:00 AM  New Patient with Adil Fausto Phillips MD  [facility] ([place]) [address] [place] [address] [place] [address] [phone]   -Bring photo ID, insurance card, and list of medications to appointment  -If testing was completed outside of [facility] then patient must bring images on a  disc  -Copay will be collected at time of appointment  -New patients should arrive 15 minutes prior to appointment  If your symptoms change or worsen, please contact your PCP or go to the nearest emergency room.                Additional Instructions for the Follow-ups that You Need to Schedule       Discharge Follow-up with PCP   As directed       Currently Documented PCP:    Margaret Baxter APRN    PCP Phone Number:    [phone]     Follow Up Details: 1 week post hospital follow up                Labs Pending at Discharge:     Pending Labs       Order Current Status    Blood Culture - Blood, Arm, Left Preliminary result    Blood Culture - Blood, Arm, Right Preliminary result             The ASCVD Risk score (Carmen COHEN, et al., 2019) failed to calculate for the following reasons:    The 2019 ASCVD risk score is only valid for ages 40 to 79     Attestation: I personally discussed the patient's hospital course, disposition, discharge planning, and discharge medications with attending physician, Kadeem Ibrahim DO, prior to time of discharge.       Priscilla Bob PA-C  Hospitalist Service -- [facility]       12/11/24  12:05 EST    Discharge Time: 1126    Please send a copy of this dictation to the following providers:  Margaret Baxter APRN     Electronically signed by Kadeem Olmstead DO at 12/11/24 1619       Discharge Order (From admission, onward)       Start     Ordered    12/11/24 1126  Discharge patient  Once        Expected Discharge Date: 12/11/24   Expected Discharge Time: 11:26   Discharge Disposition: Home or Self Care   Physician of Record for Attribution - Please select from Treatment Team: KADEEM OLMSTEAD [[id number]]   Review needed by CMO to determine Physician of Record: No      Question Answer Comment   Physician of Record for Attribution - Please select from Treatment Team KADEEM OLMSTEAD    Review needed by CMO to determine Physician of Record No        12/11/24 1128     12/11/24 1101  Discharge patient  Once        Expected Discharge Date: 12/11/24   Discharge Disposition: Home or Self Care   Physician of Record for Attribution - Please select from Treatment Team: DEANA OLMSTEAD [[id number]]   Review needed by CMO to determine Physician of Record: No      Question Answer Comment   Physician of Record for Attribution - Please select from Treatment Team DEANA OLMSTEAD    Review needed by CMO to determine Physician of Record No        12/11/24 1126

## 2024-12-13 ENCOUNTER — HOSPITAL ENCOUNTER (OUTPATIENT)
Dept: INFUSION THERAPY | Facility: HOSPITAL | Age: 29
Discharge: HOME | End: 2024-12-13
Payer: COMMERCIAL

## 2024-12-13 VITALS — OXYGEN SATURATION: 98 % | HEART RATE: 61 BPM | SYSTOLIC BLOOD PRESSURE: 83 MMHG | DIASTOLIC BLOOD PRESSURE: 50 MMHG

## 2024-12-13 DIAGNOSIS — B95.62 MRSA BACTEREMIA: ICD-10-CM

## 2024-12-13 DIAGNOSIS — I33.0 ACUTE BACTERIAL ENDOCARDITIS: Primary | ICD-10-CM

## 2024-12-13 DIAGNOSIS — R78.81 MRSA BACTEREMIA: ICD-10-CM

## 2024-12-13 PROCEDURE — 25010000002 DAPTOMYCIN PER 1 MG: Performed by: NURSE PRACTITIONER

## 2024-12-13 PROCEDURE — 96365 THER/PROPH/DIAG IV INF INIT: CPT

## 2024-12-13 RX ADMIN — DAPTOMYCIN 500 MG: 500 INJECTION, POWDER, LYOPHILIZED, FOR SOLUTION INTRAVENOUS at 15:12

## 2024-12-14 ENCOUNTER — HOSPITAL ENCOUNTER (OUTPATIENT)
Dept: INFUSION THERAPY | Facility: HOSPITAL | Age: 29
Discharge: HOME | End: 2024-12-14

## 2024-12-14 NOTE — PROGRESS NOTES
Attempted calling patient twice at 0910 at phone number provided on file. Attempted calling spouse 0911 at phone number provided on file. No answer at either number. Spoke with R tech who stated the patient was a no show for them this morning as well.

## 2024-12-15 ENCOUNTER — HOSPITAL ENCOUNTER (OUTPATIENT)
Dept: INFUSION THERAPY | Facility: HOSPITAL | Age: 29
Discharge: HOME | End: 2024-12-15

## 2024-12-15 LAB
BACTERIA SPEC AEROBE CULT: NORMAL
BACTERIA SPEC AEROBE CULT: NORMAL

## 2024-12-15 NOTE — PROGRESS NOTES
Patient did not arrive for appointment this morning. I attempted to call patient at number on file at [phone] and spouse at [phone] to check on pt. No response from either call was obtained.

## 2024-12-16 ENCOUNTER — HOSPITAL ENCOUNTER (OUTPATIENT)
Dept: INFUSION THERAPY | Facility: HOSPITAL | Age: 29
Discharge: HOME | End: 2024-12-16
Admitting: NURSE PRACTITIONER
Payer: COMMERCIAL

## 2024-12-16 VITALS
RESPIRATION RATE: 18 BRPM | DIASTOLIC BLOOD PRESSURE: 52 MMHG | OXYGEN SATURATION: 100 % | HEART RATE: 66 BPM | SYSTOLIC BLOOD PRESSURE: 107 MMHG

## 2024-12-16 DIAGNOSIS — R78.81 MRSA BACTEREMIA: ICD-10-CM

## 2024-12-16 DIAGNOSIS — I33.0 ACUTE BACTERIAL ENDOCARDITIS: Primary | ICD-10-CM

## 2024-12-16 DIAGNOSIS — B95.62 MRSA BACTEREMIA: ICD-10-CM

## 2024-12-16 PROCEDURE — 25010000002 DAPTOMYCIN PER 1 MG: Performed by: NURSE PRACTITIONER

## 2024-12-16 PROCEDURE — 96365 THER/PROPH/DIAG IV INF INIT: CPT

## 2024-12-16 RX ADMIN — DAPTOMYCIN 500 MG: 500 INJECTION, POWDER, LYOPHILIZED, FOR SOLUTION INTRAVENOUS at 09:15

## 2024-12-17 ENCOUNTER — HOSPITAL ENCOUNTER (OUTPATIENT)
Dept: INFUSION THERAPY | Facility: HOSPITAL | Age: 29
Discharge: HOME | End: 2024-12-17
Admitting: NURSE PRACTITIONER
Payer: COMMERCIAL

## 2024-12-17 VITALS — OXYGEN SATURATION: 99 % | HEART RATE: 60 BPM | DIASTOLIC BLOOD PRESSURE: 53 MMHG | SYSTOLIC BLOOD PRESSURE: 83 MMHG

## 2024-12-17 DIAGNOSIS — B95.62 MRSA BACTEREMIA: ICD-10-CM

## 2024-12-17 DIAGNOSIS — R78.81 MRSA BACTEREMIA: ICD-10-CM

## 2024-12-17 DIAGNOSIS — I33.0 ACUTE BACTERIAL ENDOCARDITIS: Primary | ICD-10-CM

## 2024-12-17 PROCEDURE — 96365 THER/PROPH/DIAG IV INF INIT: CPT

## 2024-12-17 PROCEDURE — 25010000002 DAPTOMYCIN PER 1 MG: Performed by: NURSE PRACTITIONER

## 2024-12-17 RX ADMIN — DAPTOMYCIN 500 MG: 500 INJECTION, POWDER, LYOPHILIZED, FOR SOLUTION INTRAVENOUS at 14:55

## 2024-12-19 ENCOUNTER — OFFICE VISIT (OUTPATIENT)
Dept: INFECTIOUS DISEASES | Facility: CLINIC | Age: 29
End: 2024-12-19
Payer: COMMERCIAL

## 2024-12-19 ENCOUNTER — HOSPITAL ENCOUNTER (OUTPATIENT)
Dept: INFUSION THERAPY | Facility: HOSPITAL | Age: 29
Discharge: HOME | End: 2024-12-19
Payer: COMMERCIAL

## 2024-12-19 VITALS
HEIGHT: 63 IN | BODY MASS INDEX: 23.39 KG/M2 | DIASTOLIC BLOOD PRESSURE: 51 MMHG | RESPIRATION RATE: 18 BRPM | WEIGHT: 132 LBS | SYSTOLIC BLOOD PRESSURE: 100 MMHG | HEART RATE: 63 BPM

## 2024-12-19 VITALS
RESPIRATION RATE: 16 BRPM | OXYGEN SATURATION: 100 % | DIASTOLIC BLOOD PRESSURE: 55 MMHG | HEART RATE: 69 BPM | SYSTOLIC BLOOD PRESSURE: 87 MMHG

## 2024-12-19 DIAGNOSIS — L97.822 NON-PRESSURE CHRONIC ULCER OF OTHER PART OF LEFT LOWER LEG WITH FAT LAYER EXPOSED: ICD-10-CM

## 2024-12-19 DIAGNOSIS — I33.0 ACUTE BACTERIAL ENDOCARDITIS: Primary | ICD-10-CM

## 2024-12-19 DIAGNOSIS — L02.11 CUTANEOUS ABSCESS OF NECK: ICD-10-CM

## 2024-12-19 DIAGNOSIS — B95.62 MRSA BACTEREMIA: ICD-10-CM

## 2024-12-19 DIAGNOSIS — R78.81 MRSA BACTEREMIA: ICD-10-CM

## 2024-12-19 DIAGNOSIS — L02.11 ABSCESS, NECK: Primary | ICD-10-CM

## 2024-12-19 DIAGNOSIS — R78.81 POSITIVE BLOOD CULTURES: ICD-10-CM

## 2024-12-19 LAB
ALBUMIN SERPL-MCNC: 4.6 G/DL (ref 3.5–5.2)
ALBUMIN/GLOB SERPL: 1.3 G/DL
ALP SERPL-CCNC: 80 U/L (ref 39–117)
ALT SERPL W P-5'-P-CCNC: 5 U/L (ref 1–33)
ANION GAP SERPL CALCULATED.3IONS-SCNC: 14.2 MMOL/L (ref 5–15)
AST SERPL-CCNC: 11 U/L (ref 1–32)
BASOPHILS # BLD AUTO: 0.02 10*3/MM3 (ref 0–0.2)
BASOPHILS NFR BLD AUTO: 0.6 % (ref 0–1.5)
BILIRUB SERPL-MCNC: 0.5 MG/DL (ref 0–1.2)
BUN SERPL-MCNC: 7 MG/DL (ref 6–20)
BUN/CREAT SERPL: 11.5 (ref 7–25)
CALCIUM SPEC-SCNC: 9.6 MG/DL (ref 8.6–10.5)
CHLORIDE SERPL-SCNC: 99 MMOL/L (ref 98–107)
CK SERPL-CCNC: 14 U/L (ref 20–180)
CO2 SERPL-SCNC: 19.8 MMOL/L (ref 22–29)
CREAT SERPL-MCNC: 0.61 MG/DL (ref 0.57–1)
CRP SERPL-MCNC: 0.63 MG/DL (ref 0–0.5)
DEPRECATED RDW RBC AUTO: 45.3 FL (ref 37–54)
EGFRCR SERPLBLD CKD-EPI 2021: 123.5 ML/MIN/1.73
EOSINOPHIL # BLD AUTO: 0.06 10*3/MM3 (ref 0–0.4)
EOSINOPHIL NFR BLD AUTO: 1.9 % (ref 0.3–6.2)
ERYTHROCYTE [DISTWIDTH] IN BLOOD BY AUTOMATED COUNT: 13.2 % (ref 12.3–15.4)
GLOBULIN UR ELPH-MCNC: 3.6 GM/DL
GLUCOSE SERPL-MCNC: 77 MG/DL (ref 65–99)
HCT VFR BLD AUTO: 37.7 % (ref 34–46.6)
HGB BLD-MCNC: 12.2 G/DL (ref 12–15.9)
IMM GRANULOCYTES # BLD AUTO: 0.02 10*3/MM3 (ref 0–0.05)
IMM GRANULOCYTES NFR BLD AUTO: 0.6 % (ref 0–0.5)
LYMPHOCYTES # BLD AUTO: 1.33 10*3/MM3 (ref 0.7–3.1)
LYMPHOCYTES NFR BLD AUTO: 42.6 % (ref 19.6–45.3)
MCH RBC QN AUTO: 30.3 PG (ref 26.6–33)
MCHC RBC AUTO-ENTMCNC: 32.4 G/DL (ref 31.5–35.7)
MCV RBC AUTO: 93.8 FL (ref 79–97)
MONOCYTES # BLD AUTO: 0.24 10*3/MM3 (ref 0.1–0.9)
MONOCYTES NFR BLD AUTO: 7.7 % (ref 5–12)
NEUTROPHILS NFR BLD AUTO: 1.45 10*3/MM3 (ref 1.7–7)
NEUTROPHILS NFR BLD AUTO: 46.6 % (ref 42.7–76)
NRBC BLD AUTO-RTO: 0 /100 WBC (ref 0–0.2)
PLAT MORPH BLD: NORMAL
PLATELET # BLD AUTO: 146 10*3/MM3 (ref 140–450)
PMV BLD AUTO: 10.7 FL (ref 6–12)
POTASSIUM SERPL-SCNC: 4 MMOL/L (ref 3.5–5.2)
PROT SERPL-MCNC: 8.2 G/DL (ref 6–8.5)
RBC # BLD AUTO: 4.02 10*6/MM3 (ref 3.77–5.28)
RBC MORPH BLD: NORMAL
SODIUM SERPL-SCNC: 133 MMOL/L (ref 136–145)
WBC NRBC COR # BLD AUTO: 3.12 10*3/MM3 (ref 3.4–10.8)

## 2024-12-19 PROCEDURE — 25010000002 DAPTOMYCIN PER 1 MG: Performed by: NURSE PRACTITIONER

## 2024-12-19 PROCEDURE — 80053 COMPREHEN METABOLIC PANEL: CPT | Performed by: INTERNAL MEDICINE

## 2024-12-19 PROCEDURE — 85007 BL SMEAR W/DIFF WBC COUNT: CPT | Performed by: INTERNAL MEDICINE

## 2024-12-19 PROCEDURE — 85025 COMPLETE CBC W/AUTO DIFF WBC: CPT | Performed by: INTERNAL MEDICINE

## 2024-12-19 PROCEDURE — 86140 C-REACTIVE PROTEIN: CPT | Performed by: INTERNAL MEDICINE

## 2024-12-19 PROCEDURE — 96365 THER/PROPH/DIAG IV INF INIT: CPT

## 2024-12-19 PROCEDURE — 82550 ASSAY OF CK (CPK): CPT | Performed by: INTERNAL MEDICINE

## 2024-12-19 RX ADMIN — DAPTOMYCIN 500 MG: 500 INJECTION, POWDER, LYOPHILIZED, FOR SOLUTION INTRAVENOUS at 11:20

## 2024-12-19 NOTE — PROGRESS NOTES
[facility]         Referring Provider: No referring provider defined for this encounter.    Subjective      Chief Complaint  Hospital Follow Up Visit (MRSA )    History of Present Illness  Sailaja Alarcon is a 28 y.o. female who presents today to [facility] for Follow Up .     Ms. Sailaja Alarcon is a 28 y.o. female with past medical history significant for substance abuse history, who presented to [facility] Emergency Department on 12/10/2024 for abnormal labs.  Patient was recently admitted to this facility under hospitalist service and left AMA and again left AMA from the ED on 12/9/2024.  Patient was being treated for MRSA bacteremia and endocarditis with plans for discharge to LTAC facility for 6 weeks of IV daptomycin through 1/16/2024.  CRP 0.32.  Blood cultures from 12/10/2024 show no growth thus far.     Patient sitting up in bed.   at bedside.  Overall feels well today.  Denies chest pain, shortness of breath or palpitations.  On room air with no apparent distress.  Lungs clear to auscultation bilaterally.  Abdomen soft, nontender.  Midline to right upper arm with no signs of infection.    Past Medical History:   Diagnosis Date    Hepatitis C     Hepatitis C antibody positive in blood     Migraines        Past Surgical History:   Procedure Laterality Date    HEAD/NECK ABSCESS INCISION AND DRAINAGE Left 7/8/2022    Procedure: HEAD NECK ABSCESS INCISION AND DRAINAGE;  Surgeon: Miquel Grayson MD;  Location: [facility];  Service: General;  Laterality: Left;    INCISION AND DRAINAGE ABSCESS Right 10/10/2021    Procedure: INCISION AND DRAINAGE ABSCESS CENTRAL LINE PLACEMENT;  Surgeon: Naz Payne MD;  Location: [facility];  Service: General;  Laterality: Right;  I&D ABSCESS= INFECTED.  CENTRAL LINE = CLEAN.       Social History     Socioeconomic History    Marital status:    Tobacco Use    Smoking status:  Every Day     Current packs/day: 1.00     Average packs/day: 1 pack/day for 15.0 years (15.0 ttl pk-yrs)     Types: Cigarettes    Smokeless tobacco: Never   Vaping Use    Vaping status: Never Used   Substance and Sexual Activity    Alcohol use: No     Alcohol/week: 0.0 standard drinks of alcohol    Drug use: Not Currently     Frequency: 3.0 times per week     Types: IV     Comment: last use 5 YEARS AGO    Sexual activity: Yes     Partners: Male       Family History  family history includes Cancer in her father; Diabetes in her maternal aunt and maternal grandfather; Hypertension in her maternal grandmother and mother; No Known Problems in her brother, cousin, daughter, paternal grandfather, paternal grandmother, sister, and son.    Immunization History   Administered Date(s) Administered    DTaP, Unspecified 02/17/1998    Hepatitis A 08/10/2018    MMR 02/17/1998    Meningococcal Polysaccharide 10/05/2009    OPV 02/17/1998    Rho (D) Immune Globulin 04/23/2019    Td (TDVAX) 07/12/2006    Tdap 12/06/2017    Varicella 10/05/2009        Allergies  Allergies   Allergen Reactions    Amoxicillin Anaphylaxis    Penicillins Anaphylaxis     Has tolerated rocephin per patient    Vancomycin Itching     Pt states she had full body itch and burning sensation, and she turned red. Reaction potentially Red Man Syndrome.    Zofran [Ondansetron Hcl] Hives and Rash       The medication list has been reviewed and updated.   Current Medications    Current Outpatient Medications:     buprenorphine-naloxone (SUBOXONE) 8-2 MG per SL tablet, Place 0.25-0.5 tablets under the tongue Daily., Disp: , Rfl:     daptomycin (CUBICIN) solution IVPB, Infuse 50 mL into a venous catheter Daily for 37 doses. Indications: Bacteria in the Blood, Disp: , Rfl:     diphenhydrAMINE (BENADRYL) 25 mg capsule, Take 1 capsule by mouth 2 (Two) Times a Day As Needed for Itching, Allergies or Sleep., Disp: , Rfl:     famotidine (PEPCID) 20 MG tablet, Take 1 tablet by  "mouth 2 (Two) Times a Day., Disp: , Rfl:     folic acid (FOLVITE) 1 MG tablet, Take 4 tablets by mouth Daily for 28 days., Disp: 112 tablet, Rfl: 0    gabapentin (NEURONTIN) 800 MG tablet, Take 1 tablet by mouth 3 (Three) Times a Day., Disp: , Rfl:     ibuprofen (ADVIL,MOTRIN) 800 MG tablet, Take 1 tablet by mouth 2 (Two) Times a Day As Needed for Mild Pain or Moderate Pain., Disp: , Rfl:     melatonin 5 MG tablet tablet, Take 3 tablets by mouth At Night As Needed (Sleep)., Disp: , Rfl:     prenatal vitamin (prenatal, CLASSIC, vitamin) tablet, Take 1 tablet by mouth Daily., Disp: , Rfl:       Review of Systems    Review of Systems   Constitutional:  Negative for activity change, appetite change, chills, diaphoresis, fatigue and fever.   HENT:  Negative for congestion, dental problem, drooling, ear discharge, ear pain, facial swelling, postnasal drip, sinus pressure, sore throat and swollen glands.    Eyes:  Negative for blurred vision, double vision, pain, discharge and itching.   Respiratory:  Negative for apnea, cough, choking, chest tightness and shortness of breath.    Cardiovascular:  Negative for chest pain, palpitations and leg swelling.   Gastrointestinal:  Negative for abdominal distention, abdominal pain, diarrhea, nausea, rectal pain and vomiting.   Genitourinary:  Negative for difficulty urinating, dysuria, flank pain, frequency, hematuria, pelvic pain and pelvic pressure.   Skin:  Positive for wound.   Neurological:  Negative for dizziness, tremors, seizures, syncope, speech difficulty and confusion.   Psychiatric/Behavioral:  Negative for agitation and behavioral problems.         Objective     Vital Signs:  /51 (BP Location: Left arm, Patient Position: Sitting, Cuff Size: Adult)   Pulse 63   Resp 18   Ht 160 cm (63\")   Wt 59.9 kg (132 lb)   BMI 23.38 kg/m²   Estimated body mass index is 23.38 kg/m² as calculated from the following:    Height as of this encounter: 160 cm (63\").    Weight as " of this encounter: 59.9 kg (132 lb).    Physical Exam  Constitutional:       General: She is not in acute distress.     Appearance: Normal appearance. She is not ill-appearing, toxic-appearing or diaphoretic.   HENT:      Head: Normocephalic and atraumatic.      Nose: No congestion or rhinorrhea.      Mouth/Throat:      Pharynx: Oropharynx is clear. No oropharyngeal exudate or posterior oropharyngeal erythema.      Comments: Poor dental hygiene  Cardiovascular:      Rate and Rhythm: Normal rate and regular rhythm.      Heart sounds: No murmur heard.  Pulmonary:      Effort: No respiratory distress.      Breath sounds: No stridor. No wheezing, rhonchi or rales.   Chest:      Chest wall: No tenderness.   Abdominal:      General: There is no distension.      Tenderness: There is no abdominal tenderness. There is no right CVA tenderness, left CVA tenderness, guarding or rebound.   Musculoskeletal:         General: No swelling, tenderness or signs of injury.      Cervical back: No rigidity or tenderness.   Skin:     Coloration: Skin is not jaundiced or pale.      Findings: No bruising, erythema or rash.   Neurological:      General: No focal deficit present.      Mental Status: She is alert. Mental status is at baseline.   Psychiatric:         Mood and Affect: Mood normal.         Behavior: Behavior normal.          Result Review :  The following data was reviewed by Kerry Sheffield MD     Lab Results  Lab Results   Component Value Date    WBC 4.52 12/10/2024    HGB 10.1 (L) 12/10/2024    HCT 31.0 (L) 12/10/2024    MCV 92.0 12/10/2024     12/10/2024     Lab Results   Component Value Date    GLUCOSE 96 12/11/2024    BUN 14 12/11/2024    CREATININE 0.67 12/11/2024    EGFRIFNONA 94 10/10/2021    BCR 20.9 12/11/2024    K 4.2 12/11/2024    CO2 23.6 12/11/2024    CALCIUM 9.3 12/11/2024    ALBUMIN 3.8 12/10/2024    LABIL2 1.3 (L) 11/16/2014    AST 8 12/10/2024    ALT 6 12/10/2024      Lab Results   Component Value  "Date    CRP 0.32 12/10/2024        No results found for: \"ACANTHNAEG\", \"AFBCX\", \"BPERTUSSISCX\", \"BLOODCX\"  No results found for: \"BCIDPCR\", \"CXREFLEX\", \"CSFCX\", \"CULTURETIS\"  No results found for: \"CULTURES\", \"HSVCX\", \"URCX\"  No results found for: \"EYECULTURE\", \"GCCX\", \"HSVCULTURE\", \"LABHSV\"  No results found for: \"LEGIONELLA\", \"MRSACX\", \"MUMPSCX\", \"MYCOPLASCX\"  No results found for: \"NOCARDIACX\", \"STOOLCX\"  No results found for: \"THROATCX\", \"UNSTIMCULT\", \"URINECX\", \"CULTURE\", \"VZVCULTUR\"  No results found for: \"VIRALCULTU\", \"WOUNDCX\"    Radiology Results  MRI Ankle Left Without Contrast    Result Date: 12/4/2024  Impression: No evidence of a soft tissue abscess or osteomyelitis.   This report was finalized on 12/4/2024 2:45 PM by Pal Hedrick M.D..      US Ob < 14 Weeks Single or First Gestation    Result Date: 12/3/2024  Impression: Impression: Gestational sac within the uterus containing a fetal pole corresponding to a 6-week 3-day gestation. No fetal cardiac movement was seen on the exam. Short interval follow-up is recommended.   This report was finalized on 12/3/2024 3:09 PM by Pal Hedrick M.D..      XR Foot 3+ View Left    Result Date: 12/3/2024  Impression: No acute fracture.      This report was finalized on 12/3/2024 1:52 PM by Pal Hedrick M.D..      XR Chest 1 View    Result Date: 12/3/2024  Impression: No change in the left basilar airspace disease.   This report was finalized on 12/3/2024 1:51 PM by Pal Hedrick M.D..      XR Ankle 3+ View Left    Result Date: 12/3/2024  Impression: Soft tissue swelling with no osseous abnormality.      This report was finalized on 12/3/2024 1:50 PM by Pal Hedrick M.D..      XR Chest 1 View    Result Date: 12/1/2024  Impression:  Lung opacification in the left costophrenic angle which may suggest pneumonia in the proper clinical setting. Please follow-up as clinically appropriate.  This report was finalized on 12/1/2024 2:45 PM by Lisa" MD Danisha.              Assessment / Plan        Diagnoses and all orders for this visit:    1. Abscess, neck (Primary)  -     CBC & Differential; Future  -     Comprehensive Metabolic Panel; Future  -     C-reactive Protein; Future  -     CK; Future    2. Cutaneous abscess of neck  -     CBC & Differential; Future  -     Comprehensive Metabolic Panel; Future  -     C-reactive Protein; Future  -     CK; Future    3. MRSA bacteremia  -     CBC & Differential; Future  -     Comprehensive Metabolic Panel; Future  -     C-reactive Protein; Future  -     CK; Future    4. Non-pressure chronic ulcer of other part of left lower leg with fat layer exposed  -     CBC & Differential; Future  -     Comprehensive Metabolic Panel; Future  -     C-reactive Protein; Future  -     CK; Future    5. Positive blood cultures  -     CBC & Differential; Future  -     Comprehensive Metabolic Panel; Future  -     C-reactive Protein; Future  -     CK; Future      Patient was recently admitted to this facility under hospitalist service and left AMA and again left AMA from the ED on 12/9/2024.  Patient was being treated for MRSA bacteremia and endocarditis with plans for discharge to LTAC facility for 6 weeks of IV daptomycin through 1/16/2024.  CRP 0.32.  Blood cultures from 12/10/2024 show no growth thus far.    As per discharge summary patient with history of recent diagnosis of MRSA bacteremia as well as infective endocarditis recently having left the hospital AGAINST MEDICAL ADVICE on 12/8/2024. Patient situation somewhat complex as does have spouse who was recently arrested for drug possession raising some concerns however patient with negative drug screen since 2022 and although on Suboxone only testing positive for this. Patient open and honest about unwillingness to stay in hospital or anywhere other than home through January 16, 2025 as was recommended by infectious disease for treatment of her bacteremia and endocarditis. Patient  "more than agreeable to come to outpatient infusion center using a heart attack rather than staying in hospital as she does have 4 children and is currently pregnant with fifth at 7 weeks. Patient already previously having left AMA as noted above and on a revisit to the ER but then agreeable to this admission. Given her high risk for recurrent AMA leaving and readmission in order to help facilitate continued appropriate antibiotic therapy decision was made to proceed with outpatient infusion clinic daily daptomycin rather than trying to \"restrain \"the patient here in hospital for her total antibiotic course. She was encouraged and educated on the dangers of inappropriate use of IV access and was agreeable to maintain sobriety and come to her daily appointments for infusion. As such as patient already with midline did send referrals and coordinate with  infectious disease for discharge home with outpatient antibiotic therapy at ambulatory care infusion clinic.      Overall feels well today.  Denies chest pain, shortness of breath or palpitations.  On room air with no apparent distress.     Recommend daptomycin 8 mg/kg IV every 24 hours through 1/16/2024.  Patient only missed 1 antibiotic dose therefore end date does not need to be changed.  Patient is not a candidate for [facility] infusions due to pregnancy.  Recommend weekly CPK and avoid statin therapy while on daptomycin.    Will check CMP, CBC, CRP and CPK today through her PICC line.          Follow Up   No follow-ups on file.    Visit Diagnoses:    ICD-10-CM ICD-9-CM   1. Abscess, neck  L02.11 682.1   2. Cutaneous abscess of neck  L02.11 682.1   3. MRSA bacteremia  R78.81 790.7    B95.62 041.12   4. 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 Positive blood cultures  R78.81 790.7       Patient was given instructions and counseling regarding her condition or for health maintenance advice. " Please see specific information pulled into the AVS if appropriate.     This document has been electronically signed by Kerry Sheffield MD   December 19, 2024 10:23 EST      No orders of the defined types were placed in this encounter.     Dictated Utilizing Dragon Dictation: Part of this note may be an electronic transcription/translation of spoken language to printed text using the Dragon Dictation System.

## 2024-12-20 ENCOUNTER — READMISSION MANAGEMENT (OUTPATIENT)
Dept: CALL CENTER | Facility: HOSPITAL | Age: 29
End: 2024-12-20
Payer: COMMERCIAL

## 2024-12-20 NOTE — OUTREACH NOTE
Medical Week 2 Survey      Flowsheet Row Responses   [facility] facility patient discharged from? [facility]   Does the patient have one of the following disease processes/diagnoses(primary or secondary)? Other   Week 2 attempt successful? No   Unsuccessful attempts Attempt 1   Revoke Other  [Patient is going to infusion daily]            Cha STEPHENS - Registered Nurse

## 2024-12-20 NOTE — ED NOTES
MEDICAL SCREENING:    Reason for Visit: Headache    Patient initially seen in triage.  The patient was advised further evaluation and diagnostic testing will be needed, some of the treatment and testing will be initiated in the lobby in order to begin the process.  The patient will be returned to the waiting area for the time being and possibly be re-assessed by a subsequent ED provider.  The patient will be brought back to the treatment area in as timely manner as possible.     Ivett Alicea, APRN  12/19/24 222

## 2024-12-21 ENCOUNTER — HOSPITAL ENCOUNTER (OUTPATIENT)
Dept: INFUSION THERAPY | Facility: HOSPITAL | Age: 29
Discharge: HOME | End: 2024-12-21
Payer: COMMERCIAL

## 2024-12-22 ENCOUNTER — HOSPITAL ENCOUNTER (OUTPATIENT)
Dept: INFUSION THERAPY | Facility: HOSPITAL | Age: 29
Discharge: HOME | End: 2024-12-22
Payer: COMMERCIAL

## 2024-12-23 ENCOUNTER — HOSPITAL ENCOUNTER (OUTPATIENT)
Dept: INFUSION THERAPY | Facility: HOSPITAL | Age: 29
Discharge: HOME | End: 2024-12-23
Admitting: NURSE PRACTITIONER
Payer: COMMERCIAL

## 2024-12-23 VITALS — OXYGEN SATURATION: 100 % | DIASTOLIC BLOOD PRESSURE: 48 MMHG | HEART RATE: 77 BPM | SYSTOLIC BLOOD PRESSURE: 94 MMHG

## 2024-12-23 DIAGNOSIS — I33.0 ACUTE BACTERIAL ENDOCARDITIS: Primary | ICD-10-CM

## 2024-12-23 DIAGNOSIS — R78.81 MRSA BACTEREMIA: ICD-10-CM

## 2024-12-23 DIAGNOSIS — B95.62 MRSA BACTEREMIA: ICD-10-CM

## 2024-12-23 PROCEDURE — 25010000002 DAPTOMYCIN PER 1 MG: Performed by: NURSE PRACTITIONER

## 2024-12-23 PROCEDURE — 96365 THER/PROPH/DIAG IV INF INIT: CPT

## 2024-12-23 RX ADMIN — DAPTOMYCIN 500 MG: 500 INJECTION, POWDER, LYOPHILIZED, FOR SOLUTION INTRAVENOUS at 14:33

## 2024-12-26 ENCOUNTER — HOSPITAL ENCOUNTER (OUTPATIENT)
Dept: INFUSION THERAPY | Facility: HOSPITAL | Age: 29
Discharge: HOME | End: 2024-12-26
Payer: COMMERCIAL

## 2024-12-26 NOTE — CODE DOCUMENTATION
Call attempted to reach patient in regards to missed appointments. Voicemail left for patient at [phone].

## 2024-12-27 ENCOUNTER — OFFICE VISIT (OUTPATIENT)
Dept: INFECTIOUS DISEASES | Facility: CLINIC | Age: 29
End: 2024-12-27
Payer: COMMERCIAL

## 2024-12-27 ENCOUNTER — APPOINTMENT (OUTPATIENT)
Dept: GENERAL RADIOLOGY | Facility: HOSPITAL | Age: 29
End: 2024-12-27
Payer: COMMERCIAL

## 2024-12-27 ENCOUNTER — HOSPITAL ENCOUNTER (OUTPATIENT)
Dept: INFUSION THERAPY | Facility: HOSPITAL | Age: 29
Discharge: HOME | End: 2024-12-27
Admitting: NURSE PRACTITIONER
Payer: COMMERCIAL

## 2024-12-27 ENCOUNTER — HOSPITAL ENCOUNTER (EMERGENCY)
Facility: HOSPITAL | Age: 29
Discharge: HOME | End: 2024-12-27
Payer: COMMERCIAL

## 2024-12-27 VITALS
HEART RATE: 70 BPM | SYSTOLIC BLOOD PRESSURE: 112 MMHG | TEMPERATURE: 98.4 F | OXYGEN SATURATION: 99 % | WEIGHT: 138.5 LBS | BODY MASS INDEX: 24.54 KG/M2 | DIASTOLIC BLOOD PRESSURE: 59 MMHG | HEIGHT: 63 IN | RESPIRATION RATE: 16 BRPM

## 2024-12-27 VITALS
WEIGHT: 138.8 LBS | DIASTOLIC BLOOD PRESSURE: 46 MMHG | BODY MASS INDEX: 24.59 KG/M2 | OXYGEN SATURATION: 99 % | SYSTOLIC BLOOD PRESSURE: 114 MMHG | HEART RATE: 71 BPM

## 2024-12-27 DIAGNOSIS — R78.81 MRSA BACTEREMIA: ICD-10-CM

## 2024-12-27 DIAGNOSIS — M79.641 RIGHT HAND PAIN: Primary | ICD-10-CM

## 2024-12-27 DIAGNOSIS — I33.0 ACUTE BACTERIAL ENDOCARDITIS: Primary | ICD-10-CM

## 2024-12-27 DIAGNOSIS — B95.62 MRSA BACTEREMIA: ICD-10-CM

## 2024-12-27 DIAGNOSIS — L02.11 CUTANEOUS ABSCESS OF NECK: ICD-10-CM

## 2024-12-27 LAB
CK SERPL-CCNC: 19 U/L (ref 20–180)
CRP SERPL-MCNC: 1.18 MG/DL (ref 0–0.5)

## 2024-12-27 PROCEDURE — 96365 THER/PROPH/DIAG IV INF INIT: CPT

## 2024-12-27 PROCEDURE — 73120 X-RAY EXAM OF HAND: CPT | Performed by: RADIOLOGY

## 2024-12-27 PROCEDURE — 73120 X-RAY EXAM OF HAND: CPT

## 2024-12-27 PROCEDURE — 82550 ASSAY OF CK (CPK): CPT | Performed by: INTERNAL MEDICINE

## 2024-12-27 PROCEDURE — 86140 C-REACTIVE PROTEIN: CPT | Performed by: INTERNAL MEDICINE

## 2024-12-27 PROCEDURE — 99283 EMERGENCY DEPT VISIT LOW MDM: CPT

## 2024-12-27 PROCEDURE — 25010000002 DAPTOMYCIN PER 1 MG: Performed by: NURSE PRACTITIONER

## 2024-12-27 RX ORDER — ACETAMINOPHEN 500 MG
1000 TABLET ORAL ONCE
Status: COMPLETED | OUTPATIENT
Start: 2024-12-27 | End: 2024-12-27

## 2024-12-27 RX ADMIN — DAPTOMYCIN 500 MG: 500 INJECTION, POWDER, LYOPHILIZED, FOR SOLUTION INTRAVENOUS at 14:49

## 2024-12-27 RX ADMIN — ACETAMINOPHEN 1000 MG: 500 TABLET ORAL at 20:42

## 2024-12-27 NOTE — CODE DOCUMENTATION
Unsuccessfully attempted to get patient's labs via venipuncture per order. Patient states she will go to outpatient lab tomorrow on 12/28/2024. Provider made aware.

## 2024-12-27 NOTE — PROGRESS NOTES
[facility]         Referring Provider: No referring provider defined for this encounter.    Subjective      Chief Complaint  Abscess, neck    History of Present Illness  Sailaja Alarcon is a 29 y.o. female who presents today to [facility] INFECTIOUS DISEASES for Follow Up .     Ms. Sailaja Alarcon is a 28 y.o. female with past medical history significant for substance abuse history, who presented to [facility] Emergency Department on 12/10/2024 for abnormal labs.  Patient was recently admitted to this facility under hospitalist service and left AMA and again left AMA from the ED on 12/9/2024.  Patient was being treated for MRSA bacteremia and endocarditis with plans for discharge to LTAC facility for 6 weeks of IV daptomycin through 1/16/2024.  CRP 0.32.  Blood cultures from 12/10/2024 show no growth thus far.     Overall feels well today.  Denies chest pain, shortness of breath or palpitations.  On room air with no apparent distress.  Lungs clear to auscultation bilaterally.  Abdomen soft, nontender.  Midline to right upper arm with no signs of infection.       Past Medical History:   Diagnosis Date    Hepatitis C     Hepatitis C antibody positive in blood     Migraines        Past Surgical History:   Procedure Laterality Date    HEAD/NECK ABSCESS INCISION AND DRAINAGE Left 7/8/2022    Procedure: HEAD NECK ABSCESS INCISION AND DRAINAGE;  Surgeon: Miquel Grayson MD;  Location: [facility];  Service: General;  Laterality: Left;    INCISION AND DRAINAGE ABSCESS Right 10/10/2021    Procedure: INCISION AND DRAINAGE ABSCESS CENTRAL LINE PLACEMENT;  Surgeon: Naz Payne MD;  Location: [facility];  Service: General;  Laterality: Right;  I&D ABSCESS= INFECTED.  CENTRAL LINE = CLEAN.       Social History     Socioeconomic History    Marital status:    Tobacco Use    Smoking status: Every Day     Current packs/day: 1.00     Average packs/day: 1  pack/day for 15.0 years (15.0 ttl pk-yrs)     Types: Cigarettes    Smokeless tobacco: Never   Vaping Use    Vaping status: Never Used   Substance and Sexual Activity    Alcohol use: No     Alcohol/week: 0.0 standard drinks of alcohol    Drug use: Not Currently     Frequency: 3.0 times per week     Types: IV     Comment: last use 5 YEARS AGO    Sexual activity: Yes     Partners: Male       Family History  family history includes Cancer in her father; Diabetes in her maternal aunt and maternal grandfather; Hypertension in her maternal grandmother and mother; No Known Problems in her brother, cousin, daughter, paternal grandfather, paternal grandmother, sister, and son.    Immunization History   Administered Date(s) Administered    DTaP, Unspecified 02/17/1998    Hepatitis A 08/10/2018    MMR 02/17/1998    Meningococcal Polysaccharide 10/05/2009    OPV 02/17/1998    Rho (D) Immune Globulin 04/23/2019    Td (TDVAX) 07/12/2006    Tdap 12/06/2017    Varicella 10/05/2009        Allergies  Allergies   Allergen Reactions    Amoxicillin Anaphylaxis    Penicillins Anaphylaxis     Has tolerated rocephin per patient    Vancomycin Itching     Pt states she had full body itch and burning sensation, and she turned red. Reaction potentially Red Man Syndrome.    Zofran [Ondansetron Hcl] Hives and Rash       The medication list has been reviewed and updated.   Current Medications    Current Outpatient Medications:     buprenorphine-naloxone (SUBOXONE) 8-2 MG per SL tablet, Place 0.25-0.5 tablets under the tongue Daily., Disp: , Rfl:     daptomycin (CUBICIN) solution IVPB, Infuse 50 mL into a venous catheter Daily for 37 doses. Indications: Bacteria in the Blood, Disp: , Rfl:     diphenhydrAMINE (BENADRYL) 25 mg capsule, Take 1 capsule by mouth 2 (Two) Times a Day As Needed for Itching, Allergies or Sleep., Disp: , Rfl:     famotidine (PEPCID) 20 MG tablet, Take 1 tablet by mouth 2 (Two) Times a Day., Disp: , Rfl:     folic acid  "(FOLVITE) 1 MG tablet, Take 4 tablets by mouth Daily for 28 days., Disp: 112 tablet, Rfl: 0    gabapentin (NEURONTIN) 800 MG tablet, Take 1 tablet by mouth 3 (Three) Times a Day., Disp: , Rfl:     ibuprofen (ADVIL,MOTRIN) 800 MG tablet, Take 1 tablet by mouth 2 (Two) Times a Day As Needed for Mild Pain or Moderate Pain., Disp: , Rfl:     melatonin 5 MG tablet tablet, Take 3 tablets by mouth At Night As Needed (Sleep)., Disp: , Rfl:     prenatal vitamin (prenatal, CLASSIC, vitamin) tablet, Take 1 tablet by mouth Daily., Disp: , Rfl:       Review of Systems    Review of Systems   Constitutional:  Negative for activity change, appetite change, chills, diaphoresis, fatigue and fever.   HENT:  Negative for congestion, dental problem, drooling, ear discharge, ear pain, facial swelling, postnasal drip, sinus pressure, sore throat and swollen glands.    Eyes:  Negative for blurred vision, double vision, pain, discharge and itching.   Respiratory:  Negative for apnea, cough, choking, chest tightness and shortness of breath.    Cardiovascular:  Negative for chest pain, palpitations and leg swelling.   Gastrointestinal:  Negative for abdominal distention, abdominal pain, diarrhea, nausea, rectal pain and vomiting.   Genitourinary:  Negative for difficulty urinating, dysuria, flank pain, frequency, hematuria, pelvic pain and pelvic pressure.   Neurological:  Negative for dizziness, tremors, seizures, syncope, speech difficulty and confusion.   Psychiatric/Behavioral:  Negative for agitation and behavioral problems.         Objective     Vital Signs:  /46 (BP Location: Right arm, Patient Position: Sitting, Cuff Size: Small Adult)   Pulse 71   Wt 63 kg (138 lb 12.8 oz)   SpO2 99%   BMI 24.59 kg/m²   Estimated body mass index is 24.59 kg/m² as calculated from the following:    Height as of 12/19/24: 160 cm (63\").    Weight as of this encounter: 63 kg (138 lb 12.8 oz).    Physical Exam  Constitutional:       General: She " "is not in acute distress.     Appearance: Normal appearance. She is not ill-appearing, toxic-appearing or diaphoretic.   HENT:      Head: Normocephalic and atraumatic.      Nose: No congestion or rhinorrhea.      Mouth/Throat:      Pharynx: Oropharynx is clear. No oropharyngeal exudate or posterior oropharyngeal erythema.   Cardiovascular:      Rate and Rhythm: Normal rate and regular rhythm.      Heart sounds: No murmur heard.  Pulmonary:      Effort: No respiratory distress.      Breath sounds: No stridor. No wheezing, rhonchi or rales.   Chest:      Chest wall: No tenderness.   Abdominal:      General: There is no distension.      Tenderness: There is no abdominal tenderness. There is no right CVA tenderness, left CVA tenderness, guarding or rebound.   Musculoskeletal:         General: No swelling, tenderness or signs of injury.      Cervical back: No rigidity or tenderness.   Skin:     Coloration: Skin is not jaundiced or pale.      Findings: No bruising, erythema or rash.   Neurological:      General: No focal deficit present.      Mental Status: She is alert. Mental status is at baseline.   Psychiatric:         Mood and Affect: Mood normal.         Behavior: Behavior normal.          Result Review :  The following data was reviewed by Kerry Sheffield MD     Lab Results  Lab Results   Component Value Date    WBC 3.12 (L) 12/19/2024    HGB 12.2 12/19/2024    HCT 37.7 12/19/2024    MCV 93.8 12/19/2024     12/19/2024     Lab Results   Component Value Date    GLUCOSE 77 12/19/2024    BUN 7 12/19/2024    CREATININE 0.61 12/19/2024    EGFRIFNONA 94 10/10/2021    BCR 11.5 12/19/2024    K 4.0 12/19/2024    CO2 19.8 (L) 12/19/2024    CALCIUM 9.6 12/19/2024    ALBUMIN 4.6 12/19/2024    LABIL2 1.3 (L) 11/16/2014    AST 11 12/19/2024    ALT 5 12/19/2024      Lab Results   Component Value Date    CRP 0.63 (H) 12/19/2024        No results found for: \"ACANTHNAEG\", \"AFBCX\", \"BPERTUSSISCX\", \"BLOODCX\"  No results " "found for: \"BCIDPCR\", \"CXREFLEX\", \"CSFCX\", \"CULTURETIS\"  No results found for: \"CULTURES\", \"HSVCX\", \"URCX\"  No results found for: \"EYECULTURE\", \"GCCX\", \"HSVCULTURE\", \"LABHSV\"  No results found for: \"LEGIONELLA\", \"MRSACX\", \"MUMPSCX\", \"MYCOPLASCX\"  No results found for: \"NOCARDIACX\", \"STOOLCX\"  No results found for: \"THROATCX\", \"UNSTIMCULT\", \"URINECX\", \"CULTURE\", \"VZVCULTUR\"  No results found for: \"VIRALCULTU\", \"WOUNDCX\"    Radiology Results  MRI Ankle Left Without Contrast    Result Date: 12/4/2024  Impression: No evidence of a soft tissue abscess or osteomyelitis.   This report was finalized on 12/4/2024 2:45 PM by Pal Hedrick M.D..      US Ob < 14 Weeks Single or First Gestation    Result Date: 12/3/2024  Impression: Impression: Gestational sac within the uterus containing a fetal pole corresponding to a 6-week 3-day gestation. No fetal cardiac movement was seen on the exam. Short interval follow-up is recommended.   This report was finalized on 12/3/2024 3:09 PM by Pal Hedrick M.D..      XR Foot 3+ View Left    Result Date: 12/3/2024  Impression: No acute fracture.      This report was finalized on 12/3/2024 1:52 PM by Pal Hedrick M.D..      XR Chest 1 View    Result Date: 12/3/2024  Impression: No change in the left basilar airspace disease.   This report was finalized on 12/3/2024 1:51 PM by Pal Hedrick M.D..      XR Ankle 3+ View Left    Result Date: 12/3/2024  Impression: Soft tissue swelling with no osseous abnormality.      This report was finalized on 12/3/2024 1:50 PM by Pal Hedrick M.D..      XR Chest 1 View    Result Date: 12/1/2024  Impression:  Lung opacification in the left costophrenic angle which may suggest pneumonia in the proper clinical setting. Please follow-up as clinically appropriate.  This report was finalized on 12/1/2024 2:45 PM by Lisa Raman MD.              Assessment / Plan        Diagnoses and all orders for this visit:    1. Acute bacterial " "endocarditis (Primary)  -     CBC & Differential; Future  -     CK; Future  -     C-reactive Protein; Future    2. Cutaneous abscess of neck  -     CBC & Differential; Future  -     CK; Future  -     C-reactive Protein; Future    3. MRSA bacteremia  -     CBC & Differential; Future  -     CK; Future  -     C-reactive Protein; Future       As per discharge summary patient with history of recent diagnosis of MRSA bacteremia as well as infective endocarditis recently having left the hospital AGAINST MEDICAL ADVICE on 12/8/2024. Patient situation somewhat complex as does have spouse who was recently arrested for drug possession raising some concerns however patient with negative drug screen since 2022 and although on Suboxone only testing positive for this. Patient open and honest about unwillingness to stay in hospital or anywhere other than home through January 16, 2025 as was recommended by infectious disease for treatment of her bacteremia and endocarditis. Patient more than agreeable to come to outpatient infusion center using a heart attack rather than staying in hospital as she does have 4 children and is currently pregnant with fifth at 7 weeks. Patient already previously having left AMA as noted above and on a revisit to the ER but then agreeable to this admission. Given her high risk for recurrent AMA leaving and readmission in order to help facilitate continued appropriate antibiotic therapy decision was made to proceed with outpatient infusion clinic daily daptomycin rather than trying to \"restrain \"the patient here in hospital for her total antibiotic course. She was encouraged and educated on the dangers of inappropriate use of IV access and was agreeable to maintain sobriety and come to her daily appointments for infusion. As such as patient already with midline did send referrals and coordinate with  infectious disease for discharge home with outpatient antibiotic therapy at ambulatory care " infusion clinic.      Overall feels well today.  Denies chest pain, shortness of breath or palpitations.  On room air with no apparent distress.     Recommend daptomycin 8 mg/kg IV every 24 hours through 1/16/2024.  Patient only missed 1 antibiotic dose therefore end date does not need to be changed.  Patient is not a candidate for Dalvance infusions due to pregnancy.  Recommend weekly CPK and avoid statin therapy while on daptomycin.     As of 12/19/2024 CPK is normal at 14 and CRP level is low at 0.63 along with normal creatinine at 0.61 and normal liver profile with mild leukopenia at 3.12 borderline platelets at 146,000 and absolute neutrophils at 1450.  Based on these results we will recheck CBC CRP and CPK levels today through her PICC line.    Patient has missed 3 doses of daptomycin due to [facility] scheduling issues and will go ahead and refer for home health for her to finish her IV antibiotic therapy at home and she is able to get help from her  as well.          Follow Up   No follow-ups on file.    Visit Diagnoses:    ICD-10-CM ICD-9-CM   1. Acute bacterial endocarditis  I33.0 421.0   2. Cutaneous abscess of neck  L02.11 682.1   3. MRSA bacteremia  R78.81 790.7    B95.62 041.12       Patient was given instructions and counseling regarding her condition or for health maintenance advice. Please see specific information pulled into the AVS if appropriate.     This document has been electronically signed by Kerry Sheffield MD   December 27, 2024 11:54 EST      No orders of the defined types were placed in this encounter.     Dictated Utilizing Dragon Dictation: Part of this note may be an electronic transcription/translation of spoken language to printed text using the Dragon Dictation System.

## 2024-12-27 NOTE — Clinical Note
[facility] EMERGENCY DEPARTMENT  [address] [address]  Phone: [phone]    Sailaja Alarcon was seen and treated in our emergency department on 12/27/2024.  She may return to school on 12/29/2024.          Thank you for choosing [facility].    Tamra Prajapati APRN

## 2024-12-28 ENCOUNTER — HOSPITAL ENCOUNTER (OUTPATIENT)
Dept: INFUSION THERAPY | Facility: HOSPITAL | Age: 29
Discharge: HOME | End: 2024-12-28
Payer: COMMERCIAL

## 2024-12-30 ENCOUNTER — HOSPITAL ENCOUNTER (OUTPATIENT)
Dept: INFUSION THERAPY | Facility: HOSPITAL | Age: 29
Discharge: HOME | End: 2024-12-30
Payer: COMMERCIAL

## 2024-12-31 ENCOUNTER — HOSPITAL ENCOUNTER (OUTPATIENT)
Dept: INFUSION THERAPY | Facility: HOSPITAL | Age: 29
Discharge: HOME | End: 2024-12-31
Payer: COMMERCIAL

## 2025-01-07 NOTE — ED PROVIDER NOTES
Subjective   History of Present Illness  Patient is a 29 year old female with PMH significant for hepatitis C and migraines. She presents for R hand pain. She accidentally caught her hand in car door at [employer]. Denies any other complaints.        Review of Systems   Constitutional: Negative.  Negative for fever.   HENT: Negative.     Respiratory: Negative.     Cardiovascular: Negative.  Negative for chest pain.   Gastrointestinal: Negative.  Negative for abdominal pain.   Endocrine: Negative.    Genitourinary: Negative.  Negative for dysuria.   Skin: Negative.    Neurological: Negative.    Psychiatric/Behavioral: Negative.     All other systems reviewed and are negative.      Past Medical History:   Diagnosis Date    Hepatitis C     Hepatitis C antibody positive in blood     Migraines        Allergies   Allergen Reactions    Amoxicillin Anaphylaxis    Penicillins Anaphylaxis     Has tolerated rocephin per patient    Vancomycin Itching     Pt states she had full body itch and burning sensation, and she turned red. Reaction potentially Red Man Syndrome.    Zofran [Ondansetron Hcl] Hives and Rash       Past Surgical History:   Procedure Laterality Date    HEAD/NECK ABSCESS INCISION AND DRAINAGE Left 7/8/2022    Procedure: HEAD NECK ABSCESS INCISION AND DRAINAGE;  Surgeon: Miquel Grayson MD;  Location: [facility];  Service: General;  Laterality: Left;    INCISION AND DRAINAGE ABSCESS Right 10/10/2021    Procedure: INCISION AND DRAINAGE ABSCESS CENTRAL LINE PLACEMENT;  Surgeon: Naz Payne MD;  Location: [facility];  Service: General;  Laterality: Right;  I&D ABSCESS= INFECTED.  CENTRAL LINE = CLEAN.       Family History   Problem Relation Age of Onset    Hypertension Mother     Cancer Father     No Known Problems Sister     No Known Problems Brother     No Known Problems Son     No Known Problems Daughter     Hypertension Maternal Grandmother     Diabetes Maternal Grandfather     No Known Problems  Paternal Grandmother     No Known Problems Paternal Grandfather     No Known Problems Cousin     Diabetes Maternal Aunt     Rheum arthritis Neg Hx     Osteoarthritis Neg Hx     Asthma Neg Hx     Heart failure Neg Hx     Hyperlipidemia Neg Hx     Migraines Neg Hx     Rashes / Skin problems Neg Hx     Seizures Neg Hx     Stroke Neg Hx     Thyroid disease Neg Hx        Social History     Socioeconomic History    Marital status:    Tobacco Use    Smoking status: Every Day     Current packs/day: 1.00     Average packs/day: 1 pack/day for 15.0 years (15.0 ttl pk-yrs)     Types: Cigarettes    Smokeless tobacco: Never   Vaping Use    Vaping status: Never Used   Substance and Sexual Activity    Alcohol use: No     Alcohol/week: 0.0 standard drinks of alcohol    Drug use: Not Currently     Frequency: 3.0 times per week     Types: IV     Comment: last use 5 YEARS AGO    Sexual activity: Yes     Partners: Male           Objective   Physical Exam  Vitals and nursing note reviewed.   Constitutional:       General: She is not in acute distress.     Appearance: She is well-developed. She is not diaphoretic.   HENT:      Head: Normocephalic and atraumatic.      Right Ear: External ear normal.      Left Ear: External ear normal.      Nose: Nose normal.   Eyes:      Conjunctiva/sclera: Conjunctivae normal.      Pupils: Pupils are equal, round, and reactive to light.   Neck:      Vascular: No JVD.      Trachea: No tracheal deviation.   Cardiovascular:      Rate and Rhythm: Normal rate and regular rhythm.      Heart sounds: Normal heart sounds. No murmur heard.  Pulmonary:      Effort: Pulmonary effort is normal. No respiratory distress.      Breath sounds: Normal breath sounds. No wheezing.   Abdominal:      General: Bowel sounds are normal.      Palpations: Abdomen is soft.      Tenderness: There is no abdominal tenderness.   Musculoskeletal:         General: No deformity. Normal range of motion.      Right hand: Swelling and  tenderness present. Decreased range of motion.      Cervical back: Normal range of motion and neck supple.   Skin:     General: Skin is warm and dry.      Coloration: Skin is not pale.      Findings: No erythema or rash.   Neurological:      Mental Status: She is alert and oriented to person, place, and time.      Cranial Nerves: No cranial nerve deficit.   Psychiatric:         Behavior: Behavior normal.         Thought Content: Thought content normal.         Procedures       XR Hand 2 View Right   Final Result   No acute osseous abnormality evident.       This report was finalized on 12/27/2024 11:07 PM by Alex Pallas, DO.               ED Course                                                       Medical Decision Making      Final diagnoses:   Right hand pain       ED Disposition  ED Disposition       ED Disposition   Discharge    Condition   Stable    Comment   --               Margaret Baxter, BIANCA  [address]  [facility] [address]  [phone]    Call in 2 days           Medication List      No changes were made to your prescriptions during this visit.            Tamra Prajapati, BIANCA  01/06/25 2095

## 2025-01-09 ENCOUNTER — TELEPHONE (OUTPATIENT)
Dept: INFECTIOUS DISEASES | Facility: CLINIC | Age: 30
End: 2025-01-09
Payer: COMMERCIAL

## 2025-01-09 NOTE — TELEPHONE ENCOUNTER
Pt called asking since she has a hard time getting [facility] could HH start taking her labs I messaged Dr. Sheffield he said yes but he was very concerned about her overall care, she has missed a lot of appointments and she needs to f/u here. She has left the hospital AMA before    The labs that needs to be drawn are CDC, CMP, CRP, and CPK will call HH and let them know.    Made pt aware she needs to be here at Friday the 17th @ 1:30.  She said she will try her best to come in.

## 2025-01-17 ENCOUNTER — OFFICE VISIT (OUTPATIENT)
Dept: INFECTIOUS DISEASES | Facility: CLINIC | Age: 30
End: 2025-01-17
Payer: COMMERCIAL

## 2025-01-17 VITALS
OXYGEN SATURATION: 98 % | HEART RATE: 74 BPM | DIASTOLIC BLOOD PRESSURE: 55 MMHG | SYSTOLIC BLOOD PRESSURE: 120 MMHG | BODY MASS INDEX: 24.37 KG/M2 | WEIGHT: 137.6 LBS

## 2025-01-17 DIAGNOSIS — I33.0 ACUTE BACTERIAL ENDOCARDITIS: Primary | ICD-10-CM

## 2025-01-17 LAB
ALBUMIN SERPL-MCNC: 4 G/DL (ref 3.5–5.2)
ALBUMIN/GLOB SERPL: 1 G/DL
ALP SERPL-CCNC: 74 U/L (ref 39–117)
ALT SERPL W P-5'-P-CCNC: 5 U/L (ref 1–33)
ANION GAP SERPL CALCULATED.3IONS-SCNC: 12.3 MMOL/L (ref 5–15)
AST SERPL-CCNC: 12 U/L (ref 1–32)
BASOPHILS # BLD AUTO: 0.03 10*3/MM3 (ref 0–0.2)
BASOPHILS NFR BLD AUTO: 1 % (ref 0–1.5)
BILIRUB SERPL-MCNC: 0.4 MG/DL (ref 0–1.2)
BUN SERPL-MCNC: 4 MG/DL (ref 6–20)
BUN/CREAT SERPL: 7.7 (ref 7–25)
CALCIUM SPEC-SCNC: 9.4 MG/DL (ref 8.6–10.5)
CHLORIDE SERPL-SCNC: 106 MMOL/L (ref 98–107)
CK SERPL-CCNC: 15 U/L (ref 20–180)
CO2 SERPL-SCNC: 17.7 MMOL/L (ref 22–29)
CREAT SERPL-MCNC: 0.52 MG/DL (ref 0.57–1)
CRP SERPL-MCNC: 4.15 MG/DL (ref 0–0.5)
DEPRECATED RDW RBC AUTO: 47.4 FL (ref 37–54)
EGFRCR SERPLBLD CKD-EPI 2021: 127.6 ML/MIN/1.73
EOSINOPHIL # BLD AUTO: 0.06 10*3/MM3 (ref 0–0.4)
EOSINOPHIL NFR BLD AUTO: 1.9 % (ref 0.3–6.2)
ERYTHROCYTE [DISTWIDTH] IN BLOOD BY AUTOMATED COUNT: 13.2 % (ref 12.3–15.4)
GLOBULIN UR ELPH-MCNC: 4 GM/DL
GLUCOSE SERPL-MCNC: 74 MG/DL (ref 65–99)
HCT VFR BLD AUTO: 38.2 % (ref 34–46.6)
HGB BLD-MCNC: 12.5 G/DL (ref 12–15.9)
IMM GRANULOCYTES # BLD AUTO: 0.01 10*3/MM3 (ref 0–0.05)
IMM GRANULOCYTES NFR BLD AUTO: 0.3 % (ref 0–0.5)
LYMPHOCYTES # BLD AUTO: 1.1 10*3/MM3 (ref 0.7–3.1)
LYMPHOCYTES NFR BLD AUTO: 35.6 % (ref 19.6–45.3)
MCH RBC QN AUTO: 31.6 PG (ref 26.6–33)
MCHC RBC AUTO-ENTMCNC: 32.7 G/DL (ref 31.5–35.7)
MCV RBC AUTO: 96.5 FL (ref 79–97)
MONOCYTES # BLD AUTO: 0.37 10*3/MM3 (ref 0.1–0.9)
MONOCYTES NFR BLD AUTO: 12 % (ref 5–12)
NEUTROPHILS NFR BLD AUTO: 1.52 10*3/MM3 (ref 1.7–7)
NEUTROPHILS NFR BLD AUTO: 49.2 % (ref 42.7–76)
NRBC BLD AUTO-RTO: 0 /100 WBC (ref 0–0.2)
PLATELET # BLD AUTO: 100 10*3/MM3 (ref 140–450)
PMV BLD AUTO: 11.4 FL (ref 6–12)
POTASSIUM SERPL-SCNC: 3.7 MMOL/L (ref 3.5–5.2)
PROT SERPL-MCNC: 8 G/DL (ref 6–8.5)
RBC # BLD AUTO: 3.96 10*6/MM3 (ref 3.77–5.28)
SODIUM SERPL-SCNC: 136 MMOL/L (ref 136–145)
WBC NRBC COR # BLD AUTO: 3.09 10*3/MM3 (ref 3.4–10.8)

## 2025-01-17 PROCEDURE — 80053 COMPREHEN METABOLIC PANEL: CPT

## 2025-01-17 PROCEDURE — 82550 ASSAY OF CK (CPK): CPT

## 2025-01-17 PROCEDURE — 86140 C-REACTIVE PROTEIN: CPT

## 2025-01-17 PROCEDURE — 85025 COMPLETE CBC W/AUTO DIFF WBC: CPT

## 2025-01-17 PROCEDURE — 36415 COLL VENOUS BLD VENIPUNCTURE: CPT

## 2025-01-17 NOTE — PROGRESS NOTES
[facility]         Referring Provider: No referring provider defined for this encounter.    Subjective      Chief Complaint  Acute bacterial endocarditis    History of Present Illness  Sailaja Alarcon is a 29 y.o. female who presents today to [facility] for infectious disease evaluation and antibiotic management.    Ms. Sailaja Alarcon is a 28 y.o. female with past medical history significant for substance abuse history, who presented to [facility] Emergency Department on 12/10/2024 for abnormal labs.  Patient was recently admitted to this facility under hospitalist service and left AMA and again left AMA from the ED on 12/9/2024.  Patient was being treated for MRSA bacteremia and endocarditis with plans for discharge to LTAC facility for 6 weeks of IV daptomycin through 1/16/2024.  CRP 0.32.  Blood cultures from 12/10/2024 show no growth thus far.     As per discharge summary patient with history of recent diagnosis of MRSA bacteremia as well as infective endocarditis recently having left the hospital AGAINST MEDICAL ADVICE on 12/8/2024. Patient situation somewhat complex as does have spouse who was recently arrested for drug possession raising some concerns however patient with negative drug screen since 2022 and although on Suboxone only testing positive for this. Patient open and honest about unwillingness to stay in hospital or anywhere other than home through January 16, 2025 as was recommended by infectious disease for treatment of her bacteremia and endocarditis. Patient more than agreeable to come to outpatient infusion center using a heart attack rather than staying in hospital as she does have 4 children and is currently pregnant with fifth at 7 weeks. Patient already previously having left AMA as noted above and on a revisit to the ER but then agreeable to this admission. Given her high risk for recurrent AMA leaving and  "readmission in order to help facilitate continued appropriate antibiotic therapy decision was made to proceed with outpatient infusion clinic daily daptomycin rather than trying to \"restrain \"the patient here in hospital for her total antibiotic course. She was encouraged and educated on the dangers of inappropriate use of IV access and was agreeable to maintain sobriety and come to her daily appointments for infusion. As such as patient already with midline did send referrals and coordinate with  infectious disease for discharge home with outpatient antibiotic therapy at ambulatory care infusion clinic.       Dr. Sheffield recommend daptomycin 8 mg/kg IV every 24 hours through 1/16/2024.  Patient only missed 1 antibiotic dose therefore end date does not need to be changed.  Patient is not a candidate for [facility] infusions due to pregnancy.  Recommend weekly CPK and avoid statin therapy while on daptomycin.     As of 12/19/2024 CPK is normal at 14 and CRP level is low at 0.63 along with normal creatinine at 0.61 and normal liver profile with mild leukopenia at 3.12 borderline platelets at 146,000 and absolute neutrophils at 1450.  Based on these results we will recheck CBC CRP and CPK levels today through her PICC line.     Patient has missed 3 doses of daptomycin due to [facility] scheduling issues and will go ahead and refer for home health for her to finish her IV antibiotic therapy at home and she is able to get help from her  as well.    1/17/25 Patient here for follow up visit. Has not had labs or seen Dr. Sheffield since 12/27/24. CRP elevated at 1.18. CK 19. Patient called on 1/9/25 asking since she has a hard time getting [facility] could HH start taking her labs. Dr. Sheffield approved but he was very concerned about her overall care, as she has missed a lot of appointments and she needs to f/u with him. Patient says she is doing well. Admits to missing appx 1 week of IV antibiotics. Midline flushes but " does not pull. She has been having nausea and vomiting but says that is due to her pregnancy. Denies fever, chills, or diarrhea.           Past Medical History:   Diagnosis Date    Hepatitis C     Hepatitis C antibody positive in blood     Migraines        Past Surgical History:   Procedure Laterality Date    HEAD/NECK ABSCESS INCISION AND DRAINAGE Left 7/8/2022    Procedure: HEAD NECK ABSCESS INCISION AND DRAINAGE;  Surgeon: Miquel Grayson MD;  Location:  [facility];  Service: General;  Laterality: Left;    INCISION AND DRAINAGE ABSCESS Right 10/10/2021    Procedure: INCISION AND DRAINAGE ABSCESS CENTRAL LINE PLACEMENT;  Surgeon: Naz Payne MD;  Location:  [facility];  Service: General;  Laterality: Right;  I&D ABSCESS= INFECTED.  CENTRAL LINE = CLEAN.       Social History     Socioeconomic History    Marital status:    Tobacco Use    Smoking status: Every Day     Current packs/day: 1.00     Average packs/day: 1 pack/day for 15.0 years (15.0 ttl pk-yrs)     Types: Cigarettes    Smokeless tobacco: Never   Vaping Use    Vaping status: Never Used   Substance and Sexual Activity    Alcohol use: No     Alcohol/week: 0.0 standard drinks of alcohol    Drug use: Not Currently     Frequency: 3.0 times per week     Types: IV     Comment: last use 5 YEARS AGO    Sexual activity: Yes     Partners: Male       Family History  family history includes Cancer in her father; Diabetes in her maternal aunt and maternal grandfather; Hypertension in her maternal grandmother and mother; No Known Problems in her brother, cousin, daughter, paternal grandfather, paternal grandmother, sister, and son.    Immunization History   Administered Date(s) Administered    DTaP, Unspecified 02/17/1998    Hepatitis A 08/10/2018    MMR 02/17/1998    Meningococcal Polysaccharide 10/05/2009    OPV 02/17/1998    Rho (D) Immune Globulin 04/23/2019    Td (TDVAX) 07/12/2006    Tdap 12/06/2017    Varicella 10/05/2009         Allergies  Allergies   Allergen Reactions    Amoxicillin Anaphylaxis    Penicillins Anaphylaxis     Has tolerated rocephin per patient    Vancomycin Itching     Pt states she had full body itch and burning sensation, and she turned red. Reaction potentially Red Man Syndrome.    Zofran [Ondansetron Hcl] Hives and Rash       The medication list has been reviewed and updated.   Current Medications    Current Outpatient Medications:     buprenorphine-naloxone (SUBOXONE) 8-2 MG per SL tablet, Place 0.25-0.5 tablets under the tongue Daily., Disp: , Rfl:     daptomycin (CUBICIN) solution IVPB, Infuse 50 mL into a venous catheter Daily for 37 doses. Indications: Bacteria in the Blood, Disp: , Rfl:     diphenhydrAMINE (BENADRYL) 25 mg capsule, Take 1 capsule by mouth 2 (Two) Times a Day As Needed for Itching, Allergies or Sleep., Disp: , Rfl:     famotidine (PEPCID) 20 MG tablet, Take 1 tablet by mouth 2 (Two) Times a Day., Disp: , Rfl:     gabapentin (NEURONTIN) 800 MG tablet, Take 1 tablet by mouth 3 (Three) Times a Day., Disp: , Rfl:     ibuprofen (ADVIL,MOTRIN) 800 MG tablet, Take 1 tablet by mouth 2 (Two) Times a Day As Needed for Mild Pain or Moderate Pain., Disp: , Rfl:     melatonin 5 MG tablet tablet, Take 3 tablets by mouth At Night As Needed (Sleep)., Disp: , Rfl:     prenatal vitamin (prenatal, CLASSIC, vitamin) tablet, Take 1 tablet by mouth Daily., Disp: , Rfl:       Review of Systems    Review of Systems   Constitutional:  Negative for activity change, appetite change, chills, diaphoresis, fatigue and fever.   HENT:  Negative for congestion, dental problem, drooling, ear discharge, ear pain, facial swelling, postnasal drip, sinus pressure, sore throat and swollen glands.    Eyes:  Negative for blurred vision, double vision, pain, discharge and itching.   Respiratory:  Negative for apnea, cough, choking, chest tightness and shortness of breath.    Cardiovascular:  Negative for chest pain, palpitations and  "leg swelling.   Gastrointestinal:  Positive for nausea and vomiting. Negative for abdominal distention, abdominal pain, diarrhea and rectal pain.   Genitourinary:  Negative for difficulty urinating, dysuria, flank pain, frequency, hematuria, pelvic pain and pelvic pressure.   Neurological:  Negative for dizziness, tremors, seizures, syncope, speech difficulty and confusion.   Psychiatric/Behavioral:  Negative for agitation and behavioral problems.         Objective     Vital Signs:  /55 (BP Location: Right arm, Patient Position: Sitting, Cuff Size: Small Adult)   Pulse 74   Wt 62.4 kg (137 lb 9.6 oz)   SpO2 98%   BMI 24.37 kg/m²   Estimated body mass index is 24.37 kg/m² as calculated from the following:    Height as of 12/27/24: 160 cm (63\").    Weight as of this encounter: 62.4 kg (137 lb 9.6 oz).    Physical Exam  Constitutional:       General: She is not in acute distress.     Appearance: Normal appearance. She is not ill-appearing, toxic-appearing or diaphoretic.   HENT:      Head: Normocephalic and atraumatic.      Nose: No congestion or rhinorrhea.      Mouth/Throat:      Pharynx: Oropharynx is clear. No oropharyngeal exudate or posterior oropharyngeal erythema.   Cardiovascular:      Rate and Rhythm: Normal rate and regular rhythm.      Heart sounds: No murmur heard.  Pulmonary:      Effort: No respiratory distress.      Breath sounds: No stridor. No wheezing, rhonchi or rales.   Chest:      Chest wall: No tenderness.   Abdominal:      General: There is no distension.      Tenderness: There is no abdominal tenderness. There is no right CVA tenderness, left CVA tenderness, guarding or rebound.   Musculoskeletal:         General: No swelling, tenderness or signs of injury.      Cervical back: No rigidity or tenderness.   Skin:     Coloration: Skin is not jaundiced or pale.      Findings: No bruising, erythema or rash.   Neurological:      General: No focal deficit present.      Mental Status: She is " "alert. Mental status is at baseline.   Psychiatric:         Mood and Affect: Mood normal.         Behavior: Behavior normal.          Result Review :  The following data was reviewed by Tangela Phillips PA-C     Lab Results  Lab Results   Component Value Date    WBC 3.12 (L) 12/19/2024    HGB 12.2 12/19/2024    HCT 37.7 12/19/2024    MCV 93.8 12/19/2024     12/19/2024     Lab Results   Component Value Date    GLUCOSE 77 12/19/2024    BUN 7 12/19/2024    CREATININE 0.61 12/19/2024    EGFRIFNONA 94 10/10/2021    BCR 11.5 12/19/2024    K 4.0 12/19/2024    CO2 19.8 (L) 12/19/2024    CALCIUM 9.6 12/19/2024    ALBUMIN 4.6 12/19/2024    LABIL2 1.3 (L) 11/16/2014    AST 11 12/19/2024    ALT 5 12/19/2024      Lab Results   Component Value Date    CRP 1.18 (H) 12/27/2024        No results found for: \"ACANTHNAEG\", \"AFBCX\", \"BPERTUSSISCX\", \"BLOODCX\"  No results found for: \"BCIDPCR\", \"CXREFLEX\", \"CSFCX\", \"CULTURETIS\"  No results found for: \"CULTURES\", \"HSVCX\", \"URCX\"  No results found for: \"EYECULTURE\", \"GCCX\", \"HSVCULTURE\", \"LABHSV\"  No results found for: \"LEGIONELLA\", \"MRSACX\", \"MUMPSCX\", \"MYCOPLASCX\"  No results found for: \"NOCARDIACX\", \"STOOLCX\"  No results found for: \"THROATCX\", \"UNSTIMCULT\", \"URINECX\", \"CULTURE\", \"VZVCULTUR\"  No results found for: \"VIRALCULTU\", \"WOUNDCX\"    Radiology Results  XR Hand 2 View Right    Result Date: 12/27/2024  Impression: No acute osseous abnormality evident.  This report was finalized on 12/27/2024 11:07 PM by Alex Pallas, DO.              Assessment / Plan        Diagnoses and all orders for this visit:    1. Acute bacterial endocarditis (Primary)  -     CK; Future  -     Comprehensive Metabolic Panel; Future  -     CBC & Differential; Future  -     C-reactive Protein; Future  -     CK  -     Comprehensive Metabolic Panel  -     CBC & Differential  -     C-reactive Protein      CK, CMP, CBC and CRP ordered today. I have extended Daptomycin through 1/27/25 in order for her to be " able to follow up with Dr. Sheffield and receive the doses she has missed. Patient scheduled to see Dr. Sheffield on 1/27/25. Will be in touch with patient if there are any concerning lab results.           Follow Up   No follow-ups on file.    Visit Diagnoses:    ICD-10-CM ICD-9-CM   1. Acute bacterial endocarditis  I33.0 421.0       Patient was given instructions and counseling regarding her condition or for health maintenance advice. Please see specific information pulled into the AVS if appropriate.     This document has been electronically signed by Tangela Phillips PA-C   January 17, 2025 13:39 EST      No orders of the defined types were placed in this encounter.     Dictated Utilizing Dragon Dictation: Part of this note may be an electronic transcription/translation of spoken language to printed text using the Dragon Dictation System.

## 2025-01-27 ENCOUNTER — DOCUMENTATION (OUTPATIENT)
Dept: INFECTIOUS DISEASES | Facility: CLINIC | Age: 30
End: 2025-01-27

## 2025-01-27 NOTE — PROGRESS NOTES
[facility] called today, Nurse stated that pt is non compliant and they are discharging pt from their services.

## 2025-01-28 ENCOUNTER — DOCUMENTATION (OUTPATIENT)
Dept: INFECTIOUS DISEASES | Facility: CLINIC | Age: 30
End: 2025-01-28

## 2025-01-28 ENCOUNTER — OFFICE VISIT (OUTPATIENT)
Dept: INFECTIOUS DISEASES | Facility: CLINIC | Age: 30
End: 2025-01-28
Payer: COMMERCIAL

## 2025-01-28 ENCOUNTER — LAB (OUTPATIENT)
Dept: LAB | Facility: HOSPITAL | Age: 30
End: 2025-01-28
Payer: COMMERCIAL

## 2025-01-28 VITALS
OXYGEN SATURATION: 99 % | RESPIRATION RATE: 19 BRPM | BODY MASS INDEX: 24.34 KG/M2 | SYSTOLIC BLOOD PRESSURE: 108 MMHG | HEART RATE: 84 BPM | WEIGHT: 137.4 LBS | HEIGHT: 63 IN | DIASTOLIC BLOOD PRESSURE: 58 MMHG

## 2025-01-28 DIAGNOSIS — L02.11 CUTANEOUS ABSCESS OF NECK: Primary | ICD-10-CM

## 2025-01-28 DIAGNOSIS — B95.62 MRSA BACTEREMIA: ICD-10-CM

## 2025-01-28 DIAGNOSIS — I33.0 ACUTE BACTERIAL ENDOCARDITIS: ICD-10-CM

## 2025-01-28 DIAGNOSIS — R78.81 MRSA BACTEREMIA: ICD-10-CM

## 2025-01-28 LAB
BASOPHILS # BLD AUTO: 0.02 10*3/MM3 (ref 0–0.2)
BASOPHILS NFR BLD AUTO: 0.5 % (ref 0–1.5)
CRP SERPL-MCNC: 7.71 MG/DL (ref 0–0.5)
DEPRECATED RDW RBC AUTO: 42.1 FL (ref 37–54)
EOSINOPHIL # BLD AUTO: 0.09 10*3/MM3 (ref 0–0.4)
EOSINOPHIL NFR BLD AUTO: 2.2 % (ref 0.3–6.2)
ERYTHROCYTE [DISTWIDTH] IN BLOOD BY AUTOMATED COUNT: 12.9 % (ref 12.3–15.4)
HCT VFR BLD AUTO: 41.1 % (ref 34–46.6)
HGB BLD-MCNC: 14 G/DL (ref 12–15.9)
IMM GRANULOCYTES # BLD AUTO: 0.03 10*3/MM3 (ref 0–0.05)
IMM GRANULOCYTES NFR BLD AUTO: 0.7 % (ref 0–0.5)
LYMPHOCYTES # BLD AUTO: 1.23 10*3/MM3 (ref 0.7–3.1)
LYMPHOCYTES NFR BLD AUTO: 29.9 % (ref 19.6–45.3)
MCH RBC QN AUTO: 30.4 PG (ref 26.6–33)
MCHC RBC AUTO-ENTMCNC: 34.1 G/DL (ref 31.5–35.7)
MCV RBC AUTO: 89.3 FL (ref 79–97)
MONOCYTES # BLD AUTO: 0.24 10*3/MM3 (ref 0.1–0.9)
MONOCYTES NFR BLD AUTO: 5.8 % (ref 5–12)
NEUTROPHILS NFR BLD AUTO: 2.51 10*3/MM3 (ref 1.7–7)
NEUTROPHILS NFR BLD AUTO: 60.9 % (ref 42.7–76)
NRBC BLD AUTO-RTO: 0 /100 WBC (ref 0–0.2)
PLATELET # BLD AUTO: 122 10*3/MM3 (ref 140–450)
PMV BLD AUTO: 10.8 FL (ref 6–12)
RBC # BLD AUTO: 4.6 10*6/MM3 (ref 3.77–5.28)
RBC MORPH BLD: NORMAL
SMALL PLATELETS BLD QL SMEAR: NORMAL
WBC NRBC COR # BLD AUTO: 4.12 10*3/MM3 (ref 3.4–10.8)

## 2025-01-28 PROCEDURE — 86140 C-REACTIVE PROTEIN: CPT | Performed by: INTERNAL MEDICINE

## 2025-01-28 PROCEDURE — 85025 COMPLETE CBC W/AUTO DIFF WBC: CPT | Performed by: INTERNAL MEDICINE

## 2025-01-28 PROCEDURE — 36415 COLL VENOUS BLD VENIPUNCTURE: CPT | Performed by: INTERNAL MEDICINE

## 2025-01-28 PROCEDURE — 85007 BL SMEAR W/DIFF WBC COUNT: CPT | Performed by: INTERNAL MEDICINE

## 2025-01-28 NOTE — PROGRESS NOTES
Called Rose with [facility] to make her aware that pt needs her Daptomycin extended for 2 more weeks. Home Health is no longer servicing this pt, her spouse is handling her medicine routine. I will fax the order to Rose and her meds will be delivered either today or tomorrow. I called the pt back to let her know and she understands.

## 2025-02-04 ENCOUNTER — TRANSCRIBE ORDERS (OUTPATIENT)
Dept: ADMINISTRATIVE | Facility: HOSPITAL | Age: 30
End: 2025-02-04
Payer: COMMERCIAL

## 2025-02-04 ENCOUNTER — HOSPITAL ENCOUNTER (OUTPATIENT)
Dept: INFUSION THERAPY | Facility: HOSPITAL | Age: 30
Discharge: HOME | End: 2025-02-04
Payer: COMMERCIAL

## 2025-02-04 ENCOUNTER — LAB (OUTPATIENT)
Dept: LAB | Facility: HOSPITAL | Age: 30
End: 2025-02-04
Payer: COMMERCIAL

## 2025-02-04 ENCOUNTER — OFFICE VISIT (OUTPATIENT)
Dept: INFECTIOUS DISEASES | Facility: CLINIC | Age: 30
End: 2025-02-04
Payer: COMMERCIAL

## 2025-02-04 VITALS
WEIGHT: 136.2 LBS | BODY MASS INDEX: 24.13 KG/M2 | HEART RATE: 73 BPM | OXYGEN SATURATION: 99 % | DIASTOLIC BLOOD PRESSURE: 59 MMHG | SYSTOLIC BLOOD PRESSURE: 111 MMHG | HEIGHT: 63 IN

## 2025-02-04 DIAGNOSIS — I33.0 ACUTE BACTERIAL ENDOCARDITIS: ICD-10-CM

## 2025-02-04 DIAGNOSIS — Z3A.15 15 WEEKS GESTATION OF PREGNANCY: Primary | ICD-10-CM

## 2025-02-04 DIAGNOSIS — Z3A.15 15 WEEKS GESTATION OF PREGNANCY: ICD-10-CM

## 2025-02-04 DIAGNOSIS — Z34.80 PRENATAL CARE, SUBSEQUENT PREGNANCY, UNSPECIFIED TRIMESTER: ICD-10-CM

## 2025-02-04 DIAGNOSIS — I33.0 ACUTE BACTERIAL ENDOCARDITIS: Primary | ICD-10-CM

## 2025-02-04 DIAGNOSIS — R78.81 MRSA BACTEREMIA: Primary | ICD-10-CM

## 2025-02-04 DIAGNOSIS — B95.62 MRSA BACTEREMIA: Primary | ICD-10-CM

## 2025-02-04 LAB
ABO GROUP BLD: NORMAL
ALBUMIN SERPL-MCNC: 3.8 G/DL (ref 3.5–5.2)
ALBUMIN/GLOB SERPL: 1.1 G/DL
ALP SERPL-CCNC: 73 U/L (ref 39–117)
ALT SERPL W P-5'-P-CCNC: 6 U/L (ref 1–33)
ANION GAP SERPL CALCULATED.3IONS-SCNC: 11.9 MMOL/L (ref 5–15)
AST SERPL-CCNC: 10 U/L (ref 1–32)
BASOPHILS # BLD AUTO: 0.02 10*3/MM3 (ref 0–0.2)
BASOPHILS # BLD AUTO: 0.03 10*3/MM3 (ref 0–0.2)
BASOPHILS NFR BLD AUTO: 0.4 % (ref 0–1.5)
BASOPHILS NFR BLD AUTO: 0.7 % (ref 0–1.5)
BILIRUB SERPL-MCNC: 0.3 MG/DL (ref 0–1.2)
BLD GP AB SCN SERPL QL: NEGATIVE
BUN SERPL-MCNC: 9 MG/DL (ref 6–20)
BUN/CREAT SERPL: 12.7 (ref 7–25)
CALCIUM SPEC-SCNC: 9.2 MG/DL (ref 8.6–10.5)
CHLORIDE SERPL-SCNC: 106 MMOL/L (ref 98–107)
CO2 SERPL-SCNC: 18.1 MMOL/L (ref 22–29)
CREAT SERPL-MCNC: 0.71 MG/DL (ref 0.57–1)
CRP SERPL-MCNC: 7.53 MG/DL (ref 0–0.5)
DEPRECATED RDW RBC AUTO: 43.2 FL (ref 37–54)
DEPRECATED RDW RBC AUTO: 43.7 FL (ref 37–54)
EGFRCR SERPLBLD CKD-EPI 2021: 116.7 ML/MIN/1.73
EOSINOPHIL # BLD AUTO: 0.11 10*3/MM3 (ref 0–0.4)
EOSINOPHIL # BLD AUTO: 0.11 10*3/MM3 (ref 0–0.4)
EOSINOPHIL NFR BLD AUTO: 2.5 % (ref 0.3–6.2)
EOSINOPHIL NFR BLD AUTO: 2.5 % (ref 0.3–6.2)
ERYTHROCYTE [DISTWIDTH] IN BLOOD BY AUTOMATED COUNT: 13 % (ref 12.3–15.4)
ERYTHROCYTE [DISTWIDTH] IN BLOOD BY AUTOMATED COUNT: 13.1 % (ref 12.3–15.4)
GLOBULIN UR ELPH-MCNC: 3.4 GM/DL
GLUCOSE SERPL-MCNC: 85 MG/DL (ref 65–99)
HCT VFR BLD AUTO: 29.9 % (ref 34–46.6)
HCT VFR BLD AUTO: 30.3 % (ref 34–46.6)
HCV AB SER QL: REACTIVE
HGB BLD-MCNC: 10.2 G/DL (ref 12–15.9)
HGB BLD-MCNC: 9.9 G/DL (ref 12–15.9)
HIV 1+2 AB+HIV1 P24 AG SERPL QL IA: NORMAL
HYPOCHROMIA BLD QL: NORMAL
IMM GRANULOCYTES # BLD AUTO: 0.03 10*3/MM3 (ref 0–0.05)
IMM GRANULOCYTES # BLD AUTO: 0.05 10*3/MM3 (ref 0–0.05)
IMM GRANULOCYTES NFR BLD AUTO: 0.7 % (ref 0–0.5)
IMM GRANULOCYTES NFR BLD AUTO: 1.1 % (ref 0–0.5)
LYMPHOCYTES # BLD AUTO: 0.9 10*3/MM3 (ref 0.7–3.1)
LYMPHOCYTES # BLD AUTO: 0.93 10*3/MM3 (ref 0.7–3.1)
LYMPHOCYTES NFR BLD AUTO: 20.1 % (ref 19.6–45.3)
LYMPHOCYTES NFR BLD AUTO: 20.9 % (ref 19.6–45.3)
MCH RBC QN AUTO: 30.1 PG (ref 26.6–33)
MCH RBC QN AUTO: 30.5 PG (ref 26.6–33)
MCHC RBC AUTO-ENTMCNC: 33.1 G/DL (ref 31.5–35.7)
MCHC RBC AUTO-ENTMCNC: 33.7 G/DL (ref 31.5–35.7)
MCV RBC AUTO: 90.7 FL (ref 79–97)
MCV RBC AUTO: 90.9 FL (ref 79–97)
MONOCYTES # BLD AUTO: 0.17 10*3/MM3 (ref 0.1–0.9)
MONOCYTES # BLD AUTO: 0.19 10*3/MM3 (ref 0.1–0.9)
MONOCYTES NFR BLD AUTO: 3.8 % (ref 5–12)
MONOCYTES NFR BLD AUTO: 4.3 % (ref 5–12)
NEUTROPHILS NFR BLD AUTO: 3.17 10*3/MM3 (ref 1.7–7)
NEUTROPHILS NFR BLD AUTO: 3.22 10*3/MM3 (ref 1.7–7)
NEUTROPHILS NFR BLD AUTO: 70.9 % (ref 42.7–76)
NEUTROPHILS NFR BLD AUTO: 72.1 % (ref 42.7–76)
NRBC BLD AUTO-RTO: 0 /100 WBC (ref 0–0.2)
PLAT MORPH BLD: NORMAL
PLATELET # BLD AUTO: 154 10*3/MM3 (ref 140–450)
PLATELET # BLD AUTO: 161 10*3/MM3 (ref 140–450)
PMV BLD AUTO: 10.7 FL (ref 6–12)
PMV BLD AUTO: 11 FL (ref 6–12)
POTASSIUM SERPL-SCNC: 3.8 MMOL/L (ref 3.5–5.2)
PROT SERPL-MCNC: 7.2 G/DL (ref 6–8.5)
RBC # BLD AUTO: 3.29 10*6/MM3 (ref 3.77–5.28)
RBC # BLD AUTO: 3.34 10*6/MM3 (ref 3.77–5.28)
RH BLD: NEGATIVE
SODIUM SERPL-SCNC: 136 MMOL/L (ref 136–145)
T&S EXPIRATION DATE: NORMAL
WBC NRBC COR # BLD AUTO: 4.46 10*3/MM3 (ref 3.4–10.8)
WBC NRBC COR # BLD AUTO: 4.47 10*3/MM3 (ref 3.4–10.8)

## 2025-02-04 PROCEDURE — 86140 C-REACTIVE PROTEIN: CPT | Performed by: EMERGENCY MEDICINE

## 2025-02-04 PROCEDURE — 87517 HEPATITIS B DNA QUANT: CPT

## 2025-02-04 PROCEDURE — 86900 BLOOD TYPING SEROLOGIC ABO: CPT

## 2025-02-04 PROCEDURE — 86901 BLOOD TYPING SEROLOGIC RH(D): CPT

## 2025-02-04 PROCEDURE — 86140 C-REACTIVE PROTEIN: CPT

## 2025-02-04 PROCEDURE — 86803 HEPATITIS C AB TEST: CPT

## 2025-02-04 PROCEDURE — 87040 BLOOD CULTURE FOR BACTERIA: CPT

## 2025-02-04 PROCEDURE — 86735 MUMPS ANTIBODY: CPT

## 2025-02-04 PROCEDURE — G0432 EIA HIV-1/HIV-2 SCREEN: HCPCS

## 2025-02-04 PROCEDURE — 85025 COMPLETE CBC W/AUTO DIFF WBC: CPT

## 2025-02-04 PROCEDURE — 85007 BL SMEAR W/DIFF WBC COUNT: CPT

## 2025-02-04 PROCEDURE — 86762 RUBELLA ANTIBODY: CPT

## 2025-02-04 PROCEDURE — 87077 CULTURE AEROBIC IDENTIFY: CPT

## 2025-02-04 PROCEDURE — 87154 CUL TYP ID BLD PTHGN 6+ TRGT: CPT

## 2025-02-04 PROCEDURE — 80053 COMPREHEN METABOLIC PANEL: CPT

## 2025-02-04 PROCEDURE — 86592 SYPHILIS TEST NON-TREP QUAL: CPT

## 2025-02-04 PROCEDURE — 82550 ASSAY OF CK (CPK): CPT

## 2025-02-04 PROCEDURE — 86765 RUBEOLA ANTIBODY: CPT

## 2025-02-04 PROCEDURE — 86850 RBC ANTIBODY SCREEN: CPT

## 2025-02-04 PROCEDURE — 36415 COLL VENOUS BLD VENIPUNCTURE: CPT

## 2025-02-04 PROCEDURE — G0463 HOSPITAL OUTPT CLINIC VISIT: HCPCS

## 2025-02-04 PROCEDURE — 87186 SC STD MICRODIL/AGAR DIL: CPT

## 2025-02-04 NOTE — PROGRESS NOTES
[facility]         Referring Provider: No referring provider defined for this encounter.    Subjective      Chief Complaint  Follow-up (Cutaneous abscess of neck)    History of Present Illness  Sailaja Alarcon is a 29 y.o. female who presents today to [facility] for infectious disease evaluation and antibiotic management.  Ms. Sailaja Alarcon is a 28 y.o. female with past medical history significant for substance abuse history, who presented to [facility] Emergency Department on 12/10/2024 for abnormal labs.  Patient was recently admitted to this facility under hospitalist service and left AMA and again left AMA from the ED on 12/9/2024.  Patient was being treated for MRSA bacteremia and endocarditis with plans for discharge to LTAC facility for 6 weeks of IV daptomycin through 1/16/2024.  CRP 0.32.  Blood cultures from 12/10/2024 show no growth thus far.     As per discharge summary patient with history of recent diagnosis of MRSA bacteremia as well as infective endocarditis recently having left the hospital AGAINST MEDICAL ADVICE on 12/8/2024. Patient situation somewhat complex as does have spouse who was recently arrested for drug possession raising some concerns however patient with negative drug screen since 2022 and although on Suboxone only testing positive for this. Patient open and honest about unwillingness to stay in hospital or anywhere other than home through January 16, 2025 as was recommended by infectious disease for treatment of her bacteremia and endocarditis. Patient more than agreeable to come to outpatient infusion center using a heart attack rather than staying in hospital as she does have 4 children and is currently pregnant with fifth at 7 weeks. Patient already previously having left AMA as noted above and on a revisit to the ER but then agreeable to this admission. Given her high risk for recurrent AMA leaving  "and readmission in order to help facilitate continued appropriate antibiotic therapy decision was made to proceed with outpatient infusion clinic daily daptomycin rather than trying to \"restrain \"the patient here in hospital for her total antibiotic course. She was encouraged and educated on the dangers of inappropriate use of IV access and was agreeable to maintain sobriety and come to her daily appointments for infusion. As such as patient already with midline did send referrals and coordinate with  infectious disease for discharge home with outpatient antibiotic therapy at ambulatory care infusion clinic.       Dr. Sheffield recommend daptomycin 8 mg/kg IV every 24 hours through 1/16/2024.  Patient only missed 1 antibiotic dose therefore end date does not need to be changed.  Patient is not a candidate for [facility] infusions due to pregnancy.  Recommend weekly CPK and avoid statin therapy while on daptomycin.     As of 12/19/2024 CPK is normal at 14 and CRP level is low at 0.63 along with normal creatinine at 0.61 and normal liver profile with mild leukopenia at 3.12 borderline platelets at 146,000 and absolute neutrophils at 1450.  Based on these results we will recheck CBC CRP and CPK levels today through her PICC line.     Patient has missed several doses of daptomycin due to [facility] scheduling issues and will go ahead and refer for home health for her to finish her IV antibiotic therapy at home and she is able to get help from her  as well.     As she has missed a lot of appointments and missed many antibiotic doses, patient is at higher risk for complications. Patient says she is doing well. Admits to missing appx 1 week of IV antibiotics or more. Midline flushes but does not pull. She has been having nausea and vomiting but says that is due to her pregnancy. Denies fever, chills, or diarrhea.      On 1/27/25, [facility] called today, Nurse stated that pt is non compliant and they " are discharging pt from their services. Our MA called Rose with [facility] on 1/28/25 to make her aware that pt needs her Daptomycin extended for 2 more weeks. Home Health is no longer servicing this pt, her spouse is handling her medicine routine. I will fax the order to Rose and her meds will be delivered either today or tomorrow. I called the pt back to let her know and she understands.     2/4/25 Patient here for follow up visit. Reports she has recently been diagnosed with a UTI by her OBGYN and is supposed to  antibiotics today. She denies fever, chills or diarrhea. Does have nausea/vomiting associated with pregnancy. Denies missing any additional doses of the antibiotics. However, line dressing is very dirty, does have dog hair on the dressing as well. Insertion site has surrounding erythema but is nontender.     Past Medical History:   Diagnosis Date    Hepatitis C     Hepatitis C antibody positive in blood     Migraines        Past Surgical History:   Procedure Laterality Date    HEAD/NECK ABSCESS INCISION AND DRAINAGE Left 7/8/2022    Procedure: HEAD NECK ABSCESS INCISION AND DRAINAGE;  Surgeon: Miquel Grayson MD;  Location: [facility] OR;  Service: General;  Laterality: Left;    INCISION AND DRAINAGE ABSCESS Right 10/10/2021    Procedure: INCISION AND DRAINAGE ABSCESS CENTRAL LINE PLACEMENT;  Surgeon: Naz Payne MD;  Location: [facility] OR;  Service: General;  Laterality: Right;  I&D ABSCESS= INFECTED.  CENTRAL LINE = CLEAN.       Social History     Socioeconomic History    Marital status:    Tobacco Use    Smoking status: Every Day     Current packs/day: 1.00     Average packs/day: 1 pack/day for 15.0 years (15.0 ttl pk-yrs)     Types: Cigarettes    Smokeless tobacco: Never   Vaping Use    Vaping status: Never Used   Substance and Sexual Activity    Alcohol use: No     Alcohol/week: 0.0 standard drinks of alcohol    Drug use: Not Currently     Frequency: 3.0 times per week      Types: IV     Comment: last use 5 YEARS AGO    Sexual activity: Yes     Partners: Male       Family History  family history includes Cancer in her father; Diabetes in her maternal aunt and maternal grandfather; Hypertension in her maternal grandmother and mother; No Known Problems in her brother, cousin, daughter, paternal grandfather, paternal grandmother, sister, and son.    Immunization History   Administered Date(s) Administered    DTaP, Unspecified 02/17/1998    Hepatitis A 08/10/2018    MMR 02/17/1998    Meningococcal Polysaccharide 10/05/2009    OPV 02/17/1998    Rho (D) Immune Globulin 04/23/2019    Td (TDVAX) 07/12/2006    Tdap 12/06/2017    Varicella 10/05/2009        Allergies  Allergies   Allergen Reactions    Amoxicillin Anaphylaxis    Penicillins Anaphylaxis     Has tolerated rocephin per patient    Vancomycin Itching     Pt states she had full body itch and burning sensation, and she turned red. Reaction potentially Red Man Syndrome.    Zofran [Ondansetron Hcl] Hives and Rash       The medication list has been reviewed and updated.   Current Medications    Current Outpatient Medications:     buprenorphine-naloxone (SUBOXONE) 8-2 MG per SL tablet, Place 0.25-0.5 tablets under the tongue Daily., Disp: , Rfl:     diphenhydrAMINE (BENADRYL) 25 mg capsule, Take 1 capsule by mouth 2 (Two) Times a Day As Needed for Itching, Allergies or Sleep., Disp: , Rfl:     famotidine (PEPCID) 20 MG tablet, Take 1 tablet by mouth 2 (Two) Times a Day., Disp: , Rfl:     gabapentin (NEURONTIN) 800 MG tablet, Take 1 tablet by mouth 3 (Three) Times a Day., Disp: , Rfl:     ibuprofen (ADVIL,MOTRIN) 800 MG tablet, Take 1 tablet by mouth 2 (Two) Times a Day As Needed for Mild Pain or Moderate Pain., Disp: , Rfl:     melatonin 5 MG tablet tablet, Take 3 tablets by mouth At Night As Needed (Sleep)., Disp: , Rfl:     prenatal vitamin (prenatal, CLASSIC, vitamin) tablet, Take 1 tablet by mouth Daily., Disp: , Rfl:       Review of  "Systems    Review of Systems   Constitutional:  Negative for activity change, appetite change, chills, diaphoresis, fatigue and fever.   HENT:  Negative for congestion, dental problem, drooling, ear discharge, ear pain, facial swelling, postnasal drip, sinus pressure, sore throat and swollen glands.    Eyes:  Negative for blurred vision, double vision, pain, discharge and itching.   Respiratory:  Negative for apnea, cough, choking, chest tightness and shortness of breath.    Cardiovascular:  Negative for chest pain, palpitations and leg swelling.   Gastrointestinal:  Positive for nausea and vomiting. Negative for abdominal distention, abdominal pain, diarrhea and rectal pain.   Genitourinary:  Negative for difficulty urinating, dysuria, flank pain, frequency, hematuria, pelvic pain and pelvic pressure.   Neurological:  Negative for dizziness, tremors, seizures, syncope, speech difficulty and confusion.   Psychiatric/Behavioral:  Negative for agitation and behavioral problems.         Objective     Vital Signs:  /59 (BP Location: Left arm, Patient Position: Sitting, Cuff Size: Small Adult)   Pulse 73   Ht 160 cm (63\")   Wt 61.8 kg (136 lb 3.2 oz)   SpO2 99%   BMI 24.13 kg/m²   Estimated body mass index is 24.13 kg/m² as calculated from the following:    Height as of this encounter: 160 cm (63\").    Weight as of this encounter: 61.8 kg (136 lb 3.2 oz).    Physical Exam  Constitutional:       General: She is not in acute distress.     Appearance: Normal appearance. She is not ill-appearing, toxic-appearing or diaphoretic.   HENT:      Head: Normocephalic and atraumatic.      Nose: No congestion or rhinorrhea.      Mouth/Throat:      Pharynx: Oropharynx is clear. No oropharyngeal exudate or posterior oropharyngeal erythema.   Cardiovascular:      Rate and Rhythm: Normal rate and regular rhythm.      Heart sounds: No murmur heard.  Pulmonary:      Effort: No respiratory distress.      Breath sounds: No " "stridor. No wheezing, rhonchi or rales.   Chest:      Chest wall: No tenderness.   Abdominal:      General: There is no distension.      Tenderness: There is no abdominal tenderness. There is no right CVA tenderness, left CVA tenderness, guarding or rebound.   Musculoskeletal:         General: No swelling, tenderness or signs of injury.      Cervical back: No rigidity or tenderness.   Skin:     Coloration: Skin is not jaundiced or pale.      Findings: No bruising, erythema or rash.   Neurological:      General: No focal deficit present.      Mental Status: She is alert. Mental status is at baseline.   Psychiatric:         Mood and Affect: Mood normal.         Behavior: Behavior normal.          Result Review :  The following data was reviewed by Tangela Phillips PA-C     Lab Results  Lab Results   Component Value Date    WBC 4.12 01/28/2025    HGB 14.0 01/28/2025    HCT 41.1 01/28/2025    MCV 89.3 01/28/2025     (L) 01/28/2025     Lab Results   Component Value Date    GLUCOSE 74 01/17/2025    BUN 4 (L) 01/17/2025    CREATININE 0.52 (L) 01/17/2025    EGFRIFNONA 94 10/10/2021    BCR 7.7 01/17/2025    K 3.7 01/17/2025    CO2 17.7 (L) 01/17/2025    CALCIUM 9.4 01/17/2025    ALBUMIN 4.0 01/17/2025    LABIL2 1.3 (L) 11/16/2014    AST 12 01/17/2025    ALT 5 01/17/2025      Lab Results   Component Value Date    CRP 7.71 (H) 01/28/2025        No results found for: \"ACANTHNAEG\", \"AFBCX\", \"BPERTUSSISCX\", \"BLOODCX\"  No results found for: \"BCIDPCR\", \"CXREFLEX\", \"CSFCX\", \"CULTURETIS\"  No results found for: \"CULTURES\", \"HSVCX\", \"URCX\"  No results found for: \"EYECULTURE\", \"GCCX\", \"HSVCULTURE\", \"LABHSV\"  No results found for: \"LEGIONELLA\", \"MRSACX\", \"MUMPSCX\", \"MYCOPLASCX\"  No results found for: \"NOCARDIACX\", \"STOOLCX\"  No results found for: \"THROATCX\", \"UNSTIMCULT\", \"URINECX\", \"CULTURE\", \"VZVCULTUR\"  No results found for: \"VIRALCULTU\", \"WOUNDCX\"    Radiology Results              Assessment / Plan        Diagnoses and all " orders for this visit:    1. Acute bacterial endocarditis (Primary)  -     C-reactive Protein; Future  -     CBC & Differential; Future  -     C-reactive Protein  -     CBC & Differential  -     Blood Culture - Blood,; Future  -     Blood Culture - Blood,; Future        Patient continues with Daptomycin 8mg/kg IV q 24 hrs. Vero with our ID group also evaluated site. We are sending patient to the infusion clinic to have site cleaned and dressing changed. She has 1 more week left and is a hard stick. Her line does not pull blood so she will be going to the outpatient lab. Have ordered repeat blood cultures, CBC and CRP. CRP was elevated at last visit but could be secondary to her UTI. WBC within normal range. Patient will follow up in 1 week with Dr. Sheffield. Very complicated case due to pregnancy, non adherence and prior history.            Follow Up   No follow-ups on file.    Visit Diagnoses:    ICD-10-CM ICD-9-CM   1. Acute bacterial endocarditis  I33.0 421.0       Patient was given instructions and counseling regarding her condition or for health maintenance advice. Please see specific information pulled into the AVS if appropriate.     This document has been electronically signed by Tangela Phillips PA-C   February 4, 2025 09:52 EST      No orders of the defined types were placed in this encounter.     Dictated Utilizing Dragon Dictation: Part of this note may be an electronic transcription/translation of spoken language to printed text using the Dragon Dictation System.

## 2025-02-05 ENCOUNTER — DOCUMENTATION (OUTPATIENT)
Dept: INFECTIOUS DISEASES | Facility: CLINIC | Age: 30
End: 2025-02-05
Payer: COMMERCIAL

## 2025-02-05 LAB
BACTERIA BLD CULT: ABNORMAL
BACTERIA ID TEST ISLT QL CULT: ABNORMAL
BOTTLE TYPE: ABNORMAL
CK SERPL-CCNC: 7 U/L (ref 20–180)
MEV IGG SER IA-ACNC: <13.5 AU/ML
MUV IGG SER IA-ACNC: <9 AU/ML
RPR SER QL: NON REACTIVE
RUBV IGG SERPL IA-ACNC: 2.4 INDEX

## 2025-02-05 NOTE — PROGRESS NOTES
Called pt she didn't ans, she did yaakov back and erlinda talked to her and explained what the provider said, the pt stated that she understood that she needed to come to the ED ASAP it was important that she did that. I spoke with Rose with [facility] and she stated that pt would be bringing in a compliant contract filled out and signed for the

## 2025-02-06 ENCOUNTER — APPOINTMENT (OUTPATIENT)
Dept: ULTRASOUND IMAGING | Facility: HOSPITAL | Age: 30
DRG: 832 | End: 2025-02-06
Payer: COMMERCIAL

## 2025-02-06 ENCOUNTER — HOSPITAL ENCOUNTER (INPATIENT)
Facility: HOSPITAL | Age: 30
LOS: 3 days | Discharge: HOME | DRG: 832 | End: 2025-02-09
Attending: EMERGENCY MEDICINE | Admitting: INTERNAL MEDICINE
Payer: COMMERCIAL

## 2025-02-06 ENCOUNTER — APPOINTMENT (OUTPATIENT)
Dept: CARDIOLOGY | Facility: HOSPITAL | Age: 30
DRG: 832 | End: 2025-02-06
Payer: COMMERCIAL

## 2025-02-06 ENCOUNTER — DOCUMENTATION (OUTPATIENT)
Dept: INFECTIOUS DISEASES | Facility: CLINIC | Age: 30
End: 2025-02-06
Payer: COMMERCIAL

## 2025-02-06 DIAGNOSIS — R78.81 BACTEREMIA: Primary | ICD-10-CM

## 2025-02-06 PROBLEM — O36.5990 IUGR (INTRAUTERINE GROWTH RESTRICTION) AFFECTING CARE OF MOTHER: Status: RESOLVED | Noted: 2023-02-23 | Resolved: 2025-02-06

## 2025-02-06 PROBLEM — Z34.90 PREGNANCY: Status: RESOLVED | Noted: 2023-02-23 | Resolved: 2025-02-06

## 2025-02-06 LAB
ALBUMIN SERPL-MCNC: 3.9 G/DL (ref 3.5–5.2)
ALBUMIN/GLOB SERPL: 1.2 G/DL
ALP SERPL-CCNC: 85 U/L (ref 39–117)
ALT SERPL W P-5'-P-CCNC: <5 U/L (ref 1–33)
AMPHET+METHAMPHET UR QL: NEGATIVE
AMPHETAMINES UR QL: NEGATIVE
ANION GAP SERPL CALCULATED.3IONS-SCNC: 11.9 MMOL/L (ref 5–15)
APAP SERPL-MCNC: <5 MCG/ML (ref 0–30)
AST SERPL-CCNC: 7 U/L (ref 1–32)
AV MEAN PRESS GRAD SYS DOP V1V2: 9 MMHG
AV VMAX SYS DOP: 211 CM/SEC
BACTERIA UR QL AUTO: ABNORMAL /HPF
BARBITURATES UR QL SCN: NEGATIVE
BASOPHILS # BLD AUTO: 0.02 10*3/MM3 (ref 0–0.2)
BASOPHILS NFR BLD AUTO: 0.4 % (ref 0–1.5)
BENZODIAZ UR QL SCN: NEGATIVE
BH CV ECHO MEAS - ACS: 1.7 CM
BH CV ECHO MEAS - AO MAX PG: 17.8 MMHG
BH CV ECHO MEAS - AO ROOT DIAM: 2.6 CM
BH CV ECHO MEAS - AO V2 VTI: 42.1 CM
BH CV ECHO MEAS - AVA(I,D): 2.6 CM2
BH CV ECHO MEAS - EDV(CUBED): 85.2 ML
BH CV ECHO MEAS - EDV(MOD-SP2): 89.7 ML
BH CV ECHO MEAS - EDV(MOD-SP4): 94.7 ML
BH CV ECHO MEAS - EF(MOD-SP2): 67.3 %
BH CV ECHO MEAS - EF(MOD-SP4): 61.1 %
BH CV ECHO MEAS - ESV(CUBED): 22 ML
BH CV ECHO MEAS - ESV(MOD-SP2): 29.3 ML
BH CV ECHO MEAS - ESV(MOD-SP4): 36.8 ML
BH CV ECHO MEAS - FS: 36.4 %
BH CV ECHO MEAS - IVS/LVPW: 1 CM
BH CV ECHO MEAS - IVSD: 0.8 CM
BH CV ECHO MEAS - LA DIMENSION: 4.5 CM
BH CV ECHO MEAS - LAT PEAK E' VEL: 19.7 CM/SEC
BH CV ECHO MEAS - LV DIASTOLIC VOL/BSA (35-75): 58.4 CM2
BH CV ECHO MEAS - LV MASS(C)D: 109.4 GRAMS
BH CV ECHO MEAS - LV MAX PG: 9 MMHG
BH CV ECHO MEAS - LV MEAN PG: 5 MMHG
BH CV ECHO MEAS - LV SYSTOLIC VOL/BSA (12-30): 22.7 CM2
BH CV ECHO MEAS - LV V1 MAX: 150 CM/SEC
BH CV ECHO MEAS - LV V1 VTI: 31.2 CM
BH CV ECHO MEAS - LVIDD: 4.4 CM
BH CV ECHO MEAS - LVIDS: 2.8 CM
BH CV ECHO MEAS - LVOT AREA: 3.5 CM2
BH CV ECHO MEAS - LVOT DIAM: 2.1 CM
BH CV ECHO MEAS - LVPWD: 0.8 CM
BH CV ECHO MEAS - MED PEAK E' VEL: 12.7 CM/SEC
BH CV ECHO MEAS - MV A MAX VEL: 72.4 CM/SEC
BH CV ECHO MEAS - MV E MAX VEL: 104 CM/SEC
BH CV ECHO MEAS - MV E/A: 1.44
BH CV ECHO MEAS - PA ACC TIME: 0.11 SEC
BH CV ECHO MEAS - RAP SYSTOLE: 10 MMHG
BH CV ECHO MEAS - RVSP: 37.5 MMHG
BH CV ECHO MEAS - SV(LVOT): 108.1 ML
BH CV ECHO MEAS - SV(MOD-SP2): 60.4 ML
BH CV ECHO MEAS - SV(MOD-SP4): 57.9 ML
BH CV ECHO MEAS - SVI(LVOT): 66.6 ML/M2
BH CV ECHO MEAS - SVI(MOD-SP2): 37.2 ML/M2
BH CV ECHO MEAS - SVI(MOD-SP4): 35.7 ML/M2
BH CV ECHO MEAS - TAPSE (>1.6): 2.7 CM
BH CV ECHO MEAS - TR MAX PG: 27.5 MMHG
BH CV ECHO MEAS - TR MAX VEL: 262 CM/SEC
BH CV ECHO MEASUREMENTS AVERAGE E/E' RATIO: 6.42
BILIRUB SERPL-MCNC: 0.4 MG/DL (ref 0–1.2)
BILIRUB UR QL STRIP: ABNORMAL
BUN SERPL-MCNC: 9 MG/DL (ref 6–20)
BUN/CREAT SERPL: 10.1 (ref 7–25)
BUPRENORPHINE SERPL-MCNC: POSITIVE NG/ML
CALCIUM SPEC-SCNC: 9 MG/DL (ref 8.6–10.5)
CANNABINOIDS SERPL QL: NEGATIVE
CHLORIDE SERPL-SCNC: 102 MMOL/L (ref 98–107)
CLARITY UR: ABNORMAL
CO2 SERPL-SCNC: 18.1 MMOL/L (ref 22–29)
COCAINE UR QL: NEGATIVE
COLOR UR: ABNORMAL
CREAT SERPL-MCNC: 0.89 MG/DL (ref 0.57–1)
CRP SERPL-MCNC: 7.83 MG/DL (ref 0–0.5)
D-LACTATE SERPL-SCNC: 1.5 MMOL/L (ref 0.5–2)
DEPRECATED RDW RBC AUTO: 43 FL (ref 37–54)
EGFRCR SERPLBLD CKD-EPI 2021: 89 ML/MIN/1.73
EOSINOPHIL # BLD AUTO: 0.03 10*3/MM3 (ref 0–0.4)
EOSINOPHIL NFR BLD AUTO: 0.6 % (ref 0.3–6.2)
ERYTHROCYTE [DISTWIDTH] IN BLOOD BY AUTOMATED COUNT: 12.9 % (ref 12.3–15.4)
ETHANOL BLD-MCNC: <10 MG/DL (ref 0–10)
ETHANOL UR QL: <0.01 %
FENTANYL UR-MCNC: NEGATIVE NG/ML
FLUAV RNA RESP QL NAA+PROBE: NOT DETECTED
FLUBV RNA RESP QL NAA+PROBE: NOT DETECTED
GLOBULIN UR ELPH-MCNC: 3.3 GM/DL
GLUCOSE SERPL-MCNC: 89 MG/DL (ref 65–99)
GLUCOSE UR STRIP-MCNC: NEGATIVE MG/DL
HBV DNA SERPL NAA+PROBE-ACNC: NORMAL IU/ML
HBV DNA SERPL NAA+PROBE-LOG IU: NORMAL LOG10 IU/ML
HCT VFR BLD AUTO: 29.3 % (ref 34–46.6)
HGB BLD-MCNC: 9.7 G/DL (ref 12–15.9)
HGB UR QL STRIP.AUTO: ABNORMAL
HYALINE CASTS UR QL AUTO: ABNORMAL /LPF
IMM GRANULOCYTES # BLD AUTO: 0.02 10*3/MM3 (ref 0–0.05)
IMM GRANULOCYTES NFR BLD AUTO: 0.4 % (ref 0–0.5)
KETONES UR QL STRIP: ABNORMAL
LEFT ATRIUM VOLUME INDEX: 26.9 ML/M2
LEUKOCYTE ESTERASE UR QL STRIP.AUTO: ABNORMAL
LV EF BIPLANE MOD: 62.9 %
LYMPHOCYTES # BLD AUTO: 0.42 10*3/MM3 (ref 0.7–3.1)
LYMPHOCYTES NFR BLD AUTO: 8.2 % (ref 19.6–45.3)
MCH RBC QN AUTO: 29.9 PG (ref 26.6–33)
MCHC RBC AUTO-ENTMCNC: 33.1 G/DL (ref 31.5–35.7)
MCV RBC AUTO: 90.4 FL (ref 79–97)
METHADONE UR QL SCN: NEGATIVE
MONOCYTES # BLD AUTO: 0.11 10*3/MM3 (ref 0.1–0.9)
MONOCYTES NFR BLD AUTO: 2.1 % (ref 5–12)
NEUTROPHILS NFR BLD AUTO: 4.55 10*3/MM3 (ref 1.7–7)
NEUTROPHILS NFR BLD AUTO: 88.3 % (ref 42.7–76)
NITRITE UR QL STRIP: NEGATIVE
NRBC BLD AUTO-RTO: 0 /100 WBC (ref 0–0.2)
OPIATES UR QL: NEGATIVE
OXYCODONE UR QL SCN: NEGATIVE
PCP UR QL SCN: NEGATIVE
PH UR STRIP.AUTO: 5.5 [PH] (ref 5–8)
PLATELET # BLD AUTO: 147 10*3/MM3 (ref 140–450)
PMV BLD AUTO: 10.4 FL (ref 6–12)
POTASSIUM SERPL-SCNC: 3.2 MMOL/L (ref 3.5–5.2)
PROCALCITONIN SERPL-MCNC: 1.5 NG/ML (ref 0–0.25)
PROT SERPL-MCNC: 7.2 G/DL (ref 6–8.5)
PROT UR QL STRIP: ABNORMAL
RBC # BLD AUTO: 3.24 10*6/MM3 (ref 3.77–5.28)
RBC # UR STRIP: ABNORMAL /HPF
REF LAB TEST METHOD: ABNORMAL
REF LAB TEST REF RANGE: NORMAL
SALICYLATES SERPL-MCNC: <0.3 MG/DL
SARS-COV-2 RNA RESP QL NAA+PROBE: NOT DETECTED
SODIUM SERPL-SCNC: 132 MMOL/L (ref 136–145)
SP GR UR STRIP: 1.02 (ref 1–1.03)
SQUAMOUS #/AREA URNS HPF: ABNORMAL /HPF
TRICYCLICS UR QL SCN: NEGATIVE
UROBILINOGEN UR QL STRIP: ABNORMAL
WBC # UR STRIP: ABNORMAL /HPF
WBC NRBC COR # BLD AUTO: 5.15 10*3/MM3 (ref 3.4–10.8)

## 2025-02-06 PROCEDURE — 93306 TTE W/DOPPLER COMPLETE: CPT

## 2025-02-06 PROCEDURE — 87040 BLOOD CULTURE FOR BACTERIA: CPT | Performed by: EMERGENCY MEDICINE

## 2025-02-06 PROCEDURE — 80143 DRUG ASSAY ACETAMINOPHEN: CPT | Performed by: EMERGENCY MEDICINE

## 2025-02-06 PROCEDURE — 36415 COLL VENOUS BLD VENIPUNCTURE: CPT

## 2025-02-06 PROCEDURE — 83735 ASSAY OF MAGNESIUM: CPT | Performed by: INTERNAL MEDICINE

## 2025-02-06 PROCEDURE — 93306 TTE W/DOPPLER COMPLETE: CPT | Performed by: INTERNAL MEDICINE

## 2025-02-06 PROCEDURE — 76805 OB US >/= 14 WKS SNGL FETUS: CPT | Performed by: RADIOLOGY

## 2025-02-06 PROCEDURE — 83605 ASSAY OF LACTIC ACID: CPT | Performed by: EMERGENCY MEDICINE

## 2025-02-06 PROCEDURE — 25010000002 DAPTOMYCIN PER 1 MG: Performed by: EMERGENCY MEDICINE

## 2025-02-06 PROCEDURE — 76805 OB US >/= 14 WKS SNGL FETUS: CPT

## 2025-02-06 PROCEDURE — 80307 DRUG TEST PRSMV CHEM ANLYZR: CPT | Performed by: EMERGENCY MEDICINE

## 2025-02-06 PROCEDURE — 25010000002 CEFEPIME PER 500 MG: Performed by: EMERGENCY MEDICINE

## 2025-02-06 PROCEDURE — 85025 COMPLETE CBC W/AUTO DIFF WBC: CPT | Performed by: EMERGENCY MEDICINE

## 2025-02-06 PROCEDURE — 25810000003 SEPSIS FLUID NS 0.9 % SOLUTION: Performed by: EMERGENCY MEDICINE

## 2025-02-06 PROCEDURE — 84443 ASSAY THYROID STIM HORMONE: CPT | Performed by: INTERNAL MEDICINE

## 2025-02-06 PROCEDURE — 81001 URINALYSIS AUTO W/SCOPE: CPT | Performed by: EMERGENCY MEDICINE

## 2025-02-06 PROCEDURE — 82077 ASSAY SPEC XCP UR&BREATH IA: CPT | Performed by: EMERGENCY MEDICINE

## 2025-02-06 PROCEDURE — 99223 1ST HOSP IP/OBS HIGH 75: CPT | Performed by: INTERNAL MEDICINE

## 2025-02-06 PROCEDURE — 80053 COMPREHEN METABOLIC PANEL: CPT | Performed by: EMERGENCY MEDICINE

## 2025-02-06 PROCEDURE — 83036 HEMOGLOBIN GLYCOSYLATED A1C: CPT | Performed by: INTERNAL MEDICINE

## 2025-02-06 PROCEDURE — 99285 EMERGENCY DEPT VISIT HI MDM: CPT

## 2025-02-06 PROCEDURE — 80179 DRUG ASSAY SALICYLATE: CPT | Performed by: EMERGENCY MEDICINE

## 2025-02-06 PROCEDURE — 84145 PROCALCITONIN (PCT): CPT | Performed by: EMERGENCY MEDICINE

## 2025-02-06 PROCEDURE — 87636 SARSCOV2 & INF A&B AMP PRB: CPT | Performed by: EMERGENCY MEDICINE

## 2025-02-06 RX ORDER — SODIUM CHLORIDE 0.9 % (FLUSH) 0.9 %
10 SYRINGE (ML) INJECTION AS NEEDED
Status: DISCONTINUED | OUTPATIENT
Start: 2025-02-06 | End: 2025-02-09 | Stop reason: HOSPADM

## 2025-02-06 RX ORDER — NICOTINE 21 MG/24HR
1 PATCH, TRANSDERMAL 24 HOURS TRANSDERMAL ONCE
Status: DISCONTINUED | OUTPATIENT
Start: 2025-02-07 | End: 2025-02-07

## 2025-02-06 RX ADMIN — CEFEPIME 2000 MG: 2 INJECTION, POWDER, FOR SOLUTION INTRAVENOUS at 21:00

## 2025-02-06 RX ADMIN — SODIUM CHLORIDE 1854 ML: 9 INJECTION, SOLUTION INTRAVENOUS at 18:16

## 2025-02-06 RX ADMIN — DAPTOMYCIN 500 MG: 500 INJECTION, POWDER, LYOPHILIZED, FOR SOLUTION INTRAVENOUS at 20:59

## 2025-02-06 NOTE — ED NOTES
Waiting 30 minutes per blood culture education to stick the same site as the first set due to patient being a very difficult stick. Rn and provider made aware

## 2025-02-06 NOTE — PROGRESS NOTES
Called pt on 2/5/25, informed her of recent abnormal lab results per provider. Pt was recommended to go to ED that same day, pt stated that she would go to ED when  came home from work. Pt did not go to ED as recommended. Will call pt again, and ask if she is planning on going.

## 2025-02-07 LAB
HBA1C MFR BLD: 5 % (ref 4.8–5.6)
MAGNESIUM SERPL-MCNC: 1.9 MG/DL (ref 1.6–2.6)
QT INTERVAL: 424 MS
QTC INTERVAL: 430 MS
TSH SERPL DL<=0.05 MIU/L-ACNC: 1.02 UIU/ML (ref 0.27–4.2)

## 2025-02-07 PROCEDURE — 25810000003 SODIUM CHLORIDE 0.9 % SOLUTION: Performed by: INTERNAL MEDICINE

## 2025-02-07 PROCEDURE — 93010 ELECTROCARDIOGRAM REPORT: CPT | Performed by: SPECIALIST

## 2025-02-07 PROCEDURE — 25010000002 CEFEPIME PER 500 MG: Performed by: INTERNAL MEDICINE

## 2025-02-07 PROCEDURE — 25010000002 CEFEPIME PER 500 MG: Performed by: STUDENT IN AN ORGANIZED HEALTH CARE EDUCATION/TRAINING PROGRAM

## 2025-02-07 PROCEDURE — 93005 ELECTROCARDIOGRAM TRACING: CPT | Performed by: INTERNAL MEDICINE

## 2025-02-07 PROCEDURE — 99222 1ST HOSP IP/OBS MODERATE 55: CPT | Performed by: NURSE PRACTITIONER

## 2025-02-07 PROCEDURE — 99233 SBSQ HOSP IP/OBS HIGH 50: CPT | Performed by: STUDENT IN AN ORGANIZED HEALTH CARE EDUCATION/TRAINING PROGRAM

## 2025-02-07 PROCEDURE — 25810000003 SODIUM CHLORIDE 0.9 % SOLUTION: Performed by: STUDENT IN AN ORGANIZED HEALTH CARE EDUCATION/TRAINING PROGRAM

## 2025-02-07 RX ORDER — SODIUM CHLORIDE 9 MG/ML
100 INJECTION, SOLUTION INTRAVENOUS CONTINUOUS
Status: ACTIVE | OUTPATIENT
Start: 2025-02-07 | End: 2025-02-07

## 2025-02-07 RX ORDER — GABAPENTIN 400 MG/1
800 CAPSULE ORAL DAILY PRN
Status: DISCONTINUED | OUTPATIENT
Start: 2025-02-07 | End: 2025-02-09 | Stop reason: HOSPADM

## 2025-02-07 RX ORDER — BUPRENORPHINE HYDROCHLORIDE AND NALOXONE HYDROCHLORIDE DIHYDRATE 8; 2 MG/1; MG/1
0.5 TABLET SUBLINGUAL 2 TIMES DAILY
Status: DISCONTINUED | OUTPATIENT
Start: 2025-02-07 | End: 2025-02-09 | Stop reason: HOSPADM

## 2025-02-07 RX ORDER — DOCUSATE SODIUM 100 MG/1
100 CAPSULE, LIQUID FILLED ORAL DAILY
Status: DISCONTINUED | OUTPATIENT
Start: 2025-02-07 | End: 2025-02-09 | Stop reason: HOSPADM

## 2025-02-07 RX ORDER — CALCIUM CARBONATE 500 MG/1
2 TABLET, CHEWABLE ORAL 3 TIMES DAILY PRN
Status: DISCONTINUED | OUTPATIENT
Start: 2025-02-07 | End: 2025-02-09 | Stop reason: HOSPADM

## 2025-02-07 RX ORDER — PANTOPRAZOLE SODIUM 40 MG/1
40 TABLET, DELAYED RELEASE ORAL
Status: DISCONTINUED | OUTPATIENT
Start: 2025-02-07 | End: 2025-02-09 | Stop reason: HOSPADM

## 2025-02-07 RX ORDER — SODIUM CHLORIDE 9 MG/ML
40 INJECTION, SOLUTION INTRAVENOUS AS NEEDED
Status: DISCONTINUED | OUTPATIENT
Start: 2025-02-07 | End: 2025-02-09 | Stop reason: HOSPADM

## 2025-02-07 RX ORDER — NICOTINE 21 MG/24HR
1 PATCH, TRANSDERMAL 24 HOURS TRANSDERMAL
Status: DISCONTINUED | OUTPATIENT
Start: 2025-02-07 | End: 2025-02-09 | Stop reason: HOSPADM

## 2025-02-07 RX ORDER — SODIUM CHLORIDE 0.9 % (FLUSH) 0.9 %
10 SYRINGE (ML) INJECTION EVERY 12 HOURS SCHEDULED
Status: DISCONTINUED | OUTPATIENT
Start: 2025-02-07 | End: 2025-02-09 | Stop reason: HOSPADM

## 2025-02-07 RX ORDER — SODIUM CHLORIDE 0.9 % (FLUSH) 0.9 %
10 SYRINGE (ML) INJECTION AS NEEDED
Status: DISCONTINUED | OUTPATIENT
Start: 2025-02-07 | End: 2025-02-09 | Stop reason: HOSPADM

## 2025-02-07 RX ORDER — POTASSIUM CHLORIDE 1500 MG/1
40 TABLET, EXTENDED RELEASE ORAL ONCE
Status: COMPLETED | OUTPATIENT
Start: 2025-02-07 | End: 2025-02-07

## 2025-02-07 RX ORDER — DOCUSATE SODIUM 100 MG/1
100 CAPSULE, LIQUID FILLED ORAL DAILY
COMMUNITY

## 2025-02-07 RX ORDER — PRENATAL VIT/IRON FUM/FOLIC AC 27MG-0.8MG
1 TABLET ORAL DAILY
Status: DISCONTINUED | OUTPATIENT
Start: 2025-02-07 | End: 2025-02-09 | Stop reason: HOSPADM

## 2025-02-07 RX ORDER — FOLIC ACID 1 MG/1
1 TABLET ORAL DAILY
Status: DISCONTINUED | OUTPATIENT
Start: 2025-02-07 | End: 2025-02-09 | Stop reason: HOSPADM

## 2025-02-07 RX ORDER — FAMOTIDINE 20 MG/1
20 TABLET, FILM COATED ORAL
Status: DISCONTINUED | OUTPATIENT
Start: 2025-02-07 | End: 2025-02-07

## 2025-02-07 RX ORDER — LIDOCAINE HYDROCHLORIDE 20 MG/ML
10 SOLUTION OROPHARYNGEAL
Status: DISCONTINUED | OUTPATIENT
Start: 2025-02-07 | End: 2025-02-09 | Stop reason: HOSPADM

## 2025-02-07 RX ORDER — MULTIPLE VITAMINS W/ MINERALS TAB 9MG-400MCG
1 TAB ORAL DAILY
Status: CANCELLED | OUTPATIENT
Start: 2025-02-07

## 2025-02-07 RX ORDER — PEDI MULTIVIT NO.25/FOLIC ACID 300 MCG
2 TABLET,CHEWABLE ORAL DAILY
COMMUNITY

## 2025-02-07 RX ADMIN — Medication 800 MG: at 06:54

## 2025-02-07 RX ADMIN — CALCIUM CARBONATE (ANTACID) CHEW TAB 500 MG 2 TABLET: 500 CHEW TAB at 11:48

## 2025-02-07 RX ADMIN — SODIUM CHLORIDE 100 ML/HR: 9 INJECTION, SOLUTION INTRAVENOUS at 07:03

## 2025-02-07 RX ADMIN — Medication 10 ML: at 08:43

## 2025-02-07 RX ADMIN — CALCIUM CARBONATE (ANTACID) CHEW TAB 500 MG 2 TABLET: 500 CHEW TAB at 03:12

## 2025-02-07 RX ADMIN — FOLIC ACID 1 MG: 1 TABLET ORAL at 08:43

## 2025-02-07 RX ADMIN — CEFEPIME 2000 MG: 2 INJECTION, POWDER, FOR SOLUTION INTRAVENOUS at 06:53

## 2025-02-07 RX ADMIN — PANTOPRAZOLE SODIUM 40 MG: 40 TABLET, DELAYED RELEASE ORAL at 15:27

## 2025-02-07 RX ADMIN — PRENATAL VITAMINS-IRON FUMARATE 27 MG IRON-FOLIC ACID 0.8 MG TABLET 1 TABLET: at 08:43

## 2025-02-07 RX ADMIN — MUPIROCIN 1 APPLICATION: 20 OINTMENT TOPICAL at 20:26

## 2025-02-07 RX ADMIN — SODIUM CHLORIDE 100 ML/HR: 9 INJECTION, SOLUTION INTRAVENOUS at 17:45

## 2025-02-07 RX ADMIN — MUPIROCIN 1 APPLICATION: 20 OINTMENT TOPICAL at 08:43

## 2025-02-07 RX ADMIN — BUPRENORPHINE AND NALOXONE 0.5 TABLET: 8; 2 TABLET SUBLINGUAL at 08:43

## 2025-02-07 RX ADMIN — CEFEPIME 2000 MG: 2 INJECTION, POWDER, FOR SOLUTION INTRAVENOUS at 14:30

## 2025-02-07 RX ADMIN — NICOTINE 1 PATCH: 21 PATCH TRANSDERMAL at 03:48

## 2025-02-07 RX ADMIN — LIDOCAINE HYDROCHLORIDE 10 ML: 20 SOLUTION ORAL at 03:49

## 2025-02-07 RX ADMIN — POTASSIUM CHLORIDE 40 MEQ: 1500 TABLET, EXTENDED RELEASE ORAL at 06:54

## 2025-02-07 RX ADMIN — DOCUSATE SODIUM 100 MG: 100 CAPSULE, LIQUID FILLED ORAL at 08:42

## 2025-02-07 RX ADMIN — FAMOTIDINE 20 MG: 20 TABLET, FILM COATED ORAL at 06:54

## 2025-02-07 RX ADMIN — CEFEPIME 2000 MG: 2 INJECTION, POWDER, FOR SOLUTION INTRAVENOUS at 23:06

## 2025-02-07 RX ADMIN — Medication 10 ML: at 20:26

## 2025-02-07 NOTE — CONSULTS
INFECTIOUS DISEASE CONSULTATION REPORT        Patient Identification:  Name:  Sailaja Alarcon  Age:  29 y.o.  Sex:  female  :  1995  MRN:  [id number]   Visit Number:  [id number]  Primary Care Physician:  Margaret Baxter APRN        Referring Provider: Dr. Ayala    Reason for consult: Bacteremia       LOS: 1 day        Subjective       Subjective     History of present illness:      Thank you Dr. Ayala for allowing us to participate in the care of your patient.  As you well know, Ms. Sailaja Alarcon is a 29 y.o. female who is 16 weeks EGA with past medical history significant for substance abuse, tobacco use, hepatitis C, who presented to [facility] Emergency Department on 2025 for call back for positive blood cultures.    The patient was initially hospitalized at [facility] from 12/3 through 2024 for positive blood cultures.  Diagnosed with MRSA bacteremia on , with CRISTOPHER showing freely mobile structure of the right atrium concerning for possible atrial wall vegetation versus prominent eustachian valve.  CRISTOPHER considered in order to look at the atrial structure, however the risk of performing the procedure during pregnancy outweigh the benefits at the time.  Infectious disease recommendation for 6-week course of daptomycin, the patient was discharged to local LTAC to complete IV antibiotics.  However the patient left AMA from LTAC.  Was readmitted to the hospital 12/10 through 2024 again for bacteremia treatment, the patient came to and understanding with Dr. Ayala at that time that she would be seen daily in the outpatient infusion clinic for daptomycin infusions.  The patient has been following with the outpatient infectious disease clinic.  Per documentation the patient has missed at least 2 weeks of daptomycin.  The patient was seen in the ID clinic on 2025 where repeat blood cultures were obtained and found to be growing Serratia  marcescens and Klebsiella oxytoca.  The patient was called back to the ED by the ID clinic staff on 2/5/2025 and instructed to come, she did come into the ED on 2/6/2025.    The patient denied any symptoms.  Denied fevers, nausea, emesis, diarrhea, shortness of air, dizziness, syncope.  She reported dysuria and that she was diagnosed with a UTI the week prior but had not taken antibiotics.  Adamantly denied any recent IVDA, with her fifth sober anniversary coming soon.  The ED attending contacted [facility] at the [facility] [place] by [facility] per the patient's choice and there was noted need for transfer to [facility] at this time, recommended IV antibiotics.  Due to the low blood pressures and pregnancy status, the patient was admitted to CCU for further evaluation and treatment.    On arrival to ED; lactate was normal at 1.5.  Procalcitonin 1.50.  WBC normal at 5.15.  Urinalysis was unremarkable with no bacteria and 0-2 WBCs.  Initial blood culture from 2/4 preliminarily reporting Serratia marcescens and Klebsiella oxytoca on BC ID.  1 of 2 repeat blood cultures from 2/6 preliminarily reporting gram-negative bacilli in 1 bottle.  COVID-19 and flu A/B PCR is negative.  UDS positive for buprenorphine.  OB ultrasound reported single live intrauterine fetus with estimated gestational age 16 weeks 5 days.  Estimated fetal weight 167.7 g.  Fetal heart rate 156 bpm.  Echocardiogram from 2/6 reports strained healer structure noted in the right atrium most likely a prominent eustachian valve although cannot exclude the presence of a right atrial mass.  Appears to be about the same as noted on the prior study on 12/4/2024.    The patient is awake and alert, sitting up comfortably in bed.  Eating breakfast.  On room air with no apparent distress.  Afebrile.  Denies diarrhea.  Denies nausea/vomiting.  Denies dysuria.  The patient reported generally feeling uncomfortable which she attributed to pregnancy.  Lung exam is clear to auscultation  bilaterally.  Abdomen soft and nontender with no active bowel sounds.  The right upper extremity where the previous midline was removed was examined, no drainage, erythema has improved and edema has improved.      Infectious Disease consultation was requested for antimicrobial management.      ---------------------------------------------------------------------------------------------------------------------     Review Of Systems:    Constitutional: no fever, chills and night sweats. No appetite change or unexpected weight change.  General discomfort  Eyes: no eye drainage, itching or redness.  HEENT: no mouth sores, dysphagia or nose bleed.  Respiratory: no for shortness of breath, cough or production of sputum.  Cardiovascular: no chest pain, no palpitations, no orthopnea.  Gastrointestinal: no nausea, vomiting or diarrhea. No abdominal pain, hematemesis or rectal bleeding.  Genitourinary: no dysuria or polyuria.  Hematologic/lymphatic: no lymph node abnormalities, no easy bruising or easy bleeding.  Musculoskeletal: no muscle or joint pain.  Skin: No rash and no itching.  Neurological: no loss of consciousness, no seizure, no headache.  Behavioral/Psych: no depression or suicidal ideation.  Endocrine: no hot flashes.  Immunologic: negative.    ---------------------------------------------------------------------------------------------------------------------     Past Medical History    Past Medical History:   Diagnosis Date    Current every day smoker 01/28/2024    Cutaneous abscess of neck 10/10/2021    Added automatically from request for surgery [id number]      Hepatitis C     Infective endocarditis 12/10/2024    Migraines     MRSA bacteremia 12/04/2024    Non-pressure chronic ulcer of other part of left lower leg with fat layer exposed 01/28/2024       Past Surgical History    Past Surgical History:   Procedure Laterality Date    HEAD/NECK ABSCESS INCISION AND DRAINAGE Left 7/8/2022    Procedure: HEAD NECK  ABSCESS INCISION AND DRAINAGE;  Surgeon: Miquel Grayson MD;  Location:  [facility] OR;  Service: General;  Laterality: Left;    INCISION AND DRAINAGE ABSCESS Right 10/10/2021    Procedure: INCISION AND DRAINAGE ABSCESS CENTRAL LINE PLACEMENT;  Surgeon: Naz Payne MD;  Location:  [facility] OR;  Service: General;  Laterality: Right;  I&D ABSCESS= INFECTED.  CENTRAL LINE = CLEAN.       Family History    Family History   Problem Relation Age of Onset    Hypertension Mother     Cancer Father     No Known Problems Sister     No Known Problems Brother     No Known Problems Son     No Known Problems Daughter     Hypertension Maternal Grandmother     Diabetes Maternal Grandfather     No Known Problems Paternal Grandmother     No Known Problems Paternal Grandfather     No Known Problems Cousin     Diabetes Maternal Aunt     Rheum arthritis Neg Hx     Osteoarthritis Neg Hx     Asthma Neg Hx     Heart failure Neg Hx     Hyperlipidemia Neg Hx     Migraines Neg Hx     Rashes / Skin problems Neg Hx     Seizures Neg Hx     Stroke Neg Hx     Thyroid disease Neg Hx        Social History    Social History     Tobacco Use    Smoking status: Every Day     Current packs/day: 1.50     Average packs/day: 1.3 packs/day for 30.0 years (37.5 ttl pk-yrs)     Types: Cigarettes     Start date: 2/7/2010    Smokeless tobacco: Never   Vaping Use    Vaping status: Some Days   Substance Use Topics    Alcohol use: No     Alcohol/week: 0.0 standard drinks of alcohol    Drug use: Not Currently     Frequency: 3.0 times per week     Types: IV     Comment: last use 5 YEARS AGO       Allergies    Amoxicillin, Penicillins, Vancomycin, and Zofran [ondansetron hcl]  ---------------------------------------------------------------------------------------------------------------------     Home Medications:    Prior to Admission Medications       Prescriptions Last Dose Informant Patient Reported? Taking?    buprenorphine-naloxone (SUBOXONE) 8-2 MG per SL  tablet 2/6/2025 Self, Other Yes Yes    Place 0.5 tablets under the tongue 2 (Two) Times a Day.    docusate sodium (COLACE) 100 MG capsule 2/6/2025 Self, Other Yes Yes    Take 1 capsule by mouth Daily.    gabapentin (NEURONTIN) 800 MG tablet Unknown Other, Self Yes No    Take 1 tablet by mouth Daily As Needed (Pain).    melatonin 5 MG tablet tablet Unknown Self, Other Yes No    Take 3 tablets by mouth At Night As Needed (Sleep).    Pediatric Multivitamins-Iron (Flintstones Complete) 18 MG chewable tablet chewable tablet 2/6/2025 Self, Other Yes Yes    Chew 2 tablets Daily.          ---------------------------------------------------------------------------------------------------------------------    Objective       Objective     Hospital Scheduled Meds:  buprenorphine-naloxone, 0.5 tablet, Sublingual, BID  cefepime, 2,000 mg, Intravenous, Q8H  DAPTOmycin, 8 mg/kg, Intravenous, Q24H  docusate sodium, 100 mg, Oral, Daily  famotidine, 20 mg, Oral, BID AC  folic acid, 1 mg, Oral, Daily  mupirocin, 1 Application, Each Nare, BID  nicotine, 1 patch, Transdermal, Q24H  prenatal vitamin, 1 tablet, Oral, Daily  sodium chloride, 10 mL, Intravenous, Q12H      Pharmacy Consult,   sodium chloride, 100 mL/hr, Last Rate: 100 mL/hr (02/07/25 0703)      ---------------------------------------------------------------------------------------------------------------------   Vital Signs:  Temp:  [97.7 °F (36.5 °C)-99.2 °F (37.3 °C)] 97.7 °F (36.5 °C)  Heart Rate:  [60-99] 78  Resp:  [10-23] 14  BP: ()/(46-81) 113/72  Mean Arterial Pressure (Non-Invasive) for the past 24 hrs (Last 3 readings):   Noninvasive MAP (mmHg)   02/07/25 1430 89   02/07/25 1400 86   02/07/25 1330 86     SpO2 Percentage    02/07/25 1330 02/07/25 1400 02/07/25 1430   SpO2: 98% 97% 99%     SpO2:  [86 %-100 %] 99 %  on   ;   Device (Oxygen Therapy): room air    Body mass index is 23.91 kg/m².  Wt Readings from Last 3 Encounters:   02/07/25 62.2 kg (137 lb 2 oz)    02/04/25 61.8 kg (136 lb 3.2 oz)   01/28/25 62.3 kg (137 lb 6.4 oz)     ---------------------------------------------------------------------------------------------------------------------     Physical Exam:    Constitutional:  Well-developed and well-nourished.  Sitting up comfortably in bed eating breakfast, on room air with no respiratory distress.    Denies any complaints.  HENT:  Head: Normocephalic and atraumatic.  Mouth:  Moist mucous membranes.    Eyes:  Conjunctivae and EOM are normal.  No scleral icterus.  Neck:  Neck supple.  No JVD present.    Cardiovascular:  Normal rate, regular rhythm and normal heart sounds with no murmur. No edema.  Pulmonary/Chest:  No respiratory distress, no wheezes, no crackles, with normal breath sounds and good air movement.  Abdominal:  Soft.  Bowel sounds are normal.  No distension and no tenderness.   Musculoskeletal:  No edema, no tenderness, and no deformity.  No swelling or redness of joints.  Neurological:  Alert and oriented to person, place, and time.  No facial droop.  No slurred speech.   Skin:  Skin is warm and dry.  No rash noted.  No pallor.  Right upper extremity previous PICC line site, erythema improved, edema improved no drainage.  Psychiatric:  Normal mood and affect.  Behavior is normal.    ---------------------------------------------------------------------------------------------------------------------    Results from last 7 days   Lab Units 02/04/25  1141   CK TOTAL U/L 7*           Results from last 7 days   Lab Units 02/06/25  1716 02/04/25  1141   CRP mg/dL  --  7.83*  7.53*   LACTATE mmol/L 1.5  --    WBC 10*3/mm3 5.15 4.47  4.46   HEMOGLOBIN g/dL 9.7* 10.2*  9.9*   HEMATOCRIT % 29.3* 30.3*  29.9*   MCV fL 90.4 90.7  90.9   MCHC g/dL 33.1 33.7  33.1   PLATELETS 10*3/mm3 147 161  154     Results from last 7 days   Lab Units 02/06/25  1716 02/04/25  1141   SODIUM mmol/L 132* 136   POTASSIUM mmol/L 3.2* 3.8   MAGNESIUM mg/dL 1.9  --   "  CHLORIDE mmol/L 102 106   CO2 mmol/L 18.1* 18.1*   BUN mg/dL 9 9   CREATININE mg/dL 0.89 0.71   CALCIUM mg/dL 9.0 9.2   GLUCOSE mg/dL 89 85   ALBUMIN g/dL 3.9 3.8   BILIRUBIN mg/dL 0.4 0.3   ALK PHOS U/L 85 73   AST (SGOT) U/L 7 10   ALT (SGPT) U/L <5 6   Estimated Creatinine Clearance: 89.9 mL/min (by C-G formula based on SCr of 0.89 mg/dL).  No results found for: \"AMMONIA\"    Hemoglobin A1C   Date/Time Value Ref Range Status   02/06/2025 1716 5.00 4.80 - 5.60 % Final     Lab Results   Component Value Date    HGBA1C 5.00 02/06/2025     Lab Results   Component Value Date    TSH 1.020 02/06/2025       Blood Culture   Date Value Ref Range Status   02/06/2025 Abnormal Stain (C)  Preliminary   02/04/2025 Serratia marcescens (C)  Preliminary   02/04/2025 Gram Negative Bacilli (C)  Preliminary     No results found for: \"URINECX\"  No results found for: \"WOUNDCX\"  No results found for: \"STOOLCX\"  No results found for: \"RESPCX\"  Pain Management Panel  More data exists         Latest Ref Rng & Units 2/6/2025 12/10/2024   Pain Management Panel   Amphetamine, Urine Qual Negative Negative  Negative    Barbiturates Screen, Urine Negative Negative  Negative    Benzodiazepine Screen, Urine Negative Negative  Negative    Buprenorphine, Screen, Urine Negative Positive  Positive    Cocaine Screen, Urine Negative Negative  Negative    Fentanyl, Urine Negative Negative  Negative    Methadone Screen , Urine Negative Negative  Negative    Methamphetamine, Ur Negative Negative  Negative       Details                   ---------------------------------------------------------------------------------------------------------------------  Imaging Results (Last 7 Days)       Procedure Component Value Units Date/Time    US Ob 14 + Weeks Single or First Gestation [[id number]] Collected: 02/06/25 2118     Updated: 02/06/25 2121    Narrative:      INDICATION: Abnormal labs.     COMPARISON: None.     TECHNIQUE: Transabdominal pelvic ultrasound was " performed.       Impression:      FINDINGS/IMPRESSION: Single live intrauterine fetus with estimated  gestational age 16 weeks 5 days. Estimated fetal weight 167.7 g. Fetal  heart rate 156 bpm.      This report was finalized on 2/6/2025 9:19 PM by Alex Pallas, DO.               ---------------------------------------------------------------------------------------------------------------------      Pertinent Infectious Disease Results          Assessment & Plan      Assessment      Polymicrobial bacteremia with Klebsiella and Serratia  Endocarditis, currently being treated with daptomycin  History of MRSA bacteremia  Pregnancy, 16 weeks gestation        Plan      On arrival to ED; lactate was normal at 1.5.  Procalcitonin 1.50.  WBC normal at 5.15.  Urinalysis was unremarkable with no bacteria and 0-2 WBCs.  Initial blood culture from 2/4 preliminarily reporting Serratia marcescens and Klebsiella oxytoca on BC ID.  1 of 2 repeat blood cultures from 2/6 preliminarily reporting gram-negative bacilli in 1 bottle.  COVID-19 and flu A/B PCR is negative.  UDS positive for buprenorphine.  OB ultrasound reported single live intrauterine fetus with estimated gestational age 16 weeks 5 days.  Estimated fetal weight 167.7 g.  Fetal heart rate 156 bpm.  Echocardiogram from 2/6 reports strained healer structure noted in the right atrium most likely a prominent eustachian valve although cannot exclude the presence of a right atrial mass.  Appears to be about the same as noted on the prior study on 12/4/2024.    On chart review, the patient was seen in the outpatient ID clinic on 2/4/2025 and the dressing to right upper extremity midline was noted to be very dirty with some hair noted to the dressing.  There was erythema noted at the insertion site at that time.  The dressing was changed that day and blood cultures were taken.    The patient is awake and alert, sitting up comfortably in bed.  Eating breakfast.  On room air with no  apparent distress.  Afebrile.  Denies diarrhea.  Denies nausea/vomiting.  Denies dysuria.  The patient reported generally feeling uncomfortable which she attributed to pregnancy.  Lung exam is clear to auscultation bilaterally.  Abdomen soft and nontender with no active bowel sounds.  The right upper extremity where the previous midline was removed was examined, no drainage, erythema has improved and edema has improved.    Case discussed with NP Kevin from [facility], who reported a urinalysis was not performed at prenatal visit and the patient was never treated for UTI.    The patient continued with daptomycin course in the setting of MRSA endocarditis which she was currently undergoing treatment for.  Initial antibiotic completion date was January 16, 2025, in light of known missed doses for approximately 11 days and several weekends, antibiotic course was extended.  The extended antibiotic infusion end date was adjusted to 2-3 25.  At this time daptomycin will be discontinued as the patient has completed 6 weeks of antibiotic therapy.  Case discussed with Dr. Ayala, primary hospitalist, who reported after his discussion with cardiology it was felt the mass noted on echo was more congenital rather than infectious.    As there is no documentation of recent urinary tract infection, suspect bacteremia to be secondary to midline infection.    In light of new blood cultures reporting Klebsiella and Serratia, cefepime was initiated while awaiting sensitivity report.  Anticipate de-escalating once sensitivity reports are available.  Plan to treat for total of 14 days from first negative blood cultures.  We will continue to monitor closely and adjust antibiotic regimen as appropriate.        RECENT ANTIMICROBIAL THERAPY    cefepime 2000 mg IVPB in 100 mL NS (VTB)  daptomycin       Again, thank you Dr. Ayala for allowing us to participate in the care of your patient and please feel free to call for any questions you  may have.        Code Status:     Code Status and Medical Interventions: CPR (Attempt to Resuscitate); Full Support   Ordered at: 02/07/25 0259     Level Of Support Discussed With:    Patient     Code Status (Patient has no pulse and is not breathing):    CPR (Attempt to Resuscitate)     Medical Interventions (Patient has pulse or is breathing):    Full Support         Ciara Espitia, APRN  02/07/25  14:39 EST

## 2025-02-07 NOTE — PROGRESS NOTES
[facility] HOSPITALIST PROGRESS NOTE     Patient Identification:  Name:  Sailaja Alarcon  Age:  29 y.o.  Sex:  female  :  1995  MRN:  [id number]  Visit Number:  [id number]  ROOM: [address] [place] [address]     Primary Care Provider:  Margaret Baxter APRN    Length of stay in inpatient status:  1    Subjective     Chief Compliant:    Chief Complaint   Patient presents with    Abnormal Lab       History of Presenting Illness: Patient seen today in follow-up for recurrent bacteremia in the setting of previous MRSA bacteremia incompletely treated due to medical noncompliance complicated by ongoing current pregnancy.  Due to patient's positive blood cultures collected and outpatient ID facility returning with new speciation patient called to the ER to be admitted for further evaluation.  Patient seen and evaluated bedside and feeling well denying any acute complaints.  Interval removal of outpatient line for infusion currently with peripheral IV.    Objective     Current Hospital Meds:  buprenorphine-naloxone, 0.5 tablet, Sublingual, BID  cefepime, 2,000 mg, Intravenous, Q8H  docusate sodium, 100 mg, Oral, Daily  folic acid, 1 mg, Oral, Daily  mupirocin, 1 Application, Each Nare, BID  nicotine, 1 patch, Transdermal, Q24H  pantoprazole, 40 mg, Oral, Q AM  prenatal vitamin, 1 tablet, Oral, Daily  sodium chloride, 10 mL, Intravenous, Q12H      Pharmacy Consult,   sodium chloride, 100 mL/hr, Last Rate: 100 mL/hr (25 0703)      ----------------------------------------------------------------------------------------------------------------------  Vital Signs:  Temp:  [97.7 °F (36.5 °C)-98.6 °F (37 °C)] 98.1 °F (36.7 °C)  Heart Rate:  [60-99] 70  Resp:  [10-23] 16  BP: ()/(46-87) 142/87  SpO2:  [86 %-100 %] 99 %  on   ;   Device (Oxygen Therapy): room air  Body mass index is 23.91 kg/m².      Intake/Output Summary (Last 24 hours) at 2025 9162  Last data filed at 2025 1221  Gross per 24  "hour   Intake 368 ml   Output --   Net 368 ml      ----------------------------------------------------------------------------------------------------------------------  Physical exam:  Constitutional:  Well-developed and well-nourished.  No acute distress.      HENT:  Head:  Normocephalic and atraumatic.  Mouth:  Moist mucous membranes.    Eyes:  Conjunctivae and EOM are normal. No scleral icterus.    Cardiovascular:  Normal rate, regular rhythm and normal heart sounds with no murmur.  Pulmonary/Chest:  No respiratory distress, no wheezes, no crackles, with normal breath sounds and good air movement.  Abdominal:  Soft.  Bowel sounds are normal.  No tenderness.  L fundus  Musculoskeletal:  No tenderness, and no deformity.  No red or swollen joints anywhere.  Functional ROM intact.   Neurological:  Alert and oriented to person, place, and time.  No cranial nerve deficit.  No tongue deviation.  No facial droop.  No slurred speech. Intact Sensation throughout  Skin:  Skin is warm and dry. No rash or lesion noted. No pallor.   Peripheral vascular:  Pulses in all 4 extremities with no clubbing, no cyanosis, no edema.  Psychiatric: Appropriate mood and affect, pleasant.   ----------------------------------------------------------------------------------------------------------------------                 No results found for: \"URINECX\"  Blood Culture   Date Value Ref Range Status   02/06/2025 Abnormal Stain (C)  Preliminary   02/04/2025 Serratia marcescens (C)  Preliminary   02/04/2025 Gram Negative Bacilli (C)  Preliminary       I have personally looked at the labs and they are summarized above.  ----------------------------------------------------------------------------------------------------------------------  Detailed radiology reports for the last 24 hours:  US Ob 14 + Weeks Single or First Gestation    Result Date: 2/6/2025  FINDINGS/IMPRESSION: Single live intrauterine fetus with estimated gestational age 16 " weeks 5 days. Estimated fetal weight 167.7 g. Fetal heart rate 156 bpm.  This report was finalized on 2/6/2025 9:19 PM by Alex Pallas, DO.     Assessment & Plan      Serratia and Klebsiella bacteremia, POA  Hypotension, fluid responsive, improving  16-week intrauterine pregnancy, POA  Prior treatment for MRSA bacteremia, intermittently noncompliant  Suspected prominent eustachian valve, vegetation felt less likely    -Patient presenting to emergency department after call back for positive blood cultures collected and infectious disease clinic with growth showing Serratia marcescens and Klebsiella oxytoca    -Repeat TTE obtained and unremarkable other than what cardiology suspects is prominent eustachian valve rather than vegetation unchanged from prior.    -Previously with MRSA bacteremia with missed treatments but no growth at this admission currently on daptomycin.    -Infectious disease consulted and following along, appreciate input.  Discussed in agreement the source is likely infected line rather than from recurrent drug abuse as patient's outpatient line in poor condition and dirty dressing at time of presentation to ID clinic.    -Repeat cultures collected and pending at this time    -Continue cefepime with daptomycin per ID recommendations    -Case previously discussed with maternal-fetal medicine at [facility] by ER provider and recommending no need for transfer at this time as treatment and management would be typical of bacteremia with no special consideration for fetus beyond avoidance of try to genic agents.    -Patient initially borderline hypotensive but chronically low blood pressures and responsive to fluids.    -Patient complaining of frequent reflux and GERD suspect related to her pregnancy and Protonix and Tums added.    -Prenatal vitamin    History of substance abuse, in remission  Chronic Suboxone usage    -Continue home Suboxone    Chronic smoking tobacco use disorder     -Encourage cessation    History of hepatitis C    Copied text in portions of the note has been reviewed and is accurate as of 02/07/25     VTE Prophylaxis:     Active VTE Prophylaxis  Mechanical:        Start        02/07/25 0218  Maintain Sequential Compression Device  Continuous                          Select Pharmacologic VTE Prophylaxis if Desired & Appropriate       Disposition pending clinical course    Kadeem Ayala DO  [facility] Hospitalist  02/07/25  17:34 EST

## 2025-02-07 NOTE — ED NOTES
Midline removed at this time per VORB from Dr. Pendleton. Tip intact at this time, skin is slightly red, warm, and intact. Pt tolerated well at this time. Provider made aware

## 2025-02-07 NOTE — PLAN OF CARE
Problem: Adult Inpatient Plan of Care  Goal: Plan of Care Review  Outcome: Progressing  Flowsheets (Taken 2/7/2025 0555)  Progress: no change  Plan of Care Reviewed With: patient  Goal: Patient-Specific Goal (Individualized)  Outcome: Progressing  Goal: Absence of Hospital-Acquired Illness or Injury  Outcome: Progressing  Intervention: Identify and Manage Fall Risk  Recent Flowsheet Documentation  Taken 2/7/2025 0500 by Archana Dye RN  Safety Promotion/Fall Prevention: safety round/check completed  Taken 2/7/2025 0400 by Archana Dye RN  Safety Promotion/Fall Prevention: safety round/check completed  Taken 2/7/2025 0300 by Archana Dye RN  Safety Promotion/Fall Prevention: safety round/check completed  Taken 2/7/2025 0200 by Archana Dye RN  Safety Promotion/Fall Prevention:   activity supervised   assistive device/personal items within reach   clutter free environment maintained   fall prevention program maintained   nonskid shoes/slippers when out of bed   room organization consistent   safety round/check completed  Intervention: Prevent Skin Injury  Recent Flowsheet Documentation  Taken 2/7/2025 0400 by Archana Dye RN  Body Position: position changed independently  Taken 2/7/2025 0200 by Archana Dye RN  Body Position: position changed independently  Skin Protection:   incontinence pads utilized   pulse oximeter probe site changed  Taken 2/7/2025 0115 by Archana Dye RN  Body Position: position changed independently  Intervention: Prevent Infection  Recent Flowsheet Documentation  Taken 2/7/2025 0200 by Archana Dye RN  Infection Prevention:   rest/sleep promoted   single patient room provided   visitors restricted/screened   hand hygiene promoted   equipment surfaces disinfected   environmental surveillance performed  Goal: Optimal Comfort and Wellbeing  Outcome: Progressing  Intervention: Provide Person-Centered Care  Recent Flowsheet Documentation  Taken 2/7/2025 0200 by Archana Dye  RN  Trust Relationship/Rapport:   care explained   reassurance provided  Goal: Readiness for Transition of Care  Outcome: Progressing   Goal Outcome Evaluation:  Plan of Care Reviewed With: patient        Progress: no change

## 2025-02-07 NOTE — PAYOR COMM NOTE
"[facility]  NPI:[id number]    Utilization Review  Contact: Tracey Chowdary RN  Phone: [phone]  Fax:[phone]    INITIATE INPATIENT AUTHORIZATION    ICD: R78.81      Rochelle Robertson (29 y.o. Female)       Date of Birth   1995    Social Security Number       Address   [address] [place], [place] [address]    Home Phone   [phone]    MRN   [id number]       [place]   [place]    Marital Status                               Admission Date   2/6/25    Admission Type   Emergency    Admitting Provider   Alix Vasquez MD    Attending Provider   Kadeem Ayala DO    Department, Room/Bed   [facility] CRITICAL CARE, [facility]/       Discharge Date       Discharge Disposition       Discharge Destination                                 Attending Provider: Kadeem Ayala DO    Allergies: Amoxicillin, Penicillins, Vancomycin, Zofran [Ondansetron Hcl]    Isolation: None   Infection: MRSA (10/13/21)   Code Status: CPR    Ht: 161.3 cm (63.5\")   Wt: 62.2 kg (137 lb 2 oz)    Admission Cmt: None   Principal Problem: Bacteremia [R78.81]                   Active Insurance as of 2/6/2025       Primary Coverage       Payor Plan Insurance Group Employer/Plan Group    [facility] [place] [facility] [place]AMBERS [facility] BY REYES [id number]       Payor Plan Address Payor Plan Phone Number Payor Plan Fax Number Effective Dates    [address] [address]   1/1/2021 - None Entered    [facility] [id number]         Subscriber Name Subscriber Birth Date Member ID       ROCHELLE ROBERTSON 1995 [id number]                     Emergency Contacts        (Rel.) Home Phone Work Phone Mobile Phone    UMER ROBERTSON (Spouse) [phone] -- --    BIB RAIN (Grandparent) [phone] -- --    Vivian Kaba (Aunt) (Other) -- -- [phone]                 History & Physical        Alix Vasquez MD at 02/07/25 0430              [facility] HOSPITALIST " HISTORY AND PHYSICAL    Patient Identification:  Name:  Sailaja Alarcon  Age:  29 y.o.  Sex:  female  :  1995  MRN:  [id number]   Visit Number:  [id number]  Admit Date: 2025   Room number:  CC10/1C  Primary Care Physician:  Margaret Baxter APRN    Date of Admission: 2025     Subjective     Chief complaint:    Chief Complaint   Patient presents with    Abnormal Lab     History of presenting illness:  29 y.o. female at 16 weeks EGA who was admitted on 2025 from the ED as a callback for positive blood cultures that were drawn in the ID clinic drawn 3 days prior to admission.  The patient has a past medical history of substance abuse, tobacco smoking, and hepatitis C.  She was initially hospitalized at our hospital 12/3/2024-2024 for positive blood cultures.  She was diagnosed with MRSA bacteremia on 2024, with TTE showing a freely mobile structure in the right atrium concerning for possible atrial wall vegetation versus prominent eustachian valve.  CRISTOPHER was considered in order to look at the right atrial structure; however, the risks of performing this procedure during pregnancy seemed to outweigh the benefits at the time.  Thus, ID recommended a 6 week course of daptomycin; she was discharged to a local LTAC to complete the IV antibiotics.  However, the patient left AMA from the LTAC.  She was readmitted to our hospital 12/10/2024-2024 again for the bacteremia treatment; however, Dr. Ayala and the patient came to an understanding that the patient would go daily to the infusion clinic for the daptomycin infusions.  The patient has been following up in the ID clinic; per their reports, the patient had missed at least 2 weeks of the daptomycin.  The patient was seen in the ID clinic on 2025; repeat blood cultures were obtained at that time and they are growing Serratia marcescens and Klebsiella oxytoca.  The patient was contacted by the ID clinic staff on 2025  and instructed to come immediately to the ED, which the patient did on 2/6/2025.    The patient denies any symptoms; she was shocked that we asked her to come back to the ED.  She denies fevers, nausea, emesis, diarrhea, shortness of air, dizziness, syncope.  However, she does have dysuria and was diagnosed with a UTI last week, but the antibiotic has not be taken yet due to timing issues.  She adamantly denies any recent IVDA, with her 5th sober anniversary coming up soon.  The ED attending contacted [facility] (patient's choice) and there was not a need for transfer to [facility] at this time; IV antibiotics were recommended.  Due to the low blood pressures and her pregnant status, the patient was admitted to the CCU for further evaluation and treatment.    ---------------------------------------------------------------------------------------------------------------------   Review of Systems   Constitutional:  Positive for fatigue (Pregnancy). Negative for chills, diaphoresis and fever.   HENT:  Negative for congestion and rhinorrhea.    Respiratory:  Negative for cough and shortness of breath.    Cardiovascular:  Negative for chest pain and leg swelling.   Gastrointestinal:  Negative for diarrhea, nausea and vomiting.   Genitourinary:  Positive for dysuria. Negative for hematuria.   Musculoskeletal:  Negative for arthralgias and myalgias.   Skin:  Negative for pallor, rash and wound.   Neurological:  Negative for facial asymmetry, speech difficulty and light-headedness.   Psychiatric/Behavioral:  Negative for agitation, behavioral problems and confusion.      ---------------------------------------------------------------------------------------------------------------------   Past Medical History:   Diagnosis Date    Current every day smoker 01/28/2024    Cutaneous abscess of neck 10/10/2021    Added automatically from request for surgery [id number]      Hepatitis C     Infective endocarditis  12/10/2024    Migraines     MRSA bacteremia 12/04/2024    Non-pressure chronic ulcer of other part of left lower leg with fat layer exposed 01/28/2024     Past Surgical History:   Procedure Laterality Date    HEAD/NECK ABSCESS INCISION AND DRAINAGE Left 7/8/2022    Procedure: HEAD NECK ABSCESS INCISION AND DRAINAGE;  Surgeon: Miquel Grayson MD;  Location:  [facility] OR;  Service: General;  Laterality: Left;    INCISION AND DRAINAGE ABSCESS Right 10/10/2021    Procedure: INCISION AND DRAINAGE ABSCESS CENTRAL LINE PLACEMENT;  Surgeon: Naz Payne MD;  Location:  [facility] OR;  Service: General;  Laterality: Right;  I&D ABSCESS= INFECTED.  CENTRAL LINE = CLEAN.     Family History   Problem Relation Age of Onset    Hypertension Mother     Cancer Father     Hypertension Maternal Grandmother     Diabetes Maternal Grandfather     Diabetes Maternal Aunt      Social History     Socioeconomic History    Marital status:    Tobacco Use    Smoking status: Every Day     Current packs/day: 1.50     Average packs/day: 1.3 packs/day for 30.0 years (37.5 ttl pk-yrs)     Types: Cigarettes     Start date: 2/7/2010    Smokeless tobacco: Never   Vaping Use    Vaping status: Some Days   Substance and Sexual Activity    Alcohol use: No     Alcohol/week: 0.0 standard drinks of alcohol    Drug use: Not Currently     Frequency: 3.0 times per week     Types: IV     Comment: last use 5 YEARS AGO    Sexual activity: Yes     Partners: Male     ---------------------------------------------------------------------------------------------------------------------   Allergies:  Amoxicillin, Penicillins, Vancomycin, and Zofran [ondansetron hcl]  ---------------------------------------------------------------------------------------------------------------------   Medications below are reported home medications pulling from within the system; at this time, these medications have not been reconciled unless otherwise specified and are in the  verification process for further verification as current home medications.      Prior to Admission Medications       Prescriptions Last Dose Informant Patient Reported? Taking?    buprenorphine-naloxone (SUBOXONE) 8-2 MG per SL tablet  Self, Other Yes No    Place 0.25-0.5 tablets under the tongue Daily.    diphenhydrAMINE (BENADRYL) 25 mg capsule  Other, Self Yes No    Take 1 capsule by mouth 2 (Two) Times a Day As Needed for Itching, Allergies or Sleep.    famotidine (PEPCID) 20 MG tablet   Yes No    Take 1 tablet by mouth 2 (Two) Times a Day.    gabapentin (NEURONTIN) 800 MG tablet  Other, Self Yes No    Take 1 tablet by mouth 3 (Three) Times a Day.    ibuprofen (ADVIL,MOTRIN) 800 MG tablet   Yes No    Take 1 tablet by mouth 2 (Two) Times a Day As Needed for Mild Pain or Moderate Pain.    melatonin 5 MG tablet tablet   Yes No    Take 3 tablets by mouth At Night As Needed (Sleep).    prenatal vitamin (prenatal, CLASSIC, vitamin) tablet   Yes No    Take 1 tablet by mouth Daily.          Objective     Vital Signs:  Temp:  [97.9 °F (36.6 °C)-99.2 °F (37.3 °C)] 97.9 °F (36.6 °C)  Heart Rate:  [60-99] 69  Resp:  [10-18] 15  BP: ()/(46-63) 99/59    Mean Arterial Pressure (Non-Invasive) for the past 24 hrs (Last 3 readings):   Noninvasive MAP (mmHg)   02/07/25 0500 69   02/07/25 0430 60   02/07/25 0400 63     SpO2:  [86 %-100 %] 98 %  on   ;   Device (Oxygen Therapy): room air  Body mass index is 23.91 kg/m².    Wt Readings from Last 3 Encounters:   02/07/25 62.2 kg (137 lb 2 oz)   02/04/25 61.8 kg (136 lb 3.2 oz)   01/28/25 62.3 kg (137 lb 6.4 oz)      ----------------------------------------------------------------------------------------------------------------------  Physical Exam  Vitals and nursing note reviewed. Exam conducted with a chaperone present.   Constitutional:       General: She is not in acute distress.     Appearance: She is not ill-appearing, toxic-appearing or diaphoretic.   HENT:      Head:  Normocephalic and atraumatic.      Right Ear: External ear normal.      Left Ear: External ear normal.      Nose: Nose normal.   Eyes:      General:         Right eye: No discharge.         Left eye: No discharge.      Pupils: Pupils are equal, round, and reactive to light.   Cardiovascular:      Rate and Rhythm: Normal rate and regular rhythm.      Pulses: Normal pulses.      Heart sounds: No murmur heard.  Pulmonary:      Effort: Pulmonary effort is normal. No respiratory distress.      Breath sounds: No wheezing or rales.   Abdominal:      General: Abdomen is protuberant. Bowel sounds are normal. There is no distension.      Palpations: Abdomen is soft.      Comments: Abdomen matches pregnancy status.   Musculoskeletal:         General: No swelling or deformity.   Skin:     General: Skin is warm.      Capillary Refill: Capillary refill takes less than 2 seconds.      Coloration: Skin is not jaundiced.   Neurological:      Mental Status: She is alert and oriented to person, place, and time. Mental status is at baseline.      Cranial Nerves: No cranial nerve deficit.   Psychiatric:         Mood and Affect: Mood normal.         Behavior: Behavior normal.         Thought Content: Thought content normal.         Judgment: Judgment normal.       ---------------------------------------------------------------------------------------------------------------------  EKG:  Ordered.      Telemetry: Normal sinus rhythm with heart rates 50s to 70s.  Please note that I personally looked at the telemetry strips.      Last echocardiogram:  Results for orders placed during the hospital encounter of 02/06/25    Adult Transthoracic Echo Complete W/ Cont if Necessary Per Protocol    Interpretation Summary    Normal left ventricular cavity size and wall thickness noted. All left ventricular wall segments contract normally. Left ventricular diastolic function was normal.    Left ventricular ejection fraction appears to be 61 - 65%.     Left ventricular diastolic function was normal.    The aortic valve is structurally normal with no regurgitation or stenosis present.    The mitral valve is structurally normal with no regurgitation or significant stenosis present.    A strandular structure noted in the right atrium most likely a  prominent Eustachian valve although cannot exclude the presence of a right atrial mass. it appears to be about the same as noted on the last study on 12/4/24.    Trace to mild tricuspid valve regurgitation is present. Estimated right ventricular systolic pressure from tricuspid regurgitation is mildly elevated (35-45 mmHg).    There is no evidence of pericardial effusion. .    Comments. May consider a CRISTOPHER study for better defnition of this structure.    I have personally read the ECHO final report.  --------------------------------------------------------------------------------------------------------------------  LABS:    CBC and coagulation:  Results from last 7 days   Lab Units 02/06/25  1716 02/04/25  1141   PROCALCITONIN ng/mL 1.50*  --    LACTATE mmol/L 1.5  --    CRP mg/dL  --  7.83*  7.53*   WBC 10*3/mm3 5.15 4.47  4.46   HEMOGLOBIN g/dL 9.7* 10.2*  9.9*   HEMATOCRIT % 29.3* 30.3*  29.9*   MCV fL 90.4 90.7  90.9   MCHC g/dL 33.1 33.7  33.1   PLATELETS 10*3/mm3 147 161  154     Renal and electrolytes:  Results from last 7 days   Lab Units 02/06/25  1716 02/04/25  1141   SODIUM mmol/L 132* 136   POTASSIUM mmol/L 3.2* 3.8   MAGNESIUM mg/dL 1.9  --    CHLORIDE mmol/L 102 106   CO2 mmol/L 18.1* 18.1*   BUN mg/dL 9 9   CREATININE mg/dL 0.89 0.71   CALCIUM mg/dL 9.0 9.2   GLUCOSE mg/dL 89 85   ANION GAP mmol/L 11.9 11.9     Estimated Creatinine Clearance: 89.9 mL/min (by C-G formula based on SCr of 0.89 mg/dL).    Liver and pancreatic function:  Results from last 7 days   Lab Units 02/06/25  1716 02/04/25  1141   ALBUMIN g/dL 3.9 3.8   BILIRUBIN mg/dL 0.4 0.3   ALK PHOS U/L 85 73   AST (SGOT) U/L 7 10   ALT (SGPT)  U/L <5 6     Endocrine function:  Lab Results   Component Value Date    HGBA1C 5.00 02/06/2025     Lab Results   Component Value Date    TSH 1.020 02/06/2025     Cardiac:  Results from last 7 days   Lab Units 02/04/25  1141   CK TOTAL U/L 7*       Cultures:  Lab Results   Component Value Date    COLORU Dark Yellow (A) 02/06/2025    CLARITYU Cloudy (A) 02/06/2025    PHUR 5.5 02/06/2025    GLUCOSEU Negative 02/06/2025    KETONESU Trace (A) 02/06/2025    BLOODU Trace (A) 02/06/2025    NITRITEU Negative 02/06/2025    LEUKOCYTESUR Trace (A) 02/06/2025    BILIRUBINUR Small (1+) (A) 02/06/2025    UROBILINOGEN 1.0 E.U./dL 02/06/2025    RBCUA 0-2 02/06/2025    WBCUA 0-2 02/06/2025    BACTERIA None Seen 02/06/2025     Microbiology Results (last 10 days)       Procedure Component Value - Date/Time    COVID-19 and FLU A/B PCR, 1 HR TAT - Swab, Nasopharynx [[id number]]  (Normal) Collected: 02/06/25 1727    Lab Status: Final result Specimen: Swab from Nasopharynx Updated: 02/06/25 1756     COVID19 Not Detected     Influenza A PCR Not Detected     Influenza B PCR Not Detected    Blood Culture - Blood, Wrist, Right [[id number]]  (Abnormal) Collected: 02/04/25 1202    Lab Status: Preliminary result Specimen: Blood from Wrist, Right Updated: 02/06/25 0706     Blood Culture Gram Negative Bacilli     Isolated from Pediatric Bottle     Gram Stain Pediatric Bottle Gram negative bacilli    Blood Culture ID, PCR - Blood, Wrist, Right [[id number]]  (Abnormal) Collected: 02/04/25 1202    Lab Status: Final result Specimen: Blood from Wrist, Right Updated: 02/05/25 0508     BCID, PCR Klebsiella oxytoca. Identification by BCID2 PCR     BCID, PCR 2 Serratia marcescens. Identification by BCID2 PCR.     BOTTLE TYPE Pediatric Bottle    Narrative:      No resistance genes detected.           02/06/25 17:16 02/06/25 17:28   Ethanol % <0.010%    Ethanol <10 mg/dL    Acetaminophen <5.0 mcg/mL    Amphetamine, Urine Qual  Negative   Barbiturates Screen,  Urine  Negative   Benzodiazepine Screen, Urine  Negative   Buprenorphine, Screen, Urine  Positive !   Cocaine Screen, Urine  Negative   Fentanyl, Urine  Negative   Methamphetamine, Ur  Negative   Methadone Screen , Urine  Negative   Opiate Screen  Negative   Oxycodone Screen, Urine  Negative   Phencyclidine (PCP), Urine  Negative   THC Screen, Urine  Negative   Tricyclic Antidepressants Screen  Negative       I have personally looked at the labs and they are summarized above.  ----------------------------------------------------------------------------------------------------------------------  Detailed radiology reports for the last 24 hours:    Imaging Results (Last 24 Hours)       Procedure Component Value Units Date/Time    US Ob 14 + Weeks Single or First Gestation [[id number]] Collected: 02/06/25 2118     Updated: 02/06/25 2121    Narrative:      INDICATION: Abnormal labs.     COMPARISON: None.     TECHNIQUE: Transabdominal pelvic ultrasound was performed.       Impression:      FINDINGS/IMPRESSION: Single live intrauterine fetus with estimated  gestational age 16 weeks 5 days. Estimated fetal weight 167.7 g. Fetal  heart rate 156 bpm.      This report was finalized on 2/6/2025 9:19 PM by Alex Pallas, DO.           I have personally looked at the radiology images and I have read the available final report.    Assessment & Plan       -Serratia marcescens and Klebsiella oxytoca bacteremia, present on admission  -Hypotension, present on admission  -16-week 5-day uterine pregnancy, present on admission  -Recent urinary tract infection diagnosis, urinalysis inconclusive this admission  -Recent treatment for MRSA bacteremia with presumed endocarditis  -Recent discovery of a mass of unknown origin in the right atrium, prominent eustachian valve versus vegetation  -Acute hypokalemia, present on admission  -History of substance abuse, in remission, maintained on Suboxone  -Tobacco smoking use disorder  -History of  hepatitis C    Admit to the critical care unit.  We will consult the infectious disease team.  I did talk to nurse practitioner Omkar with the ID team; she has suggested for now starting daptomycin and cefepime, with more recommendations to come later in the day.  Will have OB perform fetal monitoring every shift.  Will monitor the blood pressures closely, with the goal MAP being 65 mmHg.  If the BP continues to be low, then will add continuous IVF.  Please note that the patient received the full sepsis bolus.  Will add a nicotine patch for the smoking addiction.  Will give a dose of magnesium oxide 800 mg and a dose of 40 meq oral KCl.  I have stared a prenatal vitamin and folic acid for the pregnancy.    VTE Prophylaxis: SCUDs    The patient is high risk due to the following diagnoses/reasons: Pregnancy, recurrent bacteremia with a different set of bacteria    Alix Vasquez MD  [facility] Hospitalist  02/07/25  06:12 EST       Electronically signed by Alix Vasquez MD at 02/07/25 0719          Emergency Department Notes        Li Alvarenga RN at 02/06/25 2137          Report given to Moon RUIZ    Electronically signed by Li Alvarenga RN at 02/06/25 2137       Li Alvarenga RN at 02/06/25 2055          Medications compatible to run together via lexicomp    Electronically signed by Li Alvarenga RN at 02/06/25 2104       Li Alvarenga RN at 02/06/25 1900          Midline removed at this time per VORB from Dr. Pendleton. Tip intact at this time, skin is slightly red, warm, and intact. Pt tolerated well at this time. Provider made aware      Electronically signed by Li Alvarenga RN at 02/06/25 2107       Shobha Levi, PCT at 02/06/25 1716          Waiting 30 minutes per blood culture education to stick the same site as the first set due to patient being a very difficult stick. Rn and provider made aware     Electronically signed by Shobha Levi PCT at 02/06/25 1733       Vital  Signs (last 2 days)       Date/Time Temp Temp src Pulse Resp BP Patient Position SpO2    02/07/25 0800 -- -- 61 -- 94/51 -- 96    02/07/25 0730 -- -- 65 -- 99/54 -- 98    02/07/25 0700 -- -- 80 -- 117/80 -- 99    02/07/25 0600 -- -- 64 15 98/54 -- 96    02/07/25 0500 -- -- 69 15 99/59 -- 98    02/07/25 0430 -- -- 78 -- 92/46 -- 86    02/07/25 0400 97.9 (36.6) Oral 60 14 86/50 -- 98    02/07/25 0330 -- -- 61 -- 96/50 -- 98    02/07/25 0300 -- -- 71 13 95/49 -- 99    02/07/25 0230 -- -- 68 -- 99/59 -- 97    02/07/25 0200 -- -- 66 10 96/54 -- 98    02/07/25 0145 -- -- 74 11 102/62 -- 99    02/07/25 0120 98.6 (37) Oral 75 12 113/60 -- 98    02/07/25 0108 -- -- 70 -- -- -- 96    02/07/25 0107 -- -- 74 -- -- -- 96    02/07/25 0106 -- -- 71 -- -- -- 96    02/07/25 0105 -- -- 71 -- -- -- 96    02/07/25 0104 -- -- 73 -- -- -- 96    02/07/25 0103 -- -- 73 -- -- -- 96    02/07/25 0102 -- -- 74 -- -- -- 96    02/07/25 0101 -- -- 71 -- -- -- 97    02/07/25 0013 -- -- 71 -- -- -- 97    02/07/25 0012 -- -- 68 -- -- -- 97    02/07/25 0011 -- -- 72 -- -- -- 98    02/07/25 0010 -- -- 68 -- -- -- 97    02/07/25 0008 -- -- 67 -- -- -- 97    02/07/25 0007 -- -- 70 -- -- -- 97    02/07/25 0006 -- -- 67 -- -- -- 99    02/07/25 0005 -- -- 68 -- -- -- 98    02/06/25 2132 -- -- 77 -- -- -- 100    02/06/25 2131 -- -- 75 -- 103/52 -- 100    02/06/25 2130 -- -- 75 -- -- -- 100    02/06/25 2129 -- -- 75 -- -- -- 100    02/06/25 2128 -- -- 74 -- -- -- 100    02/06/25 2127 -- -- 76 -- -- -- 100    02/06/25 2126 -- -- 73 -- -- -- 100    02/06/25 2125 -- -- 73 -- -- -- 100    02/06/25 1932 -- -- -- -- 90/50 -- --    02/06/25 1917 -- -- -- -- 107/63 -- --    02/06/25 1820 -- -- 99 -- 107/63 -- 93    02/06/25 1814 -- -- 92 -- 90/50 -- 99    02/06/25 1740 -- -- 98 -- 86/53 -- 98    02/06/25 1725 -- -- 89 -- 98/50 -- 98    02/06/25 1702 99.2 (37.3) Oral 97 18 82/57 Lying 99          Facility-Administered Medications as of 2/7/2025   Medication Dose  Route Frequency Provider Last Rate Last Admin    buprenorphine-naloxone (SUBOXONE) 8-2 MG per SL tablet 0.5 tablet  0.5 tablet Sublingual BID Alix Vasquez MD        calcium carbonate (TUMS) chewable tablet 500 mg (200 mg elemental)  2 tablet Oral TID PRN Alix Vasquez MD   2 tablet at 02/07/25 0312    [COMPLETED] cefepime 2000 mg IVPB in 100 mL NS (VTB)  2,000 mg Intravenous Once Efren Frias MD   Stopped at 02/06/25 2134    cefepime 2000 mg IVPB in 100 mL NS (VTB)  2,000 mg Intravenous Q8H Alix Vasquez MD   2,000 mg at 02/07/25 0653    [COMPLETED] DAPTOmycin (CUBICIN) 500 mg/50 mL 0.9% sodium chloride IVPB  8 mg/kg Intravenous Q24H Efren Frias MD   Stopped at 02/06/25 2134    DAPTOmycin (CUBICIN) 500 mg/50 mL 0.9% sodium chloride IVPB  8 mg/kg Intravenous Q24H Alix Vasquez MD        docusate sodium (COLACE) capsule 100 mg  100 mg Oral Daily Alix Vasquez MD        famotidine (PEPCID) tablet 20 mg  20 mg Oral BID AC Alix Vasquez MD   20 mg at 02/07/25 0654    folic acid (FOLVITE) tablet 1 mg  1 mg Oral Daily Alix Vasquez MD        gabapentin (NEURONTIN) capsule 800 mg  800 mg Oral Daily PRN Alix Vasquez MD        Lidocaine Viscous HCl (XYLOCAINE) 2 % solution 10 mL  10 mL Mouth/Throat Q3H PRN Alix Vasquez MD   10 mL at 02/07/25 0349    [COMPLETED] magnesium oxide (MAG-OX) tablet 800 mg  800 mg Oral Once Alix Vasquez MD   800 mg at 02/07/25 0654    melatonin tablet 15 mg  15 mg Oral Nightly PRN Alix Vasquez MD        mupirocin (BACTROBAN) 2 % nasal ointment 1 Application  1 Application Each Nare BID Kadeem Ayala DO        nicotine (NICODERM CQ) 21 MG/24HR patch 1 patch  1 patch Transdermal Q24H Alix Vasquez MD   1 patch at 02/07/25 0348    Pharmacy Consult   Not Applicable Continuous PRN Alix Vasquez MD        [COMPLETED] potassium chloride (KLOR-CON M20) CR tablet 40 mEq  40 mEq Oral Once Alix Vasquez MD    40 mEq at 02/07/25 0654    prenatal vitamin tablet 1 tablet  1 tablet Oral Daily Alix Vasquez MD        [COMPLETED] sepsis fluid NS 0.9 % bolus 1,854 mL  30 mL/kg Intravenous Once Efren Frias MD   1,854 mL at 02/06/25 1816    sodium chloride 0.9 % flush 10 mL  10 mL Intravenous PRN Efren Frias MD        sodium chloride 0.9 % flush 10 mL  10 mL Intravenous Q12H Alix Vasquez MD        sodium chloride 0.9 % flush 10 mL  10 mL Intravenous PRN Alix Vasquez MD        sodium chloride 0.9 % infusion 40 mL  40 mL Intravenous PRN Alix Vasquez MD        sodium chloride 0.9 % infusion  100 mL/hr Intravenous Continuous Alix Vasquez  mL/hr at 02/07/25 0703 100 mL/hr at 02/07/25 0703     Lab Results (all)       Procedure Component Value Units Date/Time    TSH [[id number]]  (Normal) Collected: 02/06/25 1716    Specimen: Blood from Arm, Right Updated: 02/07/25 0017     TSH 1.020 uIU/mL     Hemoglobin A1c [[id number]]  (Normal) Collected: 02/06/25 1716    Specimen: Blood from Arm, Right Updated: 02/07/25 0008     Hemoglobin A1C 5.00 %     Narrative:      Hemoglobin A1C Ranges:    Increased Risk for Diabetes  5.7% to 6.4%  Diabetes                     >= 6.5%  Diabetic Goal                < 7.0%    Magnesium [[id number]]  (Normal) Collected: 02/06/25 1716    Specimen: Blood from Arm, Right Updated: 02/07/25 0004     Magnesium 1.9 mg/dL     Urine Drug Screen - Urine, Clean Catch [[id number]]  (Abnormal) Collected: 02/06/25 1728    Specimen: Urine, Clean Catch Updated: 02/06/25 1822     THC, Screen, Urine Negative     Phencyclidine (PCP), Urine Negative     Cocaine Screen, Urine Negative     Methamphetamine, Ur Negative     Opiate Screen Negative     Amphetamine Screen, Urine Negative     Benzodiazepine Screen, Urine Negative     Tricyclic Antidepressants Screen Negative     Methadone Screen, Urine Negative     Barbiturates Screen, Urine Negative     Oxycodone Screen, Urine  Negative     Buprenorphine, Screen, Urine Positive    Narrative:      Cutoff For Drugs Screened:    Amphetamines               500 ng/ml  Barbiturates               200 ng/ml  Benzodiazepines            150 ng/ml  Cocaine                    150 ng/ml  Methadone                  200 ng/ml  Opiates                    100 ng/ml  Phencyclidine               25 ng/ml  THC                         50 ng/ml  Methamphetamine            500 ng/ml  Tricyclic Antidepressants  300 ng/ml  Oxycodone                  100 ng/ml  Buprenorphine               10 ng/ml    The normal value for all drugs tested is negative. This report includes unconfirmed screening results, with the cutoff values listed, to be used for medical treatment purposes only.  Unconfirmed results must not be used for non-medical purposes such as employment or legal testing.  Clinical consideration should be applied to any drug of abuse test, particularly when unconfirmed results are used.      Blood Culture - Blood, Arm, Right [[id number]] Collected: 02/06/25 1816    Specimen: Blood from Arm, Right Updated: 02/06/25 1820    Acetaminophen Level [[id number]]  (Normal) Collected: 02/06/25 1716    Specimen: Blood from Arm, Right Updated: 02/06/25 1815     Acetaminophen <5.0 mcg/mL     Ethanol [[id number]] Collected: 02/06/25 1716    Specimen: Blood from Arm, Right Updated: 02/06/25 1815     Ethanol <10 mg/dL      Ethanol % <0.010 %     Narrative:      >/= 80.0 legally intoxicated    Salicylate Level [[id number]]  (Normal) Collected: 02/06/25 1716    Specimen: Blood from Arm, Right Updated: 02/06/25 1815     Salicylate <0.3 mg/dL     Fentanyl, Urine - Urine, Clean Catch [[id number]]  (Normal) Collected: 02/06/25 1728    Specimen: Urine, Clean Catch Updated: 02/06/25 1813     Fentanyl, Urine Negative    Narrative:      Negative Threshold:      Fentanyl 5 ng/mL     The normal value for the drug tested is negative. This report includes final unconfirmed screening results to  "be used for medical treatment purposes only. Unconfirmed results must not be used for non-medical purposes such as employment or legal testing. Clinical consideration should be applied to any drug of abuse test, particularly when unconfirmed results are used.           Procalcitonin [[id number]]  (Abnormal) Collected: 02/06/25 1716    Specimen: Blood from Arm, Right Updated: 02/06/25 1757     Procalcitonin 1.50 ng/mL     Narrative:      As a Marker for Sepsis (Non-Neonates):    1. <0.5 ng/mL represents a low risk of severe sepsis and/or septic shock.  2. >2 ng/mL represents a high risk of severe sepsis and/or septic shock.    As a Marker for Lower Respiratory Tract Infections that require antibiotic therapy:    PCT on Admission    Antibiotic Therapy       6-12 Hrs later    >0.5                Strongly Recommended  >0.25 - <0.5        Recommended   0.1 - 0.25          Discouraged              Remeasure/reassess PCT  <0.1                Strongly Discouraged     Remeasure/reassess PCT    As 28 day mortality risk marker: \"Change in Procalcitonin Result\" (>80% or <=80%) if Day 0 (or Day 1) and Day 4 values are available. Refer to http://www.[facility]-pct-calculator.com    Change in PCT <=80%  A decrease of PCT levels below or equal to 80% defines a positive change in PCT test result representing a higher risk for 28-day all-cause mortality of patients diagnosed with severe sepsis for septic shock.    Change in PCT >80%  A decrease of PCT levels of more than 80% defines a negative change in PCT result representing a lower risk for 28-day all-cause mortality of patients diagnosed with severe sepsis or septic shock.       COVID PRE-OP / PRE-PROCEDURE SCREENING ORDER (NO ISOLATION) - Swab, Nasopharynx [[id number]]  (Normal) Collected: 02/06/25 1727    Specimen: Swab from Nasopharynx Updated: 02/06/25 1756    Narrative:      The following orders were created for panel order COVID PRE-OP / PRE-PROCEDURE SCREENING ORDER (NO " ISOLATION) - Swab, Nasopharynx.  Procedure                               Abnormality         Status                     ---------                               -----------         ------                     COVID-19 and FLU A/B PCR...[[id number]]  Normal              Final result                 Please view results for these tests on the individual orders.    COVID-19 and FLU A/B PCR, 1 HR TAT - Swab, Nasopharynx [[id number]]  (Normal) Collected: 02/06/25 1727    Specimen: Swab from Nasopharynx Updated: 02/06/25 1756     COVID19 Not Detected     Influenza A PCR Not Detected     Influenza B PCR Not Detected    Narrative:      Fact sheet for providers: https://www.fda.gov/media/614935/download    Fact sheet for patients: https://www.fda.gov/media/515347/download    Test performed by PCR.    Urinalysis, Microscopic Only - Urine, Clean Catch [[id number]]  (Abnormal) Collected: 02/06/25 1728    Specimen: Urine, Clean Catch Updated: 02/06/25 1755     RBC, UA 0-2 /HPF      WBC, UA 0-2 /HPF      Comment: Urine culture not indicated.        Bacteria, UA None Seen /HPF      Squamous Epithelial Cells, UA 3-6 /HPF      Hyaline Casts, UA 0-2 /LPF      Methodology Manual Light Microscopy    Comprehensive Metabolic Panel [[id number]]  (Abnormal) Collected: 02/06/25 1716    Specimen: Blood from Arm, Right Updated: 02/06/25 1752     Glucose 89 mg/dL      BUN 9 mg/dL      Creatinine 0.89 mg/dL      Sodium 132 mmol/L      Potassium 3.2 mmol/L      Chloride 102 mmol/L      CO2 18.1 mmol/L      Calcium 9.0 mg/dL      Total Protein 7.2 g/dL      Albumin 3.9 g/dL      ALT (SGPT) <5 U/L      AST (SGOT) 7 U/L      Alkaline Phosphatase 85 U/L      Total Bilirubin 0.4 mg/dL      Globulin 3.3 gm/dL      A/G Ratio 1.2 g/dL      BUN/Creatinine Ratio 10.1     Anion Gap 11.9 mmol/L      eGFR 89.0 mL/min/1.73     Narrative:      GFR Categories in Chronic Kidney Disease (CKD)      GFR Category          GFR (mL/min/1.73)    Interpretation  G1                      90 or greater         Normal or high (1)  G2                      60-89                Mild decrease (1)  G3a                   45-59                Mild to moderate decrease  G3b                   30-44                Moderate to severe decrease  G4                    15-29                Severe decrease  G5                    14 or less           Kidney failure          (1)In the absence of evidence of kidney disease, neither GFR category G1 or G2 fulfill the criteria for CKD.    eGFR calculation 2021 CKD-EPI creatinine equation, which does not include race as a factor    Lactic Acid, Plasma [[id number]]  (Normal) Collected: 02/06/25 1716    Specimen: Blood from Arm, Right Updated: 02/06/25 1744     Lactate 1.5 mmol/L     Urinalysis With Culture If Indicated - Urine, Clean Catch [[id number]]  (Abnormal) Collected: 02/06/25 1728    Specimen: Urine, Clean Catch Updated: 02/06/25 1742     Color, UA Dark Yellow     Appearance, UA Cloudy     pH, UA 5.5     Specific Gravity, UA 1.025     Glucose, UA Negative     Ketones, UA Trace     Bilirubin, UA Small (1+)     Blood, UA Trace     Protein, UA 30 mg/dL (1+)     Leuk Esterase, UA Trace     Nitrite, UA Negative     Urobilinogen, UA 1.0 E.U./dL    Narrative:      In absence of clinical symptoms, the presence of pyuria, bacteria, and/or nitrites on the urinalysis result does not correlate with infection.    C-reactive Protein [[id number]]  (Abnormal) Collected: 02/04/25 1141    Specimen: Blood Updated: 02/06/25 1729     C-Reactive Protein 7.83 mg/dL     CBC & Differential [[id number]]  (Abnormal) Collected: 02/06/25 1716    Specimen: Blood from Arm, Right Updated: 02/06/25 1722    Narrative:      The following orders were created for panel order CBC & Differential.  Procedure                               Abnormality         Status                     ---------                               -----------         ------                     CBC Auto Differential[[id number]]         Abnormal            Final result                 Please view results for these tests on the individual orders.    CBC Auto Differential [[id number]]  (Abnormal) Collected: 02/06/25 1716    Specimen: Blood from Arm, Right Updated: 02/06/25 1722     WBC 5.15 10*3/mm3      RBC 3.24 10*6/mm3      Hemoglobin 9.7 g/dL      Hematocrit 29.3 %      MCV 90.4 fL      MCH 29.9 pg      MCHC 33.1 g/dL      RDW 12.9 %      RDW-SD 43.0 fl      MPV 10.4 fL      Platelets 147 10*3/mm3      Neutrophil % 88.3 %      Lymphocyte % 8.2 %      Monocyte % 2.1 %      Eosinophil % 0.6 %      Basophil % 0.4 %      Immature Grans % 0.4 %      Neutrophils, Absolute 4.55 10*3/mm3      Lymphocytes, Absolute 0.42 10*3/mm3      Monocytes, Absolute 0.11 10*3/mm3      Eosinophils, Absolute 0.03 10*3/mm3      Basophils, Absolute 0.02 10*3/mm3      Immature Grans, Absolute 0.02 10*3/mm3      nRBC 0.0 /100 WBC     Blood Culture - Blood, Arm, Right [[id number]] Collected: 02/06/25 1716    Specimen: Blood from Arm, Right Updated: 02/06/25 1720          Imaging Results (All)       Procedure Component Value Units Date/Time    US Ob 14 + Weeks Single or First Gestation [[id number]] Collected: 02/06/25 2118     Updated: 02/06/25 2121    Narrative:      INDICATION: Abnormal labs.     COMPARISON: None.     TECHNIQUE: Transabdominal pelvic ultrasound was performed.       Impression:      FINDINGS/IMPRESSION: Single live intrauterine fetus with estimated  gestational age 16 weeks 5 days. Estimated fetal weight 167.7 g. Fetal  heart rate 156 bpm.      This report was finalized on 2/6/2025 9:19 PM by Alex Pallas, DO.             ECG/EMG Results (all)       Procedure Component Value Units Date/Time    Adult Transthoracic Echo Complete W/ Cont if Necessary Per Protocol [[id number]] Resulted: 02/06/25 2308     Updated: 02/06/25 2308     EF(MOD-bp) 62.9 %      LVIDd 4.4 cm      LVIDs 2.8 cm      IVSd 0.80 cm      LVPWd 0.80 cm      FS 36.4 %      IVS/LVPW 1.00 cm       ESV(cubed) 22.0 ml      LV Sys Vol (BSA corrected) 22.7 cm2      EDV(cubed) 85.2 ml      LV Gallardo Vol (BSA corrected) 58.4 cm2      LV mass(C)d 109.4 grams      LVOT area 3.5 cm2      LVOT diam 2.10 cm      EDV(MOD-sp2) 89.7 ml      EDV(MOD-sp4) 94.7 ml      ESV(MOD-sp2) 29.3 ml      ESV(MOD-sp4) 36.8 ml      SV(MOD-sp2) 60.4 ml      SV(MOD-sp4) 57.9 ml      SVi(MOD-SP2) 37.2 ml/m2      SVi(MOD-SP4) 35.7 ml/m2      SVi (LVOT) 66.6 ml/m2      EF(MOD-sp2) 67.3 %      EF(MOD-sp4) 61.1 %      MV E max nehemias 104.0 cm/sec      MV A max nehemias 72.4 cm/sec      MV E/A 1.44     LA ESV Index (BP) 26.9 ml/m2      Med Peak E' Nehemias 12.7 cm/sec      Lat Peak E' Nehemias 19.7 cm/sec      TR max nehemias 262.0 cm/sec      Avg E/e' ratio 6.42     SV(LVOT) 108.1 ml      TAPSE (>1.6) 2.7 cm      LA dimension (2D)  4.5 cm      LV V1 max 150.0 cm/sec      LV V1 max PG 9.0 mmHg      LV V1 mean PG 5.0 mmHg      LV V1 VTI 31.2 cm      Ao pk nehemias 211.0 cm/sec      Ao max PG 17.8 mmHg      Ao mean PG 9.0 mmHg      Ao V2 VTI 42.1 cm      NIGHAT(I,D) 2.6 cm2      TR max PG 27.5 mmHg      RVSP(TR) 37.5 mmHg      RAP systole 10.0 mmHg      PA acc time 0.11 sec      Ao root diam 2.6 cm      ACS 1.70 cm     Narrative:        Normal left ventricular cavity size and wall thickness noted. All left   ventricular wall segments contract normally. Left ventricular diastolic   function was normal.    Left ventricular ejection fraction appears to be 61 - 65%.    Left ventricular diastolic function was normal.    The aortic valve is structurally normal with no regurgitation or   stenosis present.    The mitral valve is structurally normal with no regurgitation or   significant stenosis present.    A strandular structure noted in the right atrium most likely a    prominent Eustachian valve although cannot exclude the presence of a right   atrial mass. it appears to be about the same as noted on the last study on   12/4/24.    Trace to mild tricuspid valve regurgitation is  present. Estimated right   ventricular systolic pressure from tricuspid regurgitation is mildly   elevated (35-45 mmHg).    There is no evidence of pericardial effusion. .    Comments. May consider a CRISTOPHER study for better defnition of this   structure.      Telemetry Scan [[id number]] Resulted: 02/06/25     Updated: 02/07/25 0613    Telemetry Scan [[id number]] Resulted: 02/06/25     Updated: 02/07/25 0613    Telemetry Scan [[id number]] Resulted: 02/06/25     Updated: 02/07/25 0613    ECG 12 Lead QT Measurement [[id number]] Collected: 02/07/25 0619     Updated: 02/07/25 0620     QT Interval 424 ms      QTC Interval 430 ms     Narrative:      Test Reason : QT Measurement  Blood Pressure :   */*   mmHG  Vent. Rate :  62 BPM     Atrial Rate :  62 BPM     P-R Int : 148 ms          QRS Dur :  86 ms      QT Int : 424 ms       P-R-T Axes :  54  34  46 degrees    QTcB Int : 430 ms    Normal sinus rhythm  Normal ECG  When compared with ECG of 03-Dec-2024 12:29,  No significant change was found    Referred By: DEBBI           Confirmed By:           Orders (all)        Start     Ordered    02/07/25 2100  DAPTOmycin (CUBICIN) 500 mg/50 mL 0.9% sodium chloride IVPB  Every 24 Hours         02/07/25 0631    02/07/25 0900  nicotine (NICODERM CQ) 21 MG/24HR patch 1 patch  Every 24 Hours Scheduled         02/07/25 0254    02/07/25 0900  buprenorphine-naloxone (SUBOXONE) 8-2 MG per SL tablet 0.5 tablet  2 Times Daily         02/07/25 0626    02/07/25 0900  docusate sodium (COLACE) capsule 100 mg  Daily         02/07/25 0626    02/07/25 0900  sodium chloride 0.9 % flush 10 mL  Every 12 Hours Scheduled         02/07/25 0452    02/07/25 0900  folic acid (FOLVITE) tablet 1 mg  Daily         02/07/25 0624    02/07/25 0900  prenatal vitamin tablet 1 tablet  Daily         02/07/25 0624    02/07/25 0815  mupirocin (BACTROBAN) 2 % nasal ointment 1 Application  2 Times Daily         02/07/25 0718    02/07/25 0730  famotidine (PEPCID) tablet 20 mg   2 Times Daily Before Meals         02/07/25 0259    02/07/25 0719  Daily CHG Bath While in ICU  Daily      Comments: Use for admission bath & daily bath for duration of critical care stay    02/07/25 0718    02/07/25 0715  magnesium oxide (MAG-OX) tablet 800 mg  Once         02/07/25 0620    02/07/25 0715  sodium chloride 0.9 % infusion  Continuous         02/07/25 0622    02/07/25 0715  potassium chloride (KLOR-CON M20) CR tablet 40 mEq  Once         02/07/25 0624    02/07/25 0636  Case Management  Consult  Once        Provider:  (Not yet assigned)    02/07/25 0637    02/07/25 0630  cefepime 2000 mg IVPB in 100 mL NS (VTB)  Every 8 Hours         02/07/25 0628    02/07/25 0626  gabapentin (NEURONTIN) capsule 800 mg  Daily PRN         02/07/25 0626    02/07/25 0626  melatonin tablet 15 mg  Nightly PRN         02/07/25 0626    02/07/25 0620  Fetal Monitoring  Every Shift       02/07/25 0619    02/07/25 0614  Pharmacy Consult  Continuous PRN         02/07/25 0614    02/07/25 0614  ECG 12 Lead QT Measurement  Once         02/07/25 0613    02/07/25 0452  Connectors / Hubs Must Be Scrubbed 15 Seconds Using 70% Alcohol Before Access - Allow to Dry Before Accessing Line  Continuous         02/07/25 0452    02/07/25 0451  sodium chloride 0.9 % flush 10 mL  As Needed         02/07/25 0452    02/07/25 0451  sodium chloride 0.9 % infusion 40 mL  As Needed         02/07/25 0452    02/07/25 0259  calcium carbonate (TUMS) chewable tablet 500 mg (200 mg elemental)  3 Times Daily PRN         02/07/25 0259    02/07/25 0259  Code Status and Medical Interventions: CPR (Attempt to Resuscitate); Full Support  Continuous         02/07/25 0259    02/07/25 0258  Lidocaine Viscous HCl (XYLOCAINE) 2 % solution 10 mL  Every 3 Hours PRN         02/07/25 0258    02/07/25 0218  Place Sequential Compression Device  Once         02/07/25 0217    02/07/25 0218  Maintain Sequential Compression Device  Continuous         02/07/25 0214     02/07/25 0216  Wound Ostomy Eval & Treat  Once         02/07/25 0216    02/07/25 0002  nicotine (NICODERM CQ) 21 MG/24HR patch 1 patch  Once,   Status:  Discontinued         02/06/25 2346    02/06/25 2355  Hemoglobin A1c  Once         02/06/25 2354    02/06/25 2355  Magnesium  Once         02/06/25 2354    02/06/25 2354  TSH  Once         02/06/25 2354    02/06/25 2312  Inpatient Admission  Once         02/06/25 2312    02/06/25 2130  DAPTOmycin (CUBICIN) 500 mg/50 mL 0.9% sodium chloride IVPB  Every 24 Hours         02/06/25 2030 02/06/25 2054  Inpatient Hospitalist Consult  Once        Specialty:  Hospitalist  Provider:  Kadeem Ayala DO    02/06/25 2054 02/06/25 2045  cefepime 2000 mg IVPB in 100 mL NS (VTB)  Once         02/06/25 2029 02/06/25 2018  Adult Transthoracic Echo Complete W/ Cont if Necessary Per Protocol  Once         02/06/25 2017 02/06/25 2016  US Ob 14 + Weeks Single or First Gestation  1 Time Imaging         02/06/25 2015 02/06/25 1823  Diet: Regular/House; Fluid Consistency: Thin (IDDSI 0)  Diet Effective Now         02/06/25 1823    02/06/25 1810  sepsis fluid NS 0.9 % bolus 1,854 mL  Once         02/06/25 1754    02/06/25 1759  Fentanyl, Urine - Urine, Clean Catch  Once         02/06/25 1758    02/06/25 1755  Acetaminophen Level  Once         02/06/25 1754    02/06/25 1755  Ethanol  Once         02/06/25 1754    02/06/25 1755  Urine Drug Screen - Urine, Clean Catch  Once         02/06/25 1754    02/06/25 1755  Salicylate Level  Once         02/06/25 1754    02/06/25 1741  Urinalysis, Microscopic Only - Urine, Clean Catch  Once         02/06/25 1740    02/06/25 1722  Urinalysis With Culture If Indicated - Urine, Clean Catch  Once         02/06/25 1721    02/06/25 1716  COVID PRE-OP / PRE-PROCEDURE SCREENING ORDER (NO ISOLATION) - Swab, Nasopharynx  Once         02/06/25 1716    02/06/25 1716  COVID-19 and FLU A/B PCR, 1 HR TAT - Swab, Nasopharynx  PROCEDURE ONCE          "02/06/25 1716    02/06/25 1704  CBC & Differential  Once         02/06/25 1703    02/06/25 1704  Insert Peripheral IV  Once        Placed in \"And\" Linked Group    02/06/25 1703    02/06/25 1704  Comprehensive Metabolic Panel  Once         02/06/25 1703    02/06/25 1704  CBC Auto Differential  PROCEDURE ONCE         02/06/25 1703    02/06/25 1704  Blood Culture - Blood, Arm, Right  Once         02/06/25 1703    02/06/25 1704  Blood Culture - Blood, Arm, Right  Once         02/06/25 1703    02/06/25 1704  Lactic Acid, Plasma  Once         02/06/25 1703    02/06/25 1704  Procalcitonin  Once         02/06/25 1703    02/06/25 1704  C-reactive Protein  Once         02/06/25 1703    02/06/25 1703  sodium chloride 0.9 % flush 10 mL  As Needed        Placed in \"And\" Linked Group    02/06/25 1703    02/06/25 0000  daptomycin (CUBICIN) solution IVPB  Once         02/06/25 1725    02/06/25 0000  Telemetry Scan  Once         02/06/25 0000    02/06/25 0000  Telemetry Scan  Once         02/06/25 0000    02/06/25 0000  Telemetry Scan  Once         02/06/25 0000    Unscheduled  Change Dressing to IV Site As Needed When Damp, Loose or Soiled  As Needed       02/07/25 0452    Unscheduled  Change Needleless Connectors  As Needed      Comments: Change Needleless Connectors When:  - Administration Set Changed  - Dressing Changed  - Removed For Any Reason  - Residual Blood or Debris Within Connector  - Prior to Drawing Blood Cultures  - Contamination of Connector  - After Administration of Blood or Blood Components    02/07/25 0452    Unscheduled  Insert New Peripheral IV  As Needed      Comments: Frequency Per Facility Policy    02/07/25 0452    --  docusate sodium (COLACE) 100 MG capsule  Daily         02/07/25 0321    --  Pediatric Multivitamins-Iron (Flintstones Complete) 18 MG chewable tablet chewable tablet  Daily         02/07/25 0321                  Physician Progress Notes (all)    No notes of this type exist for this " encounter.       Consult Notes (all)    No notes of this type exist for this encounter.

## 2025-02-07 NOTE — H&P
[facility]    Patient Identification:  Name:  Sailaja Alarcon  Age:  29 y.o.  Sex:  female  :  1995  MRN:  [id number]   Visit Number:  [id number]  Admit Date: 2025   Room number:  CC10/1C  Primary Care Physician:  Margaret Baxter APRN    Date of Admission: 2025     Subjective     Chief complaint:    Chief Complaint   Patient presents with    Abnormal Lab     History of presenting illness:  29 y.o. female at 16 weeks EGA who was admitted on 2025 from the ED as a callback for positive blood cultures that were drawn in the ID clinic drawn 3 days prior to admission.  The patient has a past medical history of substance abuse, tobacco smoking, and hepatitis C.  She was initially hospitalized at our hospital 12/3/2024-2024 for positive blood cultures.  She was diagnosed with MRSA bacteremia on 2024, with TTE showing a freely mobile structure in the right atrium concerning for possible atrial wall vegetation versus prominent eustachian valve.  CRISTOPHER was considered in order to look at the right atrial structure; however, the risks of performing this procedure during pregnancy seemed to outweigh the benefits at the time.  Thus, ID recommended a 6 week course of daptomycin; she was discharged to a local LTAC to complete the IV antibiotics.  However, the patient left AMA from the LTAC.  She was readmitted to our hospital 12/10/2024-2024 again for the bacteremia treatment; however, Dr. Ayala and the patient came to an understanding that the patient would go daily to the infusion clinic for the daptomycin infusions.  The patient has been following up in the ID clinic; per their reports, the patient had missed at least 2 weeks of the daptomycin.  The patient was seen in the ID clinic on 2025; repeat blood cultures were obtained at that time and they are growing Serratia marcescens and Klebsiella oxytoca.  The patient was  contacted by the ID clinic staff on 2/5/2025 and instructed to come immediately to the ED, which the patient did on 2/6/2025.    The patient denies any symptoms; she was shocked that we asked her to come back to the ED.  She denies fevers, nausea, emesis, diarrhea, shortness of air, dizziness, syncope.  However, she does have dysuria and was diagnosed with a UTI last week, but the antibiotic has not be taken yet due to timing issues.  She adamantly denies any recent IVDA, with her 5th sober anniversary coming up soon.  The ED attending contacted [facility] at [facility] (patient's choice) and there was not a need for transfer to [facility] at this time; IV antibiotics were recommended.  Due to the low blood pressures and her pregnant status, the patient was admitted to the CCU for further evaluation and treatment.    ---------------------------------------------------------------------------------------------------------------------   Review of Systems   Constitutional:  Positive for fatigue (Pregnancy). Negative for chills, diaphoresis and fever.   HENT:  Negative for congestion and rhinorrhea.    Respiratory:  Negative for cough and shortness of breath.    Cardiovascular:  Negative for chest pain and leg swelling.   Gastrointestinal:  Negative for diarrhea, nausea and vomiting.   Genitourinary:  Positive for dysuria. Negative for hematuria.   Musculoskeletal:  Negative for arthralgias and myalgias.   Skin:  Negative for pallor, rash and wound.   Neurological:  Negative for facial asymmetry, speech difficulty and light-headedness.   Psychiatric/Behavioral:  Negative for agitation, behavioral problems and confusion.      ---------------------------------------------------------------------------------------------------------------------   Past Medical History:   Diagnosis Date    Current every day smoker 01/28/2024    Cutaneous abscess of neck 10/10/2021    Added automatically from request for surgery [id number]       Hepatitis C     Infective endocarditis 12/10/2024    Migraines     MRSA bacteremia 12/04/2024    Non-pressure chronic ulcer of other part of left lower leg with fat layer exposed 01/28/2024     Past Surgical History:   Procedure Laterality Date    HEAD/NECK ABSCESS INCISION AND DRAINAGE Left 7/8/2022    Procedure: HEAD NECK ABSCESS INCISION AND DRAINAGE;  Surgeon: Miquel Grayson MD;  Location:  [facility] OR;  Service: General;  Laterality: Left;    INCISION AND DRAINAGE ABSCESS Right 10/10/2021    Procedure: INCISION AND DRAINAGE ABSCESS CENTRAL LINE PLACEMENT;  Surgeon: Naz Payne MD;  Location:  [facility] OR;  Service: General;  Laterality: Right;  I&D ABSCESS= INFECTED.  CENTRAL LINE = CLEAN.     Family History   Problem Relation Age of Onset    Hypertension Mother     Cancer Father     Hypertension Maternal Grandmother     Diabetes Maternal Grandfather     Diabetes Maternal Aunt      Social History     Socioeconomic History    Marital status:    Tobacco Use    Smoking status: Every Day     Current packs/day: 1.50     Average packs/day: 1.3 packs/day for 30.0 years (37.5 ttl pk-yrs)     Types: Cigarettes     Start date: 2/7/2010    Smokeless tobacco: Never   Vaping Use    Vaping status: Some Days   Substance and Sexual Activity    Alcohol use: No     Alcohol/week: 0.0 standard drinks of alcohol    Drug use: Not Currently     Frequency: 3.0 times per week     Types: IV     Comment: last use 5 YEARS AGO    Sexual activity: Yes     Partners: Male     ---------------------------------------------------------------------------------------------------------------------   Allergies:  Amoxicillin, Penicillins, Vancomycin, and Zofran [ondansetron hcl]  ---------------------------------------------------------------------------------------------------------------------   Medications below are reported home medications pulling from within the system; at this time, these medications have not been reconciled  unless otherwise specified and are in the verification process for further verification as current home medications.      Prior to Admission Medications       Prescriptions Last Dose Informant Patient Reported? Taking?    buprenorphine-naloxone (SUBOXONE) 8-2 MG per SL tablet  Self, Other Yes No    Place 0.25-0.5 tablets under the tongue Daily.    diphenhydrAMINE (BENADRYL) 25 mg capsule  Other, Self Yes No    Take 1 capsule by mouth 2 (Two) Times a Day As Needed for Itching, Allergies or Sleep.    famotidine (PEPCID) 20 MG tablet   Yes No    Take 1 tablet by mouth 2 (Two) Times a Day.    gabapentin (NEURONTIN) 800 MG tablet  Other, Self Yes No    Take 1 tablet by mouth 3 (Three) Times a Day.    ibuprofen (ADVIL,MOTRIN) 800 MG tablet   Yes No    Take 1 tablet by mouth 2 (Two) Times a Day As Needed for Mild Pain or Moderate Pain.    melatonin 5 MG tablet tablet   Yes No    Take 3 tablets by mouth At Night As Needed (Sleep).    prenatal vitamin (prenatal, CLASSIC, vitamin) tablet   Yes No    Take 1 tablet by mouth Daily.          Objective     Vital Signs:  Temp:  [97.9 °F (36.6 °C)-99.2 °F (37.3 °C)] 97.9 °F (36.6 °C)  Heart Rate:  [60-99] 69  Resp:  [10-18] 15  BP: ()/(46-63) 99/59    Mean Arterial Pressure (Non-Invasive) for the past 24 hrs (Last 3 readings):   Noninvasive MAP (mmHg)   02/07/25 0500 69   02/07/25 0430 60   02/07/25 0400 63     SpO2:  [86 %-100 %] 98 %  on   ;   Device (Oxygen Therapy): room air  Body mass index is 23.91 kg/m².    Wt Readings from Last 3 Encounters:   02/07/25 62.2 kg (137 lb 2 oz)   02/04/25 61.8 kg (136 lb 3.2 oz)   01/28/25 62.3 kg (137 lb 6.4 oz)      ----------------------------------------------------------------------------------------------------------------------  Physical Exam  Vitals and nursing note reviewed. Exam conducted with a chaperone present.   Constitutional:       General: She is not in acute distress.     Appearance: She is not ill-appearing,  toxic-appearing or diaphoretic.   HENT:      Head: Normocephalic and atraumatic.      Right Ear: External ear normal.      Left Ear: External ear normal.      Nose: Nose normal.   Eyes:      General:         Right eye: No discharge.         Left eye: No discharge.      Pupils: Pupils are equal, round, and reactive to light.   Cardiovascular:      Rate and Rhythm: Normal rate and regular rhythm.      Pulses: Normal pulses.      Heart sounds: No murmur heard.  Pulmonary:      Effort: Pulmonary effort is normal. No respiratory distress.      Breath sounds: No wheezing or rales.   Abdominal:      General: Abdomen is protuberant. Bowel sounds are normal. There is no distension.      Palpations: Abdomen is soft.      Comments: Abdomen matches pregnancy status.   Musculoskeletal:         General: No swelling or deformity.   Skin:     General: Skin is warm.      Capillary Refill: Capillary refill takes less than 2 seconds.      Coloration: Skin is not jaundiced.   Neurological:      Mental Status: She is alert and oriented to person, place, and time. Mental status is at baseline.      Cranial Nerves: No cranial nerve deficit.   Psychiatric:         Mood and Affect: Mood normal.         Behavior: Behavior normal.         Thought Content: Thought content normal.         Judgment: Judgment normal.       ---------------------------------------------------------------------------------------------------------------------  EKG:  Ordered.      Telemetry: Normal sinus rhythm with heart rates 50s to 70s.  Please note that I personally looked at the telemetry strips.      Last echocardiogram:  Results for orders placed during the hospital encounter of 02/06/25    Adult Transthoracic Echo Complete W/ Cont if Necessary Per Protocol    Interpretation Summary    Normal left ventricular cavity size and wall thickness noted. All left ventricular wall segments contract normally. Left ventricular diastolic function was normal.    Left  ventricular ejection fraction appears to be 61 - 65%.    Left ventricular diastolic function was normal.    The aortic valve is structurally normal with no regurgitation or stenosis present.    The mitral valve is structurally normal with no regurgitation or significant stenosis present.    A strandular structure noted in the right atrium most likely a  prominent Eustachian valve although cannot exclude the presence of a right atrial mass. it appears to be about the same as noted on the last study on 12/4/24.    Trace to mild tricuspid valve regurgitation is present. Estimated right ventricular systolic pressure from tricuspid regurgitation is mildly elevated (35-45 mmHg).    There is no evidence of pericardial effusion. .    Comments. May consider a CRISTOPHER study for better defnition of this structure.    I have personally read the ECHO final report.  --------------------------------------------------------------------------------------------------------------------  LABS:    CBC and coagulation:  Results from last 7 days   Lab Units 02/06/25  1716 02/04/25  1141   PROCALCITONIN ng/mL 1.50*  --    LACTATE mmol/L 1.5  --    CRP mg/dL  --  7.83*  7.53*   WBC 10*3/mm3 5.15 4.47  4.46   HEMOGLOBIN g/dL 9.7* 10.2*  9.9*   HEMATOCRIT % 29.3* 30.3*  29.9*   MCV fL 90.4 90.7  90.9   MCHC g/dL 33.1 33.7  33.1   PLATELETS 10*3/mm3 147 161  154     Renal and electrolytes:  Results from last 7 days   Lab Units 02/06/25  1716 02/04/25  1141   SODIUM mmol/L 132* 136   POTASSIUM mmol/L 3.2* 3.8   MAGNESIUM mg/dL 1.9  --    CHLORIDE mmol/L 102 106   CO2 mmol/L 18.1* 18.1*   BUN mg/dL 9 9   CREATININE mg/dL 0.89 0.71   CALCIUM mg/dL 9.0 9.2   GLUCOSE mg/dL 89 85   ANION GAP mmol/L 11.9 11.9     Estimated Creatinine Clearance: 89.9 mL/min (by C-G formula based on SCr of 0.89 mg/dL).    Liver and pancreatic function:  Results from last 7 days   Lab Units 02/06/25  1716 02/04/25  1141   ALBUMIN g/dL 3.9 3.8   BILIRUBIN mg/dL 0.4  0.3   ALK PHOS U/L 85 73   AST (SGOT) U/L 7 10   ALT (SGPT) U/L <5 6     Endocrine function:  Lab Results   Component Value Date    HGBA1C 5.00 02/06/2025     Lab Results   Component Value Date    TSH 1.020 02/06/2025     Cardiac:  Results from last 7 days   Lab Units 02/04/25  1141   CK TOTAL U/L 7*       Cultures:  Lab Results   Component Value Date    COLORU Dark Yellow (A) 02/06/2025    CLARITYU Cloudy (A) 02/06/2025    PHUR 5.5 02/06/2025    GLUCOSEU Negative 02/06/2025    KETONESU Trace (A) 02/06/2025    BLOODU Trace (A) 02/06/2025    NITRITEU Negative 02/06/2025    LEUKOCYTESUR Trace (A) 02/06/2025    BILIRUBINUR Small (1+) (A) 02/06/2025    UROBILINOGEN 1.0 E.U./dL 02/06/2025    RBCUA 0-2 02/06/2025    WBCUA 0-2 02/06/2025    BACTERIA None Seen 02/06/2025     Microbiology Results (last 10 days)       Procedure Component Value - Date/Time    COVID-19 and FLU A/B PCR, 1 HR TAT - Swab, Nasopharynx [[id number]]  (Normal) Collected: 02/06/25 1727    Lab Status: Final result Specimen: Swab from Nasopharynx Updated: 02/06/25 1756     COVID19 Not Detected     Influenza A PCR Not Detected     Influenza B PCR Not Detected    Blood Culture - Blood, Wrist, Right [[id number]]  (Abnormal) Collected: 02/04/25 1202    Lab Status: Preliminary result Specimen: Blood from Wrist, Right Updated: 02/06/25 0706     Blood Culture Gram Negative Bacilli     Isolated from Pediatric Bottle     Gram Stain Pediatric Bottle Gram negative bacilli    Blood Culture ID, PCR - Blood, Wrist, Right [[id number]]  (Abnormal) Collected: 02/04/25 1202    Lab Status: Final result Specimen: Blood from Wrist, Right Updated: 02/05/25 0508     BCID, PCR Klebsiella oxytoca. Identification by BCID2 PCR     BCID, PCR 2 Serratia marcescens. Identification by BCID2 PCR.     BOTTLE TYPE Pediatric Bottle    Narrative:      No resistance genes detected.           02/06/25 17:16 02/06/25 17:28   Ethanol % <0.010%    Ethanol <10 mg/dL    Acetaminophen <5.0 mcg/mL     Amphetamine, Urine Qual  Negative   Barbiturates Screen, Urine  Negative   Benzodiazepine Screen, Urine  Negative   Buprenorphine, Screen, Urine  Positive !   Cocaine Screen, Urine  Negative   Fentanyl, Urine  Negative   Methamphetamine, Ur  Negative   Methadone Screen , Urine  Negative   Opiate Screen  Negative   Oxycodone Screen, Urine  Negative   Phencyclidine (PCP), Urine  Negative   THC Screen, Urine  Negative   Tricyclic Antidepressants Screen  Negative       I have personally looked at the labs and they are summarized above.  ----------------------------------------------------------------------------------------------------------------------  Detailed radiology reports for the last 24 hours:    Imaging Results (Last 24 Hours)       Procedure Component Value Units Date/Time    US Ob 14 + Weeks Single or First Gestation [[id number]] Collected: 02/06/25 2118     Updated: 02/06/25 2121    Narrative:      INDICATION: Abnormal labs.     COMPARISON: None.     TECHNIQUE: Transabdominal pelvic ultrasound was performed.       Impression:      FINDINGS/IMPRESSION: Single live intrauterine fetus with estimated  gestational age 16 weeks 5 days. Estimated fetal weight 167.7 g. Fetal  heart rate 156 bpm.      This report was finalized on 2/6/2025 9:19 PM by Alex Pallas, DO.           I have personally looked at the radiology images and I have read the available final report.    Assessment & Plan       -Serratia marcescens and Klebsiella oxytoca bacteremia, present on admission  -Hypotension, present on admission  -16-week 5-day uterine pregnancy, present on admission  -Recent urinary tract infection diagnosis, urinalysis inconclusive this admission  -Recent treatment for MRSA bacteremia with presumed endocarditis  -Recent discovery of a mass of unknown origin in the right atrium, prominent eustachian valve versus vegetation  -Acute hypokalemia, present on admission  -History of substance abuse, in remission, maintained on  Suboxone  -Tobacco smoking use disorder  -History of hepatitis C    Admit to the critical care unit.  We will consult the infectious disease team.  I did talk to nurse practitioner Omkar with the ID team; she has suggested for now starting daptomycin and cefepime, with more recommendations to come later in the day.  Will have OB perform fetal monitoring every shift.  Will monitor the blood pressures closely, with the goal MAP being 65 mmHg.  If the BP continues to be low, then will add continuous IVF.  Please note that the patient received the full sepsis bolus.  Will add a nicotine patch for the smoking addiction.  Will give a dose of magnesium oxide 800 mg and a dose of 40 meq oral KCl.  I have stared a prenatal vitamin and folic acid for the pregnancy.    VTE Prophylaxis: SCUDs    The patient is high risk due to the following diagnoses/reasons: Pregnancy, recurrent bacteremia with a different set of bacteria    Alix Vasquez MD  [facility] Hospitalist  02/07/25  06:12 EST

## 2025-02-07 NOTE — NURSING NOTE
Patient received from ICU. Patient alert and oriented. No complaints at this time. Plan of care ongoing.

## 2025-02-07 NOTE — NURSING NOTE
Consult received for left anterior ankle. Assessed patient with JOSEPH Das - patient stated that there was a scab to the left lateral ankle that came off overnight. Wound base is dry and pink with dry edges; brunilda wound is dry and intact. Patient stated she had gone to the wound clinic for it. Recommend to assess daily and leave open to air. Will remove LDA.    Miguel Angel score 21.     02/07/25 0915   Wound 01/12/24 2122 Left anterior ankle    Placement Date/Time: 01/12/24 2122   Present on Original Admission: Yes  Side: Left  Orientation: anterior  Location: ankle  Primary Wound Type: (c)    Dressing Appearance open to air   Closure None   Base pink;dry   Periwound intact;dry   Periwound Temperature warm   Periwound Skin Turgor firm   Drainage Amount none

## 2025-02-07 NOTE — PLAN OF CARE
Goal Outcome Evaluation:              Outcome Evaluation: A&O, Up ad dc, Room air, VSS, Transfer Telemetry.                             Problem: Adult Inpatient Plan of Care  Goal: Patient-Specific Goal (Individualized)  Outcome: Progressing     Problem: Adult Inpatient Plan of Care  Goal: Absence of Hospital-Acquired Illness or Injury  Outcome: Progressing     Problem: Adult Inpatient Plan of Care  Goal: Optimal Comfort and Wellbeing  Outcome: Progressing     Problem: Adult Inpatient Plan of Care  Goal: Readiness for Transition of Care  Outcome: Progressing

## 2025-02-07 NOTE — PROGRESS NOTES
Pharmacy to dose cefepime and daptomycin for bacteremia, CrCl = 89.9- Dosed as follows:    Cefepime 2000mg iv ext infusion q8h  Daptomycin 500mg iv q24h    Pharmacy will monitor and adjust dosing as appropriate per renal dosing guidelines.     Pepe Hendricks RP  06:36 EST  02/07/25

## 2025-02-07 NOTE — CASE MANAGEMENT/SOCIAL WORK
Discharge Planning Assessment   Jorge A     Patient Name: Sailaja Alarcon  MRN: [id number]  Today's Date: 2/7/2025    Admit Date: 2/6/2025    Plan: SS received a consult for D/C planning est. 16 weeks pregnant with bacteremia, hx substance abuse, left LTACH AMA last admission. SS spoke with pt on this date. Pt lives with spouse. PCP is Margaret Baxter. Pt does not utilize home health or DME. Pt does not have a POA or living will. Pt discussed substance abuse resources. Pt declined. SS informed pt if she were to reconsider to notify SS. Pt plan to return home at discharge with family to provide transportation. SS to follow and assist with discharge planning.   Discharge Needs Assessment       Row Name 02/07/25 1653       Living Environment    People in Home spouse    Name(s) of People in Home John    Current Living Arrangements home    Potentially Unsafe Housing Conditions none    Primary Care Provided by self    Provides Primary Care For no one    Family Caregiver if Needed none    Quality of Family Relationships helpful;involved;supportive    Able to Return to Prior Arrangements yes       Resource/Environmental Concerns    Resource/Environmental Concerns none    Transportation Concerns none       Transition Planning    Patient/Family Anticipates Transition to home with family    Patient/Family Anticipated Services at Transition none    Transportation Anticipated family or friend will provide       Discharge Needs Assessment    Equipment Currently Used at Home none    Anticipated Changes Related to Illness none    Equipment Needed After Discharge none                   Discharge Plan       Row Name 02/07/25 1653       Plan    Plan SS received a consult for D/C planning est. 16 weeks pregnant with bacteremia, hx substance abuse, left LTACH AMA last admission. SS spoke with pt on this date. Pt lives with spouse. PCP is Margaret Baxter. Pt does not utilize home health or DME. Pt does not have a POA or living will. Pt  discussed substance abuse resources. Pt declined. SS informed pt if she were to reconsider to notify SS. Pt plan to return home at discharge with family to provide transportation. SS to follow and assist with discharge planning.    Patient/Family in Agreement with Plan yes                  Continued Care and Services - Admitted Since 2/6/2025    No active coordination exists for this encounter.       Expected Discharge Date and Time       Expected Discharge Date Expected Discharge Time    Feb 10, 2025            Demographic Summary       Row Name 02/07/25 2134       General Information    Admission Type inpatient    Referral Source nursing    Reason for Consult discharge planning  SS received a consult for D/C planning est. 16 weeks pregnant with bacteremia, hx substance abuse, left LTACH AMA last admission.                      HELENA Lopez

## 2025-02-08 LAB
ANION GAP SERPL CALCULATED.3IONS-SCNC: 10.2 MMOL/L (ref 5–15)
BACTERIA SPEC AEROBE CULT: ABNORMAL
BUN SERPL-MCNC: 7 MG/DL (ref 6–20)
BUN/CREAT SERPL: 16.3 (ref 7–25)
CALCIUM SPEC-SCNC: 8.5 MG/DL (ref 8.6–10.5)
CHLORIDE SERPL-SCNC: 108 MMOL/L (ref 98–107)
CO2 SERPL-SCNC: 16.8 MMOL/L (ref 22–29)
CREAT SERPL-MCNC: 0.43 MG/DL (ref 0.57–1)
DEPRECATED RDW RBC AUTO: 45.1 FL (ref 37–54)
EGFRCR SERPLBLD CKD-EPI 2021: 133.5 ML/MIN/1.73
ERYTHROCYTE [DISTWIDTH] IN BLOOD BY AUTOMATED COUNT: 13.2 % (ref 12.3–15.4)
GLUCOSE SERPL-MCNC: 82 MG/DL (ref 65–99)
GRAM STN SPEC: ABNORMAL
GRAM STN SPEC: ABNORMAL
HCT VFR BLD AUTO: 25.5 % (ref 34–46.6)
HGB BLD-MCNC: 8.3 G/DL (ref 12–15.9)
ISOLATED FROM: ABNORMAL
MCH RBC QN AUTO: 30.5 PG (ref 26.6–33)
MCHC RBC AUTO-ENTMCNC: 32.5 G/DL (ref 31.5–35.7)
MCV RBC AUTO: 93.8 FL (ref 79–97)
PLATELET # BLD AUTO: 130 10*3/MM3 (ref 140–450)
PMV BLD AUTO: 10.6 FL (ref 6–12)
POTASSIUM SERPL-SCNC: 3.9 MMOL/L (ref 3.5–5.2)
RBC # BLD AUTO: 2.72 10*6/MM3 (ref 3.77–5.28)
SODIUM SERPL-SCNC: 135 MMOL/L (ref 136–145)
WBC NRBC COR # BLD AUTO: 3.98 10*3/MM3 (ref 3.4–10.8)

## 2025-02-08 PROCEDURE — 80048 BASIC METABOLIC PNL TOTAL CA: CPT | Performed by: STUDENT IN AN ORGANIZED HEALTH CARE EDUCATION/TRAINING PROGRAM

## 2025-02-08 PROCEDURE — 87040 BLOOD CULTURE FOR BACTERIA: CPT | Performed by: NURSE PRACTITIONER

## 2025-02-08 PROCEDURE — 99233 SBSQ HOSP IP/OBS HIGH 50: CPT | Performed by: NURSE PRACTITIONER

## 2025-02-08 PROCEDURE — 85027 COMPLETE CBC AUTOMATED: CPT | Performed by: STUDENT IN AN ORGANIZED HEALTH CARE EDUCATION/TRAINING PROGRAM

## 2025-02-08 PROCEDURE — 99232 SBSQ HOSP IP/OBS MODERATE 35: CPT | Performed by: STUDENT IN AN ORGANIZED HEALTH CARE EDUCATION/TRAINING PROGRAM

## 2025-02-08 PROCEDURE — 25010000002 CEFTRIAXONE PER 250 MG: Performed by: NURSE PRACTITIONER

## 2025-02-08 RX ADMIN — NICOTINE 1 PATCH: 21 PATCH TRANSDERMAL at 16:35

## 2025-02-08 RX ADMIN — MUPIROCIN 1 APPLICATION: 20 OINTMENT TOPICAL at 20:09

## 2025-02-08 RX ADMIN — SODIUM CHLORIDE 2000 MG: 9 INJECTION, SOLUTION INTRAVENOUS at 09:49

## 2025-02-08 RX ADMIN — FOLIC ACID 1 MG: 1 TABLET ORAL at 09:50

## 2025-02-08 RX ADMIN — Medication 10 ML: at 09:51

## 2025-02-08 RX ADMIN — BUPRENORPHINE AND NALOXONE 0.5 TABLET: 8; 2 TABLET SUBLINGUAL at 09:50

## 2025-02-08 RX ADMIN — PRENATAL VITAMINS-IRON FUMARATE 27 MG IRON-FOLIC ACID 0.8 MG TABLET 1 TABLET: at 09:50

## 2025-02-08 RX ADMIN — Medication 10 ML: at 20:09

## 2025-02-08 RX ADMIN — DOCUSATE SODIUM 100 MG: 100 CAPSULE, LIQUID FILLED ORAL at 09:50

## 2025-02-08 NOTE — PROGRESS NOTES
[facility] HOSPITALIST PROGRESS NOTE     Patient Identification:  Name:  Sailaja Alarcon  Age:  29 y.o.  Sex:  female  :  1995  MRN:  [id number]  Visit Number:  [id number]  ROOM: [address] [place] [address]     Primary Care Provider:  Margaret Baxter APRN    Length of stay in inpatient status:  2    Subjective     Chief Compliant:    Chief Complaint   Patient presents with    Abnormal Lab       History of Presenting Illness: Patient seen today in follow-up for recurrent bacteremia in the setting of previous MRSA bacteremia incompletely treated due to medical noncompliance complicated by ongoing current pregnancy.  Patient with 1 of 2 blood cultures returning positive today on redraws.  Repeat blood cultures obtained today and discussed with ID and if remain negative at 24 hours likely discharge home tomorrow on oral therapy.    Objective     Current Hospital Meds:  buprenorphine-naloxone, 0.5 tablet, Sublingual, BID  cefTRIAXone, 2,000 mg, Intravenous, Q24H  docusate sodium, 100 mg, Oral, Daily  folic acid, 1 mg, Oral, Daily  mupirocin, 1 Application, Each Nare, BID  nicotine, 1 patch, Transdermal, Q24H  pantoprazole, 40 mg, Oral, Q AM  prenatal vitamin, 1 tablet, Oral, Daily  sodium chloride, 10 mL, Intravenous, Q12H           ----------------------------------------------------------------------------------------------------------------------  Vital Signs:  Temp:  [97.7 °F (36.5 °C)-98.7 °F (37.1 °C)] 98.7 °F (37.1 °C)  Heart Rate:  [58-92] 61  Resp:  [16-20] 20  BP: ()/(53-87) 101/62  SpO2:  [98 %-99 %] 98 %  on   ;   Device (Oxygen Therapy): room air  Body mass index is 24.41 kg/m².      Intake/Output Summary (Last 24 hours) at 2025 1605  Last data filed at 2025 1200  Gross per 24 hour   Intake 600 ml   Output --   Net 600 ml      ----------------------------------------------------------------------------------------------------------------------  Physical  "exam:  Constitutional:  Well-developed and well-nourished.  No acute distress.      HENT:  Head:  Normocephalic and atraumatic.  Mouth:  Moist mucous membranes.    Eyes:  Conjunctivae and EOM are normal. No scleral icterus.    Cardiovascular:  Normal rate, regular rhythm and normal heart sounds with no murmur.  Pulmonary/Chest:  No respiratory distress, no wheezes, no crackles, with normal breath sounds and good air movement.  Abdominal:  Soft.  Bowel sounds are normal.  No tenderness.  L fundus  Musculoskeletal:  No tenderness, and no deformity.  No red or swollen joints anywhere.  Functional ROM intact.   Neurological:  Alert and oriented to person, place, and time.  No cranial nerve deficit.  No tongue deviation.  No facial droop.  No slurred speech. Intact Sensation throughout  Skin:  Skin is warm and dry. No rash or lesion noted. No pallor.   Peripheral vascular:  Pulses in all 4 extremities with no clubbing, no cyanosis, no edema.  Psychiatric: Appropriate mood and affect, pleasant.   ----------------------------------------------------------------------------------------------------------------------  WBC/HGB/HCT/PLT   3.98/8.3/25.5/130 (02/08 0118)  BUN/CREAT/GLUC/ALT/AST/HAILE/LIP    7/0.43/82/--/--/--/-- (02/08 0118)  LYTES - Na/K/Cl/CO2: 135*/3.9/108*/16.8* (02/08 0118)        No results found for: \"URINECX\"  Blood Culture   Date Value Ref Range Status   02/06/2025 Abnormal Stain (C)  Preliminary   02/04/2025 Serratia marcescens (C)  Preliminary   02/04/2025 Gram Negative Bacilli (C)  Preliminary       I have personally looked at the labs and they are summarized above.  ----------------------------------------------------------------------------------------------------------------------  Detailed radiology reports for the last 24 hours:  US Ob 14 + Weeks Single or First Gestation    Result Date: 2/6/2025  FINDINGS/IMPRESSION: Single live intrauterine fetus with estimated gestational age 16 weeks 5 days. " Estimated fetal weight 167.7 g. Fetal heart rate 156 bpm.  This report was finalized on 2/6/2025 9:19 PM by Alex Pallas, DO.     Assessment & Plan      Serratia and Klebsiella bacteremia, POA  Hypotension, fluid responsive, improving  16-week intrauterine pregnancy, POA  Prior treatment for MRSA bacteremia, intermittently noncompliant  Suspected prominent eustachian valve, vegetation felt less likely    -Patient presenting to emergency department after call back for positive blood cultures collected and infectious disease clinic with growth showing Serratia marcescens and Klebsiella oxytoca    -Repeat TTE obtained and unremarkable other than what cardiology suspects is prominent eustachian valve rather than vegetation unchanged from prior.    -Previously with MRSA bacteremia with missed treatments but no growth at this admission currently on daptomycin.  Cussed with infectious disease and given lack of recurrence of growth considered is treated and daptomycin discontinued.    -Infectious disease consulted and following along, appreciate input.  Discussed susceptibility studies from recent cultures and will transition to Rocephin IV with plan to transition to oral antibiotics if repeat cultures obtained today show no growth tomorrow.    -Repeat cultures collected and pending at this time    -Case previously discussed with maternal-fetal medicine at [facility] by ER provider and recommending no need for transfer at this time as treatment and management would be typical of bacteremia with no special consideration for fetus beyond avoidance of try to genic agents.    -Patient blood pressure remaining stable.    -Patient complaining of frequent reflux and GERD suspect related to her pregnancy and Protonix and Tums initiated.    -Prenatal vitamin    History of substance abuse, in remission  Chronic Suboxone usage    -Continue home Suboxone    Chronic smoking tobacco use disorder    -Encourage  cessation    History of hepatitis C    Copied text in portions of the note has been reviewed and is accurate as of 02/08/25     VTE Prophylaxis:     Active VTE Prophylaxis  Mechanical:        Start        02/07/25 0218  Maintain Sequential Compression Device  Continuous                          Select Pharmacologic VTE Prophylaxis if Desired & Appropriate       Disposition pending clinical course, possible discharge tomorrow if blood cultures are no growth at 24 hours.    Kadeem Ayala DO  [facility] Hospitalist  02/08/25  16:05 EST

## 2025-02-08 NOTE — PROGRESS NOTES
PROGRESS NOTE         Patient Identification:  Name:  Sailaja Alarcon  Age:  29 y.o.  Sex:  female  :  1995  MRN:  [id number]  Visit Number:  [id number]  Primary Care Provider:  Margaret Baxter APRN         LOS: 2 days       ----------------------------------------------------------------------------------------------------------------------  Subjective       Chief Complaints:    Polymicrobial bacteremia, concerns for infected midline      Interval History:      Patient sitting up in bed eating breakfast this morning.  Overall feels well today.  No issues or complaints.  Denies dysuria, urgency, frequency.  Lungs clear to auscultation bilaterally.  Currently on room air with no apparent distress.  WBC normal at 3.98.  Blood culture from 2025 finalized as Serratia and Klebsiella.  Repeat blood cultures from 2025 1 out of 2 sets positive for Klebsiella.    Review of Systems:    Constitutional: no fever, chills and night sweats.  Generalized fatigue.  Eyes: no eye drainage, itching or redness.  HEENT: no mouth sores, dysphagia or nose bleed.  Respiratory: no for shortness of breath, cough or production of sputum.  Cardiovascular: no chest pain, no palpitations, no orthopnea.  Gastrointestinal: no nausea, vomiting or diarrhea. No abdominal pain, hematemesis or rectal bleeding.  Genitourinary: no dysuria or polyuria.  Hematologic/lymphatic: no lymph node abnormalities, no easy bruising or easy bleeding.  Musculoskeletal: no muscle or joint pain.  Skin: No rash and no itching.  Neurological: no loss of consciousness, no seizure, no headache.  Behavioral/Psych: no depression or suicidal ideation.  Endocrine: no hot flashes.  Immunologic: negative.    ----------------------------------------------------------------------------------------------------------------------      Objective       Current [facility] Meds:  buprenorphine-naloxone, 0.5 tablet, Sublingual, BID  cefTRIAXone,  2,000 mg, Intravenous, Q24H  docusate sodium, 100 mg, Oral, Daily  folic acid, 1 mg, Oral, Daily  mupirocin, 1 Application, Each Nare, BID  nicotine, 1 patch, Transdermal, Q24H  pantoprazole, 40 mg, Oral, Q AM  prenatal vitamin, 1 tablet, Oral, Daily  sodium chloride, 10 mL, Intravenous, Q12H         ----------------------------------------------------------------------------------------------------------------------    Vital Signs:  Temp:  [97.7 °F (36.5 °C)-98.4 °F (36.9 °C)] 98.4 °F (36.9 °C)  Heart Rate:  [58-92] 58  Resp:  [12-23] 20  BP: ()/(53-87) 94/56  Mean Arterial Pressure (Non-Invasive) for the past 24 hrs (Last 3 readings):   Noninvasive MAP (mmHg)   02/08/25 0700 60   02/08/25 0341 70   02/08/25 0009 71     SpO2 Percentage    02/07/25 1934 02/08/25 0009 02/08/25 0700   SpO2: 99% 98% 98%     SpO2:  [97 %-100 %] 98 %  on   ;   Device (Oxygen Therapy): room air    Body mass index is 24.41 kg/m².  Wt Readings from Last 3 Encounters:   02/08/25 63.5 kg (140 lb)   02/04/25 61.8 kg (136 lb 3.2 oz)   01/28/25 62.3 kg (137 lb 6.4 oz)        Intake/Output Summary (Last 24 hours) at 2/8/2025 0949  Last data filed at 2/8/2025 0800  Gross per 24 hour   Intake 478 ml   Output --   Net 478 ml     Diet: Regular/House; Fluid Consistency: Thin (IDDSI 0)  ----------------------------------------------------------------------------------------------------------------------      Physical Exam:    Constitutional:  Well-developed and well-nourished.  No respiratory distress.  Sitting up in bed eating breakfast.  No issues or complaints      HENT:  Head: Normocephalic and atraumatic.  Mouth:  Moist mucous membranes.  Poor dental hygiene   Eyes:  Conjunctivae and EOM are normal.  No scleral icterus.  Neck:  Neck supple.  No JVD present.    Cardiovascular:  Normal rate, regular rhythm and normal heart sounds with no murmur. No edema.  Pulmonary/Chest:  No respiratory distress, no wheezes, no crackles, with normal breath  "sounds and good air movement.  Abdominal:  Soft.  Bowel sounds are normal.  No distension and no tenderness. Pregnancy.  Musculoskeletal:  No edema, no tenderness, and no deformity.  No swelling or redness of joints.  Neurological:  Alert and oriented to person, place, and time.  No facial droop.  No slurred speech.   Skin:  Skin is warm and dry.  No rash noted.  No pallor.   Psychiatric:  Normal mood and affect.  Behavior is normal.        ----------------------------------------------------------------------------------------------------------------------  Results from last 7 days   Lab Units 02/04/25  1141   CK TOTAL U/L 7*           Results from last 7 days   Lab Units 02/08/25 0118 02/06/25 1716 02/04/25  1141   CRP mg/dL  --   --  7.83*  7.53*   LACTATE mmol/L  --  1.5  --    WBC 10*3/mm3 3.98 5.15 4.47  4.46   HEMOGLOBIN g/dL 8.3* 9.7* 10.2*  9.9*   HEMATOCRIT % 25.5* 29.3* 30.3*  29.9*   MCV fL 93.8 90.4 90.7  90.9   MCHC g/dL 32.5 33.1 33.7  33.1   PLATELETS 10*3/mm3 130* 147 161  154     Results from last 7 days   Lab Units 02/08/25 0118 02/06/25 1716 02/04/25  1141   SODIUM mmol/L 135* 132* 136   POTASSIUM mmol/L 3.9 3.2* 3.8   MAGNESIUM mg/dL  --  1.9  --    CHLORIDE mmol/L 108* 102 106   CO2 mmol/L 16.8* 18.1* 18.1*   BUN mg/dL 7 9 9   CREATININE mg/dL 0.43* 0.89 0.71   CALCIUM mg/dL 8.5* 9.0 9.2   GLUCOSE mg/dL 82 89 85   ALBUMIN g/dL  --  3.9 3.8   BILIRUBIN mg/dL  --  0.4 0.3   ALK PHOS U/L  --  85 73   AST (SGOT) U/L  --  7 10   ALT (SGPT) U/L  --  <5 6   Estimated Creatinine Clearance: 190 mL/min (A) (by C-G formula based on SCr of 0.43 mg/dL (L)).  No results found for: \"AMMONIA\"    Hemoglobin A1C   Date/Time Value Ref Range Status   02/06/2025 1716 5.00 4.80 - 5.60 % Final     Lab Results   Component Value Date    HGBA1C 5.00 02/06/2025     Lab Results   Component Value Date    TSH 1.020 02/06/2025       Blood Culture   Date Value Ref Range Status   02/06/2025 No growth at 24 hours  " "Preliminary   02/06/2025 Klebsiella oxytoca (C)  Final   02/04/2025 Serratia marcescens (C)  Final   02/04/2025 Klebsiella oxytoca (C)  Final     No results found for: \"URINECX\"  No results found for: \"WOUNDCX\"  No results found for: \"STOOLCX\"  No results found for: \"RESPCX\"  Pain Management Panel  More data exists         Latest Ref Rng & Units 2/6/2025 12/10/2024   Pain Management Panel   Amphetamine, Urine Qual Negative Negative  Negative    Barbiturates Screen, Urine Negative Negative  Negative    Benzodiazepine Screen, Urine Negative Negative  Negative    Buprenorphine, Screen, Urine Negative Positive  Positive    Cocaine Screen, Urine Negative Negative  Negative    Fentanyl, Urine Negative Negative  Negative    Methadone Screen , Urine Negative Negative  Negative    Methamphetamine, Ur Negative Negative  Negative       Details                     ----------------------------------------------------------------------------------------------------------------------  Imaging Results (Last 24 Hours)       ** No results found for the last 24 hours. **            ----------------------------------------------------------------------------------------------------------------------    Pertinent Infectious Disease Results                Assessment/Plan       Assessment       Polymicrobial bacteremia with Klebsiella and Serratia  Endocarditis, currently being treated with daptomycin  History of MRSA bacteremia  Pregnancy, 16 weeks gestation         Plan      Patient sitting up in bed eating breakfast this morning.  Overall feels well today.  No issues or complaints.  Denies dysuria, urgency, frequency.  Lungs clear to auscultation bilaterally.  Currently on room air with no apparent distress.  WBC normal at 3.98.  Blood culture from 2/4/2025 finalized as Serratia and Klebsiella.  Repeat blood cultures from 2/6/2025 1 out of 2 sets positive for Klebsiella.    Bacteremia is likely secondary to line infection and this line " has since been removed.  Patient reports being treated for urinary tract infection outpatient however upon further questioning today she was treated by her PCP and had not obtained antibiotic therapy from her pharmacy yet.    Patient completed a total of 6 weeks of daptomycin for MRSA bacteremia with concerns for underlying endocarditis based on abnormal 2D echo.  Patient required multiple extensions of antibiotic therapy due to missed infusions however completed a sufficient 6-week course.  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    Case discussed with primary physician, Dr. Ayala.  Based on susceptibility pattern of Serratia and Klebsiella cefepime was transitioned to ceftriaxone 2 g IV every 24 hours.  Repeat blood cultures x2 ordered for today.  If blood cultures from 2/8/2025 remain clear on 2/9/2025 patient can be further de-escalated to oral cefdinir to continue through 2/22/2025.  Recommend outpatient infectious disease follow-up upon discharge.    RECENT ANTIMICROBIAL THERAPY    cefTRIAXone (ROCEPHIN) 2000 mg IVPB in 100 mL NS (VTB)     Code Status:   Code Status and Medical Interventions: CPR (Attempt to Resuscitate); Full Support   Ordered at: 02/07/25 0259     Level Of Support Discussed With:    Patient     Code Status (Patient has no pulse and is not breathing):    CPR (Attempt to Resuscitate)     Medical Interventions (Patient has pulse or is breathing):    Full Support       BIANCA Truong  02/08/25  09:49 EST

## 2025-02-08 NOTE — PLAN OF CARE
Goal Outcome Evaluation:     Problem: Adult Inpatient Plan of Care  Goal: Absence of Hospital-Acquired Illness or Injury  Intervention: Prevent Infection  Recent Flowsheet Documentation  Taken 2/7/2025 2025 by Mohinder Viera RN  Infection Prevention:   hand hygiene promoted   rest/sleep promoted     Problem: Adult Inpatient Plan of Care  Goal: Optimal Comfort and Wellbeing  Intervention: Provide Person-Centered Care  Recent Flowsheet Documentation  Taken 2/7/2025 2025 by Mohinder Viera RN  Trust Relationship/Rapport:   care explained   choices provided   questions answered   questions encouraged

## 2025-02-08 NOTE — PLAN OF CARE
Goal Outcome Evaluation:  Progress: no change   Pt resting in bed, watching television. Pt has tolerated all interventions. No complaints/concerns. No acute distress noted. Call light within reach. Plan of care ongoing.

## 2025-02-09 ENCOUNTER — READMISSION MANAGEMENT (OUTPATIENT)
Dept: CALL CENTER | Facility: HOSPITAL | Age: 30
End: 2025-02-09
Payer: COMMERCIAL

## 2025-02-09 VITALS
RESPIRATION RATE: 18 BRPM | BODY MASS INDEX: 24.13 KG/M2 | TEMPERATURE: 98.2 F | OXYGEN SATURATION: 97 % | WEIGHT: 141.31 LBS | SYSTOLIC BLOOD PRESSURE: 103 MMHG | HEIGHT: 64 IN | DIASTOLIC BLOOD PRESSURE: 76 MMHG | HEART RATE: 79 BPM

## 2025-02-09 LAB
ALBUMIN SERPL-MCNC: 3.2 G/DL (ref 3.5–5.2)
ALBUMIN/GLOB SERPL: 1 G/DL
ALP SERPL-CCNC: 61 U/L (ref 39–117)
ALT SERPL W P-5'-P-CCNC: <5 U/L (ref 1–33)
ANION GAP SERPL CALCULATED.3IONS-SCNC: 10.7 MMOL/L (ref 5–15)
AST SERPL-CCNC: 9 U/L (ref 1–32)
BASOPHILS # BLD AUTO: 0.03 10*3/MM3 (ref 0–0.2)
BASOPHILS NFR BLD AUTO: 0.7 % (ref 0–1.5)
BILIRUB SERPL-MCNC: <0.2 MG/DL (ref 0–1.2)
BUN SERPL-MCNC: 7 MG/DL (ref 6–20)
BUN/CREAT SERPL: 16.7 (ref 7–25)
CALCIUM SPEC-SCNC: 9 MG/DL (ref 8.6–10.5)
CHLORIDE SERPL-SCNC: 105 MMOL/L (ref 98–107)
CO2 SERPL-SCNC: 18.3 MMOL/L (ref 22–29)
CREAT SERPL-MCNC: 0.42 MG/DL (ref 0.57–1)
DEPRECATED RDW RBC AUTO: 42.4 FL (ref 37–54)
EGFRCR SERPLBLD CKD-EPI 2021: 134.3 ML/MIN/1.73
EOSINOPHIL # BLD AUTO: 0.08 10*3/MM3 (ref 0–0.4)
EOSINOPHIL NFR BLD AUTO: 2 % (ref 0.3–6.2)
ERYTHROCYTE [DISTWIDTH] IN BLOOD BY AUTOMATED COUNT: 12.7 % (ref 12.3–15.4)
GLOBULIN UR ELPH-MCNC: 3.1 GM/DL
GLUCOSE SERPL-MCNC: 77 MG/DL (ref 65–99)
HCT VFR BLD AUTO: 26.4 % (ref 34–46.6)
HGB BLD-MCNC: 8.9 G/DL (ref 12–15.9)
IMM GRANULOCYTES # BLD AUTO: 0.04 10*3/MM3 (ref 0–0.05)
IMM GRANULOCYTES NFR BLD AUTO: 1 % (ref 0–0.5)
LYMPHOCYTES # BLD AUTO: 1.86 10*3/MM3 (ref 0.7–3.1)
LYMPHOCYTES NFR BLD AUTO: 45.7 % (ref 19.6–45.3)
MCH RBC QN AUTO: 30.8 PG (ref 26.6–33)
MCHC RBC AUTO-ENTMCNC: 33.7 G/DL (ref 31.5–35.7)
MCV RBC AUTO: 91.3 FL (ref 79–97)
MONOCYTES # BLD AUTO: 0.24 10*3/MM3 (ref 0.1–0.9)
MONOCYTES NFR BLD AUTO: 5.9 % (ref 5–12)
NEUTROPHILS NFR BLD AUTO: 1.82 10*3/MM3 (ref 1.7–7)
NEUTROPHILS NFR BLD AUTO: 44.7 % (ref 42.7–76)
NRBC BLD AUTO-RTO: 0 /100 WBC (ref 0–0.2)
PLATELET # BLD AUTO: 156 10*3/MM3 (ref 140–450)
PMV BLD AUTO: 10.6 FL (ref 6–12)
POTASSIUM SERPL-SCNC: 4.1 MMOL/L (ref 3.5–5.2)
PROT SERPL-MCNC: 6.3 G/DL (ref 6–8.5)
RBC # BLD AUTO: 2.89 10*6/MM3 (ref 3.77–5.28)
SODIUM SERPL-SCNC: 134 MMOL/L (ref 136–145)
WBC NRBC COR # BLD AUTO: 4.07 10*3/MM3 (ref 3.4–10.8)

## 2025-02-09 PROCEDURE — 85025 COMPLETE CBC W/AUTO DIFF WBC: CPT | Performed by: STUDENT IN AN ORGANIZED HEALTH CARE EDUCATION/TRAINING PROGRAM

## 2025-02-09 PROCEDURE — 99232 SBSQ HOSP IP/OBS MODERATE 35: CPT | Performed by: NURSE PRACTITIONER

## 2025-02-09 PROCEDURE — 80053 COMPREHEN METABOLIC PANEL: CPT | Performed by: STUDENT IN AN ORGANIZED HEALTH CARE EDUCATION/TRAINING PROGRAM

## 2025-02-09 PROCEDURE — 25010000002 CEFTRIAXONE PER 250 MG: Performed by: NURSE PRACTITIONER

## 2025-02-09 PROCEDURE — 99239 HOSP IP/OBS DSCHRG MGMT >30: CPT | Performed by: STUDENT IN AN ORGANIZED HEALTH CARE EDUCATION/TRAINING PROGRAM

## 2025-02-09 RX ORDER — CEFDINIR 300 MG/1
300 CAPSULE ORAL 2 TIMES DAILY
Qty: 28 CAPSULE | Refills: 0 | Status: SHIPPED | OUTPATIENT
Start: 2025-02-09 | End: 2025-02-23

## 2025-02-09 RX ORDER — ACETAMINOPHEN 325 MG/1
650 TABLET ORAL EVERY 6 HOURS PRN
Status: DISCONTINUED | OUTPATIENT
Start: 2025-02-09 | End: 2025-02-09 | Stop reason: HOSPADM

## 2025-02-09 RX ADMIN — BUPRENORPHINE AND NALOXONE 0.5 TABLET: 8; 2 TABLET SUBLINGUAL at 08:30

## 2025-02-09 RX ADMIN — PRENATAL VITAMINS-IRON FUMARATE 27 MG IRON-FOLIC ACID 0.8 MG TABLET 1 TABLET: at 08:30

## 2025-02-09 RX ADMIN — DOCUSATE SODIUM 100 MG: 100 CAPSULE, LIQUID FILLED ORAL at 08:30

## 2025-02-09 RX ADMIN — FOLIC ACID 1 MG: 1 TABLET ORAL at 08:30

## 2025-02-09 RX ADMIN — Medication 10 ML: at 08:30

## 2025-02-09 RX ADMIN — NICOTINE 1 PATCH: 21 PATCH TRANSDERMAL at 08:29

## 2025-02-09 RX ADMIN — SODIUM CHLORIDE 2000 MG: 9 INJECTION, SOLUTION INTRAVENOUS at 08:29

## 2025-02-09 RX ADMIN — ACETAMINOPHEN 650 MG: 325 TABLET ORAL at 11:07

## 2025-02-09 NOTE — PLAN OF CARE
Problem: Adult Inpatient Plan of Care  Goal: Absence of Hospital-Acquired Illness or Injury  Intervention: Prevent Infection  Recent Flowsheet Documentation  Taken 2/8/2025 2010 by Mohinder Viera, RN  Infection Prevention:   hand hygiene promoted   rest/sleep promoted     Problem: Adult Inpatient Plan of Care  Goal: Optimal Comfort and Wellbeing  Intervention: Provide Person-Centered Care  Recent Flowsheet Documentation  Taken 2/8/2025 2010 by Mohinder Viera RN  Trust Relationship/Rapport:   care explained   choices provided   questions answered   questions encouraged   Goal Outcome Evaluation:

## 2025-02-09 NOTE — DISCHARGE SUMMARY
[facility] HOSPITALISTS DISCHARGE SUMMARY    Patient Identification:  Name:  Sailaja Alarcon  Age:  29 y.o.  Sex:  female  :  1995  MRN:  [id number]  Visit Number:  [id number]    Date of Admission: 2025  Date of Discharge:  2025     PCP: Margaret Baxter, BIANCA    DISCHARGE DIAGNOSIS  Serratia and Klebsiella bacteremia, POA  Hypotension, fluid responsive, improving  16-week intrauterine pregnancy, POA  Prior treatment for MRSA bacteremia, intermittently noncompliant  Suspected prominent eustachian valve, vegetation felt less likely  History of substance abuse, in remission  Chronic Suboxone usage  Chronic smoking tobacco use disorder  History of hepatitis C    CONSULTS   Infectious disease    PROCEDURES PERFORMED      HOSPITAL COURSE  Patient is a 29 y.o. female presented to [facility] at the direction of infectious disease clinic for positive blood cultures.  Please see the admitting history and physical for further details.      Patient presenting to the emergency department after call back for positive blood cultures drawn in clinic during office visit and subsequently admitted to the hospitalist service initiated on antibiotic therapy.  Patient with complicated history of being 16 weeks pregnant with prior history of MRSA bacteremia late last year discharged home with PICC line with poor compliance and missed treatments during her course of treatment.  During this hospitalization patient with no MRSA on repeat cultures obtained in office instead growing Klebsiella and Serratia.  Discussion had with infectious disease and given lack of growth of MRSA despite patient with several missed treatments in the outpatient setting essentially completing a sufficient 6-week course and repeat TTE here showing similar mass on 2D echo felt to be congenital in nature rather than infectious.  Repeat blood cultures were obtained with 1 of 2 being positive for Klebsiella  only on 2/6 with subsequent repeat cultures obtained on 2/8 showing no growth at 24 hours and after discussion with infectious disease given lack of growth at 24 hours on repeat cultures recommended for transition to oral antibiotic therapy with cefdinir.  Given this and patient's desire to return home and avoid further antibiotic therapy as patient did present initially to ID clinic with nonsterile appearing PICC line dressings due to loss of home health dressing care due to insurance reasons she was felt to achieve maximal benefit of continued inpatient hospitalization and subsequently discharged home in stable medical condition with oral antibiotic therapy to complete course of treatment and avoid complications from IV antibiotic therapy.  Patient will follow-up with primary care and infectious disease post discharge.    VITAL SIGNS:  Temp:  [97.9 °F (36.6 °C)-98.6 °F (37 °C)] 98.2 °F (36.8 °C)  Heart Rate:  [60-79] 79  Resp:  [18] 18  BP: ()/(53-78) 103/76  SpO2:  [93 %-100 %] 97 %  on   ;   Device (Oxygen Therapy): room air    Body mass index is 24.64 kg/m².  Wt Readings from Last 3 Encounters:   02/09/25 64.1 kg (141 lb 5 oz)   02/04/25 61.8 kg (136 lb 3.2 oz)   01/28/25 62.3 kg (137 lb 6.4 oz)       PHYSICAL EXAM:  Constitutional:  Well-developed and well-nourished.  No acute distress.      HENT:  Head:  Normocephalic and atraumatic.  Mouth:  Moist mucous membranes.    Eyes:  Conjunctivae and EOM are normal. No scleral icterus.    Cardiovascular:  Normal rate, regular rhythm and normal heart sounds with no murmur.  Pulmonary/Chest:  No respiratory distress, no wheezes, no crackles, with normal breath sounds and good air movement.  Abdominal:  Soft.  Bowel sounds are normal.  No tenderness.  L fundus  Musculoskeletal:  No tenderness, and no deformity.  No red or swollen joints anywhere.  Functional ROM intact.   Neurological:  Alert and oriented to person, place, and time.  No cranial nerve deficit.  No  tongue deviation.  No facial droop.  No slurred speech. Intact Sensation throughout  Skin:  Skin is warm and dry. No rash or lesion noted. No pallor.   Peripheral vascular:  Pulses in all 4 extremities with no clubbing, no cyanosis, no edema.  Psychiatric: Appropriate mood and affect, pleasant.    DISCHARGE DISPOSITION   Stable    DISCHARGE MEDICATIONS:     Discharge Medications        New Medications        Instructions Start Date   cefdinir 300 MG capsule  Commonly known as: OMNICEF   300 mg, Oral, 2 Times Daily             Continue These Medications        Instructions Start Date   buprenorphine-naloxone 8-2 MG per SL tablet  Commonly known as: SUBOXONE   0.5 tablets, 2 Times Daily      docusate sodium 100 MG capsule  Commonly known as: COLACE   100 mg, Oral, Daily      Flintstones Complete 18 MG chewable tablet chewable tablet   2 tablets, Oral, Daily      gabapentin 800 MG tablet  Commonly known as: NEURONTIN   800 mg, Oral, Daily PRN      melatonin 5 MG tablet tablet   15 mg, Oral, Nightly PRN                 Additional Instructions for the Follow-ups that You Need to Schedule       Discharge Follow-up with PCP   As directed       Currently Documented PCP:    Margaret Baxter APRN    PCP Phone Number:    [phone]     Follow Up Details: 1 week follow up               Follow-up Information       Margaret Baxter APRN .    Specialty: Family Medicine  Why: 1 week follow up  Contact information:  [address]  [facility] [address] [phone]               Kerry Sheffield MD .    Specialty: Infectious Diseases  Contact information:  [address]  [address] [address]  [phone]                              TEST  RESULTS PENDING AT DISCHARGE  Pending Labs       Order Current Status    Blood Culture - Blood, Arm, Right Preliminary result    Blood Culture - Blood, Arm, Right Preliminary result    Blood Culture - Blood, Hand, Right Preliminary result             CODE STATUS  Code  Status and Medical Interventions: CPR (Attempt to Resuscitate); Full Support   Ordered at: 02/07/25 0259     Level Of Support Discussed With:    Patient     Code Status (Patient has no pulse and is not breathing):    CPR (Attempt to Resuscitate)     Medical Interventions (Patient has pulse or is breathing):    Full Support       Kadeem Ayala DO  [facility] ERist  02/09/25  14:46 EST    Please note that this discharge summary required more than 30 minutes to complete.

## 2025-02-09 NOTE — PROGRESS NOTES
PROGRESS NOTE         Patient Identification:  Name:  Sailaja Alarcon  Age:  29 y.o.  Sex:  female  :  1995  MRN:  [id number]  Visit Number:  [id number]  Primary Care Provider:  Margaret Baxter APRN         LOS: 3 days       ----------------------------------------------------------------------------------------------------------------------  Subjective       Chief Complaints:    Polymicrobial bacteremia, concerns for infected midline      Interval History:      The patient is resting comfortably in bed, on room air with no apparent distress.  Afebrile.  Denies diarrhea.  Denies dysuria or other complaints.  The patient did report she feels fatigued after not resting well overnight.  Lung exam clear to auscultation bilaterally.  Abdomen soft and nontender with normoactive bowel sounds.  WBC normal at 4.07.  1 of 2 blood cultures from  finalized with Klebsiella and gram-negative bacilli.  Repeat cultures from  with 1 of 2 thus far reporting no growth at 24 hours.    Review of Systems:    Constitutional: no fever, chills and night sweats.  Fatigue, trouble sleeping  Eyes: no eye drainage, itching or redness.  HEENT: no mouth sores, dysphagia or nose bleed.  Respiratory: no for shortness of breath, cough or production of sputum.  Cardiovascular: no chest pain, no palpitations, no orthopnea.  Gastrointestinal: no nausea, vomiting or diarrhea. No abdominal pain, hematemesis or rectal bleeding.  Genitourinary: no dysuria or polyuria.  Hematologic/lymphatic: no lymph node abnormalities, no easy bruising or easy bleeding.  Musculoskeletal: no muscle or joint pain.  Skin: No rash and no itching.  Neurological: no loss of consciousness, no seizure, no headache.  Behavioral/Psych: no depression or suicidal ideation.  Endocrine: no hot flashes.  Immunologic:  negative.    ----------------------------------------------------------------------------------------------------------------------      Objective       [facility] Meds:  buprenorphine-naloxone, 0.5 tablet, Sublingual, BID  cefTRIAXone, 2,000 mg, Intravenous, Q24H  docusate sodium, 100 mg, Oral, Daily  folic acid, 1 mg, Oral, Daily  mupirocin, 1 Application, Each Nare, BID  nicotine, 1 patch, Transdermal, Q24H  pantoprazole, 40 mg, Oral, Q AM  prenatal vitamin, 1 tablet, Oral, Daily  sodium chloride, 10 mL, Intravenous, Q12H         ----------------------------------------------------------------------------------------------------------------------    Vital Signs:  Temp:  [97.9 °F (36.6 °C)-98.6 °F (37 °C)] 98.2 °F (36.8 °C)  Heart Rate:  [60-79] 79  Resp:  [18] 18  BP: ()/(53-78) 103/76  Mean Arterial Pressure (Non-Invasive) for the past 24 hrs (Last 3 readings):   Noninvasive MAP (mmHg)   02/09/25 1102 83   02/09/25 0658 69   02/09/25 0404 75     SpO2 Percentage    02/09/25 0404 02/09/25 0658 02/09/25 1102   SpO2: 95% 97% 97%     SpO2:  [93 %-100 %] 97 %  on   ;   Device (Oxygen Therapy): room air    Body mass index is 24.64 kg/m².  Wt Readings from Last 3 Encounters:   02/09/25 64.1 kg (141 lb 5 oz)   02/04/25 61.8 kg (136 lb 3.2 oz)   01/28/25 62.3 kg (137 lb 6.4 oz)        Intake/Output Summary (Last 24 hours) at 2/9/2025 1148  Last data filed at 2/9/2025 0900  Gross per 24 hour   Intake 480 ml   Output 0 ml   Net 480 ml     Diet: Regular/House; Fluid Consistency: Thin (IDDSI 0)  ----------------------------------------------------------------------------------------------------------------------      Physical Exam:    Constitutional:  Well-developed and well-nourished.  On room air with no respiratory distress.  Resting comfortably in bed.  Denies any issues or complaints.  No new events.  HENT:  Head: Normocephalic and atraumatic.  Mouth:  Moist mucous membranes.  Poor dental hygiene   Eyes:   Conjunctivae and EOM are normal.  No scleral icterus.  Neck:  Neck supple.  No JVD present.    Cardiovascular:  Normal rate, regular rhythm and normal heart sounds with no murmur. No edema.  Pulmonary/Chest:  No respiratory distress, no wheezes, no crackles, with normal breath sounds and good air movement.  Abdominal:  Soft.  Bowel sounds are normal.  No distension and no tenderness. Pregnancy.  Musculoskeletal:  No edema, no tenderness, and no deformity.  No swelling or redness of joints.  Neurological:  Alert and oriented to person, place, and time.  No facial droop.  No slurred speech.   Skin:  Skin is warm and dry.  No rash noted.  No pallor.   Psychiatric:  Normal mood and affect.  Behavior is normal.        ----------------------------------------------------------------------------------------------------------------------  Results from last 7 days   Lab Units 02/04/25  1141   CK TOTAL U/L 7*           Results from last 7 days   Lab Units 02/09/25 0137 02/08/25 0118 02/06/25 1716 02/04/25  1141   CRP mg/dL  --   --   --  7.83*  7.53*   LACTATE mmol/L  --   --  1.5  --    WBC 10*3/mm3 4.07 3.98 5.15 4.47  4.46   HEMOGLOBIN g/dL 8.9* 8.3* 9.7* 10.2*  9.9*   HEMATOCRIT % 26.4* 25.5* 29.3* 30.3*  29.9*   MCV fL 91.3 93.8 90.4 90.7  90.9   MCHC g/dL 33.7 32.5 33.1 33.7  33.1   PLATELETS 10*3/mm3 156 130* 147 161  154     Results from last 7 days   Lab Units 02/09/25 0137 02/08/25 0118 02/06/25 1716 02/04/25  1141   SODIUM mmol/L 134* 135* 132* 136   POTASSIUM mmol/L 4.1 3.9 3.2* 3.8   MAGNESIUM mg/dL  --   --  1.9  --    CHLORIDE mmol/L 105 108* 102 106   CO2 mmol/L 18.3* 16.8* 18.1* 18.1*   BUN mg/dL 7 7 9 9   CREATININE mg/dL 0.42* 0.43* 0.89 0.71   CALCIUM mg/dL 9.0 8.5* 9.0 9.2   GLUCOSE mg/dL 77 82 89 85   ALBUMIN g/dL 3.2*  --  3.9 3.8   BILIRUBIN mg/dL <0.2  --  0.4 0.3   ALK PHOS U/L 61  --  85 73   AST (SGOT) U/L 9  --  7 10   ALT (SGPT) U/L <5  --  <5 6   Estimated Creatinine Clearance:  "196.4 mL/min (A) (by C-G formula based on SCr of 0.42 mg/dL (L)).  No results found for: \"AMMONIA\"    Hemoglobin A1C   Date/Time Value Ref Range Status   02/06/2025 1716 5.00 4.80 - 5.60 % Final     Lab Results   Component Value Date    HGBA1C 5.00 02/06/2025     Lab Results   Component Value Date    TSH 1.020 02/06/2025       Blood Culture   Date Value Ref Range Status   02/06/2025 No growth at 24 hours  Preliminary   02/06/2025 Klebsiella oxytoca (C)  Final   02/04/2025 Serratia marcescens (C)  Final   02/04/2025 Klebsiella oxytoca (C)  Final     No results found for: \"URINECX\"  No results found for: \"WOUNDCX\"  No results found for: \"STOOLCX\"  No results found for: \"RESPCX\"  Pain Management Panel  More data exists         Latest Ref Rng & Units 2/6/2025 12/10/2024   Pain Management Panel   Amphetamine, Urine Qual Negative Negative  Negative    Barbiturates Screen, Urine Negative Negative  Negative    Benzodiazepine Screen, Urine Negative Negative  Negative    Buprenorphine, Screen, Urine Negative Positive  Positive    Cocaine Screen, Urine Negative Negative  Negative    Fentanyl, Urine Negative Negative  Negative    Methadone Screen , Urine Negative Negative  Negative    Methamphetamine, Ur Negative Negative  Negative       Details                     ----------------------------------------------------------------------------------------------------------------------  Imaging Results (Last 24 Hours)       ** No results found for the last 24 hours. **            ----------------------------------------------------------------------------------------------------------------------    Pertinent Infectious Disease Results                Assessment/Plan       Assessment       Polymicrobial bacteremia with Klebsiella and Serratia  Endocarditis, currently being treated with daptomycin  History of MRSA bacteremia  Pregnancy, 16 weeks gestation         Plan      The patient is resting comfortably in bed, on room air with " no apparent distress.  Afebrile.  Denies diarrhea.  Denies dysuria or other complaints.  The patient did report she feels fatigued after not resting well overnight.  Lung exam clear to auscultation bilaterally.  Abdomen soft and nontender with normoactive bowel sounds.  WBC normal at 4.07.  1 of 2 blood cultures from 2/6 finalized with Klebsiella and gram-negative bacilli.  Repeat cultures from 2/8 with 1 of 2 thus far reporting no growth at 24 hours.    Bacteremia is felt to be secondary to midline infection, midline has since been removed.    Completed a total of 6 weeks of daptomycin for MRSA bacteremia with concerns for underlying endocarditis based on abnormal 2D echo.  After multiple extensions due to missed infusions, the patient sufficiently completed 6 weeks of IV antibiotic therapy.  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    Continues with ceftriaxone 2 g IV every 24 hours, while awaiting repeat blood cultures to update.  If blood cultures from 2/8 remain clear, the patient may be further de-escalated to oral cefdinir, to continue through 2/22/2025.  Recommend outpatient infectious disease follow-up upon discharge.      RECENT ANTIMICROBIAL THERAPY    cefTRIAXone (ROCEPHIN) 2000 mg IVPB in 100 mL NS (VTB)     Code Status:   Code Status and Medical Interventions: CPR (Attempt to Resuscitate); Full Support   Ordered at: 02/07/25 0259     Level Of Support Discussed With:    Patient     Code Status (Patient has no pulse and is not breathing):    CPR (Attempt to Resuscitate)     Medical Interventions (Patient has pulse or is breathing):    Full Support       BIANCA Guaman  02/09/25  11:48 EST

## 2025-02-10 ENCOUNTER — TELEPHONE (OUTPATIENT)
Dept: TELEMETRY | Facility: HOSPITAL | Age: 30
End: 2025-02-10
Payer: COMMERCIAL

## 2025-02-10 NOTE — PAYOR COMM NOTE
"[facility]  NPI:[id number]    Utilization Review  Contact: Tracey Chowdary RN  Phone: [phone]  Fax:[phone]    DISCHARGE NOTIFICATION    [id number]     Rochelle Robertson (29 y.o. Female)       Date of Birth   1995    Social Security Number       Address   [place] [address] [address]    Home Phone   [phone]    MRN   [id number]       Gnosticism   Religious    Marital Status                               Admission Date   2/6/25    Admission Type   Emergency    Admitting Provider   Alix Vasquez MD    Attending Provider       Department, Room/Bed   [facility] 3 [facility], 3310/2S       Discharge Date   2/9/2025    Discharge Disposition   Home or Self Care    Discharge Destination                                 Attending Provider: (none)   Allergies: Amoxicillin, Penicillins, Vancomycin, Zofran [Ondansetron Hcl]    Isolation: None   Infection: MRSA (10/13/21)   Code Status: Prior    Ht: 161.3 cm (63.5\")   Wt: 64.1 kg (141 lb 5 oz)    Admission Cmt: None   Principal Problem: Bacteremia [R78.81]                   Active Insurance as of 2/6/2025       Primary Coverage       Payor Plan Insurance Group Employer/Plan Group    [facility] BY REYES [facility] BY REYES [id number]       Payor Plan Address Payor Plan Phone Number Payor Plan Fax Number Effective Dates    [address] [address]   1/1/2021 - None Entered    [facility] [id number]         Subscriber Name Subscriber Birth Date Member ID       ROCHELLE ROBERTSON 1995 [id number]                     Emergency Contacts        (Rel.) Home Phone Work Phone Mobile Phone    UMER ROBERTSON (Spouse) [phone] -- --    BIB RAIN (Grandparent) [phone] -- --    Vivian Kaba (Aunt) (Other) -- -- [phone]                 Discharge Summary        Kadeem Ayala DO at 02/09/25 1446              [facility] HOSPITALISTS DISCHARGE SUMMARY    Patient " Identification:  Name:  Sailaja Alarcon  Age:  29 y.o.  Sex:  female  :  1995  MRN:  [id number]  Visit Number:  [id number]    Date of Admission: 2025  Date of Discharge:  2025     PCP: Margaret Baxter APRN    DISCHARGE DIAGNOSIS  Serratia and Klebsiella bacteremia, POA  Hypotension, fluid responsive, improving  16-week intrauterine pregnancy, POA  Prior treatment for MRSA bacteremia, intermittently noncompliant  Suspected prominent eustachian valve, vegetation felt less likely  History of substance abuse, in remission  Chronic Suboxone usage  Chronic smoking tobacco use disorder  History of hepatitis C    CONSULTS   Infectious disease    PROCEDURES PERFORMED      HOSPITAL COURSE  Patient is a 29 y.o. female presented to [facility] at the direction of infectious disease clinic for positive blood cultures.  Please see the admitting history and physical for further details.      Patient presenting to the emergency department after call back for positive blood cultures drawn in clinic during office visit and subsequently admitted to the hospitalist service initiated on antibiotic therapy.  Patient with complicated history of being 16 weeks pregnant with prior history of MRSA bacteremia late last year discharged home with PICC line with poor compliance and missed treatments during her course of treatment.  During this hospitalization patient with no MRSA on repeat cultures obtained in office instead growing Klebsiella and Serratia.  Discussion had with infectious disease and given lack of growth of MRSA despite patient with several missed treatments in the outpatient setting essentially completing a sufficient 6-week course and repeat TTE here showing similar mass on 2D echo felt to be congenital in nature rather than infectious.  Repeat blood cultures were obtained with 1 of 2 being positive for Klebsiella only on  with subsequent repeat cultures obtained on  showing no  growth at 24 hours and after discussion with infectious disease given lack of growth at 24 hours on repeat cultures recommended for transition to oral antibiotic therapy with cefdinir.  Given this and patient's desire to return home and avoid further antibiotic therapy as patient did present initially to ID clinic with nonsterile appearing PICC line dressings due to loss of home health dressing care due to insurance reasons she was felt to achieve maximal benefit of continued inpatient hospitalization and subsequently discharged home in stable medical condition with oral antibiotic therapy to complete course of treatment and avoid complications from IV antibiotic therapy.  Patient will follow-up with primary care and infectious disease post discharge.    VITAL SIGNS:  Temp:  [97.9 °F (36.6 °C)-98.6 °F (37 °C)] 98.2 °F (36.8 °C)  Heart Rate:  [60-79] 79  Resp:  [18] 18  BP: ()/(53-78) 103/76  SpO2:  [93 %-100 %] 97 %  on   ;   Device (Oxygen Therapy): room air    Body mass index is 24.64 kg/m².  Wt Readings from Last 3 Encounters:   02/09/25 64.1 kg (141 lb 5 oz)   02/04/25 61.8 kg (136 lb 3.2 oz)   01/28/25 62.3 kg (137 lb 6.4 oz)       PHYSICAL EXAM:  Constitutional:  Well-developed and well-nourished.  No acute distress.      HENT:  Head:  Normocephalic and atraumatic.  Mouth:  Moist mucous membranes.    Eyes:  Conjunctivae and EOM are normal. No scleral icterus.    Cardiovascular:  Normal rate, regular rhythm and normal heart sounds with no murmur.  Pulmonary/Chest:  No respiratory distress, no wheezes, no crackles, with normal breath sounds and good air movement.  Abdominal:  Soft.  Bowel sounds are normal.  No tenderness.  L fundus  Musculoskeletal:  No tenderness, and no deformity.  No red or swollen joints anywhere.  Functional ROM intact.   Neurological:  Alert and oriented to person, place, and time.  No cranial nerve deficit.  No tongue deviation.  No facial droop.  No slurred speech. Intact  Sensation throughout  Skin:  Skin is warm and dry. No rash or lesion noted. No pallor.   Peripheral vascular:  Pulses in all 4 extremities with no clubbing, no cyanosis, no edema.  Psychiatric: Appropriate mood and affect, pleasant.    DISCHARGE DISPOSITION   Stable    DISCHARGE MEDICATIONS:     Discharge Medications        New Medications        Instructions Start Date   cefdinir 300 MG capsule  Commonly known as: OMNICEF   300 mg, Oral, 2 Times Daily             Continue These Medications        Instructions Start Date   buprenorphine-naloxone 8-2 MG per SL tablet  Commonly known as: SUBOXONE   0.5 tablets, 2 Times Daily      docusate sodium 100 MG capsule  Commonly known as: COLACE   100 mg, Oral, Daily      Flintstones Complete 18 MG chewable tablet chewable tablet   2 tablets, Oral, Daily      gabapentin 800 MG tablet  Commonly known as: NEURONTIN   800 mg, Oral, Daily PRN      melatonin 5 MG tablet tablet   15 mg, Oral, Nightly PRN                 Additional Instructions for the Follow-ups that You Need to Schedule       Discharge Follow-up with PCP   As directed       Currently Documented PCP:    Margaret Baxter APRN    PCP Phone Number:    [phone]     Follow Up Details: 1 week follow up               Follow-up Information       Margaret Baxter APRN .    Specialty: Family Medicine  Why: 1 week follow up  Contact information:  [address]  [facility] [address] [phone]               Kerry Sheffield MD .    Specialty: Infectious Diseases  Contact information:  [address]  [address] [address] [phone]                              TEST  RESULTS PENDING AT DISCHARGE  Pending Labs       Order Current Status    Blood Culture - Blood, Arm, Right Preliminary result    Blood Culture - Blood, Arm, Right Preliminary result    Blood Culture - Blood, Hand, Right Preliminary result             CODE STATUS  Code Status and Medical Interventions: CPR (Attempt to Resuscitate);  Full Support   Ordered at: 02/07/25 0259     Level Of Support Discussed With:    Patient     Code Status (Patient has no pulse and is not breathing):    CPR (Attempt to Resuscitate)     Medical Interventions (Patient has pulse or is breathing):    Full Support       Kadeem Ayala DO  [facility]  02/09/25  14:46 EST    Please note that this discharge summary required more than 30 minutes to complete.    Electronically signed by Kadeem Ayala DO at 02/09/25 8987

## 2025-02-10 NOTE — OUTREACH NOTE
Prep Survey      Flowsheet Row Responses   [facility] facility patient discharged from? [facility]   Is LACE score < 7 ? No   Eligibility Readm Mgmt   Discharge diagnosis Bacteremia, 16 weeks pregnant   Does the patient have one of the following disease processes/diagnoses(primary or secondary)? Other   Does the patient have Home health ordered? No   Is there a DME ordered? No   Prep survey completed? Yes            Fabiola GUZMÁN - Registered Nurse

## 2025-02-11 LAB — BACTERIA SPEC AEROBE CULT: NORMAL

## 2025-02-11 NOTE — ED PROVIDER NOTES
Subjective     History provided by:  Patient   used: No    Weakness - Generalized  Severity:  Mild  Onset quality:  Gradual  Duration: Several.  Timing:  Constant  Progression:  Worsening  Chronicity:  Recurrent  Context: recent infection    Context: not alcohol use, not allergies, not change in medication, not decreased sleep, not dehydration, not drug use, not increased activity, not pinched nerve, not stress and not urinary tract infection    Relieved by:  Nothing  Worsened by:  Activity  Ineffective treatments:  None tried  Associated symptoms: no abdominal pain, no anorexia, no aphasia, no arthralgias, no ataxia, no chest pain, no cough, no diarrhea, no difficulty walking, no dizziness, no drooling, no dysphagia, no dysuria, no numbness in extremities, no falls, no fever, no foul-smelling urine, no frequency, no headaches, no hematochezia, no lethargy, no loss of consciousness, no melena, no myalgias, no nausea, no near-syncope, no seizures, no sensory-motor deficit, no shortness of breath, no stroke symptoms, no syncope, no urgency, no vision change and no vomiting    Risk factors: no anemia, no congestive heart failure, no coronary artery disease, no diabetes, no excessive menstruation, no family hx of stroke, no heart disease, no neurologic disease, no new medications and no recent stressors        Review of Systems   Constitutional:  Negative for activity change, appetite change, chills, diaphoresis, fatigue and fever.   HENT:  Negative for congestion, drooling, ear pain and sore throat.    Eyes:  Negative for redness.   Respiratory:  Negative for cough, chest tightness, shortness of breath and wheezing.    Cardiovascular:  Negative for chest pain, palpitations, leg swelling, syncope and near-syncope.   Gastrointestinal:  Negative for abdominal pain, anorexia, diarrhea, dysphagia, hematochezia, melena, nausea and vomiting.   Genitourinary:  Negative for dysuria, frequency and urgency.    Musculoskeletal:  Negative for arthralgias, back pain, falls, myalgias and neck pain.   Skin:  Negative for pallor, rash and wound.   Neurological:  Positive for weakness. Negative for dizziness, seizures, loss of consciousness, speech difficulty and headaches.   Psychiatric/Behavioral:  Negative for agitation, behavioral problems, confusion and decreased concentration.    All other systems reviewed and are negative.      Past Medical History:   Diagnosis Date    Current every day smoker 01/28/2024    Cutaneous abscess of neck 10/10/2021    Added automatically from request for surgery [id number]      Hepatitis C     Infective endocarditis 12/10/2024    Migraines     MRSA bacteremia 12/04/2024    Non-pressure chronic ulcer of other part of left lower leg with fat layer exposed 01/28/2024       Allergies   Allergen Reactions    Amoxicillin Anaphylaxis    Penicillins Anaphylaxis     Has tolerated rocephin per patient    Vancomycin Itching     Pt states she had full body itch and burning sensation, and she turned red. Reaction potentially Red Man Syndrome.    Zofran [Ondansetron Hcl] Hives and Rash       Past Surgical History:   Procedure Laterality Date    HEAD/NECK ABSCESS INCISION AND DRAINAGE Left 7/8/2022    Procedure: HEAD NECK ABSCESS INCISION AND DRAINAGE;  Surgeon: Miquel Grayson MD;  Location: [facility];  Service: General;  Laterality: Left;    INCISION AND DRAINAGE ABSCESS Right 10/10/2021    Procedure: INCISION AND DRAINAGE ABSCESS CENTRAL LINE PLACEMENT;  Surgeon: Naz Payne MD;  Location: [facility] OR;  Service: General;  Laterality: Right;  I&D ABSCESS= INFECTED.  CENTRAL LINE = CLEAN.       Family History   Problem Relation Age of Onset    Hypertension Mother     Cancer Father     No Known Problems Sister     No Known Problems Brother     No Known Problems Son     No Known Problems Daughter     Hypertension Maternal Grandmother     Diabetes Maternal Grandfather     No Known Problems Paternal  Grandmother     No Known Problems Paternal Grandfather     No Known Problems Cousin     Diabetes Maternal Aunt     Rheum arthritis Neg Hx     Osteoarthritis Neg Hx     Asthma Neg Hx     Heart failure Neg Hx     Hyperlipidemia Neg Hx     Migraines Neg Hx     Rashes / Skin problems Neg Hx     Seizures Neg Hx     Stroke Neg Hx     Thyroid disease Neg Hx        Social History     Socioeconomic History    Marital status:    Tobacco Use    Smoking status: Every Day     Current packs/day: 1.50     Average packs/day: 1.3 packs/day for 30.0 years (37.5 ttl pk-yrs)     Types: Cigarettes     Start date: 2/7/2010    Smokeless tobacco: Never   Vaping Use    Vaping status: Some Days   Substance and Sexual Activity    Alcohol use: No     Alcohol/week: 0.0 standard drinks of alcohol    Drug use: Not Currently     Frequency: 3.0 times per week     Types: IV     Comment: last use 5 YEARS AGO    Sexual activity: Yes     Partners: Male           Objective   Physical Exam  Vitals and nursing note reviewed.   Constitutional:       General: She is not in acute distress.     Appearance: Normal appearance. She is well-developed. She is not toxic-appearing or diaphoretic.   HENT:      Head: Normocephalic and atraumatic.      Right Ear: External ear normal.      Left Ear: External ear normal.      Nose: Nose normal.      Mouth/Throat:      Pharynx: No oropharyngeal exudate.      Tonsils: No tonsillar exudate.   Eyes:      General: Lids are normal.      Conjunctiva/sclera: Conjunctivae normal.      Pupils: Pupils are equal, round, and reactive to light.   Neck:      Thyroid: No thyromegaly.   Cardiovascular:      Rate and Rhythm: Normal rate and regular rhythm.      Pulses: Normal pulses.      Heart sounds: Normal heart sounds, S1 normal and S2 normal.   Pulmonary:      Effort: Pulmonary effort is normal. No tachypnea or respiratory distress.      Breath sounds: Normal breath sounds. No decreased breath sounds, wheezing or rales.    Chest:      Chest wall: No tenderness.   Abdominal:      General: Bowel sounds are normal. There is no distension.      Palpations: Abdomen is soft.      Tenderness: There is no abdominal tenderness. There is no guarding or rebound.   Musculoskeletal:         General: No tenderness or deformity. Normal range of motion.      Cervical back: Full passive range of motion without pain, normal range of motion and neck supple.   Lymphadenopathy:      Cervical: No cervical adenopathy.   Skin:     General: Skin is warm and dry.      Coloration: Skin is not pale.      Findings: No erythema or rash.   Neurological:      Mental Status: She is alert and oriented to person, place, and time.      GCS: GCS eye subscore is 4. GCS verbal subscore is 5. GCS motor subscore is 6.      Cranial Nerves: No cranial nerve deficit.      Sensory: No sensory deficit.   Psychiatric:         Speech: Speech normal.         Behavior: Behavior normal.         Thought Content: Thought content normal.         Judgment: Judgment normal.         Procedures           ED Course  ED Course as of 02/11/25 1607   u Feb 06, 2025 2010 Discussed case with [facility] high risk OB/GYN maternal-fetal medicine and recommended admission here at [facility] for IV antibiotics.  Had no further recommendations from their standpoint other than consulting with ID. [ES]   Tue Feb 11, 2025   1531 US Ob 14 + Weeks Single or First Gestation  IMPRESSION:  FINDINGS/IMPRESSION: Single live intrauterine fetus with estimated  gestational age 16 weeks 5 days. Estimated fetal weight 167.7 g. Fetal  heart rate 156 bpm.   [ES]      ED Course User Index  [ES] Efren Frias MD                                                       Medical Decision Making  Problems Addressed:  Bacteremia: complicated acute illness or injury    Amount and/or Complexity of Data Reviewed  Labs: ordered.  Radiology: ordered. Decision-making details  documented in ED Course.    Risk  Decision regarding hospitalization.        Final diagnoses:   Bacteremia       ED Disposition  ED Disposition       ED Disposition   Decision to Admit    Condition   --    Comment   Level of Care: Critical Care [6]   Diagnosis: Bacteremia [790.7.ICD-9-CM]   Certification: I Certify That Inpatient Hospital Services Are Medically Necessary For Greater Than 2 Midnights                 Margaret Baxtre, BIANCA  [address]  [facility] [address]  [phone]    Follow up in 1 week(s)  Will notify patient about follow-up appt. with Abbie Baxter 02/10/25.    Kerry Sheffield MD  [address] [facility] [address]  [place] [id number]  [phone]    Follow up on 2/11/2025  @ 9:30AM.    Margaret Baxter, BIANCA  [address]  [facility] [address]  [phone]               Medication List        New Prescriptions      cefdinir 300 MG capsule  Commonly known as: OMNICEF  Take 1 capsule by mouth 2 (Two) Times a Day for 14 days.               Where to Get Your Medications        These medications were sent to [facility] [place]  [address] [facility] [id number]      Hours: Monday to Friday 7 AM to 6 PM Phone: [phone]   cefdinir 300 MG capsule            Efren Frias MD  02/11/25 2183       Efren Frias MD  02/11/25 6034

## 2025-02-12 ENCOUNTER — READMISSION MANAGEMENT (OUTPATIENT)
Dept: CALL CENTER | Facility: HOSPITAL | Age: 30
End: 2025-02-12
Payer: COMMERCIAL

## 2025-02-12 NOTE — OUTREACH NOTE
Medical Week 1 Survey      Flowsheet Row Responses   [facility] facility patient discharged from? [facility]   Does the patient have one of the following disease processes/diagnoses(primary or secondary)? Other   Week 1 attempt successful? No   Unsuccessful attempts Attempt 1            RACHEL JUNIOR - Registered Nurse

## 2025-02-13 LAB
BACTERIA SPEC AEROBE CULT: NORMAL
BACTERIA SPEC AEROBE CULT: NORMAL

## 2025-02-19 ENCOUNTER — READMISSION MANAGEMENT (OUTPATIENT)
Dept: CALL CENTER | Facility: HOSPITAL | Age: 30
End: 2025-02-19
Payer: COMMERCIAL

## 2025-02-19 NOTE — OUTREACH NOTE
Medical Week 2 Survey      Flowsheet Row Responses   [facility] patient discharged from? [facility]   Does the patient have one of the following disease processes/diagnoses(primary or secondary)? Other   Week 2 attempt successful? Yes   Call start time 1303   Discharge diagnosis Bacteremia, 16 weeks pregnant   Call end time 1304   Person spoke with today (if not patient) and relationship Patient   Does the patient have a primary care provider?  Yes   Has home health visited the patient within 72 hours of discharge? N/A   Psychosocial issues? No   Did the patient receive a copy of their discharge instructions? Yes   Nursing interventions Reviewed instructions with patient   What is the patient's perception of their health status since discharge? Worsening  [Pain R side. Vomiting]   Is the patient/caregiver able to teach back signs and symptoms related to disease process for when to call PCP? Yes   Is the patient/caregiver able to teach back signs and symptoms related to disease process for when to call 911? Yes   Is the patient/caregiver able to teach back the hierarchy of who to call/visit for symptoms/problems? PCP, Specialist, Home health nurse, Urgent Care, ED, 911 Yes   Week 2 Call Completed? Yes   Wrap up additional comments Patient reports she is in so much pain, she is going to hospital via ambulance now. Pain on R side and vomiting.   Call end time 1304            Muriel DEE - Registered Nurse

## 2025-02-25 ENCOUNTER — REFERRAL TRIAGE (OUTPATIENT)
Dept: LABOR AND DELIVERY | Facility: HOSPITAL | Age: 30
End: 2025-02-25
Payer: COMMERCIAL

## 2025-02-25 ENCOUNTER — PATIENT OUTREACH (OUTPATIENT)
Dept: LABOR AND DELIVERY | Facility: HOSPITAL | Age: 30
End: 2025-02-25
Payer: COMMERCIAL

## 2025-03-04 ENCOUNTER — READMISSION MANAGEMENT (OUTPATIENT)
Dept: CALL CENTER | Facility: HOSPITAL | Age: 30
End: 2025-03-04
Payer: COMMERCIAL

## 2025-03-04 ENCOUNTER — PATIENT OUTREACH (OUTPATIENT)
Dept: LABOR AND DELIVERY | Facility: HOSPITAL | Age: 30
End: 2025-03-04
Payer: COMMERCIAL

## 2025-03-04 NOTE — OUTREACH NOTE
Motherhood Connection  Unable to Reach    Questions/Answers      Flowsheet Row Responses   Pending Outreach Confirm Patient Interest   Call Attempt First   Outcome No answer/busy   Next Call Attempt Date 03/05/25            No voicemail available and pt does not have MyChart.    Rupal Pineda RN  Maternity Nurse Navigator    3/4/2025, 14:55 EST

## 2025-03-04 NOTE — OUTREACH NOTE
Medical Week 3 Survey      Flowsheet Row Responses   [facility] patient discharged from? [facility]   Does the patient have one of the following disease processes/diagnoses(primary or secondary)? Other   Week 3 attempt successful? Yes   Call start time 1029   Call end time 1032   Discharge diagnosis Bacteremia, 16 weeks pregnant   Person spoke with today (if not patient) and relationship Serena, grandparent   Meds reviewed with patient/caregiver? Yes   Is the patient having any side effects they believe may be caused by any medication additions or changes? No   Does the patient have all medications ordered at discharge? Yes   Is the patient taking all medications as directed (includes completed medication regime)? Yes   Does the patient have a primary care provider?  Yes   Does the patient have an appointment with their PCP within 7 days of discharge? Yes   Has the patient kept scheduled appointments due by today? Yes   Has home health visited the patient within 72 hours of discharge? N/A   Psychosocial issues? No   Did the patient receive a copy of their discharge instructions? Yes   Nursing interventions Reviewed instructions with patient   What is the patient's perception of their health status since discharge? Improving   Is the patient/caregiver able to teach back signs and symptoms related to disease process for when to call PCP? Yes   Is the patient/caregiver able to teach back signs and symptoms related to disease process for when to call 911? Yes   Is the patient/caregiver able to teach back the hierarchy of who to call/visit for symptoms/problems? PCP, Specialist, Home health nurse, Urgent Care, ED, 911 Yes   If the patient is a current smoker, are they able to teach back resources for cessation? --  [not planning on quitting]   Week 3 Call Completed? Yes   Graduated Yes   Is the patient interested in additional calls from an ambulatory ? No   Would this patient benefit from a Referral to Amb  Social Work? No   Call end time 1032            Martha GUZMÁN - Registered Nurse

## 2025-03-05 ENCOUNTER — PATIENT OUTREACH (OUTPATIENT)
Dept: LABOR AND DELIVERY | Facility: HOSPITAL | Age: 30
End: 2025-03-05
Payer: COMMERCIAL

## 2025-03-05 NOTE — OUTREACH NOTE
Motherhood Connection  Intake    Current Estimated Gestational Age: 19w5d    Intake Assessment      Flowsheet Row Responses   Best Method for Contacting Home   Currently Employed No   Able to keep appointments as scheduled Yes   Gender(s) and Name(s) Doesn't know yet.   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 No Barriers   What is the preferred language of the learner for medical teaching? English   Is an  required? No   How does the learner prefer to learn new concepts? Listening, Reading            Spoke with patient over the phone. Discussed community and insurance extra benefits. Pt states she does not know sex of child yet, is undecided on final feeding plan, is not enrolled in [facility] due to being unable to get transportation to appointment at [facility] office. Pt states she does not have a vehicle and relies on [employer] for transportation to medical appointments but that they have refused to take her to [facility] office. 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, [facility], and [employer] extra benefits. Pt denies any urgent needs.     Referral submitted to the following resources (verbal consent received to submit demographic information):         Tobacco, Alcohol, and Drug History     reports that she has been smoking cigarettes. She started smoking about 15 years ago. She has a 37.6 pack-year smoking history. She has never used smokeless tobacco.   reports no history of alcohol use.   reports that she does not currently use drugs after having used the following drugs: IV. Frequency: 3.00 times per week.    Rupal Pineda RN  Maternity Nurse Navigator    3/5/2025, 14:39 EST        Motherhood  Connection  Enrollment    Current Estimated Gestational Age: 19w5d    Questions/Answers      Flowsheet Row Responses   Would like to participate? Yes   Date of Intake Visit 03/05/25                Rupal Pineda RN  Maternity Nurse Navigator    3/5/2025, 14:39 EST

## 2025-04-07 ENCOUNTER — PATIENT OUTREACH (OUTPATIENT)
Dept: LABOR AND DELIVERY | Facility: HOSPITAL | Age: 30
End: 2025-04-07
Payer: COMMERCIAL

## 2025-04-07 NOTE — OUTREACH NOTE
Motherhood Connection  Unable to Reach    Questions/Answers      Flowsheet Row Responses   Pending Outreach Prenatal Check-in   Call Attempt First   Outcome Left message            Left message with patients .    Rupal Pineda RN  Maternity Nurse Navigator    4/7/2025, 16:16 EDT

## 2025-05-01 ENCOUNTER — PATIENT OUTREACH (OUTPATIENT)
Dept: LABOR AND DELIVERY | Facility: HOSPITAL | Age: 30
End: 2025-05-01
Payer: COMMERCIAL

## 2025-05-01 NOTE — OUTREACH NOTE
Motherhood Connection  Unable to Reach    Questions/Answers      Flowsheet Row Responses   Pending Outreach Prenatal Check-in   Call Attempt Second   Outcome No answer/busy   Unable to reach comments: Unable to leave voicemail.            No voicemail available.    Rupal Pineda RN  Maternity Nurse Navigator    5/1/2025, 13:12 EDT

## 2025-05-01 NOTE — OUTREACH NOTE
Motherhood Connection  Check-In    Current Estimated Gestational Age: 27w6d      Questions/Answers      Flowsheet Row Responses   Best Method for Contacting Home   Demographics Reviewed Yes   Currently Employed No   Able to keep appointments as scheduled Yes   Baby Active/Feeling Fetal Movemen Yes   Education related to new diagnoses/home equipment No   May I ask you questions about your substance use? Yes   Other Comment Denies   Supplies ready for baby Car Seat   Resource/Environmental Concerns None   Do you have any questions related to your care experience, your pregnancy, plans for delivery, any concerns, etc? No            Pt returned earlier call to her number. Pt reports her pregnancy is going well. She states she has most of her needed infant supplies. Pt denied any concerns at this time.    Referral submitted to the following resources (verbal consent received to submit demographic information):         Rupal Pineda RN  Maternity Nurse Navigator    5/1/2025, 14:08 EDT

## 2025-06-05 ENCOUNTER — PATIENT OUTREACH (OUTPATIENT)
Dept: LABOR AND DELIVERY | Facility: HOSPITAL | Age: 30
End: 2025-06-05
Payer: COMMERCIAL

## 2025-06-05 NOTE — OUTREACH NOTE
Motherhood Connection  Check-In    Current Estimated Gestational Age: 32w6d      Questions/Answers      Flowsheet Row Responses   Best Method for Contacting Home   Demographics Reviewed Yes   Currently Employed No   Able to keep appointments as scheduled Yes   Gender(s) and Name(s) Female   Baby Active/Feeling Fetal Movemen Yes   Are you having any of the following symptoms? Contractions  [Pt states she had a couple of contractions last week.]   Questions regarding prenatal visits or tests to be ordered? No   Education related to new diagnoses/home equipment No   May I ask you questions about your substance use? Yes   Supplies ready for baby Car Seat, Clothing, Crib, Diapers   Resource/Environmental Concerns None   Do you have any questions related to your care experience, your pregnancy, plans for delivery, any concerns, etc? No            Pt returned earlier call to her number. Pt states her pregnancy is going fairly well. She reports she did have a couple of contractions last week that were 20 minutes apart. Discussed the importance of hydration and rest. EPDS completed with a score of 7 noted. No urgent needs identified.    Referral submitted to the following resources (verbal consent received to submit demographic information):         Rupal Pineda RN  Maternity Nurse Navigator    6/5/2025, 16:05 EDT

## 2025-06-05 NOTE — OUTREACH NOTE
Motherhood Connection  Unable to Reach    Questions/Answers      Flowsheet Row Responses   Pending Outreach Prenatal Check-in   Call Attempt First   Outcome No answer/busy            Unable to leave voicemail and pt does not have MyChart.    Rupal Pineda RN  Maternity Nurse Navigator    6/5/2025, 15:45 EDT

## 2025-06-17 LAB — EXTERNAL GROUP B STREP ANTIGEN: NEGATIVE

## 2025-06-29 ENCOUNTER — HOSPITAL ENCOUNTER (INPATIENT)
Facility: HOSPITAL | Age: 30
LOS: 3 days | Discharge: HOME | End: 2025-07-02
Attending: OBSTETRICS & GYNECOLOGY | Admitting: OBSTETRICS & GYNECOLOGY
Payer: COMMERCIAL

## 2025-06-29 ENCOUNTER — HOSPITAL ENCOUNTER (OUTPATIENT)
Dept: LABOR AND DELIVERY | Facility: HOSPITAL | Age: 30
Discharge: HOME | End: 2025-06-29
Payer: COMMERCIAL

## 2025-06-29 PROBLEM — Z34.90 PREGNANCY: Status: ACTIVE | Noted: 2025-06-29

## 2025-06-29 LAB
ABO GROUP BLD: NORMAL
AMPHET+METHAMPHET UR QL: NEGATIVE
AMPHETAMINES UR QL: NEGATIVE
BARBITURATES UR QL SCN: NEGATIVE
BASOPHILS # BLD AUTO: 0.02 10*3/MM3 (ref 0–0.2)
BASOPHILS NFR BLD AUTO: 0.3 % (ref 0–1.5)
BENZODIAZ UR QL SCN: NEGATIVE
BLD GP AB SCN SERPL QL: POSITIVE
BUPRENORPHINE SERPL-MCNC: POSITIVE NG/ML
CANNABINOIDS SERPL QL: NEGATIVE
COCAINE UR QL: NEGATIVE
DEPRECATED RDW RBC AUTO: 44.4 FL (ref 37–54)
EOSINOPHIL # BLD AUTO: 0.06 10*3/MM3 (ref 0–0.4)
EOSINOPHIL NFR BLD AUTO: 0.9 % (ref 0.3–6.2)
ERYTHROCYTE [DISTWIDTH] IN BLOOD BY AUTOMATED COUNT: 12.9 % (ref 12.3–15.4)
FENTANYL UR-MCNC: NEGATIVE NG/ML
HBV SURFACE AG SERPL QL IA: NORMAL
HCT VFR BLD AUTO: 30.2 % (ref 34–46.6)
HGB BLD-MCNC: 10.1 G/DL (ref 12–15.9)
IMM GRANULOCYTES # BLD AUTO: 0.03 10*3/MM3 (ref 0–0.05)
IMM GRANULOCYTES NFR BLD AUTO: 0.5 % (ref 0–0.5)
LYMPHOCYTES # BLD AUTO: 2.18 10*3/MM3 (ref 0.7–3.1)
LYMPHOCYTES NFR BLD AUTO: 33.7 % (ref 19.6–45.3)
MCH RBC QN AUTO: 31.4 PG (ref 26.6–33)
MCHC RBC AUTO-ENTMCNC: 33.4 G/DL (ref 31.5–35.7)
MCV RBC AUTO: 93.8 FL (ref 79–97)
METHADONE UR QL SCN: NEGATIVE
MONOCYTES # BLD AUTO: 0.31 10*3/MM3 (ref 0.1–0.9)
MONOCYTES NFR BLD AUTO: 4.8 % (ref 5–12)
NEUTROPHILS NFR BLD AUTO: 3.86 10*3/MM3 (ref 1.7–7)
NEUTROPHILS NFR BLD AUTO: 59.8 % (ref 42.7–76)
NRBC BLD AUTO-RTO: 0 /100 WBC (ref 0–0.2)
OPIATES UR QL: NEGATIVE
OXYCODONE UR QL SCN: NEGATIVE
PCP UR QL SCN: NEGATIVE
PLATELET # BLD AUTO: 128 10*3/MM3 (ref 140–450)
PMV BLD AUTO: 11.1 FL (ref 6–12)
RBC # BLD AUTO: 3.22 10*6/MM3 (ref 3.77–5.28)
RESIDUAL RHIG DETECTED: NORMAL
RH BLD: NEGATIVE
T&S EXPIRATION DATE: NORMAL
TRICYCLICS UR QL SCN: NEGATIVE
WBC NRBC COR # BLD AUTO: 6.46 10*3/MM3 (ref 3.4–10.8)

## 2025-06-29 PROCEDURE — 86870 RBC ANTIBODY IDENTIFICATION: CPT | Performed by: OBSTETRICS & GYNECOLOGY

## 2025-06-29 PROCEDURE — 25810000003 LACTATED RINGERS PER 1000 ML: Performed by: OBSTETRICS & GYNECOLOGY

## 2025-06-29 PROCEDURE — 86780 TREPONEMA PALLIDUM: CPT | Performed by: OBSTETRICS & GYNECOLOGY

## 2025-06-29 PROCEDURE — 86901 BLOOD TYPING SEROLOGIC RH(D): CPT | Performed by: OBSTETRICS & GYNECOLOGY

## 2025-06-29 PROCEDURE — 86850 RBC ANTIBODY SCREEN: CPT | Performed by: OBSTETRICS & GYNECOLOGY

## 2025-06-29 PROCEDURE — 85025 COMPLETE CBC W/AUTO DIFF WBC: CPT | Performed by: OBSTETRICS & GYNECOLOGY

## 2025-06-29 PROCEDURE — 87340 HEPATITIS B SURFACE AG IA: CPT | Performed by: OBSTETRICS & GYNECOLOGY

## 2025-06-29 PROCEDURE — 80307 DRUG TEST PRSMV CHEM ANLYZR: CPT | Performed by: OBSTETRICS & GYNECOLOGY

## 2025-06-29 PROCEDURE — 86900 BLOOD TYPING SEROLOGIC ABO: CPT | Performed by: OBSTETRICS & GYNECOLOGY

## 2025-06-29 PROCEDURE — 3E033VJ INTRODUCTION OF OTHER HORMONE INTO PERIPHERAL VEIN, PERCUTANEOUS APPROACH: ICD-10-PCS | Performed by: OBSTETRICS & GYNECOLOGY

## 2025-06-29 RX ORDER — PRENATAL VIT/IRON FUM/FOLIC AC 27MG-0.8MG
1 TABLET ORAL DAILY
Status: DISCONTINUED | OUTPATIENT
Start: 2025-06-30 | End: 2025-07-02 | Stop reason: HOSPADM

## 2025-06-29 RX ORDER — FAMOTIDINE 20 MG/1
20 TABLET, FILM COATED ORAL 2 TIMES DAILY
Status: DISCONTINUED | OUTPATIENT
Start: 2025-06-30 | End: 2025-07-02 | Stop reason: HOSPADM

## 2025-06-29 RX ORDER — HYDROXYZINE HYDROCHLORIDE 25 MG/1
50 TABLET, FILM COATED ORAL NIGHTLY PRN
Status: DISCONTINUED | OUTPATIENT
Start: 2025-06-29 | End: 2025-06-30 | Stop reason: HOSPADM

## 2025-06-29 RX ORDER — BUPRENORPHINE HYDROCHLORIDE AND NALOXONE HYDROCHLORIDE DIHYDRATE 8; 2 MG/1; MG/1
4 TABLET SUBLINGUAL DAILY
Status: CANCELLED | OUTPATIENT
Start: 2025-06-30

## 2025-06-29 RX ORDER — SODIUM CHLORIDE 0.9 % (FLUSH) 0.9 %
10 SYRINGE (ML) INJECTION AS NEEDED
Status: DISCONTINUED | OUTPATIENT
Start: 2025-06-29 | End: 2025-06-30 | Stop reason: HOSPADM

## 2025-06-29 RX ORDER — TERBUTALINE SULFATE 1 MG/ML
0.25 INJECTION SUBCUTANEOUS AS NEEDED
Status: DISCONTINUED | OUTPATIENT
Start: 2025-06-29 | End: 2025-06-30 | Stop reason: HOSPADM

## 2025-06-29 RX ORDER — MAGNESIUM HYDROXIDE 1200 MG/15ML
1000 LIQUID ORAL ONCE AS NEEDED
Status: ACTIVE | OUTPATIENT
Start: 2025-06-29 | End: 2025-06-29

## 2025-06-29 RX ORDER — BUTORPHANOL TARTRATE 1 MG/ML
1 INJECTION, SOLUTION INTRAMUSCULAR; INTRAVENOUS
Status: DISCONTINUED | OUTPATIENT
Start: 2025-06-29 | End: 2025-06-30 | Stop reason: HOSPADM

## 2025-06-29 RX ORDER — NICOTINE 21 MG/24HR
1 PATCH, TRANSDERMAL 24 HOURS TRANSDERMAL
Status: DISCONTINUED | OUTPATIENT
Start: 2025-06-29 | End: 2025-07-02 | Stop reason: HOSPADM

## 2025-06-29 RX ORDER — SODIUM CHLORIDE 9 MG/ML
40 INJECTION, SOLUTION INTRAVENOUS AS NEEDED
Status: DISCONTINUED | OUTPATIENT
Start: 2025-06-29 | End: 2025-06-30

## 2025-06-29 RX ORDER — SODIUM CHLORIDE 0.9 % (FLUSH) 0.9 %
10 SYRINGE (ML) INJECTION EVERY 12 HOURS SCHEDULED
Status: DISCONTINUED | OUTPATIENT
Start: 2025-06-29 | End: 2025-06-30

## 2025-06-29 RX ORDER — ACETAMINOPHEN 325 MG/1
650 TABLET ORAL EVERY 4 HOURS PRN
Status: DISCONTINUED | OUTPATIENT
Start: 2025-06-29 | End: 2025-06-30 | Stop reason: HOSPADM

## 2025-06-29 RX ORDER — CALCIUM CARBONATE 500 MG/1
2 TABLET, CHEWABLE ORAL ONCE AS NEEDED
Status: COMPLETED | OUTPATIENT
Start: 2025-06-29 | End: 2025-06-29

## 2025-06-29 RX ORDER — LIDOCAINE HYDROCHLORIDE 10 MG/ML
0.5 INJECTION, SOLUTION INFILTRATION; PERINEURAL ONCE AS NEEDED
Status: DISCONTINUED | OUTPATIENT
Start: 2025-06-29 | End: 2025-06-30 | Stop reason: HOSPADM

## 2025-06-29 RX ORDER — MISOPROSTOL 100 MCG
25 TABLET ORAL EVERY 4 HOURS PRN
Status: DISCONTINUED | OUTPATIENT
Start: 2025-06-29 | End: 2025-06-30

## 2025-06-29 RX ORDER — BUTORPHANOL TARTRATE 2 MG/ML
2 INJECTION, SOLUTION INTRAMUSCULAR; INTRAVENOUS
Status: DISCONTINUED | OUTPATIENT
Start: 2025-06-29 | End: 2025-06-30 | Stop reason: HOSPADM

## 2025-06-29 RX ORDER — FAMOTIDINE 20 MG/1
20 TABLET, FILM COATED ORAL 2 TIMES DAILY
COMMUNITY

## 2025-06-29 RX ORDER — SODIUM CHLORIDE, SODIUM LACTATE, POTASSIUM CHLORIDE, CALCIUM CHLORIDE 600; 310; 30; 20 MG/100ML; MG/100ML; MG/100ML; MG/100ML
125 INJECTION, SOLUTION INTRAVENOUS CONTINUOUS
Status: ACTIVE | OUTPATIENT
Start: 2025-06-29 | End: 2025-07-01

## 2025-06-29 RX ADMIN — SODIUM CHLORIDE, POTASSIUM CHLORIDE, SODIUM LACTATE AND CALCIUM CHLORIDE 30 ML/HR: 600; 310; 30; 20 INJECTION, SOLUTION INTRAVENOUS at 22:51

## 2025-06-29 RX ADMIN — CALCIUM CARBONATE 2 TABLET: 500 TABLET, CHEWABLE ORAL at 20:18

## 2025-06-29 RX ADMIN — NICOTINE 1 PATCH: 14 PATCH, EXTENDED RELEASE TRANSDERMAL at 22:51

## 2025-06-30 ENCOUNTER — ANESTHESIA EVENT (OUTPATIENT)
Dept: LABOR AND DELIVERY | Facility: HOSPITAL | Age: 30
End: 2025-06-30
Payer: COMMERCIAL

## 2025-06-30 ENCOUNTER — ANESTHESIA (OUTPATIENT)
Dept: LABOR AND DELIVERY | Facility: HOSPITAL | Age: 30
End: 2025-06-30
Payer: COMMERCIAL

## 2025-06-30 LAB — TREPONEMA PALLIDUM IGG+IGM AB [PRESENCE] IN SERUM OR PLASMA BY IMMUNOASSAY: NORMAL

## 2025-06-30 PROCEDURE — C1755 CATHETER, INTRASPINAL: HCPCS | Performed by: NURSE ANESTHETIST, CERTIFIED REGISTERED

## 2025-06-30 PROCEDURE — 25010000002 FENTANYL CITRATE (PF) 50 MCG/ML SOLUTION: Performed by: NURSE ANESTHETIST, CERTIFIED REGISTERED

## 2025-06-30 PROCEDURE — C1755 CATHETER, INTRASPINAL: HCPCS

## 2025-06-30 PROCEDURE — 25010000002 METOCLOPRAMIDE PER 10 MG: Performed by: OBSTETRICS & GYNECOLOGY

## 2025-06-30 PROCEDURE — 25010000002 LIDOCAINE 1% - EPINEPHRINE 1:100000 1 %-1:100000 SOLUTION: Performed by: NURSE ANESTHETIST, CERTIFIED REGISTERED

## 2025-06-30 PROCEDURE — 25010000002 FAMOTIDINE 10 MG/ML SOLUTION: Performed by: OBSTETRICS & GYNECOLOGY

## 2025-06-30 PROCEDURE — 25010000002 LIDOCAINE PF 2% 2 % SOLUTION: Performed by: NURSE ANESTHETIST, CERTIFIED REGISTERED

## 2025-06-30 PROCEDURE — 59025 FETAL NON-STRESS TEST: CPT

## 2025-06-30 RX ORDER — ACETAMINOPHEN 325 MG/1
650 TABLET ORAL EVERY 4 HOURS PRN
Status: DISCONTINUED | OUTPATIENT
Start: 2025-06-30 | End: 2025-06-30 | Stop reason: HOSPADM

## 2025-06-30 RX ORDER — FENTANYL CITRATE 50 UG/ML
100 INJECTION, SOLUTION INTRAMUSCULAR; INTRAVENOUS ONCE
Refills: 0 | Status: COMPLETED | OUTPATIENT
Start: 2025-06-30 | End: 2025-06-30

## 2025-06-30 RX ORDER — LIDOCAINE HYDROCHLORIDE AND EPINEPHRINE 10; 10 MG/ML; UG/ML
INJECTION, SOLUTION INFILTRATION; PERINEURAL AS NEEDED
Status: DISCONTINUED | OUTPATIENT
Start: 2025-06-30 | End: 2025-06-30 | Stop reason: SURG

## 2025-06-30 RX ORDER — CARBOPROST TROMETHAMINE 250 UG/ML
250 INJECTION, SOLUTION INTRAMUSCULAR ONCE AS NEEDED
Status: DISCONTINUED | OUTPATIENT
Start: 2025-06-30 | End: 2025-07-02 | Stop reason: HOSPADM

## 2025-06-30 RX ORDER — IBUPROFEN 800 MG/1
800 TABLET, FILM COATED ORAL 3 TIMES DAILY
Status: DISCONTINUED | OUTPATIENT
Start: 2025-06-30 | End: 2025-07-02 | Stop reason: HOSPADM

## 2025-06-30 RX ORDER — FENTANYL/ROPIVACAINE/NS/PF 2MCG/ML-.2
14 PLASTIC BAG, INJECTION (ML) INJECTION CONTINUOUS
Refills: 0 | Status: DISCONTINUED | OUTPATIENT
Start: 2025-06-30 | End: 2025-06-30

## 2025-06-30 RX ORDER — LIDOCAINE HYDROCHLORIDE 20 MG/ML
INJECTION, SOLUTION EPIDURAL; INFILTRATION; INTRACAUDAL; PERINEURAL AS NEEDED
Status: DISCONTINUED | OUTPATIENT
Start: 2025-06-30 | End: 2025-06-30 | Stop reason: SURG

## 2025-06-30 RX ORDER — PRENATAL VIT/IRON FUM/FOLIC AC 27MG-0.8MG
1 TABLET ORAL DAILY
Status: DISCONTINUED | OUTPATIENT
Start: 2025-06-30 | End: 2025-06-30 | Stop reason: ALTCHOICE

## 2025-06-30 RX ORDER — DIPHENHYDRAMINE HYDROCHLORIDE 50 MG/ML
12.5 INJECTION, SOLUTION INTRAMUSCULAR; INTRAVENOUS EVERY 8 HOURS PRN
Status: DISCONTINUED | OUTPATIENT
Start: 2025-06-30 | End: 2025-06-30 | Stop reason: HOSPADM

## 2025-06-30 RX ORDER — OXYTOCIN/0.9 % SODIUM CHLORIDE 30/500 ML
125 PLASTIC BAG, INJECTION (ML) INTRAVENOUS CONTINUOUS PRN
Status: DISCONTINUED | OUTPATIENT
Start: 2025-06-30 | End: 2025-07-02 | Stop reason: HOSPADM

## 2025-06-30 RX ORDER — DOCUSATE SODIUM 100 MG/1
100 CAPSULE, LIQUID FILLED ORAL 2 TIMES DAILY
Status: DISCONTINUED | OUTPATIENT
Start: 2025-06-30 | End: 2025-07-02 | Stop reason: HOSPADM

## 2025-06-30 RX ORDER — MISOPROSTOL 100 UG/1
600 TABLET ORAL AS NEEDED
Status: DISCONTINUED | OUTPATIENT
Start: 2025-06-30 | End: 2025-06-30 | Stop reason: HOSPADM

## 2025-06-30 RX ORDER — BISACODYL 10 MG
10 SUPPOSITORY, RECTAL RECTAL DAILY PRN
Status: DISCONTINUED | OUTPATIENT
Start: 2025-07-01 | End: 2025-07-02 | Stop reason: HOSPADM

## 2025-06-30 RX ORDER — MISOPROSTOL 100 UG/1
600 TABLET ORAL ONCE AS NEEDED
Status: DISCONTINUED | OUTPATIENT
Start: 2025-06-30 | End: 2025-07-02 | Stop reason: HOSPADM

## 2025-06-30 RX ORDER — HYDROCODONE BITARTRATE AND ACETAMINOPHEN 7.5; 325 MG/1; MG/1
1 TABLET ORAL EVERY 4 HOURS PRN
Status: DISCONTINUED | OUTPATIENT
Start: 2025-06-30 | End: 2025-06-30 | Stop reason: HOSPADM

## 2025-06-30 RX ORDER — SODIUM CHLORIDE 0.9 % (FLUSH) 0.9 %
1-10 SYRINGE (ML) INJECTION AS NEEDED
Status: DISCONTINUED | OUTPATIENT
Start: 2025-06-30 | End: 2025-07-02 | Stop reason: HOSPADM

## 2025-06-30 RX ORDER — IBUPROFEN 800 MG/1
800 TABLET, FILM COATED ORAL EVERY 8 HOURS SCHEDULED
Status: DISCONTINUED | OUTPATIENT
Start: 2025-06-30 | End: 2025-06-30 | Stop reason: HOSPADM

## 2025-06-30 RX ORDER — OXYTOCIN/0.9 % SODIUM CHLORIDE 30/500 ML
2-20 PLASTIC BAG, INJECTION (ML) INTRAVENOUS
Status: DISCONTINUED | OUTPATIENT
Start: 2025-06-30 | End: 2025-06-30 | Stop reason: HOSPADM

## 2025-06-30 RX ORDER — DIPHENHYDRAMINE HCL 25 MG
25 CAPSULE ORAL NIGHTLY PRN
Status: DISCONTINUED | OUTPATIENT
Start: 2025-06-30 | End: 2025-07-02 | Stop reason: HOSPADM

## 2025-06-30 RX ORDER — HYDROCODONE BITARTRATE AND ACETAMINOPHEN 10; 325 MG/1; MG/1
1 TABLET ORAL EVERY 4 HOURS PRN
Refills: 0 | Status: DISCONTINUED | OUTPATIENT
Start: 2025-06-30 | End: 2025-07-02 | Stop reason: HOSPADM

## 2025-06-30 RX ORDER — FAMOTIDINE 10 MG/ML
20 INJECTION, SOLUTION INTRAVENOUS ONCE AS NEEDED
Status: COMPLETED | OUTPATIENT
Start: 2025-06-30 | End: 2025-06-30

## 2025-06-30 RX ORDER — ACETAMINOPHEN 325 MG/1
650 TABLET ORAL EVERY 6 HOURS PRN
Status: DISCONTINUED | OUTPATIENT
Start: 2025-06-30 | End: 2025-07-02 | Stop reason: HOSPADM

## 2025-06-30 RX ORDER — EPHEDRINE SULFATE 5 MG/ML
10 INJECTION INTRAVENOUS
Status: DISCONTINUED | OUTPATIENT
Start: 2025-06-30 | End: 2025-06-30 | Stop reason: HOSPADM

## 2025-06-30 RX ORDER — METHYLERGONOVINE MALEATE 0.2 MG/ML
200 INJECTION INTRAVENOUS ONCE AS NEEDED
Status: DISCONTINUED | OUTPATIENT
Start: 2025-06-30 | End: 2025-07-02 | Stop reason: HOSPADM

## 2025-06-30 RX ORDER — CARBOPROST TROMETHAMINE 250 UG/ML
250 INJECTION, SOLUTION INTRAMUSCULAR AS NEEDED
Status: DISCONTINUED | OUTPATIENT
Start: 2025-06-30 | End: 2025-06-30 | Stop reason: HOSPADM

## 2025-06-30 RX ORDER — OXYTOCIN/0.9 % SODIUM CHLORIDE 30/500 ML
250 PLASTIC BAG, INJECTION (ML) INTRAVENOUS CONTINUOUS
Status: ACTIVE | OUTPATIENT
Start: 2025-06-30 | End: 2025-06-30

## 2025-06-30 RX ORDER — HYDROCORTISONE 25 MG/G
1 CREAM TOPICAL AS NEEDED
Status: DISCONTINUED | OUTPATIENT
Start: 2025-06-30 | End: 2025-07-02 | Stop reason: HOSPADM

## 2025-06-30 RX ORDER — METHYLERGONOVINE MALEATE 0.2 MG/ML
200 INJECTION INTRAVENOUS ONCE AS NEEDED
Status: DISCONTINUED | OUTPATIENT
Start: 2025-06-30 | End: 2025-06-30 | Stop reason: HOSPADM

## 2025-06-30 RX ORDER — HYDROCODONE BITARTRATE AND ACETAMINOPHEN 5; 325 MG/1; MG/1
1 TABLET ORAL EVERY 4 HOURS PRN
Refills: 0 | Status: DISCONTINUED | OUTPATIENT
Start: 2025-06-30 | End: 2025-07-02 | Stop reason: HOSPADM

## 2025-06-30 RX ORDER — OXYTOCIN/0.9 % SODIUM CHLORIDE 30/500 ML
999 PLASTIC BAG, INJECTION (ML) INTRAVENOUS ONCE
Status: COMPLETED | OUTPATIENT
Start: 2025-06-30 | End: 2025-06-30

## 2025-06-30 RX ORDER — METOCLOPRAMIDE HYDROCHLORIDE 5 MG/ML
10 INJECTION INTRAMUSCULAR; INTRAVENOUS EVERY 6 HOURS PRN
Status: DISCONTINUED | OUTPATIENT
Start: 2025-06-30 | End: 2025-07-02 | Stop reason: HOSPADM

## 2025-06-30 RX ORDER — HYDROCORTISONE ACETATE PRAMOXINE HCL 1; 1 G/100G; G/100G
1 CREAM TOPICAL AS NEEDED
Status: DISCONTINUED | OUTPATIENT
Start: 2025-06-30 | End: 2025-07-02 | Stop reason: HOSPADM

## 2025-06-30 RX ORDER — BUPRENORPHINE HYDROCHLORIDE AND NALOXONE HYDROCHLORIDE DIHYDRATE 8; 2 MG/1; MG/1
4 TABLET SUBLINGUAL DAILY
Status: DISCONTINUED | OUTPATIENT
Start: 2025-06-30 | End: 2025-07-02 | Stop reason: HOSPADM

## 2025-06-30 RX ADMIN — DOCUSATE SODIUM 100 MG: 100 CAPSULE, LIQUID FILLED ORAL at 20:43

## 2025-06-30 RX ADMIN — PRENATAL VITAMINS-IRON FUMARATE 27 MG IRON-FOLIC ACID 0.8 MG TABLET 1 TABLET: at 08:33

## 2025-06-30 RX ADMIN — Medication 999 ML/HR: at 13:27

## 2025-06-30 RX ADMIN — FENTANYL CITRATE 100 MCG: 50 INJECTION INTRAMUSCULAR; INTRAVENOUS at 06:53

## 2025-06-30 RX ADMIN — FAMOTIDINE 20 MG: 20 TABLET, FILM COATED ORAL at 20:43

## 2025-06-30 RX ADMIN — MISOPROSTOL 600 MCG: 100 TABLET ORAL at 13:30

## 2025-06-30 RX ADMIN — LIDOCAINE HYDROCHLORIDE 4 ML: 20 INJECTION, SOLUTION EPIDURAL; INFILTRATION; INTRACAUDAL; PERINEURAL at 06:55

## 2025-06-30 RX ADMIN — Medication 14 ML/HR: at 06:53

## 2025-06-30 RX ADMIN — Medication 2 MILLI-UNITS/MIN: at 08:34

## 2025-06-30 RX ADMIN — IBUPROFEN 800 MG: 800 TABLET, FILM COATED ORAL at 17:59

## 2025-06-30 RX ADMIN — ACETAMINOPHEN 650 MG: 325 TABLET ORAL at 20:43

## 2025-06-30 RX ADMIN — Medication 25 MCG: at 02:55

## 2025-06-30 RX ADMIN — LIDOCAINE HYDROCHLORIDE,EPINEPHRINE BITARTRATE 3 ML: 10; .01 INJECTION, SOLUTION INFILTRATION; PERINEURAL at 06:48

## 2025-06-30 RX ADMIN — ACETAMINOPHEN 650 MG: 325 TABLET ORAL at 05:26

## 2025-06-30 RX ADMIN — METOCLOPRAMIDE 10 MG: 5 INJECTION, SOLUTION INTRAMUSCULAR; INTRAVENOUS at 09:34

## 2025-06-30 RX ADMIN — FAMOTIDINE 20 MG: 20 TABLET, FILM COATED ORAL at 08:33

## 2025-06-30 RX ADMIN — FAMOTIDINE 20 MG: 10 INJECTION, SOLUTION INTRAVENOUS at 03:04

## 2025-06-30 NOTE — NON STRESS TEST
Sailaja Alarcon, a  at 37w2d with an CHU of 2025, by Ultrasound, was seen at [facility] LABOR DELIVERY for a nonstress test.    Chief Complaint   Patient presents with    Scheduled Induction     Pt presents for IOL for IUGR. Pt reports FM+. Pt denies vag bleeding, LOF, or contractions.        Patient Active Problem List   Diagnosis    Cutaneous abscess of neck    Open wound    Non-pressure chronic ulcer of other part of left lower leg with fat layer exposed    Current every day smoker    Positive blood cultures    MRSA bacteremia    Infective endocarditis    Bacteremia    Pregnancy       Start Time:   Stop Time:     Interpretation A  Nonstress Test Interpretation A: Reactive  Comments A: VERIFIED BY TINO BURGER RN                 
Discharged
Alert and oriented to person, place and time

## 2025-06-30 NOTE — PLAN OF CARE
Problem: Adult Inpatient Plan of Care  Goal: Plan of Care Review  Outcome: Progressing  Goal: Patient-Specific Goal (Individualized)  Outcome: Progressing  Goal: Absence of Hospital-Acquired Illness or Injury  Outcome: Progressing  Goal: Optimal Comfort and Wellbeing  Outcome: Progressing  Goal: Readiness for Transition of Care  Outcome: Progressing     Problem: Fall Injury Risk  Goal: Absence of Fall and Fall-Related Injury  Outcome: Progressing     Problem: Postpartum (Vaginal Delivery)  Goal: Successful Parent Role Transition  Outcome: Progressing  Goal: Hemostasis  Outcome: Progressing  Goal: Absence of Infection Signs and Symptoms  Outcome: Progressing  Goal: Anesthesia/Sedation Recovery  Outcome: Progressing  Goal: Optimal Pain Control and Function  Outcome: Progressing  Goal: Effective Urinary Elimination  Outcome: Progressing     Problem:  Fall Injury Risk  Goal: Absence of Fall, Infant Drop and Related Injury  Outcome: Progressing   Goal Outcome Evaluation:

## 2025-06-30 NOTE — PLAN OF CARE
Goal Outcome Evaluation:  Plan of Care Reviewed With: patient, family        Progress: improving  Outcome Evaluation: IV SALINE LOCKED WITH NO REDNESS NOTED TO SITE.  EPIDURAL REMOVED WITH BLUE TIP INTACT.  PERICARE PROVIDED AND PADS APPLIED.

## 2025-06-30 NOTE — ANESTHESIA PREPROCEDURE EVALUATION
Anesthesia Evaluation     Patient summary reviewed and Nursing notes reviewed                Airway   Dental - normal exam     Pulmonary - normal exam   Cardiovascular - normal exam        Neuro/Psych  GI/Hepatic/Renal/Endo    (+) hepatitis, liver disease    Musculoskeletal     Abdominal  - normal exam   Substance History      OB/GYN    (+) Pregnant        Other                    Anesthesia Plan    ASA 2     epidural       Anesthetic plan, risks, benefits, and alternatives have been provided, discussed and informed consent has been obtained with: patient.    Use of blood products discussed with patient .      CODE STATUS:

## 2025-06-30 NOTE — ANESTHESIA PROCEDURE NOTES
Labor Epidural      Patient location during procedure: OB  Indication:at surgeon's request  Performed By  CRNA/FRANKY: Valarie Pollard CRNA  Preanesthetic Checklist  Completed: patient identified, IV checked, site marked, risks and benefits discussed, surgical consent, monitors and equipment checked, pre-op evaluation and timeout performed  Prep:  Pt Position:sitting  Sterile Tech:gloves, mask, sterile barrier and cap  Prep:povidone-iodine 7.5% surgical scrub  Monitoring:blood pressure monitoring  Epidural Block Procedure:  Approach:midline  Guidance:landmark technique and palpation technique  Location:L3-L4  Needle Type:Tuohy  Needle Gauge:17 G  Loss of Resistance Medium: saline  Loss of Resistance: 4cm  Cath Depth at skin:15 cm  Paresthesia: none  Aspiration:negative  Test Dose:negative  Number of Attempts: 1  Post Assessment:  Dressing:occlusive dressing applied, secured with tape and biopatch applied  Pt Tolerance:patient tolerated the procedure well with no apparent complications  Complications:no

## 2025-07-01 ENCOUNTER — PATIENT OUTREACH (OUTPATIENT)
Dept: LABOR AND DELIVERY | Facility: HOSPITAL | Age: 30
End: 2025-07-01
Payer: COMMERCIAL

## 2025-07-01 LAB
BASOPHILS # BLD AUTO: 0.03 10*3/MM3 (ref 0–0.2)
BASOPHILS NFR BLD AUTO: 0.4 % (ref 0–1.5)
DEPRECATED RDW RBC AUTO: 42.5 FL (ref 37–54)
EOSINOPHIL # BLD AUTO: 0.1 10*3/MM3 (ref 0–0.4)
EOSINOPHIL NFR BLD AUTO: 1.3 % (ref 0.3–6.2)
ERYTHROCYTE [DISTWIDTH] IN BLOOD BY AUTOMATED COUNT: 12.9 % (ref 12.3–15.4)
HCT VFR BLD AUTO: 29.3 % (ref 34–46.6)
HGB BLD-MCNC: 10 G/DL (ref 12–15.9)
IMM GRANULOCYTES # BLD AUTO: 0.05 10*3/MM3 (ref 0–0.05)
IMM GRANULOCYTES NFR BLD AUTO: 0.7 % (ref 0–0.5)
LYMPHOCYTES # BLD AUTO: 3.19 10*3/MM3 (ref 0.7–3.1)
LYMPHOCYTES NFR BLD AUTO: 42.4 % (ref 19.6–45.3)
MCH RBC QN AUTO: 31.3 PG (ref 26.6–33)
MCHC RBC AUTO-ENTMCNC: 34.1 G/DL (ref 31.5–35.7)
MCV RBC AUTO: 91.8 FL (ref 79–97)
MONOCYTES # BLD AUTO: 0.42 10*3/MM3 (ref 0.1–0.9)
MONOCYTES NFR BLD AUTO: 5.6 % (ref 5–12)
NEUTROPHILS NFR BLD AUTO: 3.74 10*3/MM3 (ref 1.7–7)
NEUTROPHILS NFR BLD AUTO: 49.6 % (ref 42.7–76)
NRBC BLD AUTO-RTO: 0 /100 WBC (ref 0–0.2)
PLATELET # BLD AUTO: 133 10*3/MM3 (ref 140–450)
PMV BLD AUTO: 11.2 FL (ref 6–12)
RBC # BLD AUTO: 3.19 10*6/MM3 (ref 3.77–5.28)
WBC NRBC COR # BLD AUTO: 7.53 10*3/MM3 (ref 3.4–10.8)

## 2025-07-01 PROCEDURE — 63710000001 DIPHENHYDRAMINE PER 50 MG: Performed by: OBSTETRICS & GYNECOLOGY

## 2025-07-01 PROCEDURE — 85025 COMPLETE CBC W/AUTO DIFF WBC: CPT | Performed by: OBSTETRICS & GYNECOLOGY

## 2025-07-01 RX ADMIN — IBUPROFEN 800 MG: 800 TABLET, FILM COATED ORAL at 08:06

## 2025-07-01 RX ADMIN — FAMOTIDINE 20 MG: 20 TABLET, FILM COATED ORAL at 08:06

## 2025-07-01 RX ADMIN — IBUPROFEN 800 MG: 800 TABLET, FILM COATED ORAL at 21:04

## 2025-07-01 RX ADMIN — DOCUSATE SODIUM 100 MG: 100 CAPSULE, LIQUID FILLED ORAL at 21:04

## 2025-07-01 RX ADMIN — FAMOTIDINE 20 MG: 20 TABLET, FILM COATED ORAL at 21:04

## 2025-07-01 RX ADMIN — BUPRENORPHINE AND NALOXONE 4 MG OF BUPRENORPHINE: 8; 2 TABLET SUBLINGUAL at 08:06

## 2025-07-01 RX ADMIN — DOCUSATE SODIUM 100 MG: 100 CAPSULE, LIQUID FILLED ORAL at 08:06

## 2025-07-01 RX ADMIN — DIPHENHYDRAMINE HYDROCHLORIDE 25 MG: 25 CAPSULE ORAL at 01:51

## 2025-07-01 RX ADMIN — IBUPROFEN 800 MG: 800 TABLET, FILM COATED ORAL at 15:28

## 2025-07-01 NOTE — PLAN OF CARE
Problem: Adult Inpatient Plan of Care  Goal: Plan of Care Review  Outcome: Progressing  Goal: Patient-Specific Goal (Individualized)  Outcome: Progressing  Goal: Absence of Hospital-Acquired Illness or Injury  Outcome: Progressing  Intervention: Identify and Manage Fall Risk  Recent Flowsheet Documentation  Taken 2025 by Shelby Sauer RN  Safety Promotion/Fall Prevention: safety round/check completed  Intervention: Prevent Infection  Recent Flowsheet Documentation  Taken 2025 by Shelby Sauer RN  Infection Prevention: rest/sleep promoted  Goal: Optimal Comfort and Wellbeing  Outcome: Progressing  Intervention: Provide Person-Centered Care  Recent Flowsheet Documentation  Taken 2025 by Shelby Sauer RN  Trust Relationship/Rapport:   care explained   choices provided  Goal: Readiness for Transition of Care  Outcome: Progressing     Problem: Fall Injury Risk  Goal: Absence of Fall and Fall-Related Injury  Outcome: Progressing  Intervention: Promote Injury-Free Environment  Recent Flowsheet Documentation  Taken 2025 by Shelby Sauer RN  Safety Promotion/Fall Prevention: safety round/check completed     Problem: Postpartum (Vaginal Delivery)  Goal: Successful Parent Role Transition  Outcome: Progressing  Goal: Hemostasis  Outcome: Progressing  Goal: Absence of Infection Signs and Symptoms  Outcome: Progressing  Goal: Anesthesia/Sedation Recovery  Outcome: Progressing  Intervention: Optimize Anesthesia Recovery  Recent Flowsheet Documentation  Taken 2025 by Shelby Sauer RN  Safety Promotion/Fall Prevention: safety round/check completed  Goal: Optimal Pain Control and Function  Outcome: Progressing  Goal: Effective Urinary Elimination  Outcome: Progressing     Problem:  Fall Injury Risk  Goal: Absence of Fall, Infant Drop and Related Injury  Outcome: Progressing  Intervention: Promote Injury-Free  Environment  Recent Flowsheet Documentation  Taken 6/30/2025 1912 by Shelby Sauer, RN  Safety Promotion/Fall Prevention: safety round/check completed   Goal Outcome Evaluation:

## 2025-07-01 NOTE — H&P
Patient Care Team:  Margaret Baxter APRN as PCP - General (Family Medicine)  Rupal Boswell, RN as Nurse Navigator (Obstetrics)    Chief complaint induction of labor secondary to IUGR    Subjective     Patient is a 29 y.o. female  7 para 5 at 37 weeks and 3 days who presents for induction of labor secondary to IUGR.  She has a history of IV drug abuse and uses Suboxone.  Otherwise her prenatal course has been unremarkable.    Review of Systems   Pertinent items are noted in HPI    History  Past Medical History:   Diagnosis Date    Current every day smoker 2024    Cutaneous abscess of neck 10/10/2021    Added automatically from request for surgery [id number]      Hepatitis C     Infective endocarditis 12/10/2024    Migraines     MRSA bacteremia 2024    Non-pressure chronic ulcer of other part of left lower leg with fat layer exposed 2024     Past Surgical History:   Procedure Laterality Date    HEAD/NECK ABSCESS INCISION AND DRAINAGE Left 2022    Procedure: HEAD NECK ABSCESS INCISION AND DRAINAGE;  Surgeon: Miquel Grayson MD;  Location: [facility] OR;  Service: General;  Laterality: Left;    INCISION AND DRAINAGE ABSCESS Right 10/10/2021    Procedure: INCISION AND DRAINAGE ABSCESS CENTRAL LINE PLACEMENT;  Surgeon: Naz Payne MD;  Location:  [facility] OR;  Service: General;  Laterality: Right;  I&D ABSCESS= INFECTED.  CENTRAL LINE = CLEAN.     Family History   Problem Relation Age of Onset    Hypertension Mother     Cancer Father     No Known Problems Sister     No Known Problems Brother     No Known Problems Son     No Known Problems Daughter     Hypertension Maternal Grandmother     Diabetes Maternal Grandfather     No Known Problems Paternal Grandmother     No Known Problems Paternal Grandfather     No Known Problems Cousin     Diabetes Maternal Aunt     Rheum arthritis Neg Hx     Osteoarthritis Neg Hx     Asthma Neg Hx     Heart failure Neg Hx     Hyperlipidemia  Neg Hx     Migraines Neg Hx     Rashes / Skin problems Neg Hx     Seizures Neg Hx     Stroke Neg Hx     Thyroid disease Neg Hx      Social History     Tobacco Use    Smoking status: Every Day     Current packs/day: 1.00     Average packs/day: 1.5 packs/day for 15.4 years (22.9 ttl pk-yrs)     Types: Cigarettes     Start date: 2/7/2010    Smokeless tobacco: Never   Vaping Use    Vaping status: Some Days    Substances: Nicotine, Flavoring    Devices: Disposable   Substance Use Topics    Alcohol use: No     Alcohol/week: 0.0 standard drinks of alcohol    Drug use: Not Currently     Frequency: 3.0 times per week     Types: IV     Comment: last use 5 YEARS AGO     E-cigarette/Vaping    E-cigarette/Vaping Use Current Some Day User      E-cigarette/Vaping Substances    Nicotine Yes     THC No     CBD No     Flavoring Yes      Medications Prior to Admission   Medication Sig Dispense Refill Last Dose/Taking    buprenorphine-naloxone (SUBOXONE) 8-2 MG per SL tablet Place 0.5 tablets under the tongue Daily.   6/28/2025    famotidine (PEPCID) 20 MG tablet Take 1 tablet by mouth 2 (Two) Times a Day.   6/28/2025    Pediatric Multivitamins-Iron (Flintstones Complete) 18 MG chewable tablet chewable tablet Chew 2 tablets Daily.   6/28/2025     Allergies:  Amoxicillin, Penicillins, Clindamycin/lincomycin, Vancomycin, and Zofran [ondansetron hcl]    Objective     Vital Signs  Temp:  [95.9 °F (35.5 °C)-98.4 °F (36.9 °C)] 97.9 °F (36.6 °C)  Heart Rate:  [43-79] 62  Resp:  [18-20] 18  BP: ()/(48-86) 117/73    Physical Exam:      General Appearance:  Alert, cooperative, in no acute distress   Head:  Normocephalic, without obvious abnormality, atraumatic   Eyes:  Lids and lashes normal, conjunctivae and sclerae normal, no icterus, no pallor, corneas clear, PERRLA   Ears:  Ears appear intact with no abnormalities noted   Throat:  No oral lesions, no thrush, oral mucosa moist   Neck:  No adenopathy, supple, trachea midline, no  thyromegaly, no carotid bruit, no JVD   Back:  No kyphosis present, no scoliosis present, no skin lesions, erythema or scars, no tenderness to percussion, or palpation, range of motion normal   Lungs:  Clear to auscultation,respirations regular, even and unlabored    Heart:  Regular rhythm and normal rate, normal S1 and S2, no murmur, no gallop, no rub, no click   Breast Exam:  Deferred   Abdomen:  Normal bowel sounds, no masses, no organomegaly, soft non-tender, non-distended, no guarding, no rebound tenderness   Genitalia:  Deferred   Extremities:  Moves all extremities well, no edema, no cyanosis, no redness   Pulses:  Pulses palpable and equal bilaterally   Skin:  No bleeding, bruising or rash   Lymph nodes:  No palpable adenopathy            Results Review:    I reviewed the patient's new clinical results.    Assessment & Plan     29-year-old  7 para 5 at 37 weeks and 3 days  Intrauterine growth restriction-admit for induction of labor.    I discussed the patient's findings and my recommendations with patient and family.     Mahin Jolley DO  25  11:11 EDT

## 2025-07-01 NOTE — PROGRESS NOTES
" [facility]  Vaginal Delivery Progress Note    Subjective   Postpartum Day 1: Vaginal Delivery    The patient feels well.  Denies complaints.  Lochia is light.      Objective     Vital Signs Range for the last 24 hours  Temperature: Temp:  [95.9 °F (35.5 °C)-98.4 °F (36.9 °C)] 97.9 °F (36.6 °C)   Temp Source: Temp src: Oral   BP: BP: ()/(48-86) 117/73   Pulse: Heart Rate:  [43-79] 62   Respirations: Resp:  [18-20] 18   SPO2: SpO2:  [97 %-98 %] 98 %   O2 Amount (l/min):     O2 Devices Device (Oxygen Therapy): room air   Weight:       Admit Height:  Height: 160 cm (63\")      Physical Exam:  General:  no acute distresss.  Abdomen: Fundus: appropriate, firm, non tender  Extremities: normal, atraumatic, no cyanosis, and trace edema.       Lab results reviewed:  Yes       Assessment & Plan     Normal postpartum state      Plan:  Continue current care.      Mahin Jolley DO  7/1/2025  11:12 EDT  "

## 2025-07-01 NOTE — CASE MANAGEMENT/SOCIAL WORK
Case Management/Social Work    Patient Name:  Sailaja Alarcon  YOB: 1995  MRN: [id number]  Admit Date:  6/29/2025        SS received consult for MAT positive for suboxone. Pt is 28 Y/O Sailaja Alarcon who delivered a viable baby girl. Infant was named Kimmie Alarcon. FOB Is John Alarcon who is involved. Pt lives at [address] [place] [address] [address] [place] [address] in [facility] with FOB and three other children 6 y/O Adelaida Alarcon, 3 Y/O Rosalina Alarcon and 1 Y/O John Alarcon.      Pt's UDS results are positive for buprenorphine. Infant's UDS results are negative. Pt reports she has a history with child protective services. Pt denies any open cases but states FOB has an open CPS case. SS made report to Central intake [phone] (Intake ID# [id number])  SS to follow up on 07/02/25 to determine if report met for investigation.      Infant care supplies including car seat are available. Maternal great grandmother to provide transportation.      SS to follow up with meconium drug screen results when available.       Electronically signed by:  HELENA Nelson  07/01/25 15:27 EDT

## 2025-07-01 NOTE — OUTREACH NOTE
Motherhood Connection  IP Postpartum    Questions/Answers      Flowsheet Row Responses   Best Method for Contacting Home   Support Person Present Yes   Does the patient have a car seat at the hospital Yes   Delivery Note Reviewed Reviewed   Were birth expectations met? Yes   Is there a need for additional support/resources? No   Is additional support needed? No   Any questions or concerns? No   Is the patient going to use Meds to Beds? Yes   Any concerns related discharge meds/ability to  prescriptions? No   OB Discharge Navigator Reviewed  Reviewed   Confirm Postpartum OB appointment No  [Will be made prior to discharge.]   Confirm initial well-child Pediatrician appointment date/time: No  [Will be made prior to discharge.]   Additional post-discharge F/U appointments No   Does patient have transportation to appointments? Yes   Any other assistance needed to ensure she is able to attend appointments? No   Does patient have supplies needed at home for  care? Clothing, Crib, Diapers            Spoke with patient at bedside. Pt up and ambulating in room. Pt with two family members and her infant daughter at bedside.    Referral submitted to the following resources (verbal consent received to submit demographic information):         Rupal Pineda RN  Maternity Nurse Navigator    2025, 13:00 EDT

## 2025-07-01 NOTE — L&D DELIVERY NOTE
[facility]  Vaginal Delivery Note    Pre-op Diagnosis:  Intrauterine pregnancy at 37w3d    Delivery     Delivery: Vaginal, Spontaneous    YOB: 2027   Time of Birth: 1:24 PM     Anesthesia: Epidural    Delivering clinician: Mahin Jolley   Forceps?   No   Vacuum? No    Shoulder dystocia present: No        Delivery narrative:      Infant    Findings: female infant     Infant observations: Weight: 2170 g (4 lb 12.5 oz)  Length: 18.504 in  Observations/Comments:        Apgars: 8  @ 1 minute /    9  @ 5 minutes   Infant Name:      Placenta, Cord, and Fluid    Placenta delivered  Spontaneous at  6/30/2025  1:30 PM    Cord: 3 vessels present.   Nuchal Cord?  no   Cord blood obtained: Yes                  Repair    Episiotomy: None   Lacerations: Yes  Laceration Information  Laceration Repaired?   Perineal: None     Periurethral:       Labial:       Sulcus:       Vaginal:       Cervical:         Suture used for repair:      Estimated Blood Loss:  100 mls.   Suture used for repair:      Complications  none    Disposition  Mother to postpartum in stable condition.    Mahin Jolley DO  06/30/25  21:35 EDT

## 2025-07-02 VITALS
DIASTOLIC BLOOD PRESSURE: 69 MMHG | HEART RATE: 58 BPM | OXYGEN SATURATION: 99 % | TEMPERATURE: 98.2 F | WEIGHT: 144 LBS | BODY MASS INDEX: 25.52 KG/M2 | HEIGHT: 63 IN | RESPIRATION RATE: 18 BRPM | SYSTOLIC BLOOD PRESSURE: 135 MMHG

## 2025-07-02 PROCEDURE — 63710000001 DIPHENHYDRAMINE PER 50 MG: Performed by: OBSTETRICS & GYNECOLOGY

## 2025-07-02 RX ORDER — IBUPROFEN 600 MG/1
600 TABLET, FILM COATED ORAL EVERY 6 HOURS PRN
Qty: 40 TABLET | Refills: 0 | Status: SHIPPED | OUTPATIENT
Start: 2025-07-02

## 2025-07-02 RX ORDER — DOCUSATE SODIUM 100 MG/1
100 CAPSULE, LIQUID FILLED ORAL 2 TIMES DAILY
Qty: 60 CAPSULE | Refills: 1 | Status: SHIPPED | OUTPATIENT
Start: 2025-07-02

## 2025-07-02 RX ADMIN — FAMOTIDINE 20 MG: 20 TABLET, FILM COATED ORAL at 08:55

## 2025-07-02 RX ADMIN — IBUPROFEN 800 MG: 800 TABLET, FILM COATED ORAL at 08:54

## 2025-07-02 RX ADMIN — BUPRENORPHINE AND NALOXONE 4 MG OF BUPRENORPHINE: 8; 2 TABLET SUBLINGUAL at 08:55

## 2025-07-02 RX ADMIN — DIPHENHYDRAMINE HYDROCHLORIDE 25 MG: 25 CAPSULE ORAL at 01:32

## 2025-07-02 NOTE — PLAN OF CARE
Problem: Adult Inpatient Plan of Care  Goal: Plan of Care Review  Outcome: Progressing  Goal: Patient-Specific Goal (Individualized)  Outcome: Progressing  Goal: Absence of Hospital-Acquired Illness or Injury  Outcome: Progressing  Intervention: Identify and Manage Fall Risk  Recent Flowsheet Documentation  Taken 2025 by Shelby Sauer RN  Safety Promotion/Fall Prevention: safety round/check completed  Goal: Optimal Comfort and Wellbeing  Outcome: Progressing  Goal: Readiness for Transition of Care  Outcome: Progressing     Problem: Fall Injury Risk  Goal: Absence of Fall and Fall-Related Injury  Outcome: Progressing  Intervention: Promote Injury-Free Environment  Recent Flowsheet Documentation  Taken 2025 by Shelby Sauer RN  Safety Promotion/Fall Prevention: safety round/check completed     Problem: Postpartum (Vaginal Delivery)  Goal: Successful Parent Role Transition  Outcome: Progressing  Goal: Hemostasis  Outcome: Progressing  Goal: Absence of Infection Signs and Symptoms  Outcome: Progressing  Goal: Anesthesia/Sedation Recovery  Outcome: Progressing  Intervention: Optimize Anesthesia Recovery  Recent Flowsheet Documentation  Taken 2025 by Shelby Sauer RN  Safety Promotion/Fall Prevention: safety round/check completed  Goal: Optimal Pain Control and Function  Outcome: Progressing  Goal: Effective Urinary Elimination  Outcome: Progressing     Problem:  Fall Injury Risk  Goal: Absence of Fall, Infant Drop and Related Injury  Outcome: Progressing  Intervention: Promote Injury-Free Environment  Recent Flowsheet Documentation  Taken 2025 by Shelby Sauer RN  Safety Promotion/Fall Prevention: safety round/check completed   Goal Outcome Evaluation:

## 2025-07-02 NOTE — DISCHARGE SUMMARY
"[facility]  Delivery Discharge Summary    Primary OB Clinician:     EDC: Estimated Date of Delivery: 25    Gestational Age:37w3d    Antepartum complications: intrauterine growth restriction    Date of Delivery: 2025  Time of Delivery: 1:24 PM    Delivered By:  Mahin Jolley    Delivery Type: Vaginal, Spontaneous     Tubal Ligation: n/a    Baby:female infant;   Apgar:  8  @ 1 minute /   Apgar:  9  @ 5 minutes   Weight: 2170 g (4 lb 12.5 oz)     Anesthesia: Epidural     Intrapartum complications: None    [unfilled]       Lab Results   Component Value Date    ABO AB 2025    RH Negative 2025        Lab Results   Component Value Date    HGB 10.0 (L) 2025    HCT 29.3 (L) 2025       Subjective   Subjective  Postpartum Day 2:  Delivery    The patient feels well.  Her pain is well controlled with nonsteroidal anti-inflammatory drugs.   She is ambulating well.  Patient describes her bleeding as thin lochia.    Breastfeeding: declines.    Objective     Objective:  Vital signs (most recent): Blood pressure 135/69, pulse 58, temperature 98.2 °F (36.8 °C), temperature source Oral, resp. rate 18, height 160 cm (63\"), weight 65.3 kg (144 lb), last menstrual period 2024, SpO2 99%, not currently breastfeeding.     Vital Signs Range for the last 24 hours  Temperature: Temp:  [98 °F (36.7 °C)-98.2 °F (36.8 °C)] 98.2 °F (36.8 °C)   Temp Source: Temp src: Oral   BP: BP: (104-135)/(56-78) 135/69   Pulse: Heart Rate:  [58-70] 58   Respirations: Resp:  [17-18] 18   Weight:       Admit Height:  Height: 160 cm (63\")    Physical Exam:  General:  no acute distresss.  Abdomen: Fundus: appropriate, firm, non tender  Extremities: normal, atraumatic, no cyanosis, and trace edema.         Assesment and Plan:    PPD 2 s/p   Substance abuse D/O- infant in NICU.  Mother requesting discharge home.  S/p SSC.    Follow-up appointment with [facility] in 3 weeks.    Discharge Date: " 7/2/2025; Discharge Time: 11:05 EDT        Yaquelin Espinal DO  7/2/2025  11:03 EDT

## 2025-07-03 LAB — REF LAB TEST METHOD: NORMAL

## 2025-07-10 ENCOUNTER — PATIENT OUTREACH (OUTPATIENT)
Dept: LABOR AND DELIVERY | Facility: HOSPITAL | Age: 30
End: 2025-07-10
Payer: COMMERCIAL

## 2025-07-10 NOTE — OUTREACH NOTE
[facility]  Postpartum Check-In    Questions/Answers      Flowsheet Row Responses   Visit Setting Telephone   Best Method for Contacting Home   OB Discharge Note Reviewed  Reviewed   OB Discharge Navigator Reviewed  Reviewed   OB Discharge Medications Reviewed  Reviewed    discharged home with mother? Yes  [Pts infant was discharged on 2025.]   Current Pain Levels 0-10 0   At Rest Pain Levels 0-10 0   Pain level with activity 0-10 0   Acceptable Pain Level 0-10 0   Pain Location --  [Pt denies pain today.]   Verbalized Emotional State Acceptance   Family/Support Network Family, Spouse   Level of Involvement in Care Attentive, Supportive   Do you feel comfortable in your relationship with your baby? Yes   Have members of your household adjusted to your baby? Yes   Is the baby's father supportive and/or involved with the baby? Yes   How does your partner feel about the baby? Happy   Do you feel safe at home, school and work? Yes   Are you in a relationship with someone who threatens you or hurts you? No   Do you have the resources to keep yourself and your baby healthy and safe? Yes   Lochia (per patient report) Similar to Menstrual Flow   Amount Scant   Number of pads per day 3   Lochia Odor Similar to menstrual flow   Is patient breastfeeding? No   How is breast suppression going? Pt states she does not have any problems with her breasts.   Postpartum Depression Screening Education Education Provided   Peripheral Blood Glucose Other   Doctor Appointments: Education Provided   Breastfeeding Education Other   Family Planning Education Declined   Postpartum Care Education Education Provided   S & S to report Education Provided   Followup Appointments Made Yes   Well Child Visit Appointments Made Yes   Appointment Date 25   Provider/Agency [facility]IAN[facility] Fernandez, [facility]   Peripheral Blood Glucose Education Referred NA   Breast feeding education other comments NA   Well Child Checkup  Provider Name Peyton Iniguez   Well Child Check Up Date: 07/10/25   Did you complete the visit? --  [Getting ready to leave for visit at time of call.]   Umbilical Cord No reported signs or symptoms   Was the baby circumcised? No   Feeding Readiness Cues: Crying   Infant Feeding Method Formula   Feeding Tolerance Adequate pause for breath, Arousal Required  [Pt states her infant gets sleepy during feedings and has to be stimulated to finish feed.]   Formula Type Other   Other Formula Similac Sensitive   Formula PO (mL) 60ml   Formula/Expressed Milk frequency of feedings: every 2 - 3 hours   Number of wet diapers x 24 hours 8   Last BM x 24 hours 2   Emesis (Unmeasured Occurence) 0   What safe sleep surface is available? Jenit, Nimesh   Are there stuffed animals, toys, pillows, quilts, blankets, wedges, positioners, bumpers or other loose bedding in the infant's sleeping environment? No   Where does the baby usually sleep? Bassinet   Does the baby ever share a sleep surface with a sibling, adult or pet? No   Does the baby ever share a sleep surface in a bed, couch, recliner or other? No   What position do you place your baby to sleep for naps? Back   What position do you place your baby to sleep at night Back   Are you and/or other caregivers smoking inside or outside the baby's home? Yes   Is the infant dressed appropiately for the temperature of the home? Yes   Do you use a clean, dry pacifier that is not attached to a string or stuffed animal? Yes            Review of Systems   Cardiovascular:  Positive for leg swelling.        Pt reports mild edema in ankles and feet.   Genitourinary:  Positive for vaginal bleeding.   All other systems reviewed and are negative.    Most Recent [place]  Depression Scale Score (EPDS)    Performed by a clinician: 7 (7/10/2025  1:31 PM)    Received via [place] questionnaire:  ()     Spoke with patient over the phone. Pt had vaginal delivery of her infant daughter  on 06/30/2025. Pt with hx of substance abuse and participating in MAT program at [facility].Pt reports she is doing well with minimal PP vaginal bleeding and denies pain at this time. She reports she does have mild edema in her ankles and feet. Pt's infant was hospitalized until 7/8 for monitoring for S/S of withdrawl, feeding tolerance, and weight gain due to IGUR. Pts mother states infant has been tolerating formula feeding well since discharge home and has appointment with pediatrics this afternoon. She reports her infant is having good voids and stools. EPDS completed with score of 7 noted. Pt declined 5Ps due to needing to leave for peds appointment for her infant. Pt denies any needs at this time.    5 Ps Screen  Declined    Referral submitted to the following resources (verbal consent received to submit demographic information):         Rupal Pineda RN  Maternity Nurse Navigator    7/10/2025, 13:54 EDT

## 2025-07-17 ENCOUNTER — PATIENT OUTREACH (OUTPATIENT)
Dept: CALL CENTER | Facility: HOSPITAL | Age: 30
End: 2025-07-17
Payer: COMMERCIAL

## 2025-07-17 NOTE — OUTREACH NOTE
Motherhood Connection Survey      Flowsheet Row Responses   Latter day facility patient discharged from? [place]   Week 1 attempt successful? No   Unsuccessful attempts Attempt 1   Reschedule Today              Martha GUZMÁN - Registered Nurse

## 2025-07-17 NOTE — OUTREACH NOTE
Motherhood Connection Survey      Flowsheet Row Responses   Caodaism facility patient discharged from? [facility]   Week 1 attempt successful? No   Unsuccessful attempts Attempt 2   Reschedule Tomorrow              Martha GUZMÁN - Registered Nurse

## 2025-07-18 ENCOUNTER — PATIENT OUTREACH (OUTPATIENT)
Dept: CALL CENTER | Facility: HOSPITAL | Age: 30
End: 2025-07-18
Payer: COMMERCIAL

## 2025-07-18 NOTE — OUTREACH NOTE
Motherhood Connection Survey      Flowsheet Row Responses   Buddhism facility patient discharged from? [place]   Week 1 attempt successful? No   Unsuccessful attempts Attempt 3   Revoke Decline to participate              JUDD VILLASENOR - Registered Nurse

## (undated) DEVICE — ANTIBACTERIAL UNDYED BRAIDED (POLYGLACTIN 910), SYNTHETIC ABSORBABLE SUTURE: Brand: COATED VICRYL

## (undated) DEVICE — PATIENT RETURN ELECTRODE, SINGLE-USE, CONTACT QUALITY MONITORING, ADULT, WITH 9FT CORD, FOR PATIENTS WEIGING OVER 33LBS. (15KG): Brand: MEGADYNE

## (undated) DEVICE — PK BASIC 70

## (undated) DEVICE — GLV SURG PREMIERPRO MIC LTX PF SZ7 BRN

## (undated) DEVICE — DRN PENRS RO SILCNE 1/4X12IN LF STRL

## (undated) DEVICE — TUBING, SUCTION, 1/4" X 20', STRAIGHT: Brand: MEDLINE INDUSTRIES, INC.

## (undated) DEVICE — HOLDER: Brand: DEROYAL

## (undated) DEVICE — DRAPE,UTILTY,TAPE,15X26, 4EA/PK: Brand: MEDLINE

## (undated) DEVICE — SYR LL TP 10ML STRL

## (undated) DEVICE — SUT ETHLN 4/0 P3 18IN 699G

## (undated) DEVICE — ADHS SKIN PREMIERPRO EXOFIN TOPICAL HI/VISC .5ML

## (undated) DEVICE — DBD-DRAPE,LAP,CHOLE,W/TROUGHS,STERILE: Brand: MEDLINE

## (undated) DEVICE — PK HD AND NK 70

## (undated) DEVICE — DRP UTIL 2/LAYR W/TP 15X26IN STRL PK/4

## (undated) DEVICE — TRY CVC VANTEX PI 7F 3L 20CM CHG TEGADERM 10

## (undated) DEVICE — TRY SKINPREP PVP SCRB W PAINT

## (undated) DEVICE — NDL HYPO ECLPS SFTY 18G 1 1/2IN

## (undated) DEVICE — GLV SURG PREMIERPRO MIC LTX PF SZ7.5 BRN

## (undated) DEVICE — SUT MNCRYL 4/0 PS2 18 IN

## (undated) DEVICE — GLV SURG PREMIERPRO MIC LTX PF SZ8 BRN